# Patient Record
Sex: MALE | Race: BLACK OR AFRICAN AMERICAN | Employment: OTHER | URBAN - METROPOLITAN AREA
[De-identification: names, ages, dates, MRNs, and addresses within clinical notes are randomized per-mention and may not be internally consistent; named-entity substitution may affect disease eponyms.]

---

## 2017-01-31 ENCOUNTER — TELEPHONE (OUTPATIENT)
Dept: FAMILY MEDICINE CLINIC | Age: 77
End: 2017-01-31

## 2017-01-31 RX ORDER — ROSUVASTATIN CALCIUM 20 MG/1
TABLET, COATED ORAL
Qty: 90 TAB | Refills: 0 | Status: SHIPPED | OUTPATIENT
Start: 2017-01-31 | End: 2017-07-12 | Stop reason: SDUPTHER

## 2017-01-31 NOTE — TELEPHONE ENCOUNTER
----- Message from Emery Palomino sent at 1/31/2017 12:30 PM EST -----  Regarding: Dr. Frank Kelly is requesting a call back with the status of the medical records request that was sent to the office on 1/20/17.  (u)[phone]

## 2017-04-04 RX ORDER — VALSARTAN 160 MG/1
TABLET ORAL
Qty: 30 TAB | Refills: 0 | Status: SHIPPED | OUTPATIENT
Start: 2017-04-04 | End: 2017-05-09 | Stop reason: SDUPTHER

## 2017-04-04 RX ORDER — GLIMEPIRIDE 4 MG/1
TABLET ORAL
Qty: 30 TAB | Refills: 0 | Status: SHIPPED | OUTPATIENT
Start: 2017-04-04 | End: 2017-05-09 | Stop reason: SDUPTHER

## 2017-04-04 RX ORDER — CLOPIDOGREL BISULFATE 75 MG/1
TABLET ORAL
Qty: 30 TAB | Refills: 0 | Status: SHIPPED | OUTPATIENT
Start: 2017-04-04 | End: 2017-05-09 | Stop reason: SDUPTHER

## 2017-05-09 RX ORDER — GLIMEPIRIDE 4 MG/1
TABLET ORAL
Qty: 30 TAB | Refills: 0 | Status: SHIPPED | OUTPATIENT
Start: 2017-05-09 | End: 2017-06-13 | Stop reason: SDUPTHER

## 2017-05-09 RX ORDER — VALSARTAN 160 MG/1
TABLET ORAL
Qty: 30 TAB | Refills: 0 | Status: SHIPPED | OUTPATIENT
Start: 2017-05-09 | End: 2017-06-13 | Stop reason: SDUPTHER

## 2017-05-09 RX ORDER — CLOPIDOGREL BISULFATE 75 MG/1
TABLET ORAL
Qty: 30 TAB | Refills: 0 | Status: SHIPPED | OUTPATIENT
Start: 2017-05-09 | End: 2017-06-13 | Stop reason: SDUPTHER

## 2017-06-13 RX ORDER — GLIMEPIRIDE 4 MG/1
TABLET ORAL
Qty: 90 TAB | Refills: 0 | Status: SHIPPED | OUTPATIENT
Start: 2017-06-13 | End: 2017-09-18 | Stop reason: SDUPTHER

## 2017-06-13 RX ORDER — METFORMIN HYDROCHLORIDE 1000 MG/1
TABLET ORAL
Qty: 180 TAB | Refills: 12 | Status: SHIPPED | OUTPATIENT
Start: 2017-06-13 | End: 2018-01-15 | Stop reason: SDUPTHER

## 2017-06-13 RX ORDER — VALSARTAN 160 MG/1
TABLET ORAL
Qty: 90 TAB | Refills: 0 | Status: SHIPPED | OUTPATIENT
Start: 2017-06-13 | End: 2017-09-18 | Stop reason: SDUPTHER

## 2017-06-13 RX ORDER — AMITRIPTYLINE HYDROCHLORIDE 10 MG/1
TABLET, FILM COATED ORAL
Qty: 90 TAB | Refills: 12 | Status: SHIPPED | OUTPATIENT
Start: 2017-06-13 | End: 2018-07-20 | Stop reason: SDUPTHER

## 2017-06-13 RX ORDER — CLOPIDOGREL BISULFATE 75 MG/1
TABLET ORAL
Qty: 90 TAB | Refills: 0 | Status: SHIPPED | OUTPATIENT
Start: 2017-06-13 | End: 2018-01-12 | Stop reason: ALTCHOICE

## 2017-07-12 ENCOUNTER — OFFICE VISIT (OUTPATIENT)
Dept: FAMILY MEDICINE CLINIC | Age: 77
End: 2017-07-12

## 2017-07-12 ENCOUNTER — HOSPITAL ENCOUNTER (OUTPATIENT)
Dept: LAB | Age: 77
Discharge: HOME | End: 2017-07-12
Payer: MEDICARE

## 2017-07-12 VITALS
WEIGHT: 215 LBS | HEIGHT: 73 IN | BODY MASS INDEX: 28.49 KG/M2 | TEMPERATURE: 97.6 F | RESPIRATION RATE: 16 BRPM | DIASTOLIC BLOOD PRESSURE: 68 MMHG | HEART RATE: 108 BPM | SYSTOLIC BLOOD PRESSURE: 124 MMHG | OXYGEN SATURATION: 96 %

## 2017-07-12 DIAGNOSIS — E11.9 DIABETES MELLITUS WITHOUT COMPLICATION (HCC): Primary | ICD-10-CM

## 2017-07-12 DIAGNOSIS — Z13.39 SCREENING FOR ALCOHOLISM: ICD-10-CM

## 2017-07-12 DIAGNOSIS — Z00.00 ROUTINE GENERAL MEDICAL EXAMINATION AT A HEALTH CARE FACILITY: ICD-10-CM

## 2017-07-12 DIAGNOSIS — R06.09 DYSPNEA ON EXERTION: ICD-10-CM

## 2017-07-12 DIAGNOSIS — Z23 ENCOUNTER FOR IMMUNIZATION: ICD-10-CM

## 2017-07-12 LAB — HBA1C MFR BLD HPLC: 7.4 %

## 2017-07-12 PROCEDURE — 82043 UR ALBUMIN QUANTITATIVE: CPT

## 2017-07-12 PROCEDURE — 80061 LIPID PANEL: CPT

## 2017-07-12 PROCEDURE — 80053 COMPREHEN METABOLIC PANEL: CPT

## 2017-07-12 PROCEDURE — 36415 COLL VENOUS BLD VENIPUNCTURE: CPT

## 2017-07-12 PROCEDURE — 85025 COMPLETE CBC W/AUTO DIFF WBC: CPT

## 2017-07-12 RX ORDER — ACETAMINOPHEN AND CODEINE PHOSPHATE 300; 30 MG/1; MG/1
2 TABLET ORAL
Qty: 100 TAB | Refills: 5 | Status: SHIPPED | OUTPATIENT
Start: 2017-07-12 | End: 2018-03-12 | Stop reason: SDUPTHER

## 2017-07-12 RX ORDER — IBUPROFEN 800 MG/1
800 TABLET ORAL
Qty: 90 TAB | Refills: 12 | Status: SHIPPED | OUTPATIENT
Start: 2017-07-12 | End: 2018-01-12 | Stop reason: ALTCHOICE

## 2017-07-12 NOTE — PROGRESS NOTES
Chief Complaint   Patient presents with    Hypertension    Diabetes    Cholesterol Problem     1. Have you been to the ER, urgent care clinic since your last visit? Hospitalized since your last visit? No    2. Have you seen or consulted any other health care providers outside of the [facility] since your last visit? Include any pap smears or colon screening.  No     Health Maintenance Due   Topic Date Due    MEDICARE YEARLY EXAM  12/09/2016    HEMOGLOBIN A1C Q6M  06/09/2017    MICROALBUMIN Q1  06/09/2017

## 2017-07-12 NOTE — MR AVS SNAPSHOT
Visit Information Date & Time Provider Department Dept. Phone Encounter #  
 7/12/2017  3:00 PM Tatyana Roberts MD [facility] [phone] [id number] Follow-up Instructions Return in about 6 months (around 1/12/2018). Your Appointments 1/12/2018 10:30 AM  
ROUTINE CARE with Tatyana Roberts MD  
[facility] [address] [facility]) Appt Note: 6 months f/u.eli 7.12.17  
 [address] [facility] [address] [id number] [phone] [address] [facility] [address] Upcoming Health Maintenance Date Due  
 MEDICARE YEARLY EXAM 12/9/2016 HEMOGLOBIN A1C Q6M 6/9/2017 MICROALBUMIN Q1 6/9/2017 INFLUENZA AGE 9 TO ADULT 8/1/2017 EYE EXAM RETINAL OR DILATED Q1 9/29/2017 FOOT EXAM Q1 12/9/2017 LIPID PANEL Q1 12/9/2017 GLAUCOMA SCREENING Q2Y 9/29/2018 COLONOSCOPY 12/9/2018 DTaP/Tdap/Td series (2 - Td) 6/8/2025 Allergies as of 7/12/2017  Review Complete On: 7/12/2017 By: Tatyana Roberts MD  
  
 Severity Noted Reaction Type Reactions Lipitor [Atorvastatin]  10/29/2013   Side Effect Diarrhea Current Immunizations  Reviewed on 12/9/2015 Name Date Influenza High Dose Vaccine PF 12/9/2016 10:30 AM, 12/9/2015, 10/1/2014 Influenza Vaccine Split 9/12/2012, 10/25/2011, 11/29/2010 Pneumococcal Vaccine (Unspecified Type) 11/29/2010, 10/24/2004 Tdap 6/8/2015 Zoster Vaccine, Live 6/5/2013 Not reviewed this visit You Were Diagnosed With   
  
 Codes Comments Diabetes mellitus without complication ([facility]    -  Primary ICD-10-CM: E11.9 ICD-9-CM: 250.00 Dyspnea on exertion     ICD-10-CM: R06.09 
ICD-9-CM: 786.09 Encounter for immunization     ICD-10-CM: G20 ICD-9-CM: V03.89 Routine general medical examination at a health care facility     ICD-10-CM: Z00.00 ICD-9-CM: V70.0 Screening for alcoholism     ICD-10-CM: Z13.89 ICD-9-CM: V79.1 Vitals BP Pulse Temp Resp Height(growth percentile) Weight(growth percentile) 124/68 (!) 108 97.6 °F (36.4 °C) (Oral) 16 6' 1\" (1.854 m) 215 lb (97.5 kg) SpO2 BMI Smoking Status 96% 28.37 kg/m2 Former Smoker BMI and BSA Data Body Mass Index Body Surface Area  
 28.37 kg/m 2 2.24 m 2 Preferred Pharmacy Pharmacy Name Phone [facility] [employer] - is now [facility] [address]  [place] [phone] Your Updated Medication List  
  
   
This list is accurate as of: 7/12/17  5:52 PM.  Always use your most recent med list.  
  
  
  
  
 acetaminophen-codeine 300-30 mg per tablet Commonly known as:  TYLENOL-CODEINE #3 Take 2 Tabs by mouth every four (4) hours as needed for Pain. amitriptyline 10 mg tablet Commonly known as:  ELAVIL TAKE 1 TABLET BY MOUTH NIGHTLY TO PREVENT HAVING TO PASS WATER AT NIGHT AS DIRECTED  
  
 aspirin delayed-release 81 mg tablet Take 1 Tab by mouth daily. To prevent heart attack and stroke  
  
 clopidogrel 75 mg Tab Commonly known as:  PLAVIX TAKE 1 TABLET BY MOUTH EVERY DAY  
  
 glimepiride 4 mg tablet Commonly known as:  AMARYL  
TAKE 1 TABLET BY MOUTH EVERY DAY FOR SUGAR  
  
 hydroCHLOROthiazide 12.5 mg tablet Commonly known as:  HYDRODIURIL Take 1 Tab by mouth daily. For blood pressure  
  
 ibuprofen 800 mg tablet Commonly known as:  MOTRIN Take 1 Tab by mouth every eight (8) hours as needed for Pain.  
  
 metFORMIN 1,000 mg tablet Commonly known as:  GLUCOPHAGE  
TAKE 1 TABLET BY MOUTH TWICE DAILY WITH MEALS  
  
 metoprolol tartrate 25 mg tablet Commonly known as:  LOPRESSOR Take 0.5 Tabs by mouth two (2) times a day. pneumococcal 13 richard conj dip 0.5 mL Syrg injection Commonly known as:  PREVNAR-13  
0.5 mL by IntraMUSCular route once for 1 dose. rosuvastatin 20 mg tablet Commonly known as:  CRESTOR  
 TAKE 1 TABLET BY MOUTH EVERY NIGHT AT BEDTIME  
  
 sAXagliptin 5 mg Tab tablet Commonly known as:  ONGLYZA Take 1 Tab by mouth daily. for diabetes  
  
 sildenafil citrate 100 mg tablet Commonly known as:  VIAGRA Take 1 Tab by mouth as needed. 1/2 - tab one hour before sex on an empty stomach  
  
 valsartan 160 mg tablet Commonly known as:  DIOVAN  
TAKE 1 TABLET BY MOUTH EVERY DAY Prescriptions Printed Refills  
 acetaminophen-codeine (TYLENOL-CODEINE #3) 300-30 mg per tablet 5 Sig: Take 2 Tabs by mouth every four (4) hours as needed for Pain. Class: Print Route: Oral  
 pneumococcal 13 richard conj dip (PREVNAR-13) 0.5 mL syrg injection 0 Si.5 mL by IntraMUSCular route once for 1 dose. Class: Print Route: IntraMUSCular Prescriptions Sent to Pharmacy Refills  
 ibuprofen (MOTRIN) 800 mg tablet 12 Sig: Take 1 Tab by mouth every eight (8) hours as needed for Pain. Class: Normal  
 Pharmacy: [facility] [address] #: [phone] Route: Oral  
  
We Performed the Following AMB POC EKG ROUTINE W/ 12 LEADS, INTER & REP [23548 CPT(R)] AMB POC HEMOGLOBIN A1C [89355 CPT(R)] AMB POC SPIROMETRY [53792 CPT(R)] CBC WITH AUTOMATED DIFF [69194 CPT(R)]  DIABETES FOOT EXAM [HM7 Custom] LIPID PANEL [32603 CPT(R)] METABOLIC PANEL, COMPREHENSIVE [17205 CPT(R)] MICROALBUMIN, UR, RAND W/ MICROALBUMIN/CREA RATIO A2560525 CPT(R)] REFERRAL TO CARDIOLOGY [ANL96 Custom] Comments:  
 Please evaluate patient for dyspnea, heart murmur Follow-up Instructions Return in about 6 months (around 2018). To-Do List   
 2017 ECHO:  2D ECHO COMPLETE ADULT (TTE) W OR WO CONTR   
  
 2017 Imaging:  XR CHEST PA LAT Referral Information Referral ID Referred By Referred To [id number] Berry Toro Not Available Visits Status Start Date End Date 1 New Request 7/12/17 7/12/18 If your referral has a status of pending review or denied, additional information will be sent to support the outcome of this decision. Patient Instructions Medicare Wellness Visit, Male The best way to live healthy is to have a healthy lifestyle by eating a well-balanced diet, exercising regularly, limiting alcohol and stopping smoking. Regular physical exams and screening tests are another way to keep healthy. Preventive exams provided by your health care provider can find health problems before they become diseases or illnesses. Preventive services including immunizations, screening tests, monitoring and exams can help you take care of your own health. All people over age 72 should have a pneumovax  and and a prevnar shot to prevent pneumonia. These are once in a lifetime unless you and your provider decide differently. You had the PPSV 23 vaccine but are due for prevnar 13 - reviewed this with you today. All people over 65 should have a yearly flu shot and a tetanus vaccine every 10 years. You had your flu shot this year and are due again in the Fall 2017 coming up soon. You had Tdap in 2015 and will be due for a tetanus booster in 2025. Screening for diabetes mellitus with a blood sugar test should be done every year. Your A1c in December 2016 was 6.9% which is controlled. This will be checked again today. Glaucoma is a disease of the eye due to increased ocular pressure that can lead to blindness and it should be done every year by an eye professional. Boom Acuna are due for an eye exam in September 2017 and plan to make an appt. Cardiovascular screening tests that check for elevated lipids (fatty part of blood) which can lead to heart disease and strokes should be done every 5 years.  Your cholesterol was checked in December 2016, and is being checked again today. You are on a cholesterol medication. Colorectal screening that evaluates for blood or polyps in your colon should be done yearly as a stool test or every five years as a flexible sigmoidoscope or every 10 years as a colonoscopy up to age 76. You plan to discuss with Dr. Crystal Baker if you need this. Men up to age 76 may need a screening blood test for prostate cancer at certain intervals, depending on their personal and family history. This decision is between the patient and his provider. You had a normal PSA check in 2013. A shingles vaccine is also recommended once in a lifetime after age 61. You had this vaccine on 6/5/13. Your Medicare Wellness Exam is recommended annually. Introducing [facility] & HE[facility] SERVICES! Haley Shane introduces D[employer] patient portal. Now you can access parts of your medical record, email your doctor's office, and request medication refills online. 1. In your internet browser, go to [email or web address] 2. Click on the First Time User? Click Here link in the Sign In box. You will see the New Member Sign Up page. 3. Enter your DXY Access Code exactly as it appears below. You will not need to use this code after youve completed the sign-up process. If you do not sign up before the expiration date, you must request a new code. · DXY Access Code: [email or web address] Expires: 10/10/2017  4:21 PM 
 
4. Enter the last four digits of your Social Security Number (xxxx) and Date of Birth (mm/dd/yyyy) as indicated and click Submit. You will be taken to the next sign-up page. 5. Create a DXY ID. This will be your DXY login ID and cannot be changed, so think of one that is secure and easy to remember. 6. Create a DXY password. You can change your password at any time. 7. Enter your Password Reset Question and Answer. This can be used at a later time if you forget your password. 8. Enter your e-mail address. You will receive e-mail notification when new information is available in [address] 9. Click Sign Up. You can now view and download portions of your medical record. 10. Click the Download Summary menu link to download a portable copy of your medical information. If you have questions, please visit the Frequently Asked Questions section of the [employer] website. Remember, [employer] is NOT to be used for urgent needs. For medical emergencies, dial 911. Now available from your iPhone and Android! Please provide this summary of care documentation to your next provider. Your primary care clinician is listed as Stephanie Graves. If you have any questions after today's visit, please call [phone].

## 2017-07-12 NOTE — PROGRESS NOTES
Dr. Claudell Canes Referred AW, 1940, a 68 y.o. male for a Medicare Annual Wellness Visit (AWV). This is a Subsequent Medicare Annual Wellness Visit providing Personalized Prevention Plan Services (PPPS) (Performed 12 months after initial AWV and PPPS )    I have reviewed the patient's medical history in detail and updated the computerized patient record. History     Past Medical History:   Diagnosis Date    Arthritis     CAD (coronary artery disease)     Diabetes ([facility]     Diabetic neuropathy ([facility]     Hypercholesterolemia     Hypertension 11/29/10    PVD (peripheral vascular disease) ([facility]     Shingles 2010    [facility]boembolus [facility])       Past Surgical History:   Procedure Laterality Date    HX CARPAL TUNNEL RELEASE  left    HX HEART CATHETERIZATION  12/2005    Dr Lan      left arm fracture    CA COLONOSCOPY FLX DX W/COLLJ SPEC WHEN PFRMD  11/12/2012          Current Outpatient Prescriptions   Medication Sig Dispense Refill    acetaminophen-codeine (TYLENOL-CODEINE #3) 300-30 mg per tablet Take 2 Tabs by mouth every four (4) hours as needed for Pain. 100 Tab 5    ibuprofen (MOTRIN) 800 mg tablet Take 1 Tab by mouth every eight (8) hours as needed for Pain. 90 Tab 12    pneumococcal 13 richard conj dip (PREVNAR-13) 0.5 mL syrg injection 0.5 mL by IntraMUSCular route once for 1 dose. 0.5 mL 0    sAXagliptin (ONGLYZA) 5 mg tab tablet Take 1 Tab by mouth daily.  for diabetes 30 Tab 12    metFORMIN (GLUCOPHAGE) 1,000 mg tablet TAKE 1 TABLET BY MOUTH TWICE DAILY WITH MEALS 180 Tab 12    amitriptyline (ELAVIL) 10 mg tablet TAKE 1 TABLET BY MOUTH NIGHTLY TO PREVENT HAVING TO PASS WATER AT NIGHT AS DIRECTED 90 Tab 12    clopidogrel (PLAVIX) 75 mg tab TAKE 1 TABLET BY MOUTH EVERY DAY 90 Tab 0    valsartan (DIOVAN) 160 mg tablet TAKE 1 TABLET BY MOUTH EVERY DAY 90 Tab 0    glimepiride (AMARYL) 4 mg tablet TAKE 1 TABLET BY MOUTH EVERY DAY FOR SUGAR 90 Tab 0    rosuvastatin (CRESTOR) 20 mg tablet TAKE 1 TABLET BY MOUTH EVERY NIGHT AT BEDTIME 90 Tab 3    metoprolol tartrate (LOPRESSOR) 25 mg tablet Take 0.5 Tabs by mouth two (2) times a day. 30 Tab 3    hydrochlorothiazide (HYDRODIURIL) 12.5 mg tablet Take 1 Tab by mouth daily. For blood pressure 90 Tab 3    aspirin delayed-release 81 mg tablet Take 1 Tab by mouth daily. To prevent heart attack and stroke 30 Tab 12    sildenafil citrate (VIAGRA) 100 mg tablet Take 1 Tab by mouth as needed. 1/2 - tab one hour before sex on an empty stomach 6 Tab 5     Allergies   Allergen Reactions    Lipitor [Atorvastatin] Diarrhea     Family History   Problem Relation Age of Onset    Stroke Father     Heart Disease Brother     Cancer Brother      Social History   Substance Use Topics    Smoking status: Former Smoker     Quit date: 1/1/2000    Smokeless tobacco: Never Used      Comment: 15 years ago    Alcohol use Yes      Comment: occ. Patient Active Problem List   Diagnosis Code    Hypercholesterolemia E78.00    Diabetes ([facility] E11.9    Diabetes mellitus without complication ([facility] T85.6    Essential hypertension I10       Depression Risk Factor Screening:     PHQ over the last two weeks 7/12/2017   Little interest or pleasure in doing things Not at all   Feeling down, depressed or hopeless Not at all   Total Score PHQ 2 0     Alcohol Risk Factor Screening: On any occasion during the past 3 months, have you had more than 4 drinks containing alcohol? No    Do you average more than 14 drinks per week? No        Functional Ability and Level of Safety:     Hearing Loss   mild-to-moderate    Activities of Daily Living   Self-care.    Requires assistance with: no ADLs  ADL Assessment 7/12/2017   Feeding yourself No Help Needed   Getting from bed to chair No Help Needed   Getting dressed No Help Needed   Bathing or showering No Help Needed   Walk across the room (includes cane/walker) No Help Needed   Using the telphone No Help Needed   Taking your medications No Help Needed   Preparing meals No Help Needed   Managing money (expenses/bills) No Help Needed   Moderately strenuous housework (laundry) No Help Needed   Shopping for personal items (toiletries/medicines) No Help Needed   Shopping for groceries No Help Needed   Driving No Help Needed   Climbing a flight of stairs No Help Needed   Getting to places beyond walking distances No Help Needed         Fall Risk   Fall Risk Assessment, last 12 mths 7/12/2017   Able to walk? Yes   Fall in past 12 months? No     Abuse Screen   Patient is not abused    Review of Systems   Not required    Physical Examination     Evaluation of Cognitive Function:  Mood/affect:  happy  Appearance: age appropriate and within normal Limits  Family member/caregiver input: none present    No exam performed today for AWV; Dr. Stephanie Corona saw pt today and performed. Patient Care Team:  Yael Bobby MD as PCP - General    Advice/Referrals/Counseling   Education and counseling provided:  End-of-Life planning (with patient's consent)  Pneumococcal Vaccine  Colorectal cancer screening tests  Screening for glaucoma    Due for prevnar 13 - given information on this and also the \"your right to decide brochure\" for advanced directives     Assessment/Plan   specific diabetic recommendations: home glucose monitoring emphasized   Recommend repeat A1c as planned today - last check it was at goal at 6.9% - pt checks daily and states ranges from 85 - 170. Reviewed medicare-recommended preventative screenings as noted in patient instructions. Patient verbalized understanding of information presented. Answered all of the patient's questions. AVS handed and reviewed with patient.     Nathen Mcfadden, PHARMD, CDE

## 2017-07-12 NOTE — PROGRESS NOTES
HISTORY OF PRESENT ILLNESS  Angelique Lucia is a 68 y.o. male. HPI Pt. Comes in for blood pressure, cholesterol, and diabetes check. Has had some mild dyspnea for the last 3 months, seems to get worse when gets a cold. Symptoms seem worse if climbs stairs or cuts grass. . NO real chest tightness, edema. Slight cough at times. Nonsmoker. Blood sugars usually run in the 100s. NO hypoglycemic episodes. ROS    Physical Exam   Constitutional: He appears well-developed and well-nourished. HENT:   Right Ear: External ear normal.   Left Ear: External ear normal.   Mouth/Throat: Oropharynx is clear and moist.   Neck: No thyromegaly present. Cardiovascular: Normal rate, regular rhythm, normal heart sounds and intact distal pulses. 2/6 systolic murmur   Pulmonary/Chest: Effort normal and breath sounds normal. No respiratory distress. He has no wheezes. Abdominal: Soft. Bowel sounds are normal. He exhibits no distension and no mass. There is no tenderness. There is no guarding. Musculoskeletal: Normal range of motion. He exhibits no edema. Lymphadenopathy:     He has no cervical adenopathy. Nursing note and vitals reviewed. ASSESSMENT and PLAN  Orders Placed This Encounter    AMB POC SPIROMETRY    XR CHEST PA LAT     Standing Status:   Future     Number of Occurrences:   1     Standing Expiration Date:   10/12/2017     Order Specific Question:   Reason for Exam     Answer:   dyspnea     Order Specific Question:   Is Patient Allergic to Contrast Dye?      Answer:   No    CBC WITH AUTOMATED DIFF    METABOLIC PANEL, COMPREHENSIVE    LIPID PANEL    MICROALBUMIN, UR, RAND W/ MICROALBUMIN/CREA RATIO    REFERRAL TO CARDIOLOGY     Referral Priority:   Routine     Referral Type:   Consultation     Referral Reason:   Specialty Services Required    AMB POC HEMOGLOBIN A1C    AMB POC EKG ROUTINE W/ 12 LEADS, INTER & REP     Order Specific Question:   Reason for Exam:     Answer:   dyspnea    2D ECHO COMPLETE ADULT (TTE) W OR WO CONTR     Standing Status:   Future     Standing Expiration Date:   2018     Order Specific Question:   Reason for Exam:     Answer:   dyspnea, heart murmur     Order Specific Question:   Contrast Enhancement (Bubble Study, Definity, Optison) may be used if criteria listed in established evidence-based protocol has been identified. Answer:   No    HM DIABETES FOOT EXAM    acetaminophen-codeine (TYLENOL-CODEINE #3) 300-30 mg per tablet     Sig: Take 2 Tabs by mouth every four (4) hours as needed for Pain. Dispense:  100 Tab     Refill:  5    ibuprofen (MOTRIN) 800 mg tablet     Sig: Take 1 Tab by mouth every eight (8) hours as needed for Pain. Dispense:  90 Tab     Refill:  12    pneumococcal 13 richard conj dip (PREVNAR-13) 0.5 mL syrg injection     Si.5 mL by IntraMUSCular route once for 1 dose. Dispense:  0.5 mL     Refill:  0     Orders Placed This Encounter    AMB POC SPIROMETRY    XR CHEST PA LAT    CBC WITH AUTOMATED DIFF    METABOLIC PANEL, COMPREHENSIVE    LIPID PANEL    MICROALBUMIN, UR, RAND W/ MICROALBUMIN/CREA RATIO    REFERRAL TO CARDIOLOGY    AMB POC HEMOGLOBIN A1C    AMB POC EKG ROUTINE W/ 12 LEADS, INTER & REP    2D ECHO COMPLETE ADULT (TTE) W OR WO CONTR    HM DIABETES FOOT EXAM    acetaminophen-codeine (TYLENOL-CODEINE #3) 300-30 mg per tablet    ibuprofen (MOTRIN) 800 mg tablet    pneumococcal 13 richard conj dip (PREVNAR-13) 0.5 mL syrg injection     Rory was seen today for hypertension, diabetes and cholesterol problem. Diagnoses and all orders for this visit:    Diabetes mellitus without complication ([facility]  -     CBC WITH AUTOMATED DIFF  -     METABOLIC PANEL, COMPREHENSIVE  -     LIPID PANEL  -     AMB POC HEMOGLOBIN A1C  -     MICROALBUMIN, UR, RAND W/ MICROALBUMIN/CREA RATIO  -     HM DIABETES FOOT EXAM    Dyspnea on exertion  -     AMB POC EKG ROUTINE W/ 12 LEADS, INTER & REP  -     XR CHEST PA LAT;  Future  -     REFERRAL TO CARDIOLOGY  -     2D ECHO COMPLETE ADULT (TTE) W OR WO CONTR; Future  -     AMB POC SPIROMETRY    Encounter for immunization  -     acetaminophen-codeine (TYLENOL-CODEINE #3) 300-30 mg per tablet; Take 2 Tabs by mouth every four (4) hours as needed for Pain. Routine general medical examination at a health care facility  -     acetaminophen-codeine (TYLENOL-CODEINE #3) 300-30 mg per tablet; Take 2 Tabs by mouth every four (4) hours as needed for Pain. Screening for alcoholism    Other orders  -     ibuprofen (MOTRIN) 800 mg tablet; Take 1 Tab by mouth every eight (8) hours as needed for Pain. -     pneumococcal 13 richard conj dip (PREVNAR-13) 0.5 mL syrg injection; 0.5 mL by IntraMUSCular route once for 1 dose. Follow-up Disposition:  Return in about 6 months (around 1/12/2018).

## 2017-07-12 NOTE — PATIENT INSTRUCTIONS
Medicare Wellness Visit, Male    The best way to live healthy is to have a healthy lifestyle by eating a well-balanced diet, exercising regularly, limiting alcohol and stopping smoking. Regular physical exams and screening tests are another way to keep healthy. Preventive exams provided by your health care provider can find health problems before they become diseases or illnesses. Preventive services including immunizations, screening tests, monitoring and exams can help you take care of your own health. All people over age 72 should have a pneumovax  and and a prevnar shot to prevent pneumonia. These are once in a lifetime unless you and your provider decide differently. You had the PPSV 23 vaccine but are due for prevnar 13 - reviewed this with you today. All people over 65 should have a yearly flu shot and a tetanus vaccine every 10 years. You had your flu shot this year and are due again in the Fall 2017 coming up soon. You had Tdap in 2015 and will be due for a tetanus booster in 2025. Screening for diabetes mellitus with a blood sugar test should be done every year. Your A1c in December 2016 was 6.9% which is controlled. This will be checked again today. Glaucoma is a disease of the eye due to increased ocular pressure that can lead to blindness and it should be done every year by an eye professional. Tc Bourgeois are due for an eye exam in September 2017 and plan to make an appt. Cardiovascular screening tests that check for elevated lipids (fatty part of blood) which can lead to heart disease and strokes should be done every 5 years. Your cholesterol was checked in December 2016, and is being checked again today. You are on a cholesterol medication. Colorectal screening that evaluates for blood or polyps in your colon should be done yearly as a stool test or every five years as a flexible sigmoidoscope or every 10 years as a colonoscopy up to age 76.  You plan to discuss with Dr. Tamara Boudreaux if you need this. Men up to age 76 may need a screening blood test for prostate cancer at certain intervals, depending on their personal and family history. This decision is between the patient and his provider. You had a normal PSA check in 2013. A shingles vaccine is also recommended once in a lifetime after age 61. You had this vaccine on 6/5/13. Your Medicare Wellness Exam is recommended annually.

## 2017-07-13 LAB
ALBUMIN SERPL-MCNC: 4.4 G/DL (ref 3.5–4.8)
ALBUMIN/CREAT UR: 16.8 MG/G CREAT (ref 0–30)
ALBUMIN/GLOB SERPL: 1.5 {RATIO} (ref 1.2–2.2)
ALP SERPL-CCNC: 78 IU/L (ref 39–117)
ALT SERPL-CCNC: 21 IU/L (ref 0–44)
AST SERPL-CCNC: 25 IU/L (ref 0–40)
BASOPHILS # BLD AUTO: 0 X10E3/UL (ref 0–0.2)
BASOPHILS NFR BLD AUTO: 0 %
BILIRUB SERPL-MCNC: 0.3 MG/DL (ref 0–1.2)
BUN SERPL-MCNC: 12 MG/DL (ref 8–27)
BUN/CREAT SERPL: 11 (ref 10–24)
CALCIUM SERPL-MCNC: 10.1 MG/DL (ref 8.6–10.2)
CHLORIDE SERPL-SCNC: 97 MMOL/L (ref 96–106)
CHOLEST SERPL-MCNC: 151 MG/DL (ref 100–199)
CO2 SERPL-SCNC: 24 MMOL/L (ref 18–29)
CREAT SERPL-MCNC: 1.05 MG/DL (ref 0.76–1.27)
CREAT UR-MCNC: 246.4 MG/DL
EOSINOPHIL # BLD AUTO: 0.2 X10E3/UL (ref 0–0.4)
EOSINOPHIL NFR BLD AUTO: 3 %
ERYTHROCYTE [DISTWIDTH] IN BLOOD BY AUTOMATED COUNT: 14 % (ref 12.3–15.4)
GLOBULIN SER CALC-MCNC: 3 G/DL (ref 1.5–4.5)
GLUCOSE SERPL-MCNC: 183 MG/DL (ref 65–99)
HCT VFR BLD AUTO: 40.3 % (ref 37.5–51)
HDLC SERPL-MCNC: 40 MG/DL
HGB BLD-MCNC: 12.9 G/DL (ref 12.6–17.7)
IMM GRANULOCYTES # BLD: 0 X10E3/UL (ref 0–0.1)
IMM GRANULOCYTES NFR BLD: 0 %
INTERPRETATION, 910389: NORMAL
LDLC SERPL CALC-MCNC: 82 MG/DL (ref 0–99)
LYMPHOCYTES # BLD AUTO: 3.1 X10E3/UL (ref 0.7–3.1)
LYMPHOCYTES NFR BLD AUTO: 34 %
Lab: NORMAL
Lab: NORMAL
MCH RBC QN AUTO: 27.7 PG (ref 26.6–33)
MCHC RBC AUTO-ENTMCNC: 32 G/DL (ref 31.5–35.7)
MCV RBC AUTO: 87 FL (ref 79–97)
MICROALBUMIN UR-MCNC: 41.5 UG/ML
MONOCYTES # BLD AUTO: 0.5 X10E3/UL (ref 0.1–0.9)
MONOCYTES NFR BLD AUTO: 6 %
NEUTROPHILS # BLD AUTO: 5 X10E3/UL (ref 1.4–7)
NEUTROPHILS NFR BLD AUTO: 57 %
PLATELET # BLD AUTO: 325 X10E3/UL (ref 150–379)
POTASSIUM SERPL-SCNC: 4.5 MMOL/L (ref 3.5–5.2)
PROT SERPL-MCNC: 7.4 G/DL (ref 6–8.5)
RBC # BLD AUTO: 4.65 X10E6/UL (ref 4.14–5.8)
SODIUM SERPL-SCNC: 139 MMOL/L (ref 134–144)
TRIGL SERPL-MCNC: 147 MG/DL (ref 0–149)
VLDLC SERPL CALC-MCNC: 29 MG/DL (ref 5–40)
WBC # BLD AUTO: 8.9 X10E3/UL (ref 3.4–10.8)

## 2017-08-02 ENCOUNTER — CLINICAL SUPPORT (OUTPATIENT)
Dept: CARDIOLOGY CLINIC | Age: 77
End: 2017-08-02

## 2017-08-02 DIAGNOSIS — I10 ESSENTIAL HYPERTENSION: Primary | ICD-10-CM

## 2017-08-02 DIAGNOSIS — E78.00 HYPERCHOLESTEROLEMIA: ICD-10-CM

## 2017-09-18 RX ORDER — GLIMEPIRIDE 4 MG/1
TABLET ORAL
Qty: 90 TAB | Refills: 0 | Status: SHIPPED | OUTPATIENT
Start: 2017-09-18 | End: 2017-12-10 | Stop reason: SDUPTHER

## 2017-09-18 RX ORDER — VALSARTAN 160 MG/1
TABLET ORAL
Qty: 90 TAB | Refills: 0 | Status: SHIPPED | OUTPATIENT
Start: 2017-09-18 | End: 2017-12-10 | Stop reason: SDUPTHER

## 2017-11-03 RX ORDER — NAPROXEN 500 MG/1
TABLET ORAL
Qty: 180 TAB | Refills: 12 | Status: SHIPPED | OUTPATIENT
Start: 2017-11-03 | End: 2018-03-12 | Stop reason: SDUPTHER

## 2017-12-11 RX ORDER — METOPROLOL TARTRATE 25 MG/1
TABLET, FILM COATED ORAL
Qty: 30 TAB | Refills: 0 | Status: SHIPPED | OUTPATIENT
Start: 2017-12-11 | End: 2018-01-12 | Stop reason: SDUPTHER

## 2017-12-11 RX ORDER — GLIMEPIRIDE 4 MG/1
TABLET ORAL
Qty: 90 TAB | Refills: 0 | Status: SHIPPED | OUTPATIENT
Start: 2017-12-11 | End: 2018-01-12 | Stop reason: SDUPTHER

## 2017-12-11 RX ORDER — VALSARTAN 160 MG/1
TABLET ORAL
Qty: 90 TAB | Refills: 0 | Status: SHIPPED | OUTPATIENT
Start: 2017-12-11 | End: 2018-01-12 | Stop reason: SDUPTHER

## 2018-01-12 ENCOUNTER — HOSPITAL ENCOUNTER (OUTPATIENT)
Dept: LAB | Age: 78
Discharge: HOME | End: 2018-01-12
Payer: MEDICARE

## 2018-01-12 ENCOUNTER — OFFICE VISIT (OUTPATIENT)
Dept: FAMILY MEDICINE CLINIC | Age: 78
End: 2018-01-12

## 2018-01-12 VITALS
TEMPERATURE: 97.2 F | HEIGHT: 73 IN | OXYGEN SATURATION: 96 % | BODY MASS INDEX: 29.29 KG/M2 | SYSTOLIC BLOOD PRESSURE: 138 MMHG | WEIGHT: 221 LBS | HEART RATE: 60 BPM | DIASTOLIC BLOOD PRESSURE: 55 MMHG | RESPIRATION RATE: 14 BRPM

## 2018-01-12 DIAGNOSIS — E11.9 TYPE 2 DIABETES MELLITUS WITHOUT COMPLICATION, WITH LONG-TERM CURRENT USE OF INSULIN (HCC): Primary | ICD-10-CM

## 2018-01-12 DIAGNOSIS — I10 ESSENTIAL HYPERTENSION: ICD-10-CM

## 2018-01-12 DIAGNOSIS — R49.0 HOARSENESS: ICD-10-CM

## 2018-01-12 DIAGNOSIS — E78.00 HYPERCHOLESTEROLEMIA: ICD-10-CM

## 2018-01-12 DIAGNOSIS — Z79.4 TYPE 2 DIABETES MELLITUS WITHOUT COMPLICATION, WITH LONG-TERM CURRENT USE OF INSULIN (HCC): Primary | ICD-10-CM

## 2018-01-12 DIAGNOSIS — Z23 ENCOUNTER FOR IMMUNIZATION: ICD-10-CM

## 2018-01-12 LAB — HBA1C MFR BLD HPLC: 8.1 %

## 2018-01-12 PROCEDURE — 85025 COMPLETE CBC W/AUTO DIFF WBC: CPT

## 2018-01-12 PROCEDURE — 80061 LIPID PANEL: CPT

## 2018-01-12 PROCEDURE — 82043 UR ALBUMIN QUANTITATIVE: CPT

## 2018-01-12 PROCEDURE — 36415 COLL VENOUS BLD VENIPUNCTURE: CPT

## 2018-01-12 PROCEDURE — 80053 COMPREHEN METABOLIC PANEL: CPT

## 2018-01-12 RX ORDER — GLIMEPIRIDE 4 MG/1
TABLET ORAL
Qty: 90 TAB | Refills: 3 | Status: SHIPPED | OUTPATIENT
Start: 2018-01-12 | End: 2018-03-12 | Stop reason: SDUPTHER

## 2018-01-12 RX ORDER — VALSARTAN 160 MG/1
TABLET ORAL
Qty: 90 TAB | Refills: 3 | Status: SHIPPED | OUTPATIENT
Start: 2018-01-12 | End: 2018-03-12 | Stop reason: SDUPTHER

## 2018-01-12 RX ORDER — METOPROLOL TARTRATE 25 MG/1
TABLET, FILM COATED ORAL
Qty: 90 TAB | Refills: 12 | Status: SHIPPED | OUTPATIENT
Start: 2018-01-12 | End: 2018-03-12 | Stop reason: SDUPTHER

## 2018-01-12 RX ORDER — METOPROLOL TARTRATE 25 MG/1
TABLET, FILM COATED ORAL
Qty: 30 TAB | Refills: 12 | Status: SHIPPED | OUTPATIENT
Start: 2018-01-12 | End: 2018-01-12 | Stop reason: SDUPTHER

## 2018-01-12 RX ORDER — ROSUVASTATIN CALCIUM 20 MG/1
TABLET, COATED ORAL
Qty: 90 TAB | Refills: 3 | Status: SHIPPED | OUTPATIENT
Start: 2018-01-12 | End: 2018-03-12 | Stop reason: SDUPTHER

## 2018-01-12 RX ORDER — HYDROCHLOROTHIAZIDE 12.5 MG/1
12.5 TABLET ORAL DAILY
Qty: 90 TAB | Refills: 3 | Status: SHIPPED | OUTPATIENT
Start: 2018-01-12 | End: 2018-12-03 | Stop reason: SDUPTHER

## 2018-01-12 NOTE — MR AVS SNAPSHOT
Visit Information Date & Time Provider Department Dept. Phone Encounter #  
 1/12/2018 10:30 AM Jeffry Quintanilla MD [facility] [phone] [id number] Upcoming Health Maintenance Date Due  
 MEDICARE YEARLY EXAM 12/9/2016 EYE EXAM RETINAL OR DILATED Q1 9/29/2017 HEMOGLOBIN A1C Q6M 1/12/2018 FOOT EXAM Q1 7/12/2018 MICROALBUMIN Q1 7/12/2018 LIPID PANEL Q1 7/12/2018 GLAUCOMA SCREENING Q2Y 9/29/2018 COLONOSCOPY 12/9/2018 DTaP/Tdap/Td series (2 - Td) 6/8/2025 Allergies as of 1/12/2018  Review Complete On: 1/12/2018 By: Jeffry Quintanilla MD  
  
 Severity Noted Reaction Type Reactions Lipitor [Atorvastatin]  10/29/2013   Side Effect Diarrhea Current Immunizations  Reviewed on 12/9/2015 Name Date Influenza High Dose Vaccine PF 12/9/2016 10:30 AM, 12/9/2015, 10/1/2014 Influenza Vaccine Split 9/12/2012, 10/25/2011, 11/29/2010 Tdap 6/8/2015 ZZZ-RETIRED (DO NOT USE) Pneumococcal Vaccine (Unspecified Type) 11/29/2010, 10/24/2004 Zoster Vaccine, Live 6/5/2013 Not reviewed this visit You Were Diagnosed With   
  
 Codes Comments Type 2 diabetes mellitus without complication, with long-term current use of insulin (HCC)    -  Primary ICD-10-CM: E11.9, Z79.4 ICD-9-CM: 250.00, V58.67 Essential hypertension     ICD-10-CM: I10 
ICD-9-CM: 401.9 Hypercholesterolemia     ICD-10-CM: E78.00 ICD-9-CM: 272.0 Hoarseness     ICD-10-CM: R49.0 ICD-9-CM: 784.42 Vitals BP Pulse Temp Resp Height(growth percentile) Weight(growth percentile) 138/55 60 97.2 °F (36.2 °C) (Oral) 14 6' 1\" (1.854 m) 221 lb (100.2 kg) SpO2 BMI Smoking Status 96% 29.16 kg/m2 Former Smoker BMI and BSA Data Body Mass Index Body Surface Area  
 29.16 kg/m 2 2.27 m 2 Preferred Pharmacy Pharmacy Name Phone  [facility] [place] [address] [phone] Your Updated Medication List  
  
   
This list is accurate as of: 1/12/18 11:28 AM.  Always use your most recent med list.  
  
  
  
  
 acetaminophen-codeine 300-30 mg per tablet Commonly known as:  TYLENOL-CODEINE #3 Take 2 Tabs by mouth every four (4) hours as needed for Pain. amitriptyline 10 mg tablet Commonly known as:  ELAVIL TAKE 1 TABLET BY MOUTH NIGHTLY TO PREVENT HAVING TO PASS WATER AT NIGHT AS DIRECTED  
  
 aspirin delayed-release 81 mg tablet Take 1 Tab by mouth daily. To prevent heart attack and stroke  
  
 glimepiride 4 mg tablet Commonly known as:  AMARYL  
TAKE 1 TABLET BY MOUTH EVERY DAY FOR SUGAR  
  
 hydroCHLOROthiazide 12.5 mg tablet Commonly known as:  HYDRODIURIL Take 1 Tab by mouth daily. For blood pressure  
  
 metFORMIN 1,000 mg tablet Commonly known as:  GLUCOPHAGE  
TAKE 1 TABLET BY MOUTH TWICE DAILY WITH MEALS  
  
 metoprolol tartrate 25 mg tablet Commonly known as:  LOPRESSOR  
TAKE 1/2 TABLET BY MOUTH TWICE DAILY  
  
 naproxen 500 mg tablet Commonly known as:  NAPROSYN  
TAKE 1 TABLET BY MOUTH TWICE DAILY WITH MEALS FOR KNEE AND HIP PAIN  
  
 rosuvastatin 20 mg tablet Commonly known as:  CRESTOR  
TAKE 1 TABLET BY MOUTH EVERY NIGHT AT BEDTIME for cholesterol sAXagliptin 5 mg Tab tablet Commonly known as:  ONGLYZA Take 1 Tab by mouth daily. for diabetes  
  
 sildenafil citrate 100 mg tablet Commonly known as:  VIAGRA Take 1 Tab by mouth as needed. 1/2 - tab one hour before sex on an empty stomach  
  
 valsartan 160 mg tablet Commonly known as:  DIOVAN  
TAKE 1 TABLET BY MOUTH EVERY DAY for blood pressure Prescriptions Sent to Pharmacy Refills  
 rosuvastatin (CRESTOR) 20 mg tablet 3 Sig: TAKE 1 TABLET BY MOUTH EVERY NIGHT AT BEDTIME for cholesterol  Class: Normal  
 [facility] [address] [place], [place] [address] AT [address] [place] [address] Ph #: [phone]  
 hydroCHLOROthiazide (HYDRODIURIL) 12.5 mg tablet 3 Sig: Take 1 Tab by mouth daily. For blood pressure Class: Normal  
 Pharmacy: [facility] [address] Ph #: [phone] Route: Oral  
 glimepiride (AMARYL) 4 mg tablet 3 Sig: TAKE 1 TABLET BY MOUTH EVERY DAY FOR SUGAR Class: Normal  
 Pharmacy: [place] [address] AT [address] [place] [address] Ph #: [phone]  
 valsartan (DIOVAN) 160 mg tablet 3 Sig: TAKE 1 TABLET BY MOUTH EVERY DAY for blood pressure Class: Normal  
 Pharmacy: [facility] [address] AT [address] [place] [address] Ph #: [phone]  
 metoprolol tartrate (LOPRESSOR) 25 mg tablet 12 Sig: TAKE 1/2 TABLET BY MOUTH TWICE DAILY Class: Normal  
 Pharmacy: [facility] [address] Ph #: [phone] We Performed the Following AMB POC HEMOGLOBIN A1C [85392 CPT(R)] CBC WITH AUTOMATED DIFF [13286 CPT(R)] FUNDUS PHOTOGRAPHY [id number] CPT(R)]  DIABETES FOOT EXAM [HM7 Custom] LIPID PANEL [69584 CPT(R)] METABOLIC PANEL, COMPREHENSIVE [09358 CPT(R)] MICROALBUMIN, UR, RAND W/ MICROALBUMIN/CREA RATIO [id number] CPT(R)] REFERRAL TO ENT-OTOLARYNGOLOGY [OOQ14 Custom] Referral Information Referral ID Referred By Referred To  
  
 [id number] Juan Loss Not Available Visits Status Start Date End Date 1 New Request 1/12/18 1/12/19 If your referral has a status of pending review or denied, additional information will be sent to support the outcome of this decision. Introducing [address]!    
 Faby Colvin introduces [employer] patient portal. Now you can access parts of your medical record, email your doctor's office, and request medication refills online. 1. In your internet browser, go to [email or web address] 2. Click on the First Time User? Click Here link in the Sign In box. You will see the New Member Sign Up page. 3. Enter your GBS Access Code exactly as it appears below. You will not need to use this code after youve completed the sign-up process. If you do not sign up before the expiration date, you must request a new code. · GBS Access Code: [email or web address] Expires: 4/12/2018 10:34 AM 
 
4. Enter the last four digits of your Social Security Number (xxxx) and Date of Birth (mm/dd/yyyy) as indicated and click Submit. You will be taken to the next sign-up page. 5. Create a GBS ID. This will be your GBS login ID and cannot be changed, so think of one that is secure and easy to remember. 6. Create a GBS password. You can change your password at any time. 7. Enter your Password Reset Question and Answer. This can be used at a later time if you forget your password. 8. Enter your e-mail address. You will receive e-mail notification when new information is available in [address] 9. Click Sign Up. You can now view and download portions of your medical record. 10. Click the Download Summary menu link to download a portable copy of your medical information. If you have questions, please visit the Frequently Asked Questions section of the GBS website. Remember, GBS is NOT to be used for urgent needs. For medical emergencies, dial 911. Now available from your iPhone and Android! Please provide this summary of care documentation to your next provider. Your primary care clinician is listed as Alysia Farfan. If you have any questions after today's visit, please call [phone].

## 2018-01-12 NOTE — PROGRESS NOTES
Chief Complaint   Patient presents with    Hypertension    Diabetes    Cholesterol Problem     1. Have you been to the ER, urgent care clinic since your last visit? Hospitalized since your last visit? No    2. Have you seen or consulted any other health care providers outside of the [address] [place] [address] since your last visit? Include any pap smears or colon screening. No       Health Maintenance Due   Topic Date Due    MEDICARE YEARLY EXAM  12/09/2016    EYE EXAM RETINAL OR DILATED Q1  09/29/2017    HEMOGLOBIN A1C Q6M  01/12/2018     Patient had Retinal Scan in office Today.

## 2018-01-12 NOTE — PATIENT INSTRUCTIONS
Vaccine Information Statement     Pneumococcal Conjugate Vaccine (PCV13): What You Need to Know    Many Vaccine Information Statements are available in Bulgarian and other languages. See www.immunize.org/vis. Hojas de información Sobre Vacunas están disponibles en español y en muchos otros idiomas. Visite www.immunize.org/vis. 1. Why get vaccinated? Vaccination can protect both children and adults from pneumococcal disease. Pneumococcal disease is caused by bacteria that can spread from person to person through close contact. It can cause ear infections, and it can also lead to more serious infections of the:   Lungs (pneumonia),   Blood (bacteremia), and   Covering of the brain and spinal cord (meningitis). Pneumococcal pneumonia is most common among adults. Pneumococcal meningitis can cause deafness and brain damage, and it kills about 1 child in 10 who get it. Anyone can get pneumococcal disease, but children under 3years of age and adults 72 years and older, people with certain medical conditions, and cigarette smokers are at the highest risk. Before there was a vaccine, the [facility] saw:   more than 700 cases of meningitis,   about 13,000 blood infections,   about 5 million ear infections, and   about 200 deaths  in children under 5 each year from pneumococcal disease. Since vaccine became available, severe pneumococcal disease in these children has fallen by 88%. About 18,000 older adults die of pneumococcal disease each year in the [place]. Treatment of pneumococcal infections with penicillin and other drugs is not as effective as it used to be, because some strains of the disease have become resistant to these drugs. This makes prevention of the disease, through vaccination, even more important. 2. PCV13 vaccine    Pneumococcal conjugate vaccine (called PCV13) protects against 13 types of pneumococcal bacteria.       PCV13 is routinely given to children at 2, 4, 6, and 1515 months of age. It is also recommended for children and adults 3to 59years of age with certain health conditions, and for all adults 72years of age and older. Your doctor can give you details. 3. Some people should not get this vaccine    Anyone who has ever had a life-threatening allergic reaction to a dose of this vaccine, to an earlier pneumococcal vaccine called PCV7, or to any vaccine containing diphtheria toxoid (for example, DTaP), should not get PCV13. Anyone with a severe allergy to any component of PCV13 should not get the vaccine. Tell your doctor if the person being vaccinated has any severe allergies. If the person scheduled for vaccination is not feeling well, your healthcare provider might decide to reschedule the shot on another day. 4. Risks of a vaccine reaction    With any medicine, including vaccines, there is a chance of reactions. These are usually mild and go away on their own, but serious reactions are also possible. Problems reported following PCV13 varied by age and dose in the series. The most common problems reported among children were:    About half became drowsy after the shot, had a temporary loss of appetite, or had redness or tenderness where the shot was given.  About 1 out of 3 had swelling where the shot was given.  About 1 out of 3 had a mild fever, and about 1 in 20 had a fever over 102.2°F.   Up to about 8 out of 10 became fussy or irritable. Adults have reported pain, redness, and swelling where the shot was given; also mild fever, fatigue, headache, chills, or muscle pain. Alize Mosley children who get PCV13 along with inactivated flu vaccine at the same time may be at increased risk for seizures caused by fever. Ask your doctor for more information. Problems that could happen after any vaccine:     People sometimes faint after a medical procedure, including vaccination.  Sitting or lying down for about 15 minutes can help prevent fainting, and injuries caused by a fall. Tell your doctor if you feel dizzy, or have vision changes or ringing in the ears.  Some older children and adults get severe pain in the shoulder and have difficulty moving the arm where a shot was given. This happens very rarely.  Any medication can cause a severe allergic reaction. Such reactions from a vaccine are very rare, estimated at about 1 in a million doses, and would happen within a few minutes to a few hours after the vaccination. As with any medicine, there is a very small chance of a vaccine causing a serious injury or death. The safety of vaccines is always being monitored. For more information, visit: www.cdc.gov/vaccinesafety/     5. What if there is a serious reaction? What should I look for?  Look for anything that concerns you, such as signs of a severe allergic reaction, very high fever, or unusual behavior. Signs of a severe allergic reaction can include hives, swelling of the face and throat, difficulty breathing, a fast heartbeat, dizziness, and weakness - usually within a few minutes to a few hours after the vaccination. What should I do?  If you think it is a severe allergic reaction or other emergency that cant wait, call 9-1-1 or get the person to the nearest hospital. Otherwise, call your doctor. Reactions should be reported to the Vaccine Adverse Event Reporting System (VAERS). Your doctor should file this report, or you can do it yourself through the VAERS web site at www.vaers. hhs.gov, or by calling 3-575.455.9075. VAERS does not give medical advice. 6. The National Vaccine Injury Compensation Program    The [facility] Vaccine Injury Compensation Program (VICP) is a federal program that was created to compensate people who may have been injured by certain vaccines.     Persons who believe they may have been injured by a vaccine can learn about the program and about filing a claim by calling 1-434.696.3487 or visiting the [facility] website at www.[facility].gov/vaccinecompensation. There is a time limit to file a claim for compensation. 7. How can I learn more?  Ask your healthcare provider. He or she can give you the vaccine package insert or suggest other sources of information.  Call your local or state health department.  Contact the Centers for Disease Control and Prevention (CDC):  - Call 2-897.530.7642 (0-689-NNW-INFO) or  - Visit CDCs website at www.cdc.gov/vaccines    Vaccine Information Statement   PCV13 Vaccine   11/5/2015   42 FAYE [address]re [id number]-20    Department of Health and Human Services  Centers for Disease Control and Prevention    Office Use Only

## 2018-01-12 NOTE — PROGRESS NOTES
HISTORY OF PRESENT ILLNESS  Carson Jane is a 68 y.o. male. HPI Pt. Comes in for blood pressure, cholesterol, and diabetes check. Some hoarseness at times, intermittently. Quit smoking 20 years ago. Mild cough at times. Mild dyspnea if climbs stairs. No chest tightness, edema, orthopnea. ROS    Physical Exam   Constitutional: He appears well-developed and well-nourished. HENT:   Right Ear: External ear normal.   Left Ear: External ear normal.   Mouth/Throat: Oropharynx is clear and moist.   Neck: No thyromegaly present. Cardiovascular: Normal rate, regular rhythm, normal heart sounds and intact distal pulses. Pulmonary/Chest: Effort normal and breath sounds normal. No respiratory distress. He has no wheezes. Abdominal: Soft. Bowel sounds are normal. He exhibits no distension and no mass. There is no tenderness. There is no guarding. Musculoskeletal: Normal range of motion. He exhibits no edema. Lymphadenopathy:     He has no cervical adenopathy. Nursing note and vitals reviewed. ASSESSMENT and PLAN  Orders Placed This Encounter    FUNDUS PHOTOGRAPHY    CBC WITH AUTOMATED DIFF    METABOLIC PANEL, COMPREHENSIVE    LIPID PANEL    MICROALBUMIN, UR, RAND W/ MICROALBUMIN/CREA RATIO    REFERRAL TO ENT-OTOLARYNGOLOGY    AMB POC HEMOGLOBIN A1C     DIABETES FOOT EXAM    rosuvastatin (CRESTOR) 20 mg tablet    hydroCHLOROthiazide (HYDRODIURIL) 12.5 mg tablet    glimepiride (AMARYL) 4 mg tablet    valsartan (DIOVAN) 160 mg tablet    metoprolol tartrate (LOPRESSOR) 25 mg tablet     Diagnoses and all orders for this visit:    1. Type 2 diabetes mellitus without complication, with long-term current use of insulin ([facility])  -     CBC WITH AUTOMATED DIFF  -     METABOLIC PANEL, COMPREHENSIVE  -     LIPID PANEL  -     AMB POC HEMOGLOBIN A1C  -     FUNDUS PHOTOGRAPHY  -     MICROALBUMIN, UR, RAND W/ MICROALBUMIN/CREA RATIO  -      DIABETES FOOT EXAM    2. Essential hypertension    3. Hypercholesterolemia    4. Hoarseness  -     REFERRAL TO ENT-OTOLARYNGOLOGY    Other orders  -     rosuvastatin (CRESTOR) 20 mg tablet; TAKE 1 TABLET BY MOUTH EVERY NIGHT AT BEDTIME for cholesterol  -     hydroCHLOROthiazide (HYDRODIURIL) 12.5 mg tablet; Take 1 Tab by mouth daily. For blood pressure  -     glimepiride (AMARYL) 4 mg tablet; TAKE 1 TABLET BY MOUTH EVERY DAY FOR SUGAR  -     valsartan (DIOVAN) 160 mg tablet; TAKE 1 TABLET BY MOUTH EVERY DAY for blood pressure  -     metoprolol tartrate (LOPRESSOR) 25 mg tablet; TAKE 1/2 TABLET BY MOUTH TWICE DAILY      Follow-up Disposition:  Return in about 6 months (around 7/12/2018).

## 2018-01-13 LAB
ALBUMIN SERPL-MCNC: 4.3 G/DL (ref 3.5–4.8)
ALBUMIN/CREAT UR: 26.7 MG/G CREAT (ref 0–30)
ALBUMIN/GLOB SERPL: 1.4 {RATIO} (ref 1.2–2.2)
ALP SERPL-CCNC: 66 IU/L (ref 39–117)
ALT SERPL-CCNC: 21 IU/L (ref 0–44)
AST SERPL-CCNC: 27 IU/L (ref 0–40)
BASOPHILS # BLD AUTO: 0 X10E3/UL (ref 0–0.2)
BASOPHILS NFR BLD AUTO: 0 %
BILIRUB SERPL-MCNC: 0.3 MG/DL (ref 0–1.2)
BUN SERPL-MCNC: 13 MG/DL (ref 8–27)
BUN/CREAT SERPL: 13 (ref 10–24)
CALCIUM SERPL-MCNC: 9.9 MG/DL (ref 8.6–10.2)
CHLORIDE SERPL-SCNC: 103 MMOL/L (ref 96–106)
CHOLEST SERPL-MCNC: 129 MG/DL (ref 100–199)
CO2 SERPL-SCNC: 25 MMOL/L (ref 18–29)
CREAT SERPL-MCNC: 1.01 MG/DL (ref 0.76–1.27)
CREAT UR-MCNC: 169.2 MG/DL
EOSINOPHIL # BLD AUTO: 0.2 X10E3/UL (ref 0–0.4)
EOSINOPHIL NFR BLD AUTO: 3 %
ERYTHROCYTE [DISTWIDTH] IN BLOOD BY AUTOMATED COUNT: 14.6 % (ref 12.3–15.4)
GLOBULIN SER CALC-MCNC: 3 G/DL (ref 1.5–4.5)
GLUCOSE SERPL-MCNC: 147 MG/DL (ref 65–99)
HCT VFR BLD AUTO: 36.2 % (ref 37.5–51)
HDLC SERPL-MCNC: 38 MG/DL
HGB BLD-MCNC: 12.3 G/DL (ref 13–17.7)
IMM GRANULOCYTES # BLD: 0 X10E3/UL (ref 0–0.1)
IMM GRANULOCYTES NFR BLD: 0 %
INTERPRETATION, 910389: NORMAL
LDLC SERPL CALC-MCNC: 71 MG/DL (ref 0–99)
LYMPHOCYTES # BLD AUTO: 3.2 X10E3/UL (ref 0.7–3.1)
LYMPHOCYTES NFR BLD AUTO: 39 %
Lab: NORMAL
MCH RBC QN AUTO: 29.1 PG (ref 26.6–33)
MCHC RBC AUTO-ENTMCNC: 34 G/DL (ref 31.5–35.7)
MCV RBC AUTO: 86 FL (ref 79–97)
MICROALBUMIN UR-MCNC: 45.1 UG/ML
MONOCYTES # BLD AUTO: 0.7 X10E3/UL (ref 0.1–0.9)
MONOCYTES NFR BLD AUTO: 9 %
NEUTROPHILS # BLD AUTO: 4 X10E3/UL (ref 1.4–7)
NEUTROPHILS NFR BLD AUTO: 49 %
PLATELET # BLD AUTO: 267 X10E3/UL (ref 150–379)
POTASSIUM SERPL-SCNC: 4.4 MMOL/L (ref 3.5–5.2)
PROT SERPL-MCNC: 7.3 G/DL (ref 6–8.5)
RBC # BLD AUTO: 4.22 X10E6/UL (ref 4.14–5.8)
SODIUM SERPL-SCNC: 145 MMOL/L (ref 134–144)
TRIGL SERPL-MCNC: 102 MG/DL (ref 0–149)
VLDLC SERPL CALC-MCNC: 20 MG/DL (ref 5–40)
WBC # BLD AUTO: 8.2 X10E3/UL (ref 3.4–10.8)

## 2018-01-15 RX ORDER — METFORMIN HYDROCHLORIDE 1000 MG/1
TABLET ORAL
Qty: 225 TAB | Refills: 12 | Status: SHIPPED | OUTPATIENT
Start: 2018-01-15 | End: 2019-01-18 | Stop reason: SDUPTHER

## 2018-01-19 LAB
LEFT EYE DIABETIC RETINOPATHY: ABNORMAL
LEFT EYE IMAGE QUALITY: ABNORMAL
LEFT EYE MACULAR EDEMA: ABNORMAL
LEFT EYE OTHER RETINOPATHY: ABNORMAL
RESULT: ABNORMAL
RIGHT EYE DIABETIC RETINOPATHY: ABNORMAL
RIGHT EYE IMAGE QUALITY: ABNORMAL
RIGHT EYE MACULAR EDEMA: ABNORMAL
RIGHT EYE OTHER RETINOPATHY: ABNORMAL
SEVERITY: ABNORMAL

## 2018-01-22 ENCOUNTER — TELEPHONE (OUTPATIENT)
Dept: FAMILY MEDICINE CLINIC | Age: 78
End: 2018-01-22

## 2018-01-22 DIAGNOSIS — H40.003 GLAUCOMA SUSPECT OF BOTH EYES: Primary | ICD-10-CM

## 2018-03-06 ENCOUNTER — APPOINTMENT (OUTPATIENT)
Dept: GENERAL RADIOLOGY | Age: 78
DRG: 066 | End: 2018-03-06
Attending: EMERGENCY MEDICINE
Payer: MEDICARE

## 2018-03-06 ENCOUNTER — APPOINTMENT (OUTPATIENT)
Dept: CT IMAGING | Age: 78
DRG: 066 | End: 2018-03-06
Attending: EMERGENCY MEDICINE
Payer: MEDICARE

## 2018-03-06 ENCOUNTER — HOSPITAL ENCOUNTER (INPATIENT)
Age: 78
LOS: 2 days | Discharge: HOME | DRG: 066 | End: 2018-03-08
Attending: EMERGENCY MEDICINE | Admitting: INTERNAL MEDICINE
Payer: MEDICARE

## 2018-03-06 DIAGNOSIS — I63.9 CEREBROVASCULAR ACCIDENT (CVA), UNSPECIFIED MECHANISM (HCC): Primary | ICD-10-CM

## 2018-03-06 DIAGNOSIS — E78.00 HYPERCHOLESTEROLEMIA: ICD-10-CM

## 2018-03-06 DIAGNOSIS — I10 ESSENTIAL HYPERTENSION: ICD-10-CM

## 2018-03-06 DIAGNOSIS — I63.311 THROMBOTIC STROKE INVOLVING RIGHT MIDDLE CEREBRAL ARTERY (HCC): ICD-10-CM

## 2018-03-06 DIAGNOSIS — I65.23 BILATERAL CAROTID ARTERY STENOSIS: ICD-10-CM

## 2018-03-06 DIAGNOSIS — E11.9 DIABETES MELLITUS WITHOUT COMPLICATION (HCC): ICD-10-CM

## 2018-03-06 PROBLEM — G45.9 TIA (TRANSIENT ISCHEMIC ATTACK): Status: ACTIVE | Noted: 2018-03-06

## 2018-03-06 LAB
AMORPH CRY URNS QL MICRO: ABNORMAL
ANION GAP SERPL CALC-SCNC: 8 MMOL/L (ref 5–15)
APPEARANCE UR: CLEAR
BACTERIA URNS QL MICRO: NEGATIVE /HPF
BASOPHILS # BLD: 0 K/UL (ref 0–0.1)
BASOPHILS NFR BLD: 1 % (ref 0–1)
BILIRUB UR QL: NEGATIVE
BUN SERPL-MCNC: 22 MG/DL (ref 6–20)
BUN/CREAT SERPL: 17 (ref 12–20)
CALCIUM SERPL-MCNC: 9.5 MG/DL (ref 8.5–10.1)
CHLORIDE SERPL-SCNC: 101 MMOL/L (ref 97–108)
CO2 SERPL-SCNC: 28 MMOL/L (ref 21–32)
COLOR UR: ABNORMAL
CREAT SERPL-MCNC: 1.32 MG/DL (ref 0.7–1.3)
DIFFERENTIAL METHOD BLD: NORMAL
EOSINOPHIL # BLD: 0.2 K/UL (ref 0–0.4)
EOSINOPHIL NFR BLD: 2 % (ref 0–7)
EPITH CASTS URNS QL MICRO: ABNORMAL /LPF
ERYTHROCYTE [DISTWIDTH] IN BLOOD BY AUTOMATED COUNT: 13.1 % (ref 11.5–14.5)
GLUCOSE BLD STRIP.AUTO-MCNC: 128 MG/DL (ref 65–100)
GLUCOSE BLD STRIP.AUTO-MCNC: 73 MG/DL (ref 65–100)
GLUCOSE SERPL-MCNC: 143 MG/DL (ref 65–100)
GLUCOSE UR STRIP.AUTO-MCNC: NEGATIVE MG/DL
HCT VFR BLD AUTO: 37.5 % (ref 36.6–50.3)
HGB BLD-MCNC: 12.4 G/DL (ref 12.1–17)
HGB UR QL STRIP: NEGATIVE
HYALINE CASTS URNS QL MICRO: ABNORMAL /LPF (ref 0–5)
IMM GRANULOCYTES # BLD: 0 K/UL (ref 0–0.04)
IMM GRANULOCYTES NFR BLD AUTO: 0 % (ref 0–0.5)
INR BLD: 1.1 (ref 0.9–1.2)
KETONES UR QL STRIP.AUTO: NEGATIVE MG/DL
LEUKOCYTE ESTERASE UR QL STRIP.AUTO: NEGATIVE
LYMPHOCYTES # BLD: 3 K/UL (ref 0.8–3.5)
LYMPHOCYTES NFR BLD: 34 % (ref 12–49)
MCH RBC QN AUTO: 28.8 PG (ref 26–34)
MCHC RBC AUTO-ENTMCNC: 33.1 G/DL (ref 30–36.5)
MCV RBC AUTO: 87.2 FL (ref 80–99)
MONOCYTES # BLD: 0.7 K/UL (ref 0–1)
MONOCYTES NFR BLD: 8 % (ref 5–13)
NEUTS SEG # BLD: 4.8 K/UL (ref 1.8–8)
NEUTS SEG NFR BLD: 55 % (ref 32–75)
NITRITE UR QL STRIP.AUTO: NEGATIVE
NRBC # BLD: 0 K/UL (ref 0–0.01)
NRBC BLD-RTO: 0 PER 100 WBC
PH UR STRIP: 6 [PH] (ref 5–8)
PLATELET # BLD AUTO: 275 K/UL (ref 150–400)
PMV BLD AUTO: 10 FL (ref 8.9–12.9)
POTASSIUM SERPL-SCNC: 3.9 MMOL/L (ref 3.5–5.1)
PROT UR STRIP-MCNC: NEGATIVE MG/DL
RBC # BLD AUTO: 4.3 M/UL (ref 4.1–5.7)
RBC #/AREA URNS HPF: ABNORMAL /HPF (ref 0–5)
SERVICE CMNT-IMP: ABNORMAL
SERVICE CMNT-IMP: NORMAL
SODIUM SERPL-SCNC: 137 MMOL/L (ref 136–145)
SP GR UR REFRACTOMETRY: 1.01 (ref 1–1.03)
UA: UC IF INDICATED,UAUC: ABNORMAL
UROBILINOGEN UR QL STRIP.AUTO: 0.2 EU/DL (ref 0.2–1)
WBC # BLD AUTO: 8.7 K/UL (ref 4.1–11.1)
WBC URNS QL MICRO: ABNORMAL /HPF (ref 0–4)

## 2018-03-06 PROCEDURE — 74011250636 HC RX REV CODE- 250/636: Performed by: INTERNAL MEDICINE

## 2018-03-06 PROCEDURE — 74011250637 HC RX REV CODE- 250/637: Performed by: EMERGENCY MEDICINE

## 2018-03-06 PROCEDURE — 74011250637 HC RX REV CODE- 250/637: Performed by: INTERNAL MEDICINE

## 2018-03-06 PROCEDURE — 99285 EMERGENCY DEPT VISIT HI MDM: CPT

## 2018-03-06 PROCEDURE — 70450 CT HEAD/BRAIN W/O DYE: CPT

## 2018-03-06 PROCEDURE — 80048 BASIC METABOLIC PNL TOTAL CA: CPT | Performed by: EMERGENCY MEDICINE

## 2018-03-06 PROCEDURE — 82962 GLUCOSE BLOOD TEST: CPT

## 2018-03-06 PROCEDURE — 96360 HYDRATION IV INFUSION INIT: CPT

## 2018-03-06 PROCEDURE — 70498 CT ANGIOGRAPHY NECK: CPT

## 2018-03-06 PROCEDURE — 85025 COMPLETE CBC W/AUTO DIFF WBC: CPT | Performed by: EMERGENCY MEDICINE

## 2018-03-06 PROCEDURE — 74011250636 HC RX REV CODE- 250/636: Performed by: EMERGENCY MEDICINE

## 2018-03-06 PROCEDURE — 94762 N-INVAS EAR/PLS OXIMTRY CONT: CPT

## 2018-03-06 PROCEDURE — 81001 URINALYSIS AUTO W/SCOPE: CPT

## 2018-03-06 PROCEDURE — 65660000000 HC RM CCU STEPDOWN

## 2018-03-06 PROCEDURE — 85610 PROTHROMBIN TIME: CPT

## 2018-03-06 PROCEDURE — 36415 COLL VENOUS BLD VENIPUNCTURE: CPT | Performed by: EMERGENCY MEDICINE

## 2018-03-06 PROCEDURE — 93005 ELECTROCARDIOGRAM TRACING: CPT

## 2018-03-06 PROCEDURE — 74011636320 HC RX REV CODE- 636/320: Performed by: EMERGENCY MEDICINE

## 2018-03-06 PROCEDURE — 71045 X-RAY EXAM CHEST 1 VIEW: CPT

## 2018-03-06 RX ORDER — SODIUM CHLORIDE 9 MG/ML
50 INJECTION, SOLUTION INTRAVENOUS
Status: COMPLETED | OUTPATIENT
Start: 2018-03-06 | End: 2018-03-06

## 2018-03-06 RX ORDER — ACETAMINOPHEN 650 MG/1
650 SUPPOSITORY RECTAL
Status: DISCONTINUED | OUTPATIENT
Start: 2018-03-06 | End: 2018-03-08 | Stop reason: HOSPADM

## 2018-03-06 RX ORDER — SODIUM CHLORIDE 0.9 % (FLUSH) 0.9 %
10 SYRINGE (ML) INJECTION
Status: COMPLETED | OUTPATIENT
Start: 2018-03-06 | End: 2018-03-06

## 2018-03-06 RX ORDER — GLIMEPIRIDE 1 MG/1
2 TABLET ORAL
Status: DISCONTINUED | OUTPATIENT
Start: 2018-03-07 | End: 2018-03-08 | Stop reason: HOSPADM

## 2018-03-06 RX ORDER — ACETAMINOPHEN 325 MG/1
650 TABLET ORAL
Status: DISCONTINUED | OUTPATIENT
Start: 2018-03-06 | End: 2018-03-08 | Stop reason: HOSPADM

## 2018-03-06 RX ORDER — ASPIRIN 81 MG/1
81 TABLET ORAL DAILY
Status: DISCONTINUED | OUTPATIENT
Start: 2018-03-07 | End: 2018-03-08 | Stop reason: HOSPADM

## 2018-03-06 RX ORDER — AMITRIPTYLINE HYDROCHLORIDE 10 MG/1
10 TABLET, FILM COATED ORAL
Status: DISCONTINUED | OUTPATIENT
Start: 2018-03-06 | End: 2018-03-08 | Stop reason: HOSPADM

## 2018-03-06 RX ORDER — SODIUM CHLORIDE 0.9 % (FLUSH) 0.9 %
5-10 SYRINGE (ML) INJECTION EVERY 8 HOURS
Status: DISCONTINUED | OUTPATIENT
Start: 2018-03-06 | End: 2018-03-08 | Stop reason: HOSPADM

## 2018-03-06 RX ORDER — SODIUM CHLORIDE 0.9 % (FLUSH) 0.9 %
5-10 SYRINGE (ML) INJECTION AS NEEDED
Status: DISCONTINUED | OUTPATIENT
Start: 2018-03-06 | End: 2018-03-08 | Stop reason: HOSPADM

## 2018-03-06 RX ORDER — ENOXAPARIN SODIUM 100 MG/ML
40 INJECTION SUBCUTANEOUS EVERY 24 HOURS
Status: DISCONTINUED | OUTPATIENT
Start: 2018-03-06 | End: 2018-03-08 | Stop reason: HOSPADM

## 2018-03-06 RX ORDER — DEXTROSE 50 % IN WATER (D50W) INTRAVENOUS SYRINGE
12.5-25 AS NEEDED
Status: DISCONTINUED | OUTPATIENT
Start: 2018-03-06 | End: 2018-03-08 | Stop reason: HOSPADM

## 2018-03-06 RX ORDER — METOPROLOL TARTRATE 25 MG/1
12.5 TABLET, FILM COATED ORAL 2 TIMES DAILY
Status: DISCONTINUED | OUTPATIENT
Start: 2018-03-06 | End: 2018-03-08 | Stop reason: HOSPADM

## 2018-03-06 RX ORDER — ATORVASTATIN CALCIUM 40 MG/1
40 TABLET, FILM COATED ORAL
Status: DISCONTINUED | OUTPATIENT
Start: 2018-03-06 | End: 2018-03-08 | Stop reason: HOSPADM

## 2018-03-06 RX ORDER — ASPIRIN 325 MG
325 TABLET ORAL
Status: COMPLETED | OUTPATIENT
Start: 2018-03-06 | End: 2018-03-06

## 2018-03-06 RX ORDER — INSULIN LISPRO 100 [IU]/ML
INJECTION, SOLUTION INTRAVENOUS; SUBCUTANEOUS
Status: DISCONTINUED | OUTPATIENT
Start: 2018-03-06 | End: 2018-03-08 | Stop reason: HOSPADM

## 2018-03-06 RX ORDER — LABETALOL HYDROCHLORIDE 5 MG/ML
5 INJECTION, SOLUTION INTRAVENOUS
Status: DISCONTINUED | OUTPATIENT
Start: 2018-03-06 | End: 2018-03-08 | Stop reason: HOSPADM

## 2018-03-06 RX ORDER — MAGNESIUM SULFATE 100 %
4 CRYSTALS MISCELLANEOUS AS NEEDED
Status: DISCONTINUED | OUTPATIENT
Start: 2018-03-06 | End: 2018-03-08 | Stop reason: HOSPADM

## 2018-03-06 RX ADMIN — AMITRIPTYLINE HYDROCHLORIDE 10 MG: 10 TABLET, FILM COATED ORAL at 23:55

## 2018-03-06 RX ADMIN — SODIUM CHLORIDE 500 ML: 900 INJECTION, SOLUTION INTRAVENOUS at 17:42

## 2018-03-06 RX ADMIN — Medication 10 ML: at 23:57

## 2018-03-06 RX ADMIN — ACETAMINOPHEN 650 MG: 325 TABLET ORAL at 23:57

## 2018-03-06 RX ADMIN — ENOXAPARIN SODIUM 40 MG: 40 INJECTION SUBCUTANEOUS at 20:01

## 2018-03-06 RX ADMIN — Medication 10 ML: at 17:41

## 2018-03-06 RX ADMIN — IOPAMIDOL 100 ML: 755 INJECTION, SOLUTION INTRAVENOUS at 17:42

## 2018-03-06 RX ADMIN — SODIUM CHLORIDE 50 ML/HR: 900 INJECTION, SOLUTION INTRAVENOUS at 17:41

## 2018-03-06 RX ADMIN — METOPROLOL TARTRATE 12.5 MG: 25 TABLET ORAL at 20:02

## 2018-03-06 RX ADMIN — ASPIRIN 325 MG: 325 TABLET ORAL at 20:02

## 2018-03-06 RX ADMIN — ATORVASTATIN CALCIUM 40 MG: 40 TABLET, FILM COATED ORAL at 23:56

## 2018-03-06 NOTE — ED NOTES
Patients wife has asked multiple times if the patient can get TPA. Dr. Zhang Zaragoza and Dr. Stuart Kathleen have explained multiple times patient is not a candidate due to patients CT Scan.

## 2018-03-06 NOTE — PROGRESS NOTES
Spiritual Care Assessment/Progress Note  [facility]      NAME: Fidencio Torres      MRN: [id number]  AGE: 66 y.o. SEX: male  Sabianism Affiliation: No Bahai   Language: English     3/6/2018     Total Time (in minutes): 11     Spiritual Assessment begun in [facility] EMERGENCY DEPT through conversation with:         []Patient        [] Family    [] Friend(s)        Reason for Consult: Other (comment), Emergency Department visit (Code Stroke)     Spiritual beliefs: (Please include comment if needed)     [x] Involved in a aryan tradition/spiritual practice: Family is      [] Supported by a aryan community: Family is     [] Claims no spiritual orientation:      [] Seeking spiritual identity:           [] Adheres to an individual form of spirituality:      [] Not able to assess:                     Identified resources for coping:      [x] Prayer                  [] Devotional reading               [] Music                  [] Guided Imagery     [x] Family/friends                 [] Pet visits     [] Other:         Interventions offered during this visit: (See comments for more details)    Patient Interventions: Affirmation of emotions/emotional suffering, Initial/Spiritual assessment, patient floor, Prayer (assurance of), Affirmation of aryan, Iconic (affirming the presence of God/Higher Power)     Family/Friend(s):  Affirmation of aryan, Catharsis/review of pertinent events in supportive environment, Iconic (affirming the presence of God/Higher Power), Prayer (assurance of)     Plan of Care:     [x] Discuss Spiritual/Cultural needs    [] Support AMD and/or advance care planning process      [] Support grieving process   [] Coordinate Rites/Rituals    [] Coordination with community clergy   [] No spiritual needs identified at this time   [] Detailed Plan of Care below (See Comments)  [] Make referral to Music Therapy  [] Make referral to Pet Therapy     [] Make referral to Addiction services  [] Make referral to [facility]  [] Make referral to Spiritual Care Partner  [] No future visits requested             Comments:   Responded to Code Stroke page to ER. Patient's wife, daughter and one son were present. Patient was alert and was aware of my presence. Provided brief support to family as patient evaluation began. Explained role of  to family, offered assurance of prayer and advised of  availability as needed/requested. Will follow up tomorrow as able, if admitted.   LEOLA Bolden, [facility], [address] [place] [address] Avenue    [address] [place] [address] Road Paging Service  [phone])
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
## 2018-03-06 NOTE — IP AVS SNAPSHOT
[address] [place] [address] 
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 [address] [place] [address] 7 [phone] Talk to Dr Leon Townsend about setting up Outpatient PT/OT in the future at [facility]. Your Scheduled Appointments Monday March 12, 2018 11:15 AM EDT TRANSITIONAL CARE MANAGEMENT with Oksana Montano MD  
[facility] [address] [facility] 7 [id number] [phone] Discharge Orders None A check dora indicates which time of day the medication should be taken. My Medications CHANGE how you take these medications Instructions Each Dose to Equal  
 Morning Noon Evening Bedtime * aspirin delayed-release 81 mg tablet What changed:  Another medication with the same name was added. Make sure you understand how and when to take each. Your last dose was: Your next dose is: Take 1 Tab by mouth daily. To prevent heart attack and stroke 81 mg  
    
   
   
   
  
 * aspirin delayed-release 81 mg tablet What changed:   You were already taking a medication with the same name, and this prescription was added. Make sure you understand how and when to take each. Your last dose was: Your next dose is: Take 1 Tab by mouth daily. 81 mg  
    
   
   
   
  
 * Notice: This list has 2 medication(s) that are the same as other medications prescribed for you. Read the directions carefully, and ask your doctor or other care provider to review them with you. CONTINUE taking these medications Instructions Each Dose to Equal  
 Morning Noon Evening Bedtime  
 acetaminophen-codeine 300-30 mg per tablet Commonly known as:  TYLENOL-CODEINE #3 Your last dose was: Your next dose is: Take 2 Tabs by mouth every four (4) hours as needed for Pain. 2 Tab  
    
   
   
   
  
 amitriptyline 10 mg tablet Commonly known as:  ELAVIL Your last dose was: Your next dose is: TAKE 1 TABLET BY MOUTH NIGHTLY TO PREVENT HAVING TO PASS WATER AT NIGHT AS DIRECTED  
     
   
   
   
  
 glimepiride 4 mg tablet Commonly known as:  AMARYL Your last dose was: Your next dose is: TAKE 1 TABLET BY MOUTH EVERY DAY FOR SUGAR  
     
   
   
   
  
 hydroCHLOROthiazide 12.5 mg tablet Commonly known as:  HYDRODIURIL Your last dose was: Your next dose is: Take 1 Tab by mouth daily. For blood pressure 12.5 mg  
    
   
   
   
  
 metFORMIN 1,000 mg tablet Commonly known as:  GLUCOPHAGE Your last dose was: Your next dose is:    
   
   
 1 1/2 tabs po in am and 1 tab po in pm. For diabetes  
     
   
   
   
  
 metoprolol tartrate 25 mg tablet Commonly known as:  LOPRESSOR Your last dose was: Your next dose is: TAKE 1/2 TABLET BY MOUTH TWICE DAILY  
     
   
   
   
  
 naproxen 500 mg tablet Commonly known as:  NAPROSYN Your last dose was: Your next dose is: TAKE 1 TABLET BY MOUTH TWICE DAILY WITH MEALS FOR KNEE AND HIP PAIN  
     
   
   
   
  
 rosuvastatin 20 mg tablet Commonly known as:  CRESTOR Your last dose was: Your next dose is: TAKE 1 TABLET BY MOUTH EVERY NIGHT AT BEDTIME for cholesterol sAXagliptin 5 mg Tab tablet Commonly known as:  ONGLYZA Your last dose was: Your next dose is: Take 1 Tab by mouth daily. for diabetes 5 mg  
    
   
   
   
  
 valsartan 160 mg tablet Commonly known as:  DIOVAN Your last dose was: Your next dose is: TAKE 1 TABLET BY MOUTH EVERY DAY for blood pressure Where to Get Your Medications Information on where to get these meds will be given to you by the nurse or doctor. ! Ask your nurse or doctor about these medications  
  aspirin delayed-release 81 mg tablet Discharge Instructions General Daily Progress Note Admit Date: 3/6/2018 Hospital day 3 Subjective:  
 
Patient has no complaint of slurred speech. Juju Palomino Medication side effects: none Current Facility-Administered Medications Medication Dose Route Frequency  amitriptyline (ELAVIL) tablet 10 mg  10 mg Oral QHS  aspirin delayed-release tablet 81 mg  81 mg Oral DAILY  glimepiride (AMARYL) tablet 2 mg  2 mg Oral ACB  metoprolol tartrate (LOPRESSOR) tablet 12.5 mg  12.5 mg Oral BID  atorvastatin (LIPITOR) tablet 40 mg  40 mg Oral QHS  sodium chloride (NS) flush 5-10 mL  5-10 mL IntraVENous Q8H  
 sodium chloride (NS) flush 5-10 mL  5-10 mL IntraVENous PRN  
 acetaminophen (TYLENOL) tablet 650 mg  650 mg Oral Q4H PRN Or  
 acetaminophen (TYLENOL) solution 650 mg  650 mg Per NG tube Q4H PRN  Or  
 acetaminophen (TYLENOL) suppository 650 mg  650 mg Rectal Q4H PRN  
 labetalol (NORMODYNE;TRANDATE) injection 5 mg  5 mg IntraVENous Q10MIN PRN  
  enoxaparin (LOVENOX) injection 40 mg  40 mg SubCUTAneous Q24H  
 insulin lispro (HUMALOG) injection   SubCUTAneous AC&HS  
 glucose chewable tablet 16 g  4 Tab Oral PRN  
 dextrose (D50W) injection syrg 12.5-25 g  12.5-25 g IntraVENous PRN  
 glucagon (GLUCAGEN) injection 1 mg  1 mg IntraMUSCular PRN Review of Systems Pertinent items are noted in HPI. Objective:  
 
Patient Vitals for the past 8 hrs: 
 BP Temp Pulse Resp SpO2  
03/08/18 0727 128/57 97.4 °F (36.3 °C) 60 18 99 % 03/08/18 0320 126/65 97.7 °F (36.5 °C) (!) 59 18 97 % 03/06 1901 - 03/08 0700 In: 740 [P.O.:740] Out: 275 [Urine:275] Physical Exam:  
Visit Vitals  /57  Pulse 60  Temp 97.4 °F (36.3 °C)  Resp 18  Ht 6' 1\" (1.854 m)  Wt 198 lb 10.2 oz (90.1 kg)  SpO2 99%  BMI 26.21 kg/m2 Lungs: clear to auscultation bilaterally Heart: regular rate and rhythm, S1, S2 normal, no murmur, click, rub or gallop Abdomen: soft, non-tender. Bowel sounds normal. No masses,  no organomegaly Extremities: extremities normal, atraumatic, no cyanosis or edema ECG: normal sinus rhythm Data Review Recent Results (from the past 24 hour(s)) GLUCOSE, POC Collection Time: 03/07/18 11:48 AM  
Result Value Ref Range Glucose (POC) 241 (H) 65 - 100 mg/dL Performed by Dory Mondragon (PCT) GLUCOSE, POC Collection Time: 03/07/18  4:21 PM  
Result Value Ref Range Glucose (POC) 115 (H) 65 - 100 mg/dL Performed by Dory Mondragon (PCT) GLUCOSE, POC Collection Time: 03/07/18  9:07 PM  
Result Value Ref Range Glucose (POC) 198 (H) 65 - 100 mg/dL Performed by Erik Caraballo (PCT) GLUCOSE, POC Collection Time: 03/08/18  6:16 AM  
Result Value Ref Range Glucose (POC) 103 (H) 65 - 100 mg/dL Performed by Erik Caraballo (PCT) Assessment:  
 
Active Problems: 
  CVA (cerebral vascular accident) ([facility] [address] (3/6/2018) TIA (transient ischemic attack) (3/6/2018) Bilateral carotid artery stenosis (3/8/2018) Thrombotic stroke involving right middle cerebral artery ([address] (3/8/2018) Plan:  
 
Doing well. Will dc home today and recheck in office next week ACO Transitions of Care Introducing [facility] [address] offers a voluntary care coordination program to provide high quality service and care to [facility] fee-for-service beneficiaries. Mason Segura was designed to help you enhance your health and well-being through the following services: ? Transitions of Care  support for individuals who are transitioning from one care setting to another (example: Hospital to home). ? Chronic and Complex Care Coordination  support for individuals and caregivers of those with serious or chronic illnesses or with more than one chronic (ongoing) condition and those who take a number of different medications. If you meet specific medical criteria, a [address] [place] [address] Rd may call you directly to coordinate your care with your primary care physician and your other care providers. For questions about the [facility] programs, please, contact your physicians office. For general questions or additional information about Accountable Care Organizations: 
Please visit www.medicare.gov/acos. html or call 1-800-MEDICARE (8-248.728.8461) TTY users should call 9-180.570.7870. Introducing [facility] & HEALTH SERVICES! [facility] introduces [employer] patient portal. Now you can access parts of your medical record, email your doctor's office, and request medication refills online. 1. In your internet browser, go to [email or web address] 2. Click on the First Time User? Click Here link in the Sign In box. You will see the New Member Sign Up page. 3. Enter your [employer] Access Code exactly as it appears below. You will not need to use this code after youve completed the sign-up process. If you do not sign up before the expiration date, you must request a new code. · [employer] Access Code: [email or web address] Expires: 4/12/2018 10:34 AM 
 
4. Enter the last four digits of your Social Security Number (xxxx) and Date of Birth (mm/dd/yyyy) as indicated and click Submit. You will be taken to the next sign-up page. 5. Create a Satorist ID. This will be your [employer] login ID and cannot be changed, so think of one that is secure and easy to remember. 6. Create a [employer] password. You can change your password at any time. 7. Enter your Password Reset Question and Answer. This can be used at a later time if you forget your password. 8. Enter your e-mail address. You will receive e-mail notification when new information is available in [address] 9. Click Sign Up. You can now view and download portions of your medical record. 10. Click the Download Summary menu link to download a portable copy of your medical information. If you have questions, please visit the Frequently Asked Questions section of the [employer] website. Remember, [employer] is NOT to be used for urgent needs. For medical emergencies, dial 911. Now available from your iPhone and Android! Providers Seen During Your Hospitalization Provider Specialty Primary office phone Leslie Segura DO Emergency Medicine [phone] Tiffanie Whyte MD Internal Medicine [phone] Estefanía Aguila MD [facility] [phone] Your Primary Care Physician (PCP) Primary Care Physician Office Phone Office Fax Oleg Davila [phone] You are allergic to the following Allergen Reactions Lipitor (Atorvastatin) Diarrhea Recent Documentation Height Weight BMI Smoking Status 1.854 m 90.1 kg 26.21 kg/m2 Former Smoker Emergency Contacts Name Discharge Info Relation Home Work Mobile Abimbola Ayon DISCHARGE CAREGIVER [3] Spouse [3] [phone] Patient Belongings The following personal items are in your possession at time of discharge: 
  Dental Appliances: None  Visual Aid: None      Home Medications: None   Jewelry: None  Clothing: At bedside    Other Valuables: At bedside Please provide this summary of care documentation to your next provider. Signatures-by signing, you are acknowledging that this After Visit Summary has been reviewed with you and you have received a copy. Patient Signature:  ____________________________________________________________ Date:  ____________________________________________________________  
  
[facility] Provider Signature:  ____________________________________________________________ Date:  ____________________________________________________________

## 2018-03-06 NOTE — ED NOTES
Assumed care from EMS. Per EMS patient was last seen well at 2 pm per patients family. Patient was aphasic, but after being is route patients symptoms started clearing up. Once arriving to the ED patient had delayed responses, stuttering but with time could answer all questions correctly. Patient has no complaints, but did say he had a headache last night.

## 2018-03-07 ENCOUNTER — APPOINTMENT (OUTPATIENT)
Dept: MRI IMAGING | Age: 78
DRG: 066 | End: 2018-03-07
Attending: INTERNAL MEDICINE
Payer: MEDICARE

## 2018-03-07 LAB
ATRIAL RATE: 86 BPM
CALCULATED P AXIS, ECG09: 48 DEGREES
CALCULATED R AXIS, ECG10: 16 DEGREES
CALCULATED T AXIS, ECG11: 47 DEGREES
CHOLEST SERPL-MCNC: 113 MG/DL
DIAGNOSIS, 93000: NORMAL
EST. AVERAGE GLUCOSE BLD GHB EST-MCNC: 192 MG/DL
GLUCOSE BLD STRIP.AUTO-MCNC: 105 MG/DL (ref 65–100)
GLUCOSE BLD STRIP.AUTO-MCNC: 115 MG/DL (ref 65–100)
GLUCOSE BLD STRIP.AUTO-MCNC: 198 MG/DL (ref 65–100)
GLUCOSE BLD STRIP.AUTO-MCNC: 241 MG/DL (ref 65–100)
GLUCOSE BLD STRIP.AUTO-MCNC: 72 MG/DL (ref 65–100)
GLUCOSE BLD STRIP.AUTO-MCNC: 75 MG/DL (ref 65–100)
GLUCOSE BLD STRIP.AUTO-MCNC: 78 MG/DL (ref 65–100)
GLUCOSE BLD STRIP.AUTO-MCNC: 82 MG/DL (ref 65–100)
HBA1C MFR BLD: 8.3 % (ref 4.2–6.3)
HDLC SERPL-MCNC: 33 MG/DL
HDLC SERPL: 3.4 {RATIO} (ref 0–5)
LDLC SERPL CALC-MCNC: 57.2 MG/DL (ref 0–100)
LIPID PROFILE,FLP: NORMAL
P-R INTERVAL, ECG05: 192 MS
Q-T INTERVAL, ECG07: 372 MS
QRS DURATION, ECG06: 112 MS
QTC CALCULATION (BEZET), ECG08: 445 MS
SERVICE CMNT-IMP: ABNORMAL
SERVICE CMNT-IMP: NORMAL
TRIGL SERPL-MCNC: 114 MG/DL (ref ?–150)
TSH SERPL DL<=0.05 MIU/L-ACNC: 2.71 UIU/ML (ref 0.36–3.74)
VENTRICULAR RATE, ECG03: 86 BPM
VLDLC SERPL CALC-MCNC: 22.8 MG/DL

## 2018-03-07 PROCEDURE — 36415 COLL VENOUS BLD VENIPUNCTURE: CPT | Performed by: INTERNAL MEDICINE

## 2018-03-07 PROCEDURE — G8979 MOBILITY GOAL STATUS: HCPCS

## 2018-03-07 PROCEDURE — 74011250637 HC RX REV CODE- 250/637: Performed by: INTERNAL MEDICINE

## 2018-03-07 PROCEDURE — 97162 PT EVAL MOD COMPLEX 30 MIN: CPT

## 2018-03-07 PROCEDURE — 93880 EXTRACRANIAL BILAT STUDY: CPT

## 2018-03-07 PROCEDURE — 97116 GAIT TRAINING THERAPY: CPT

## 2018-03-07 PROCEDURE — G8989 SELF CARE D/C STATUS: HCPCS | Performed by: OCCUPATIONAL THERAPIST

## 2018-03-07 PROCEDURE — G8978 MOBILITY CURRENT STATUS: HCPCS

## 2018-03-07 PROCEDURE — 84443 ASSAY THYROID STIM HORMONE: CPT | Performed by: INTERNAL MEDICINE

## 2018-03-07 PROCEDURE — G8988 SELF CARE GOAL STATUS: HCPCS | Performed by: OCCUPATIONAL THERAPIST

## 2018-03-07 PROCEDURE — 97535 SELF CARE MNGMENT TRAINING: CPT | Performed by: OCCUPATIONAL THERAPIST

## 2018-03-07 PROCEDURE — G8987 SELF CARE CURRENT STATUS: HCPCS | Performed by: OCCUPATIONAL THERAPIST

## 2018-03-07 PROCEDURE — 83036 HEMOGLOBIN GLYCOSYLATED A1C: CPT | Performed by: INTERNAL MEDICINE

## 2018-03-07 PROCEDURE — 80061 LIPID PANEL: CPT | Performed by: INTERNAL MEDICINE

## 2018-03-07 PROCEDURE — 93306 TTE W/DOPPLER COMPLETE: CPT

## 2018-03-07 PROCEDURE — 65660000000 HC RM CCU STEPDOWN

## 2018-03-07 PROCEDURE — 82962 GLUCOSE BLOOD TEST: CPT

## 2018-03-07 PROCEDURE — 70551 MRI BRAIN STEM W/O DYE: CPT

## 2018-03-07 PROCEDURE — 74011636637 HC RX REV CODE- 636/637: Performed by: INTERNAL MEDICINE

## 2018-03-07 PROCEDURE — 74011250636 HC RX REV CODE- 250/636: Performed by: INTERNAL MEDICINE

## 2018-03-07 PROCEDURE — 92523 SPEECH SOUND LANG COMPREHEN: CPT

## 2018-03-07 PROCEDURE — 97165 OT EVAL LOW COMPLEX 30 MIN: CPT | Performed by: OCCUPATIONAL THERAPIST

## 2018-03-07 RX ADMIN — Medication 10 ML: at 06:06

## 2018-03-07 RX ADMIN — METOPROLOL TARTRATE 12.5 MG: 25 TABLET ORAL at 18:15

## 2018-03-07 RX ADMIN — ACETAMINOPHEN 650 MG: 325 TABLET ORAL at 13:49

## 2018-03-07 RX ADMIN — AMITRIPTYLINE HYDROCHLORIDE 10 MG: 10 TABLET, FILM COATED ORAL at 21:20

## 2018-03-07 RX ADMIN — Medication 10 ML: at 21:20

## 2018-03-07 RX ADMIN — ATORVASTATIN CALCIUM 40 MG: 40 TABLET, FILM COATED ORAL at 20:13

## 2018-03-07 RX ADMIN — ENOXAPARIN SODIUM 40 MG: 40 INJECTION SUBCUTANEOUS at 20:13

## 2018-03-07 RX ADMIN — INSULIN LISPRO 3 UNITS: 100 INJECTION, SOLUTION INTRAVENOUS; SUBCUTANEOUS at 13:06

## 2018-03-07 RX ADMIN — ASPIRIN 81 MG: 81 TABLET, COATED ORAL at 09:17

## 2018-03-07 RX ADMIN — METOPROLOL TARTRATE 12.5 MG: 25 TABLET ORAL at 08:54

## 2018-03-07 RX ADMIN — GLIMEPIRIDE 2 MG: 1 TABLET ORAL at 08:54

## 2018-03-07 NOTE — PROGRESS NOTES
Bedside shift change report given to Mine Ventura RN (oncoming nurse) by Doc Buchanan RN (offgoing nurse). Report included the following information SBAR, Kardex, Procedure Summary, Intake/Output, Recent Results and Cardiac Rhythm NSR.

## 2018-03-07 NOTE — PROGRESS NOTES
General Daily Progress Note    Admit Date: 3/6/2018  Hospital day 2    Subjective:     Patient has no complaint of headache. Thinks that speech is almost back to normal. Also had some clumsiness in L hand. No prior hx TIA or stroke. Medication side effects: none    Current Facility-Administered Medications   Medication Dose Route Frequency    amitriptyline (ELAVIL) tablet 10 mg  10 mg Oral QHS    aspirin delayed-release tablet 81 mg  81 mg Oral DAILY    glimepiride (AMARYL) tablet 2 mg  2 mg Oral ACB    metoprolol tartrate (LOPRESSOR) tablet 12.5 mg  12.5 mg Oral BID    atorvastatin (LIPITOR) tablet 40 mg  40 mg Oral QHS    sodium chloride (NS) flush 5-10 mL  5-10 mL IntraVENous Q8H    sodium chloride (NS) flush 5-10 mL  5-10 mL IntraVENous PRN    acetaminophen (TYLENOL) tablet 650 mg  650 mg Oral Q4H PRN    Or    acetaminophen (TYLENOL) solution 650 mg  650 mg Per NG tube Q4H PRN    Or    acetaminophen (TYLENOL) suppository 650 mg  650 mg Rectal Q4H PRN    labetalol (NORMODYNE;TRANDATE) injection 5 mg  5 mg IntraVENous Q10MIN PRN    enoxaparin (LOVENOX) injection 40 mg  40 mg SubCUTAneous Q24H    insulin lispro (HUMALOG) injection   SubCUTAneous AC&HS    glucose chewable tablet 16 g  4 Tab Oral PRN    dextrose (D50W) injection syrg 12.5-25 g  12.5-25 g IntraVENous PRN    glucagon (GLUCAGEN) injection 1 mg  1 mg IntraMUSCular PRN        Review of Systems  Pertinent items are noted in HPI.     Objective:     Patient Vitals for the past 8 hrs:   BP Temp Pulse Resp SpO2   03/07/18 0745 123/55 97.4 °F (36.3 °C) 66 18 98 %   03/07/18 0244 124/61 97.4 °F (36.3 °C) (!) 59 20 99 %             Physical Exam:   Visit Vitals    /55    Pulse 66    Temp 97.4 °F (36.3 °C)    Resp 18    Ht 6' 1\" (1.854 m)    Wt 198 lb 10.2 oz (90.1 kg)    SpO2 98%    BMI 26.21 kg/m2     Lungs: clear to auscultation bilaterally  Heart: regular rate and rhythm, S1, S2 normal, no murmur, click, rub or gallop  Abdomen: soft, non-tender. Bowel sounds normal. No masses,  no organomegaly  Neuro- speech intact. CNS2-12 intact. Gross motor intact. Gait- able to stand and walk ok      ECG: normal sinus rhythm     Data Review   Recent Results (from the past 24 hour(s))   GLUCOSE, POC    Collection Time: 03/06/18  4:10 PM   Result Value Ref Range    Glucose (POC) 128 (H) 65 - 100 mg/dL    Performed by Ashia Nunes    CBC WITH AUTOMATED DIFF    Collection Time: 03/06/18  4:13 PM   Result Value Ref Range    WBC 8.7 4.1 - 11.1 K/uL    RBC 4.30 4. 10 - 5.70 M/uL    HGB 12.4 12.1 - 17.0 g/dL    HCT 37.5 36.6 - 50.3 %    MCV 87.2 80.0 - 99.0 FL    MCH 28.8 26.0 - 34.0 PG    MCHC 33.1 30.0 - 36.5 g/dL    RDW 13.1 11.5 - 14.5 %    PLATELET 267 300 - 234 K/uL    MPV 10.0 8.9 - 12.9 FL    NRBC 0.0 0  WBC    ABSOLUTE NRBC 0.00 0.00 - 0.01 K/uL    NEUTROPHILS 55 32 - 75 %    LYMPHOCYTES 34 12 - 49 %    MONOCYTES 8 5 - 13 %    EOSINOPHILS 2 0 - 7 %    BASOPHILS 1 0 - 1 %    IMMATURE GRANULOCYTES 0 0.0 - 0.5 %    ABS. NEUTROPHILS 4.8 1.8 - 8.0 K/UL    ABS. LYMPHOCYTES 3.0 0.8 - 3.5 K/UL    ABS. MONOCYTES 0.7 0.0 - 1.0 K/UL    ABS. EOSINOPHILS 0.2 0.0 - 0.4 K/UL    ABS. BASOPHILS 0.0 0.0 - 0.1 K/UL    ABS. IMM.  GRANS. 0.0 0.00 - 0.04 K/UL    DF AUTOMATED     METABOLIC PANEL, BASIC    Collection Time: 03/06/18  4:13 PM   Result Value Ref Range    Sodium 137 136 - 145 mmol/L    Potassium 3.9 3.5 - 5.1 mmol/L    Chloride 101 97 - 108 mmol/L    CO2 28 21 - 32 mmol/L    Anion gap 8 5 - 15 mmol/L    Glucose 143 (H) 65 - 100 mg/dL    BUN 22 (H) 6 - 20 MG/DL    Creatinine 1.32 (H) 0.70 - 1.30 MG/DL    BUN/Creatinine ratio 17 12 - 20      GFR est AA >60 >60 ml/min/1.73m2    GFR est non-AA 52 (L) >60 ml/min/1.73m2    Calcium 9.5 8.5 - 10.1 MG/DL   POC INR    Collection Time: 03/06/18  4:37 PM   Result Value Ref Range    INR (POC) 1.1 <1.2     URINALYSIS W/ REFLEX CULTURE    Collection Time: 03/06/18  5:46 PM   Result Value Ref Range    Color YELLOW/STRAW      Appearance CLEAR CLEAR      Specific gravity 1.011 1.003 - 1.030      pH (UA) 6.0 5.0 - 8.0      Protein NEGATIVE  NEG mg/dL    Glucose NEGATIVE  NEG mg/dL    Ketone NEGATIVE  NEG mg/dL    Bilirubin NEGATIVE  NEG      Blood NEGATIVE  NEG      Urobilinogen 0.2 0.2 - 1.0 EU/dL    Nitrites NEGATIVE  NEG      Leukocyte Esterase NEGATIVE  NEG      WBC 0-4 0 - 4 /hpf    RBC 0-5 0 - 5 /hpf    Epithelial cells FEW FEW /lpf    Bacteria NEGATIVE  NEG /hpf    UA:UC IF INDICATED CULTURE NOT INDICATED BY UA RESULT CNI      Amorphous Crystals FEW (A) NEG      Hyaline cast 0-2 0 - 5 /lpf   EKG, 12 LEAD, INITIAL    Collection Time: 03/06/18  6:00 PM   Result Value Ref Range    Ventricular Rate 86 BPM    Atrial Rate 86 BPM    P-R Interval 192 ms    QRS Duration 112 ms    Q-T Interval 372 ms    QTC Calculation (Bezet) 445 ms    Calculated P Axis 48 degrees    Calculated R Axis 16 degrees    Calculated T Axis 47 degrees    Diagnosis       Normal sinus rhythm  Normal ECG  No previous ECGs available     GLUCOSE, POC    Collection Time: 03/06/18 11:55 PM   Result Value Ref Range    Glucose (POC) 73 65 - 100 mg/dL    Performed by Lott Lauren (PCT)    GLUCOSE, POC    Collection Time: 03/07/18 12:15 AM   Result Value Ref Range    Glucose (POC) 82 65 - 100 mg/dL    Performed by Ronna Sanchez (S0N)    LIPID PANEL    Collection Time: 03/07/18  2:45 AM   Result Value Ref Range    LIPID PROFILE          Cholesterol, total 113 <200 MG/DL    Triglyceride 114 <150 MG/DL    HDL Cholesterol 33 MG/DL    LDL, calculated 57.2 0 - 100 MG/DL    VLDL, calculated 22.8 MG/DL    CHOL/HDL Ratio 3.4 0 - 5.0     HEMOGLOBIN A1C WITH EAG    Collection Time: 03/07/18  2:45 AM   Result Value Ref Range    Hemoglobin A1c 8.3 (H) 4.2 - 6.3 %    Est. average glucose 192 mg/dL   TSH 3RD GENERATION    Collection Time: 03/07/18  2:45 AM   Result Value Ref Range    TSH 2.71 0.36 - 3.74 uIU/mL   GLUCOSE, POC    Collection Time: 03/07/18  6:29 AM Result Value Ref Range    Glucose (POC) 72 65 - 100 mg/dL    Performed by Elian Villegas (PCT)    GLUCOSE, POC    Collection Time: 03/07/18  6:44 AM   Result Value Ref Range    Glucose (POC) 75 65 - 100 mg/dL    Performed by Elian Villegas (PCT)    GLUCOSE, POC    Collection Time: 03/07/18  7:02 AM   Result Value Ref Range    Glucose (POC) 78 65 - 100 mg/dL    Performed by Elian Villegas (PCT)    GLUCOSE, POC    Collection Time: 03/07/18  7:25 AM   Result Value Ref Range    Glucose (POC) 105 (H) 65 - 100 mg/dL    Performed by Richie Andi [facility]            Assessment:     Active Problems:    CVA (cerebral vascular accident) ([address] (3/6/2018)      TIA (transient ischemic attack) (3/6/2018)        Plan:     1. TIA vs. Mild stroke with expressive aphasia, L hand clumsiness. For MRI, carotid doppler, echo, and neuro consult today. Continue aspirin. 2. Diabetes- stable. 3. Hx Hypertension.

## 2018-03-07 NOTE — PROGRESS NOTES
* No surgery found *  * No surgery found *  Bedside shift change report given Precious  RN (oncoming nurse) by Rivka RN (offgoing nurse). Report included the following information SBAR, Kardex and MAR. [facility] Phone:   [phone]      Significant changes during shift:  admitted        Patient Paige Ayon  66 y.o.  3/6/2018  4:08 PM by Jorge Aponte MD. Mirna Dodge was admitted from Home    Problem List    Patient Active Problem List    Diagnosis Date Noted    CVA (cerebral vascular accident) ([address] 03/06/2018    TIA (transient ischemic attack) 03/06/2018    Diabetes mellitus without complication ([facility] [address] 76/34/5500    Essential hypertension 12/09/2015    Hypercholesterolemia 11/29/2010    Diabetes ([address] 11/29/2010     Past Medical History:   Diagnosis Date    Arthritis     CAD (coronary artery disease)     Diabetes ([address]     Diabetic neuropathy ([facility] [address]     Hypercholesterolemia     Hypertension 11/29/10    PVD (peripheral vascular disease) ([address]     Shingles 2010    Thromboembolus ([facility] [address]          Core Measures:    CVA: Yes Yes  CHF:No No  PNA:noNo    Post Op Surgical (If Applicable):     Number times ambulated in hallway past shift:  0  Number of times OOB to chair past shift:   0    Activity Status:    OOB to Chair No  Ambulated this shift Yes   Bed Rest No    Supplemental O2: (If Applicable)    NC No  NRB No        LINES AND DRAINS:    Only peripheral IVs    DVT prophylaxis:    DVT prophylaxis Med- Yes  DVT prophylaxis SCD or GRACE- No     Wounds: (If Applicable)    Wounds- No    Location      Patient Safety:    Falls Score Total Score: 1  Safety Level_______  Bed Alarm On? Yes  Sitter?  No    Plan for upcoming shift: MRI, neuro consult, echo, ot/pt/slp        Discharge Plan: No just admitted    Active Consults:  IP CONSULT TO NEUROLOGY

## 2018-03-07 NOTE — PROGRESS NOTES
Problem: Communication Impaired (Adult)  Goal: *Acute Goals and Plan of Care (Insert Text)  3/7/2018  Speech path goals:  1. Pt will produce sentences with 80% intelligibility with mod cues. 2. Pt will state 2 motor speech strategies with 100% acc. 3. Pt will describe pix with 80% acc. Speech LAnguage Pathology evaluation  Patient: Belem Rosen (74 y.o. male)  Date: 3/7/2018  Primary Diagnosis: CVA (cerebral vascular accident) [facility])        Precautions:        ASSESSMENT :  Based on the objective data described below, the patient presents with mod dysarthria characterized by imprecise artic, reduced intelligibility, reduced volume and fast rate. He is fluent and grammatical but with mild word finding deficits on a naming task. Auditory comprehension is functional but mildly slow processing deficits. He reported no swallowing problems. Wife feels his speech is not at baseline but the pt does. Insight is reduced. Patient will benefit from skilled intervention to address the above impairments. Patients rehabilitation potential is considered to be Good  Factors which may influence rehabilitation potential include:   []              None noted  [x]              Mental ability/status  [x]              Medical condition  [x]              Home/family situation and support systems  [x]              Safety awareness  []              Pain tolerance/management  []              Other:      PLAN :  Recommendations and Planned Interventions:  Recommend intensive speech therapy to improve dysarthria and word finding. Frequency/Duration: Patient will be followed by speech-language pathology 4 times a week to address goals. Discharge Recommendations: Inpatient Rehab and None     SUBJECTIVE:   Patient stated he thought his speech was fine.      OBJECTIVE:     Past Medical History:   Diagnosis Date    Arthritis     CAD (coronary artery disease)     Diabetes ([facility] [address]     Diabetic neuropathy ([facility] [address]     Hypercholesterolemia  Hypertension 11/29/10    [facility]     Shingles 2010    Thromboembolus [facility])      Past Surgical History:   Procedure Laterality Date    HX CARPAL TUNNEL RELEASE  left    HX HEART CATHETERIZATION  12/2005    Dr Jasmina Franks      left arm fracture    [place] COLONOSCOPY FLX DX W/COLLJ SPEC WHEN PFRMD  11/12/2012          Prior Level of Function/Home Situation:   Home Situation  Home Environment:  (Condo)  # Steps to Enter: 3  Rails to Enter: Yes  Hand Rails : Right  One/Two Story Residence:  (13 steps; walk into 1sr level; bedroom on 3rd level)  Living Alone: No  Support Systems: Family member(s)  Patient Expects to be Discharged to[de-identified] Private residence  Current DME Used/Available at Home: None  Tub or Shower Type: Tub/Shower combination  Mental Status:  Neurologic State: Alert  Orientation Level: Oriented X4  Cognition: Appropriate for age attention/concentration, Appropriate safety awareness, Appropriate decision making  Perception: Appears intact  Perseveration: No perseveration noted     Motor Speech:  Oral-Motor Structure/Motor Speech  Face: No impairment  Labial: No impairment  Lingual: No impairment  Velum:  (weak on the L)  Apraxic Characteristics: None  Dysarthric Characteristics: Blended word boundaries; Imprecise; Increased rate  Intelligibility: Impaired  Word Intelligibility (%): 80 %  Conversation Intelligibility (%): 50 %  Overall Impairment Severity: Moderate  Language Comprehension and Expression:  Auditory Comprehension  Auditory Impairment: No  (slower processing)  Verbal Expression  Primary Mode of Expression: Verbal  Initiation: No impairment  Naming: Impaired  Confrontation (%): 62 %  Conversation: Fluent; Dysarthric  Speech Characteristics: Word retrieval        Neuro-Linguistics:                                                 G Codes:   In compliance with CMSs Claims Based Outcome Reporting, the following G-code set was chosen for this patient based the use of the NOMS functional outcome to quantify this patient's level of 4 impairment. Using the NOMS, the patient was determined to be at level 4 for motor speech which correlates with the CK= 40-59% level of severity. Based on the objective assessment provided within this note, the current, goal, and discharge g-codes are as follows: Motor   Current  CK= 40-59%   Goal  CJ= 20-39%      Pain:  Pain Scale 1: Numeric (0 - 10)  Pain Intensity 1: 0     After treatment:   []              Patient left in no apparent distress sitting up in chair  [x]              Patient left in no apparent distress in bed  [x]              Call bell left within reach  [x]              Nursing notified  [x]              Caregiver present  []              Bed alarm activated    COMMUNICATION/EDUCATION:   The patients plan of care including recommendations and planned interventions was discussed with: Registered Nurse. [x]  Patient/family have participated as able in goal setting and plan of care. []  Patient/family agree to work toward stated goals and plan of care. []  Patient understands intent and goals of therapy, but is neutral about his/her participation. []  Patient is unable to participate in goal setting and plan of care.     Thank you for this referral.  Cristóbal Blanca, SLP  Time Calculation: 13 mins

## 2018-03-07 NOTE — PROGRESS NOTES
Attempted to see pt for therapy services and pt had SLP at bedside. Will defer and continue to follow.

## 2018-03-07 NOTE — ED NOTES
Attempt to call report x2, on hold for extended period of time, RN not available, \"will call back\"

## 2018-03-07 NOTE — H&P
Hospitalist Admission Note    NAME: Nikki Otto   :  1940   MRN:  [id number]     Date/Time:  3/6/2018 7:25 PM    Patient PCP: Abeba Ennis MD  ______________________________________________________________________  Given the patient's current clinical presentation, I have a high level of concern for decompensation if discharged from the emergency department. Complex decision making was performed, which includes reviewing the patient's available past medical records, laboratory results, and x-ray films. My assessment of this patient's clinical condition and my plan of care is as follows. 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- pt unaware of any CVA or injury in past    Admit to neuro tele bed  S/p  mg x 1 in ER, cont ASA 81 mg daily in AM  Check MRI brain  Cont statin , check lipid panel  Check A1c, TSH  Check 2DEcho  IP neurology consult  PT/OT/SLP eval as per protocol  IV labetalol prn as per protocol    HTN  CAD s/p cath  Hyperlipidemia    Cont ASA  Cont statin  Cont Metoprolol, holding HCTZ, Diovan for first 24 hrs    DM type 2 - controlled    FS Q AC & HS  SSI lispro here  Holding home metformin, saxagliptin        Code Status: Full  Surrogate Decision Maker: wife Adonay Cabello # [phone]    DVT Prophylaxis: Sq lovenox  GI Prophylaxis: not indicated    Baseline: Pt lives at home with wife & is independent with ADLs      Subjective:   CHIEF COMPLAINT: Aphasia with L arm numbness x 15 min PTA    HISTORY OF PRESENT ILLNESS:     Nikki Otto is a 66 y.o.  male who presents with above complains from home via EMS. Pt presented as CODE S with CC of sudden onset difficulty speaking with associated L arm numbness this afternoon. Pt denies any previous h/o  CVA. H/o taking ASA for the heart as per pt  No h/o chest pain, SOB, palpitation, Syncope    Pt was found to have a ? Subacute R parietal lobe infarction in ER on CT & CTA head & neck with no significant prox blockage -- not a tPA candidate as per Teleneurology, also pt's symptoms resolved significantly. We were asked to admit for work up and evaluation of the above problems. Past Medical History:   Diagnosis Date    Arthritis     CAD (coronary artery disease)     [facility]     Diabetic neuropathy ([facility]     Hypercholesterolemia     Hypertension 11/29/10    PVD (peripheral vascular disease) ([facility]     Shingles 2010    [facility]boembolus [facility])         Past Surgical History:   Procedure Laterality Date    HX CARPAL TUNNEL RELEASE  left    HX HEART CATHETERIZATION  12/2005    Dr Xiao Tian      left arm fracture    [place] COLONOSCOPY FLX DX W/COLLJ Darrall Aas WHEN PFRMD  11/12/2012            Social History   Substance Use Topics    Smoking status: Former Smoker     Quit date: 1/1/2000    Smokeless tobacco: Never Used      Comment: 15 years ago    Alcohol use Yes      Comment: occ. Family History   Problem Relation Age of Onset    Stroke Father     Heart Disease Brother     Cancer Brother      Allergies   Allergen Reactions    Lipitor [Atorvastatin] Diarrhea        Prior to Admission medications    Medication Sig Start Date End Date Taking? Authorizing Provider   metFORMIN (GLUCOPHAGE) 1,000 mg tablet 1 1/2 tabs po in am and 1 tab po in pm. For diabetes 1/15/18  Yes Placido Grimm MD   rosuvastatin (CRESTOR) 20 mg tablet TAKE 1 TABLET BY MOUTH EVERY NIGHT AT BEDTIME for cholesterol 1/12/18  Yes Placido Grimm MD   hydroCHLOROthiazide (HYDRODIURIL) 12.5 mg tablet Take 1 Tab by mouth daily.  For blood pressure 1/12/18  Yes Placido Grimm MD   glimepiride (AMARYL) 4 mg tablet TAKE 1 TABLET BY MOUTH EVERY DAY FOR SUGAR 1/12/18  Yes Placido Grimm MD   valsartan (DIOVAN) 160 mg tablet TAKE 1 TABLET BY MOUTH EVERY DAY for blood pressure 1/12/18  Yes Placido Grimm MD   metoprolol tartrate (LOPRESSOR) 25 mg tablet TAKE 1/2 TABLET BY MOUTH TWICE DAILY 1/12/18  Yes Wiliam Currie MD   naproxen (NAPROSYN) 500 mg tablet TAKE 1 TABLET BY MOUTH TWICE DAILY WITH MEALS FOR KNEE AND HIP PAIN 11/3/17  Yes Wiliam Currie MD   acetaminophen-codeine (TYLENOL-CODEINE #3) 300-30 mg per tablet Take 2 Tabs by mouth every four (4) hours as needed for Pain. 7/12/17  Yes Wiliam Currie MD   sAXagliptin (ONGLYZA) 5 mg tab tablet Take 1 Tab by mouth daily. for diabetes 6/29/17  Yes Wiliam Currie MD   amitriptyline (ELAVIL) 10 mg tablet TAKE 1 TABLET BY MOUTH NIGHTLY TO PREVENT HAVING TO PASS WATER AT NIGHT AS DIRECTED 6/13/17  Yes Wiliam Currie MD   aspirin delayed-release 81 mg tablet Take 1 Tab by mouth daily.  To prevent heart attack and stroke 6/9/16  Yes Wiliam Currie MD       REVIEW OF SYSTEMS:           Total of 12 systems reviewed as follows:       POSITIVE= underlined text  Negative = text not underlined  General:  fever, chills, sweats, generalized weakness, weight loss/gain,      loss of appetite   Eyes:    blurred vision, eye pain, loss of vision, double vision  ENT:    rhinorrhea, pharyngitis   Respiratory:   cough, sputum production, SOB, FRIAS, wheezing, pleuritic pain   Cardiology:   chest pain, palpitations, orthopnea, PND, edema, syncope   Gastrointestinal:  abdominal pain , N/V, diarrhea, dysphagia, constipation, bleeding   Genitourinary:  frequency, urgency, dysuria, hematuria, incontinence   Muskuloskeletal :  arthralgia, myalgia, back pain  Hematology:  easy bruising, nose or gum bleeding, lymphadenopathy   Dermatological: rash, ulceration, pruritis, color change / jaundice  Endocrine:   hot flashes or polydipsia   Neurological:  headache, dizziness, confusion, focal weakness, paresthesia ( L hand)     Speech difficulties, memory loss, gait difficulty  Psychological: Feelings of anxiety, depression, agitation    Objective:   VITALS:    Visit Vitals    /79    Pulse 88    Temp 98.1 °F (36.7 °C)    Resp 18    Ht 6' 1\" (1.854 m)    Wt 90.1 kg (198 lb 10.2 oz)    SpO2 98%    BMI 26.21 kg/m2       PHYSICAL EXAM:    General:    Alert, cooperative, no distress, appears stated age. HEENT: Atraumatic, anicteric sclerae, pink conjunctivae     No oral ulcers, mucosa moist, throat clear, dentition fair  Neck:  Supple, symmetrical,  thyroid: non tender  Lungs:   Clear to auscultation bilaterally. No Wheezing or Rhonchi. No rales. Chest wall:  No tenderness  No Accessory muscle use. Heart:   Regular  rhythm,  No  murmur   No edema  Abdomen:   Soft, non-tender. Not distended. Bowel sounds normal  Extremities: No cyanosis. No clubbing,      Skin turgor normal, Capillary refill normal, Radial dial pulse 2+  Skin:     Not pale. Not Jaundiced  No rashes   Psych:  Good insight. Not depressed. Not anxious or agitated. Neurologic: EOMs intact. No facial asymmetry. ?dysarthria noted +, Symmetrical strength, Sensation grossly   intact now. Alert and oriented X 4.     _______________________________________________________________________  Care Plan discussed with:    Comments   Patient x    Family      RN x    Care Manager                    Consultant:  aracely ED Resident   _______________________________________________________________________  Expected  Disposition:   Home with Family x   HH/PT/OT/RN ?   SNF/LTC    ISAIAS    ________________________________________________________________________  TOTAL TIME:  64 Minutes    Critical Care Provided     Minutes non procedure based      Comments    x Reviewed previous records   >50% of visit spent in counseling and coordination of care x Discussion with patient and family and questions answered       ________________________________________________________________________  Signed: Saint Brake, MD    Procedures: see electronic medical records for all procedures/Xrays and details which were not copied into this note but were reviewed prior to creation of Plan.     LAB DATA REVIEWED:    Recent Results (from the past 24 hour(s))   GLUCOSE, POC    Collection Time: 03/06/18  4:10 PM   Result Value Ref Range    Glucose (POC) 128 (H) 65 - 100 mg/dL    Performed by Gabby Chaney    CBC WITH AUTOMATED DIFF    Collection Time: 03/06/18  4:13 PM   Result Value Ref Range    WBC 8.7 4.1 - 11.1 K/uL    RBC 4.30 4. 10 - 5.70 M/uL    HGB 12.4 12.1 - 17.0 g/dL    HCT 37.5 36.6 - 50.3 %    MCV 87.2 80.0 - 99.0 FL    MCH 28.8 26.0 - 34.0 PG    MCHC 33.1 30.0 - 36.5 g/dL    RDW 13.1 11.5 - 14.5 %    PLATELET 947 279 - 969 K/uL    MPV 10.0 8.9 - 12.9 FL    NRBC 0.0 0  WBC    ABSOLUTE NRBC 0.00 0.00 - 0.01 K/uL    NEUTROPHILS 55 32 - 75 %    LYMPHOCYTES 34 12 - 49 %    MONOCYTES 8 5 - 13 %    EOSINOPHILS 2 0 - 7 %    BASOPHILS 1 0 - 1 %    IMMATURE GRANULOCYTES 0 0.0 - 0.5 %    ABS. NEUTROPHILS 4.8 1.8 - 8.0 K/UL    ABS. LYMPHOCYTES 3.0 0.8 - 3.5 K/UL    ABS. MONOCYTES 0.7 0.0 - 1.0 K/UL    ABS. EOSINOPHILS 0.2 0.0 - 0.4 K/UL    ABS. BASOPHILS 0.0 0.0 - 0.1 K/UL    ABS. IMM.  GRANS. 0.0 0.00 - 0.04 K/UL    DF AUTOMATED     METABOLIC PANEL, BASIC    Collection Time: 03/06/18  4:13 PM   Result Value Ref Range    Sodium 137 136 - 145 mmol/L    Potassium 3.9 3.5 - 5.1 mmol/L    Chloride 101 97 - 108 mmol/L    CO2 28 21 - 32 mmol/L    Anion gap 8 5 - 15 mmol/L    Glucose 143 (H) 65 - 100 mg/dL    BUN 22 (H) 6 - 20 MG/DL    Creatinine 1.32 (H) 0.70 - 1.30 MG/DL    BUN/Creatinine ratio 17 12 - 20      GFR est AA >60 >60 ml/min/1.73m2    GFR est non-AA 52 (L) >60 ml/min/1.73m2    Calcium 9.5 8.5 - 10.1 MG/DL   POC INR    Collection Time: 03/06/18  4:37 PM   Result Value Ref Range    INR (POC) 1.1 <1.2     URINALYSIS W/ REFLEX CULTURE    Collection Time: 03/06/18  5:46 PM   Result Value Ref Range    Color YELLOW/STRAW      Appearance CLEAR CLEAR      Specific gravity 1.011 1.003 - 1.030      pH (UA) 6.0 5.0 - 8.0      Protein NEGATIVE  NEG mg/dL    Glucose NEGATIVE  NEG mg/dL    Ketone NEGATIVE  NEG mg/dL    Bilirubin NEGATIVE  NEG      Blood NEGATIVE  NEG      Urobilinogen 0.2 0.2 - 1.0 EU/dL    Nitrites NEGATIVE  NEG      Leukocyte Esterase NEGATIVE  NEG      WBC PENDING /hpf    RBC PENDING /hpf    Epithelial cells PENDING /lpf    Bacteria PENDING /hpf    UA:UC IF INDICATED PENDING    EKG, 12 LEAD, INITIAL    Collection Time: 03/06/18  6:00 PM   Result Value Ref Range    Ventricular Rate 86 BPM    Atrial Rate 86 BPM    P-R Interval 192 ms    QRS Duration 112 ms    Q-T Interval 372 ms    QTC Calculation (Bezet) 445 ms    Calculated P Axis 48 degrees    Calculated R Axis 16 degrees    Calculated T Axis 47 degrees    Diagnosis       Normal sinus rhythm  Normal ECG  No previous ECGs available

## 2018-03-07 NOTE — PROGRESS NOTES
[facility] Vascular  Preliminary Report:  Carotid Duplex Scan    Right:  Moderate plaque noted in the right carotid system. Right ICA velocities suggest less than 50% diameter reduction. Right vertebral artery flow is antegrade. Left:  Moderate plaque noted in the left carotid system. Left ICA velocities suggest less than 50% diameter reduction. Left vertebral artery flow is antegrade (resistive). Final report to follow.

## 2018-03-07 NOTE — ED NOTES
Bedside and Verbal shift change report given to [address] [place] [address] [address] (oncoming nurse) by Nathanael Clemons RN (offgoing nurse). Report included the following information SBAR, Kardex, ED Summary, Intake/Output, MAR, Recent Results and Med Rec Status.

## 2018-03-07 NOTE — PROGRESS NOTES
Occupational Therapy neurological EVALUATION with discharge  Patient: Will Pereira (74 y.o. male)  Date: 3/7/2018  Primary Diagnosis: CVA (cerebral vascular accident) [facility])        Precautions:   Fall    ASSESSMENT:  Based on the objective data described below, the patient presents with decreased auditory processing at times but pt appears to be hard of hearing. Slight left gross motor and left hand dis coordination that does not impede pts functional use of his left UE during ADLs. Decreased balance with higher level standing balance and turns. 5/5 strength in left UE. Pt reports slight numbness in left finger tips at times but this has improved. Pt was able to don socks seated, perform mock toileting. Pt may benefit from OP OT if symptoms persist.  Pt prefers to have PT to address balance deficits. Once these deficits improve so will his independence with ADLS. Further skilled acute occupational therapy is not indicated at this time. Educated pt on gross motor exercises with good understanding. Discharge Recommendations: Outpatient PT and OT   Further Equipment Recommendations for Discharge: none     SUBJECTIVE:   Patient stated I can work on this with the physical therapy.     OBJECTIVE DATA SUMMARY:   HISTORY:   Past Medical History:   Diagnosis Date    Arthritis     CAD (coronary artery disease)     [facility]     Diabetic neuropathy [facility]     Hypercholesterolemia     Hypertension 11/29/10    PVD [facility]peripheral vascular disease) [facility] [facility]     Shingles 2010    Thromboembolus [facility])      Past Surgical History:   Procedure Laterality Date    HX CARPAL TUNNEL RELEASE  left    HX HEART CATHETERIZATION  12/2005    Dr Steven Phan      left arm fracture    MD COLONOSCOPY FLX DX W/COLLJ SPEC WHEN PFRMD  11/12/2012            Prior Level of Function/Environment/Context: independent without assist devices; drives and is retired  Expanded or extensive additional review of patient history: Home Situation  Home Environment:  (Condo)  # Steps to Enter: 3  Rails to Enter: Yes  Hand Rails : Right  One/Two Story Residence:  (13 steps; walk into 2rd level; bedroom on 3rd level)  Living Alone: No  Support Systems: Family member(s)  Patient Expects to be Discharged to[de-identified] Private residence  Current DME Used/Available at Home: None  Tub or Shower Type: Tub/Shower combination  [x]  Right hand dominant   []  Left hand dominant    EXAMINATION OF PERFORMANCE DEFICITS:  Cognitive/Behavioral Status:  Neurologic State: Alert  Orientation Level: Oriented X4  Cognition: Appropriate for age attention/concentration; Appropriate safety awareness; Appropriate decision making  Perception: Appears intact  Perseveration: No perseveration noted  Safety/Judgement: Awareness of environment; Fall prevention        Hearing: Auditory  Auditory Impairment: None    Vision/Perceptual:    Tracking: Able to track stimulus in all quadrants w/o difficulty                      Acuity: Within Defined Limits         Range of Motion:    AROM: Within functional limits  PROM: Within functional limits                      Strength:    Strength: Generally decreased, functional                Coordination:  Coordination: Generally decreased, functional  Fine Motor Skills-Upper: Left Intact; Right Intact    Gross Motor Skills-Upper: Left Impaired;Right Intact (but functional)    Tone & Sensation:    Tone: Normal  Sensation: Impaired (L finger tips)                      Balance:  Sitting: Intact  Standing: Impaired; Without support  Standing - Static: Good  Standing - Dynamic : Fair    Functional Mobility and Transfers for ADLs:  Bed Mobility:  Rolling: Supervision  Supine to Sit: Supervision  Scooting: Supervision    Transfers:  Functional Transfers  Sit to Stand: Contact guard assistance  Stand to Sit: Stand-by assistance  Bed to Chair: Stand-by assistance  Toilet Transfer : Stand-by assistance    ADL Assessment:  Feeding: Supervision    Oral Facial Hygiene/Grooming: Supervision    Bathing: Stand-by assistance    Upper Body Dressing: Supervision    Lower Body Dressing: Stand-by assistance    Toileting: Stand by assistance                ADL Intervention and task modifications:    Cognitive Retraining  Safety/Judgement: Awareness of environment; Fall prevention      Functional Measure:   Fugl-Shields Assessment of Motor Recovery after Stroke:     Reflex Activity  Flexors/Biceps/Fingers: Can be elicited  Extensors/Triceps: Can be elicited  Reflex Subtotal: 4    Volitional Movement Within Synergies  Shoulder Retraction: Full  Shoulder Elevation: Full  Shoulder Abduction (90 degrees): Full  Shoulder External Rotation: Full  Elbow Flexion: Full  Forearm Supination: Full  Shoulder Adduction/Internal Rotation: Full  Elbow Extension: Full  Forearm Pronation: Full  Subtotal: 18    Volitional Movement Mixing Synergies  Hand to Lumbar Spine: Full  Shoulder Flexion (0-90 degrees): Full  Pronation-Supination: Full  Subtotal: 6    Volitional Movement With Little or No Synergy  Shoulder Abduction (0-90 degrees): Full  Shoulder Flexion ( degrees): Full  Pronation/Supination: Full  Subtotal : 6    Normal Reflex Activity  Biceps, Triceps, Finger Flexors:  Full  Subtotal : 2    Upper Extremity Total   Upper Extremity Total: 36    Wrist  Stability at 15 Degree Dorsiflexion: Full  Repeated Dorsiflexion/ Volar Flexion: Full  Stability at 15 Degree Dorsiflexion: Full  Repeated Dorsiflexion/ Volar Flexion: Partial  Circumduction: Full  Wrist Total: 9    Hand  Mass Flexion: Full  Mass Extension: Full  Grasp A: Full  Grasp B: Full  Grasp C: Full  Grasp D: Full  Grasp E: Full  Hand Total: 14    Coordination/Speed  Tremor: None  Dysmetria: Slight  Time: 2-5s  Coordination/Speed Total : 4    Total A-D  Total A-D (Motor Function): 63/66     Percentage of impairment CH  0% CI  1-19% CJ  20-39% CK  40-59% CL  60-79% CM  80-99% CN  100%   Fugl-Shields score: 0-66 66 53-65 39-52 26-38 13-25 1-12   0      This is a reliable/valid measure of arm function after a neurological event. It has established value to characterize functional status and for measuring spontaneous and therapy-induced recovery; tests proximal and distal motor functions. Fugl-Wright Assessment  UE scores recorded between five and 30 days post neurologic event can be used to predict UE recovery at six months post neurologic event. Severe = 0-21 points   Moderately Severe = 22-33 points   Moderate = 34-47 points   Mild = 48-66 points  RAVI Denton, CORA Monsalve, & PAULINA Jacobs (1992). Measurement of motor recovery after stroke: Outcome assessment and sample size requirements. Stroke, 23, pp. 7183-3168.   ------------------------------------------------------------------------------------------------------------------------------------------------------------------  MCID:  Stroke:   Jamaal Cedeño et al, 2001; n = 171; mean age 79 (5) years; assessed within 16 (12) days of stroke, Acute Stroke)  FMA Motor Scores from Admission to Discharge   10 point increase in FMA Upper Extremity = 1.5 change in discharge FIM   10 point increase in FMA Lower Extremity = 1.9 change in discharge FIM  MDC:   Stroke:   Fidencio Roberts et al, 2008, n = 14, mean age = 59.9 (14.6) years, assessed on average 14 (6.5) months post stroke, Chronic Stroke)   FMA = 5.2 points for the Upper Extremity portion of the assessment     G codes: In compliance with CMSs Claims Based Outcome Reporting, the following G-code set was chosen for this patient based on their primary functional limitation being treated: The outcome measure chosen to determine the severity of the functional limitation was the fugl wright with a score of 63/66 which was correlated with the impairment scale. ?  Self Care:     - CURRENT STATUS: CI - 1%-19% impaired, limited or restricted    - GOAL STATUS: CI - 1%-19% impaired, limited or restricted    - D/C STATUS:  CI - 1%-19% impaired, limited or restricted      Occupational Therapy Evaluation Charge Determination   History Examination Decision-Making   LOW Complexity : Brief history review  LOW Complexity : 1-3 performance deficits relating to physical, cognitive , or psychosocial skils that result in activity limitations and / or participation restrictions  LOW Complexity : No comorbidities that affect functional and no verbal or physical assistance needed to complete eval tasks       Based on the above components, the patient evaluation is determined to be of the following complexity level: LOW     Pain:  Pain Scale 1: Numeric (0 - 10)  Pain Intensity 1: 0              Activity Tolerance:     Please refer to the flowsheet for vital signs taken during this treatment. After treatment:   [x]  Patient left in no apparent distress sitting up in chair  []  Patient left in no apparent distress in bed  [x]  Call bell left within reach  [x]  Nursing notified  []  Caregiver present  []  Bed alarm activated    COMMUNICATION/EDUCATION:   Findings and recommendations were discussed with: Physical Therapist, Registered Nurse and patient    Patient was educated regarding His deficit(s) of left hand numbness/discoordination as this relates to His diagnosis of CVA. He demonstrated Good understanding as evidenced by verbalization. Patient and/or family was verbally educated on the BE FAST acronym for signs/symptoms of CVA and TIA. BE FAST was written on patient's communication board  for visual education and reinforcement. All questions answered with patient indicating good understanding. [x]      Home safety education was provided and the patient/caregiver indicated understanding. [x]      Patient/family have participated as able and agree with findings and recommendations. []      Patient is unable to participate in plan of care at this time.     Thank you for this referral.  Radha Bernard OTR/L  Time Calculation: 29 mins

## 2018-03-07 NOTE — PROGRESS NOTES
Stroke Education provided to patient and the following topics were discussed    1. Patients personal risk factors for stroke are hypertension    2. Warning signs of Stroke:        * Sudden numbness or weakness of the face, arm or leg, especially on one side of          The body            * Sudden confusion, trouble speaking or understanding        * Sudden trouble seeing in one or both eyes        * Sudden trouble walking, dizziness, loss of balance or coordination        * Sudden severe headache with no known cause      3. Importance of activation Emergency Medical Services ( 9-1-1 ) immediately if experience any warning signs of stroke. 4. Be sure and schedule a follow-up appointment with your primary care doctor or any specialists as instructed. 5. You must take medicine every day to treat your risk factors for stroke. Be sure to take your medicines exactly as your doctor tells you: no more, no less. Know what your medicines are for , what they do. Anti-thrombotics /anticoagulants can help prevent strokes. You are taking the following medicine(s)  asa     6. Smoking and second-hand smoke greatly increase your risk of stroke, cardiovascular disease and death. Smoking history quit 20 years ago    7. Information provided was BS Stroke Education Binder, Stroke Handouts or Verbal Education    8. Documentation of teaching completed in Patient Education Activity and on Care Plan with teaching response noted?   yes

## 2018-03-07 NOTE — ROUTINE PROCESS

## 2018-03-07 NOTE — PROCEDURES
[facility]  *** FINAL REPORT ***    Name: Jose Cunningham  MRN: [id number]    Inpatient  : 06 Mar 1940  HIS Order #: [id number]  [address] [facility] Visit #: [id number]  Date: 07 Mar 2018    TYPE OF TEST: Cerebrovascular Duplex    REASON FOR TEST  Focal Neuro Deficit    Right Carotid:-             Proximal               Mid                 Distal  cm/s  Systolic  Diastolic  Systolic  Diastolic  Systolic  Diastolic  CCA:    483.7      16.0                           121.0      19.0  Bulb:  ECA:     89.0       3.0  ICA:    107.0      21.0      114.0      14.0       94.0      21.0  ICA/CCA:  0.9       1.1    ICA Stenosis: <50%    Right Vertebral:-  Finding: Antegrade  Sys:       86.0  Lorena:       19.0    Right Subclavian: Normal    Left Carotid:-            Proximal                Mid                 Distal  cm/s  Systolic  Diastolic  Systolic  Diastolic  Systolic  Diastolic  CCA:    729.0      12.0                           117.0      17.0  Bulb:  ECA:     80.0       0.0  ICA:     77.0      11.0       44.0      13.0       62.0      15.0  ICA/CCA:  0.7       0.6    ICA Stenosis: <50%    Left Vertebral:-  Finding: Resistant  Sys:       26.0  Lorena:        6.0    Left Subclavian: Normal    INTERPRETATION/FINDINGS  PROCEDURE:  Carotid Duplex Examination using B-mode, color and  spectral Doppler of the extracranial cerebrovascular arteries. 1. Bilateral <50% stenosis of the internal carotid arteries. 2. No significant stenosis in the external carotid arteries  bilaterally. 3. Antegrade flow in the right vertebral artery. 4. Resistant flow in the left vertebral artery. 5. Normal flow in both subclavian arteries. Plaque Morphology:  1. Calcific plaque in the bulb and right ICA. 2. Calcific plaque in the bulb and left ICA. ADDITIONAL COMMENTS    I have personally reviewed the data relevant to the interpretation of  this  study. TECHNOLOGIST: Gabriel Bryson.  JEREMIAH Jameson, PHILIP  Signed: 2018 11:57 AM    PHYSICIAN: Brynn Rodas MD  Signed: 03/07/2018 10:46 PM

## 2018-03-07 NOTE — CONSULTS
NEUROLOGY NOTE       DATE OF CONSULTATION: 3/7/2018    CONSULTED BY: Jo Ann Jaquez MD    Chief Complaint   Patient presents with    Aphasia     per ems they were called an hour and 15 minutes ago for slurreed speech and patient unable to say his words correctly, ems had a positive cicannitti for aphasia, but answers all questions correctly, symptoms have all cleared accept for some delayed responses and stuttering       Reason for Consult  I have been asked to see the patient in neurological consultation to render advice and opinion regarding possible stroke    HISTORY OF PRESENT ILLNESS  Ga Aquino is a 66 y.o. male who presents to the hospital because of slurred speech and left arm weakness. He states that around 2 PM on 3/6/2018, he did have sudden onset slurred speech, mild expressive aphasia and left arm numbness. He came to the hospital where CT scan was done and that was suggestive of a right parietal infarct which was confirmed on MRI of the brain. The patient does not take any aspirin at home. He states that his symptoms are much better today.     ROS  A ten system review of constitutional, cardiovascular, respiratory, musculoskeletal, endocrine, skin, SHEENT, genitourinary, psychiatric and neurologic systems was obtained and is unremarkable except as stated in HPI     PMH  Past Medical History:   Diagnosis Date    Arthritis     CAD (coronary artery disease)     [facility] [address]     Diabetic neuropathy ([facility] [address]     Hypercholesterolemia     Hypertension 11/29/10    PVD (peripheral vascular disease) ([address]     Shingles 2010    Thromboembolus ([facility] [address]        FH  Family History   Problem Relation Age of Onset    Stroke Father     Heart Disease Brother     Cancer Brother        SH  Social History     Social History    Marital status:      Spouse name: N/A    Number of children: 4    Years of education: N/A     Social History Main Topics    Smoking status: Former Smoker     Quit date: 1/1/2000    Smokeless tobacco: Never Used      Comment: 15 years ago    Alcohol use Yes      Comment: occ.  Drug use: None    Sexual activity: No     Other Topics Concern    None     Social History Narrative    , 4 children. Retired from [employer] in 2001 after 25 years       [place] Reactions    Lipitor [Atorvastatin] Diarrhea       PHYSICAL EXAM  EXAMINATION:   Patient Vitals for the past 24 hrs:   Temp Pulse Resp BP SpO2   03/07/18 0745 97.4 °F (36.3 °C) 66 18 123/55 98 %   03/07/18 0244 97.4 °F (36.3 °C) (!) 59 20 124/61 99 %   03/06/18 2312 97.4 °F (36.3 °C) 74 20 158/82 98 %   03/06/18 2245 - 67 21 124/63 -   03/06/18 2230 - 64 14 120/65 -   03/06/18 2215 - 66 23 121/68 -   03/06/18 2200 - 69 17 122/72 -   03/06/18 2145 - 90 24 153/66 -   03/06/18 2130 - 77 20 125/64 -   03/06/18 2115 - 78 22 119/66 -   03/06/18 2100 - 83 23 120/61 -   03/06/18 2045 - 94 19 131/73 -   03/06/18 2030 - 96 25 138/65 -   03/06/18 2015 - (!) 102 27 121/55 -   03/06/18 2000 - 92 19 115/71 98 %   03/06/18 1945 - (!) 115 16 160/72 99 %   03/06/18 1930 - 95 18 139/69 98 %   03/06/18 1915 - 91 22 138/74 98 %   03/06/18 1900 - 93 21 135/81 98 %   03/06/18 1845 - 88 18 132/79 98 %   03/06/18 1830 - 93 21 140/69 97 %   03/06/18 1815 - 91 19 151/78 99 %   03/06/18 1800 - 93 19 148/69 98 %   03/06/18 1746 - (!) 114 24 158/67 96 %   03/06/18 1700 - 98 17 166/80 98 %   03/06/18 1645 - 94 22 148/66 98 %   03/06/18 1626 98.1 °F (36.7 °C) 100 18 163/68 100 %        General:   General appearance: Pt is in no acute distress   Distal pulses are preserved  Fundoscopic exam: attempted    Neurological Examination:   Mental Status:  AAO x3. Speech is dysarthric. Follows commands, has normal fund of knowledge, attention, short term recall, comprehension and insight. Cranial Nerves: Visual fields are full. PERRL, Extraocular movements are full. Facial sensation intact. Facial movement intact.  Hearing intact to conversation. Palate elevates symmetrically. Shoulder shrug symmetric. Tongue midline. Motor: Strength is 5/5 in all 4 ext. Normal tone. No atrophy. Sensation: Normal to light touch    Reflexes: DTRs absent in the upper and lower extremities    Coordination/Cerebellar: Intact to finger-nose-finger     Gait: Deferred    Skin: No significant bruising or lacerations. LAB DATA REVIEWED:    Recent Results (from the past 24 hour(s))   GLUCOSE, POC    Collection Time: 03/06/18  4:10 PM   Result Value Ref Range    Glucose (POC) 128 (H) 65 - 100 mg/dL    Performed by Daphne Mandujano    CBC WITH AUTOMATED DIFF    Collection Time: 03/06/18  4:13 PM   Result Value Ref Range    WBC 8.7 4.1 - 11.1 K/uL    RBC 4.30 4. 10 - 5.70 M/uL    HGB 12.4 12.1 - 17.0 g/dL    HCT 37.5 36.6 - 50.3 %    MCV 87.2 80.0 - 99.0 FL    MCH 28.8 26.0 - 34.0 PG    MCHC 33.1 30.0 - 36.5 g/dL    RDW 13.1 11.5 - 14.5 %    PLATELET 247 422 - 596 K/uL    MPV 10.0 8.9 - 12.9 FL    NRBC 0.0 0  WBC    ABSOLUTE NRBC 0.00 0.00 - 0.01 K/uL    NEUTROPHILS 55 32 - 75 %    LYMPHOCYTES 34 12 - 49 %    MONOCYTES 8 5 - 13 %    EOSINOPHILS 2 0 - 7 %    BASOPHILS 1 0 - 1 %    IMMATURE GRANULOCYTES 0 0.0 - 0.5 %    ABS. NEUTROPHILS 4.8 1.8 - 8.0 K/UL    ABS. LYMPHOCYTES 3.0 0.8 - 3.5 K/UL    ABS. MONOCYTES 0.7 0.0 - 1.0 K/UL    ABS. EOSINOPHILS 0.2 0.0 - 0.4 K/UL    ABS. BASOPHILS 0.0 0.0 - 0.1 K/UL    ABS. IMM.  GRANS. 0.0 0.00 - 0.04 K/UL    DF AUTOMATED     METABOLIC PANEL, BASIC    Collection Time: 03/06/18  4:13 PM   Result Value Ref Range    Sodium 137 136 - 145 mmol/L    Potassium 3.9 3.5 - 5.1 mmol/L    Chloride 101 97 - 108 mmol/L    CO2 28 21 - 32 mmol/L    Anion gap 8 5 - 15 mmol/L    Glucose 143 (H) 65 - 100 mg/dL    BUN 22 (H) 6 - 20 MG/DL    Creatinine 1.32 (H) 0.70 - 1.30 MG/DL    BUN/Creatinine ratio 17 12 - 20      GFR est AA >60 >60 ml/min/1.73m2    GFR est non-AA 52 (L) >60 ml/min/1.73m2    Calcium 9.5 8.5 - 10.1 MG/DL POC INR    Collection Time: 03/06/18  4:37 PM   Result Value Ref Range    INR (POC) 1.1 <1.2     URINALYSIS W/ REFLEX CULTURE    Collection Time: 03/06/18  5:46 PM   Result Value Ref Range    Color YELLOW/STRAW      Appearance CLEAR CLEAR      Specific gravity 1.011 1.003 - 1.030      pH (UA) 6.0 5.0 - 8.0      Protein NEGATIVE  NEG mg/dL    Glucose NEGATIVE  NEG mg/dL    Ketone NEGATIVE  NEG mg/dL    Bilirubin NEGATIVE  NEG      Blood NEGATIVE  NEG      Urobilinogen 0.2 0.2 - 1.0 EU/dL    Nitrites NEGATIVE  NEG      Leukocyte Esterase NEGATIVE  NEG      WBC 0-4 0 - 4 /hpf    RBC 0-5 0 - 5 /hpf    Epithelial cells FEW FEW /lpf    Bacteria NEGATIVE  NEG /hpf    UA:UC IF INDICATED CULTURE NOT INDICATED BY UA RESULT CNI      Amorphous Crystals FEW (A) NEG      Hyaline cast 0-2 0 - 5 /lpf   EKG, 12 LEAD, INITIAL    Collection Time: 03/06/18  6:00 PM   Result Value Ref Range    Ventricular Rate 86 BPM    Atrial Rate 86 BPM    P-R Interval 192 ms    QRS Duration 112 ms    Q-T Interval 372 ms    QTC Calculation (Bezet) 445 ms    Calculated P Axis 48 degrees    Calculated R Axis 16 degrees    Calculated T Axis 47 degrees    Diagnosis       Normal sinus rhythm  Normal ECG  No previous ECGs available     GLUCOSE, POC    Collection Time: 03/06/18 11:55 PM   Result Value Ref Range    Glucose (POC) 73 65 - 100 mg/dL    Performed by Portia Litten (PCT)    GLUCOSE, POC    Collection Time: 03/07/18 12:15 AM   Result Value Ref Range    Glucose (POC) 82 65 - 100 mg/dL    Performed by Eleuterio Henderson (S0N)    LIPID PANEL    Collection Time: 03/07/18  2:45 AM   Result Value Ref Range    LIPID PROFILE          Cholesterol, total 113 <200 MG/DL    Triglyceride 114 <150 MG/DL    HDL Cholesterol 33 MG/DL    LDL, calculated 57.2 0 - 100 MG/DL    VLDL, calculated 22.8 MG/DL    CHOL/HDL Ratio 3.4 0 - 5.0     HEMOGLOBIN A1C WITH EAG    Collection Time: 03/07/18  2:45 AM   Result Value Ref Range    Hemoglobin A1c 8.3 (H) 4.2 - 6.3 %    Est. average glucose 192 mg/dL   TSH 3RD GENERATION    Collection Time: 03/07/18  2:45 AM   Result Value Ref Range    TSH 2.71 0.36 - 3.74 uIU/mL   GLUCOSE, POC    Collection Time: 03/07/18  6:29 AM   Result Value Ref Range    Glucose (POC) 72 65 - 100 mg/dL    Performed by Shanell Heath (PCT)    GLUCOSE, POC    Collection Time: 03/07/18  6:44 AM   Result Value Ref Range    Glucose (POC) 75 65 - 100 mg/dL    Performed by Shanell Heath (PCT)    GLUCOSE, POC    Collection Time: 03/07/18  7:02 AM   Result Value Ref Range    Glucose (POC) 78 65 - 100 mg/dL    Performed by Shanell Heath (PCT)    GLUCOSE, POC    Collection Time: 03/07/18  7:25 AM   Result Value Ref Range    Glucose (POC) 105 (H) 65 - 100 mg/dL    Performed by [facility] RUTH[facility])         Imaging review:  See below. Stroke workup    MRI Brain  1. Acute right cerebral infarct, primarily parietal lobe. 2. No acute bleed or shift. 3. Chronic right parietal infarct with punctate foci of hemosiderin. 4. Chronic deep white matter microangiopathy. Carotids:   Right:  Moderate plaque noted in the right carotid system. Right ICA velocities suggest less than 50% diameter reduction. Right vertebral artery flow is antegrade. Left:  Moderate plaque noted in the left carotid system. Left ICA velocities suggest less than 50% diameter reduction. Left vertebral artery flow is antegrade (resistive). TTE:   Pending  Stroke labs:  HgBA1c    Lab Results   Component Value Date/Time    Hemoglobin A1c 8.3 (H) 03/07/2018 02:45 AM     LDL   Lab Results   Component Value Date/Time    LDL, calculated 57.2 03/07/2018 02:45 AM       HOME MEDS  Prior to Admission Medications   Prescriptions Last Dose Informant Patient Reported? Taking?   acetaminophen-codeine (TYLENOL-CODEINE #3) 300-30 mg per tablet 2/6/2018 at Unknown time  No Yes   Sig: Take 2 Tabs by mouth every four (4) hours as needed for Pain.    amitriptyline (ELAVIL) 10 mg tablet 2/6/2018 at Unknown time  No Yes   Sig: TAKE 1 TABLET BY MOUTH NIGHTLY TO PREVENT HAVING TO PASS WATER AT NIGHT AS DIRECTED   aspirin delayed-release 81 mg tablet 3/6/2018 at Unknown time  No Yes   Sig: Take 1 Tab by mouth daily. To prevent heart attack and stroke   glimepiride (AMARYL) 4 mg tablet 3/5/2018 at Unknown time  No Yes   Sig: TAKE 1 TABLET BY MOUTH EVERY DAY FOR SUGAR   hydroCHLOROthiazide (HYDRODIURIL) 12.5 mg tablet 3/5/2018 at Unknown time  No Yes   Sig: Take 1 Tab by mouth daily. For blood pressure   metFORMIN (GLUCOPHAGE) 1,000 mg tablet 3/5/2018 at Unknown time  No Yes   Si 1/2 tabs po in am and 1 tab po in pm. For diabetes   metoprolol tartrate (LOPRESSOR) 25 mg tablet 3/5/2018 at Unknown time  No Yes   Sig: TAKE 1/2 TABLET BY MOUTH TWICE DAILY   naproxen (NAPROSYN) 500 mg tablet 2018 at Unknown time  No Yes   Sig: TAKE 1 TABLET BY MOUTH TWICE DAILY WITH MEALS FOR KNEE AND HIP PAIN   rosuvastatin (CRESTOR) 20 mg tablet 3/6/2018 at Unknown time  No Yes   Sig: TAKE 1 TABLET BY MOUTH EVERY NIGHT AT BEDTIME for cholesterol   sAXagliptin (ONGLYZA) 5 mg tab tablet 3/5/2018 at Unknown time  No Yes   Sig: Take 1 Tab by mouth daily. for diabetes   valsartan (DIOVAN) 160 mg tablet 3/6/2018 at Unknown time  No Yes   Sig: TAKE 1 TABLET BY MOUTH EVERY DAY for blood pressure      Facility-Administered Medications: None       CURRENT MEDS  Current Facility-Administered Medications   Medication Dose Route Frequency    amitriptyline (ELAVIL) tablet 10 mg  10 mg Oral QHS    aspirin delayed-release tablet 81 mg  81 mg Oral DAILY    glimepiride (AMARYL) tablet 2 mg  2 mg Oral ACB    metoprolol tartrate (LOPRESSOR) tablet 12.5 mg  12.5 mg Oral BID    atorvastatin (LIPITOR) tablet 40 mg  40 mg Oral QHS    sodium chloride (NS) flush 5-10 mL  5-10 mL IntraVENous Q8H    enoxaparin (LOVENOX) injection 40 mg  40 mg SubCUTAneous Q24H    insulin lispro (HUMALOG) injection   SubCUTAneous AC&HS       IMPRESSION:  Wood Meyer is a 66 y.o. male who presents with slurred speech and left arm numbness that started on 3/6/2018 at around 2 PM.  Both of them are better. Imaging study did show a right parietal infarct. The patient does not take any aspirin at home and stopped taking it somewhere in 2017. Recommend that he restarts that. His HbA1c is elevated as well and that can be worked on as an outpatient. Transthoracic echo is pending. 1.  Right parietal infarct  2. Hypertension  3. Hyperlipidemia  4. Diabetes    RECOMMENDATIONS:  - MRI brain w/o C -right parietal infarct  - Carotid US  -normal  - TTE -completed and result pending  - Telemetry  - Permissive HTN (SBP<220/<120) for 24 hrs from symptom onset and then BP goal is less than 140/90  - Stroke labs (HgbA1c, lipid panel). HbA1c is 8.3. Long-term goal is less than 7  -Continue aspirin 81 mg a day. Was not taking it as an outpatient.  -Continue atorvastatin 40 mg daily  - ST/OT/PT chris    Thank you very much for this consultation. Transthoracic echo is pending otherwise stroke workup has been completed. Will follow up with patient as an outpatient.       Ingris Sandoval MD  Neurologist

## 2018-03-07 NOTE — PROGRESS NOTES
Problem: Mobility Impaired (Adult and Pediatric)  Goal: *Acute Goals and Plan of Care (Insert Text)  Physical Therapy Goals  Initiated 3/7/2018  1. Patient will move from supine to sit and sit to supine , scoot up and down and roll side to side in bed with independence within 7 day(s). 2.  Patient will transfer from bed to chair and chair to bed with independence using the least restrictive device within 7 day(s). 3.  Patient will perform sit to stand with independence within 7 day(s). 4.  Patient will ambulate with independence for 200 feet with the least restrictive device within 7 day(s). 5.  Patient will ascend/descend 13 stairs with 1 handrail(s) with modified independence within 7 day(s). 6.  Patient will improve Golden Balance score by 7 points within 7 days. physical Therapy EVALUATION- neuro population    Patient: Reina Parnell (74 y.o. male)  Date: 3/7/2018  Primary Diagnosis: CVA (cerebral vascular accident) [facility])        Precautions:   Fall    ASSESSMENT :  Based on the objective data described below, the patient presents with impaired functional mobility secondary to impaired balance, mild gait impairments, impaired coordination in LUE, and impaired command following following admission for CVA. Pt received supine in bed with wife present and agreeable to PT evaluation. Pt cleared by nursing for mobility. Speech appeared slurred, with occasionally difficulty understanding patient. He had difficulty following multi-step commands, requiring repetition of commands and one step commands. Bed mobility performed with supervision and transfers performed with CGA/SBA. He scored 35/56 on GOLDEN balance test indicating moderate fall risk. Pt with tendency to rely on bed by leaning his legs against bed for support during balance testing, needing frequent cueing to correct.  He ambulated 110 ft with CGA x 1, demonstrating decreased step clearance (L worse than R), increased trunk flexion (pt reports baseline), and mild deviations throughout. He ascended/descended 10 steps with CGA and use of R railing. No overt LOB noted during functional mobility, however he was generally unsteady. He was assisted into bedside chair and left with all needs met, wife present, and RN aware following therapy session. Recommend patient discharge home with initial 24/7 supervision and OP neuro PT. PT will continue to follow to address mobility impairments as noted above. Patient will benefit from skilled intervention to address the above impairments. Patients rehabilitation potential is considered to be Good  Factors which may influence rehabilitation potential include:   [x]           None noted  []           Mental ability/status  []           Medical condition  []           Home/family situation and support systems  []           Safety awareness  []           Pain tolerance/management  []           Other:      PLAN :  Recommendations and Planned Interventions:  [x]             Bed Mobility Training             []      Neuromuscular Re-Education  [x]             Transfer Training                   []      Orthotic/Prosthetic Training  [x]             Gait Training                         []      Modalities  [x]             Therapeutic Exercises           []      Edema Management/Control  [x]             Therapeutic Activities            [x]      Patient and Family Training/Education  []             Other (comment):  Frequency/Duration: Patient will be followed by physical therapy 4 times a week to address goals. Discharge Recommendations: Outpatient Neuro PT  Further Equipment Recommendations for Discharge: None     SUBJECTIVE:   Patient stated About the same.     OBJECTIVE DATA SUMMARY:   HISTORY:    Past Medical History:   Diagnosis Date    Arthritis     CAD (coronary artery disease)     [facility] [address]     Diabetic neuropathy ([facility] [address]     Hypercholesterolemia     Hypertension 11/29/10    PVD (peripheral vascular disease) ([facility] [address]  Shingles 2010    [facility]rom[facility]embolus [facility])      Past Surgical History:   Procedure Laterality Date    HX CARPAL TUNNEL RELEASE  left    HX HEART CATHETERIZATION  12/2005    Dr Jenn Elder      left arm fracture    WA COLONOSCOPY FLX DX W/COLLJ SPEC WHEN PFRMD  11/12/2012          Prior Level of Function/Home Situation: Pt is independent for mobility and ADLs at baseline. Lives with wife. Retired. Drives. Denies fall history. Personal factors and/or comorbidities impacting plan of care: HTN    Home Situation  Home Environment:  (Condo)  # Steps to Enter: 3  Rails to Enter: Yes  Hand Rails : Right  One/Two Story Residence:  (13 steps; walk into 2rd level; bedroom on 3rd level)  Living Alone: No  Support Systems: Family member(s)  Patient Expects to be Discharged to[de-identified] Private residence  Current DME Used/Available at Home: None  Tub or Shower Type: Tub/Shower combination    EXAMINATION/PRESENTATION/DECISION MAKING:   Critical Behavior:  Neurologic State: Alert  Orientation Level: Oriented X4  Cognition: Appropriate for age attention/concentration, Appropriate safety awareness, Appropriate decision making     Hearing: Auditory  Auditory Impairment: None  Skin:  Intact  Edema: None  Range Of Motion:  AROM: Within functional limits           PROM: Within functional limits           Strength:    Strength: Generally decreased, functional                    Tone & Sensation:   Tone: Normal              Sensation: Impaired (L fingers)               Coordination:  Coordination: Generally decreased, functional  Vision:      Functional Mobility:  Bed Mobility:  Rolling: Supervision  Supine to Sit: Supervision     Scooting: Supervision  Transfers:  Sit to Stand: Contact guard assistance  Stand to Sit: Stand-by assistance                       Balance:   Sitting: Intact  Standing: Impaired; Without support  Standing - Static: Good  Standing - Dynamic : Fair  Ambulation/Gait Training:  Distance (ft): 110 Feet (ft)  Assistive Device: Gait belt  Ambulation - Level of Assistance: Contact guard assistance;Assist x1;Additional time     Gait Description (WDL): Exceptions to WDL  Gait Abnormalities: Decreased step clearance (increased trunk flexion)        Base of Support: Narrowed     Speed/Gina: Pace decreased (<100 feet/min)  Step Length: Left shortened;Right shortened       Stair Training:  Number of Stairs Trained: 10  Stairs - Level of Assistance: Contact guard assistance;Assist X1;Additional time  Rail Use: Right        Functional Measure  Golden Balance Test:    Sitting to Standing: 3  Standing Unsupported: 3  Sitting with Back Unsupported: 4  Standing to Sitting: 3  Transfers: 3  Standing Unsupported with Eyes Closed: 4  Standing Unsupported with Feet Together: 3  Reach Forward with Outstretched Arm: 2   Object: 3  Turn to Look Over Shoulders: 4  Turn 360 Degrees: 1  Alternate Foot on Step/Stool: 0  Standing Unsupported One Foot in Front: 0  Stand on One Le  Total: 35         56=Maximum possible score;   0-20=High fall risk  21-40=Moderate fall risk   41-56=Low fall risk     Golden Balance Test and G-code impairment scale:  Percentage of Impairment CH    0%   CI    1-19% CJ    20-39% CK    40-59% CL    60-79% CM    80-99% CN     100%   Golden   Score 0-56 56 45-55 34-44 23-33 12-22 1-11 0       G codes: In compliance with CMSs Claims Based Outcome Reporting, the following G-code set was chosen for this patient based on their primary functional limitation being treated: The outcome measure chosen to determine the severity of the functional limitation was the GOLDEN with a score of 35/56 which was correlated with the impairment scale.     ? Mobility - Walking and Moving Around:     - CURRENT STATUS: CJ - 20%-39% impaired, limited or restricted    - GOAL STATUS: CI - 1%-19% impaired, limited or restricted    - D/C STATUS:  ---------------To be determined---------------     Physical Therapy Evaluation Charge Determination   History Examination Presentation Decision-Making   MEDIUM  Complexity : 1-2 comorbidities / personal factors will impact the outcome/ POC  HIGH Complexity : 4+ Standardized tests and measures addressing body structure, function, activity limitation and / or participation in recreation  MEDIUM Complexity : Evolving with changing characteristics  Other outcome measures GOLDEN  MEDIUM      Based on the above components, the patient evaluation is determined to be of the following complexity level: MEDIUM    Pain:  Pain Scale 1: Numeric (0 - 10)  Pain Intensity 1: 0              Activity Tolerance:   Good - pt without complaints  Please refer to the flowsheet for vital signs taken during this treatment. After treatment:   [x]     Patient left in no apparent distress sitting up in chair  []     Patient left in no apparent distress in bed  [x]     Call bell left within reach  [x]     Nursing notified  [x]     Caregiver present  []     Bed alarm activated    COMMUNICATION/EDUCATION:   The patients plan of care was discussed with: Physical Therapist, Occupational Therapist and Registered Nurse. Patient was educated regarding His deficit(s) of impaired balance as this relates to His diagnosis of CVA. He demonstrated Excellent understanding as evidenced by verbalizing understanding. Patient and/or family was verbally educated on the BE FAST acronym for signs/symptoms of CVA and TIA. BE FAST was written on patient's communication board  for visual education and reinforcement. All questions answered with patient indicating Good understanding. [x]  Fall prevention education was provided and the patient/caregiver indicated understanding. [x]  Patient/family have participated as able in goal setting and plan of care. [x]  Patient/family agree to work toward stated goals and plan of care.   []  Patient understands intent and goals of therapy, but is neutral about his/her participation. []  Patient is unable to participate in goal setting and plan of care.     Thank you for this referral.  Trisha Park, PT, DPT   Time Calculation: 23 mins

## 2018-03-08 ENCOUNTER — PATIENT OUTREACH (OUTPATIENT)
Dept: FAMILY MEDICINE CLINIC | Age: 78
End: 2018-03-08

## 2018-03-08 VITALS
SYSTOLIC BLOOD PRESSURE: 128 MMHG | HEART RATE: 60 BPM | BODY MASS INDEX: 26.33 KG/M2 | DIASTOLIC BLOOD PRESSURE: 57 MMHG | WEIGHT: 198.63 LBS | RESPIRATION RATE: 18 BRPM | HEIGHT: 73 IN | OXYGEN SATURATION: 99 % | TEMPERATURE: 97.4 F

## 2018-03-08 PROBLEM — I63.311 THROMBOTIC STROKE INVOLVING RIGHT MIDDLE CEREBRAL ARTERY (HCC): Status: ACTIVE | Noted: 2018-03-08

## 2018-03-08 PROBLEM — I65.23 BILATERAL CAROTID ARTERY STENOSIS: Status: ACTIVE | Noted: 2018-03-08

## 2018-03-08 LAB
GLUCOSE BLD STRIP.AUTO-MCNC: 103 MG/DL (ref 65–100)
SERVICE CMNT-IMP: ABNORMAL

## 2018-03-08 PROCEDURE — 82962 GLUCOSE BLOOD TEST: CPT

## 2018-03-08 PROCEDURE — 74011250637 HC RX REV CODE- 250/637: Performed by: INTERNAL MEDICINE

## 2018-03-08 RX ORDER — ASPIRIN 81 MG/1
81 TABLET ORAL DAILY
Qty: 30 TAB | Refills: 12 | Status: SHIPPED
Start: 2018-03-08 | End: 2018-03-12 | Stop reason: SDUPTHER

## 2018-03-08 RX ADMIN — ASPIRIN 81 MG: 81 TABLET, COATED ORAL at 08:48

## 2018-03-08 RX ADMIN — GLIMEPIRIDE 2 MG: 1 TABLET ORAL at 08:48

## 2018-03-08 RX ADMIN — METOPROLOL TARTRATE 12.5 MG: 25 TABLET ORAL at 08:48

## 2018-03-08 NOTE — LETTER
3/8/2018 2:18 PM 
 
Mr. Nicanor Drake [address] [address] Dear Mr. Ayon: 
 
Hello! At any age, we need to think about our wishes if something catastrophic were to happen medically. If you were not able to speak for yourself, is there a way that your wishes were followed if you cannot meaningfully recover? There is a way you can let your medical team know if you do not want (or want) certain medical procedures in the event that you cannot recover from something back to good quality of life. Please find enclosed the documents and papers regarding making an Advance Care Plan. You just need to pick your 2 \"spokespeople\" and let them know what you want. Then you decide what you want by checking the boxes. Lastly, you sign the last page with two people watching you and they sign the witness blanks. These papers do NOT need to be notarized, are effective from the date signed and can be changed or revoked at any time. When you do complete the paperwork, please bring them to your Primary Care office. A copy will be made for their records and the original returned to you for safekeeping. The papers will then be available for all Irina Daniel. Please call me if you have any questions at [phone]. Sincerely, Pillo Parker RN

## 2018-03-08 NOTE — PROGRESS NOTES
CM noted that Pt is ready for discharge. CM is going to meet with Pt now to complete evaluation. CM made follow up appointment with PCP. CM will continue to monitor discharge plan. 9:55 AM   CM met with Pt at 8:50 AM this morning and completed eval and discharge planning. Pt is a 67 y/o male that was admitted on 3/6/18 d/t a Dx of CVA. Pt is alert and oriented. Pt is Full code. Pt lives with spouse in three story home with 2 ANITRA to enter. Bedroom is on seconf floor. Pt has no DME. Pt has no experience with HH or SNF. Pt I W ADLs and Drives. Pt pharmacy is [facility]. CM issued Second IM letter. Signed copy on bedside chart. Spouse will come to transport home this morning. Therapy rec was outpatient PTOT and MD did not write order. Pt will see MD on Monday and will discuss. No further CM needs. Care Management Interventions  PCP Verified by CM: Yes  Palliative Care Criteria Met (RRAT>21 & CHF Dx)?: No  Mode of Transport at Discharge:  Other (see comment)  Transition of Care Consult (CM Consult): Discharge Planning  MyChart Signup: No  Discharge Durable Medical Equipment: No  Physical Therapy Consult: Yes  Occupational Therapy Consult: Yes  Speech Therapy Consult: No  Current Support Network: Lives with Spouse, Own Home  Confirm Follow Up Transport: Family  Plan discussed with Pt/Family/Caregiver: Yes  Freedom of Choice Offered: Yes  Discharge Location  Discharge Placement: Home with family assistance    Nicolas, [address] [facility]

## 2018-03-08 NOTE — PROGRESS NOTES
Spoke with patient's wife about taking asa every day and that his follow up is next Monday with Dr. Giorgio Myles. She is on her way to pick him up.

## 2018-03-08 NOTE — PROGRESS NOTES
DC instructions reviewed with patient. CM met with patient and he is following up with his PCP next week. He verbalizes understanding of all information provided.

## 2018-03-08 NOTE — DISCHARGE INSTRUCTIONS
General Daily Progress Note    Admit Date: 3/6/2018  Hospital day 3    Subjective:     Patient has no complaint of slurred speech. .   Medication side effects: none    Current Facility-Administered Medications   Medication Dose Route Frequency    amitriptyline (ELAVIL) tablet 10 mg  10 mg Oral QHS    aspirin delayed-release tablet 81 mg  81 mg Oral DAILY    glimepiride (AMARYL) tablet 2 mg  2 mg Oral ACB    metoprolol tartrate (LOPRESSOR) tablet 12.5 mg  12.5 mg Oral BID    atorvastatin (LIPITOR) tablet 40 mg  40 mg Oral QHS    sodium chloride (NS) flush 5-10 mL  5-10 mL IntraVENous Q8H    sodium chloride (NS) flush 5-10 mL  5-10 mL IntraVENous PRN    acetaminophen (TYLENOL) tablet 650 mg  650 mg Oral Q4H PRN    Or    acetaminophen (TYLENOL) solution 650 mg  650 mg Per NG tube Q4H PRN    Or    acetaminophen (TYLENOL) suppository 650 mg  650 mg Rectal Q4H PRN    labetalol (NORMODYNE;TRANDATE) injection 5 mg  5 mg IntraVENous Q10MIN PRN    enoxaparin (LOVENOX) injection 40 mg  40 mg SubCUTAneous Q24H    insulin lispro (HUMALOG) injection   SubCUTAneous AC&HS    glucose chewable tablet 16 g  4 Tab Oral PRN    dextrose (D50W) injection syrg 12.5-25 g  12.5-25 g IntraVENous PRN    glucagon (GLUCAGEN) injection 1 mg  1 mg IntraMUSCular PRN        Review of Systems  Pertinent items are noted in HPI. Objective:     Patient Vitals for the past 8 hrs:   BP Temp Pulse Resp SpO2   03/08/18 0727 128/57 97.4 °F (36.3 °C) 60 18 99 %   03/08/18 0320 126/65 97.7 °F (36.5 °C) (!) 59 18 97 %        03/06 1901 - 03/08 0700  In: 740 [P.O.:740]  Out: 275 [Urine:275]    Physical Exam:   Visit Vitals    /57    Pulse 60    Temp 97.4 °F (36.3 °C)    Resp 18    Ht 6' 1\" (1.854 m)    Wt 198 lb 10.2 oz (90.1 kg)    SpO2 99%    BMI 26.21 kg/m2     Lungs: clear to auscultation bilaterally  Heart: regular rate and rhythm, S1, S2 normal, no murmur, click, rub or gallop  Abdomen: soft, non-tender.  Bowel sounds normal. No masses,  no organomegaly  Extremities: extremities normal, atraumatic, no cyanosis or edema      ECG: normal sinus rhythm     Data Review   Recent Results (from the past 24 hour(s))   GLUCOSE, POC    Collection Time: 03/07/18 11:48 AM   Result Value Ref Range    Glucose (POC) 241 (H) 65 - 100 mg/dL    Performed by Ritu Cortez (PCT)    GLUCOSE, POC    Collection Time: 03/07/18  4:21 PM   Result Value Ref Range    Glucose (POC) 115 (H) 65 - 100 mg/dL    Performed by Ritu Cortez (PCT)    GLUCOSE, POC    Collection Time: 03/07/18  9:07 PM   Result Value Ref Range    Glucose (POC) 198 (H) 65 - 100 mg/dL    Performed by Anny Mello (PCT)    GLUCOSE, POC    Collection Time: 03/08/18  6:16 AM   Result Value Ref Range    Glucose (POC) 103 (H) 65 - 100 mg/dL    Performed by Anny Mello (PCT)            Assessment:     Active Problems:    CVA (cerebral vascular accident) ([address] (3/6/2018)      TIA (transient ischemic attack) (3/6/2018)      Bilateral carotid artery stenosis (3/8/2018)      Thrombotic stroke involving right middle cerebral artery ([address] (3/8/2018)        Plan:     Doing well.  Will dc home today and recheck in office next week

## 2018-03-08 NOTE — PROGRESS NOTES
Hospital Discharge Follow-Up      Date/Time:  3/8/2018 2:25 PM    Patient was admitted to [facility] on 3/6/18 and discharged on 3/8/18 for CVA. The physician discharge summary was not available at the time of outreach. Patient contacted within 1 business days of discharge. Nurse Navigator(NN) contacted the family by telephone to perform post hospital discharge assessment. Verified name and  with wife as identifiers. Provided introduction to self, and explanation of the Nurse Navigator role. Inpatient RRAT score: 16    Top challenges reviewed with the provider:  Aspirin compliance and checking blood sugars    Method of communication with provider :face to face      Reviewed discharge instructions and red flags with  wife who verbalizes understanding. WIfe given an opportunity to ask questions and does not have any further questions or concerns at this time. The patient agrees to contact the PCP office for questions related to their healthcare. NN provided contact information for future reference. Summary of patients top problems:  1. Compliance with aspirin therapy  2. Checking blood sugars at least daily  3. Appointment attendance    Home Health orders at discharge: [address]: NA  Date of initial visit: NA    Durable Medical Equipment ordered/company: None  Durable Medical Equipment received: NA    Barriers to care? lack of knowledge about disease, medication management    Medication:   Medication reconciliation was preformed with wife, who verbalizes understanding of administration of home medications. There were no barriers to obtaining medications identified at this time.     New medications at discharge include none  Does the patient have new prescription(s) to last until follow up with prescribing provider: NA  Prescription Medication total: 12 (pharmacy consult for polypharm needed?) no   Medication changes (dose adjustments or discontinued meds): addition of aspirin which the patient was supposed to be taking PTA    Advance Care Planning:   Does patient have an Advance Directive:  not on file, materials mailed to patient     PCP/Specialist follow up: Future Appointments  Date Time Provider Negar Mckeon   3/12/2018 11:15 AM Kvng Shepherd MD [address]   7/12/2018 10:30 AM Kvng Shepherd  [place] [address] No hospital readmission for CVA            3/8/18 - Spoke with wife about patient now being home after CVA - goal is not to go back to the hospital.  S/sx of CVA reviewed with wife. Importance of taking daily aspirin reviewed. SPoke about importance of taking twice daily blood sugars and recording (homework). Call back in 1 week to discuss medication compliance and taking blood sugars.

## 2018-03-08 NOTE — PROGRESS NOTES
* No surgery found *  * No surgery found *  Bedside shift change report given to Shira[facility] [place] (oncoming nurse) by Andrea Pina (offgoing nurse). Report included the following information SBAR, Kardex and MAR.     Zone Phone:   [phone]        Significant changes during shift:  none        Patient Information     Aliyah Buchanan  66 y.o.  3/6/2018  4:08 PM by Tesfaye Mars MD. Aliyah Buchanan was admitted from Home     Problem List          Patient Active Problem List     Diagnosis Date Noted    CVA (cerebral vascular accident) ([address] 03/06/2018    TIA (transient ischemic attack) 03/06/2018    Diabetes mellitus without complication ([address] 01/05/7678    Essential hypertension 12/09/2015    Hypercholesterolemia 11/29/2010    Diabetes ([address] 11/29/2010           Past Medical History:   Diagnosis Date    Arthritis      CAD (coronary artery disease)      Diabetes ([address]      Diabetic neuropathy ([address]      Hypercholesterolemia      Hypertension 11/29/10    PVD (peripheral vascular disease) ([address]      Shingles 2010    Thromboembolus ([address]              Core Measures:     CVA: Yes Yes  CHF:No No  PNA:noNo     Post Op Surgical (If Applicable):      Number times ambulated in hallway past shift:  0  Number of times OOB to chair past shift:   0     Activity Status:     OOB to Chair No  Ambulated this shift Yes   Bed Rest No     Supplemental O2: (If Applicable)     NC No  NRB No           LINES AND DRAINS:     Only peripheral IVs     DVT prophylaxis:     DVT prophylaxis Med- Yes  DVT prophylaxis SCD or GRACE- No      Wounds: (If Applicable)     Wounds- No     Location       Patient Safety:     Falls Score Total Score: 1  Safety Level_______  Bed Alarm On? Yes  Sitter?  No     Plan for upcoming shift: ot/pt/slp           Discharge Plan: No just admitted     Active Consults:  IP CONSULT TO NEUROLOGY

## 2018-03-08 NOTE — PROGRESS NOTES
Spoke with Saintclair Rickers to schedule f/u appointment. She stated that she would give his information to their  people and that someone would call him at home with f/u appointment.  This information was also relayed to patient and added to his AVS.

## 2018-03-12 ENCOUNTER — OFFICE VISIT (OUTPATIENT)
Dept: FAMILY MEDICINE CLINIC | Age: 78
End: 2018-03-12

## 2018-03-12 VITALS
SYSTOLIC BLOOD PRESSURE: 119 MMHG | TEMPERATURE: 96.8 F | DIASTOLIC BLOOD PRESSURE: 56 MMHG | BODY MASS INDEX: 28.63 KG/M2 | RESPIRATION RATE: 16 BRPM | HEIGHT: 73 IN | WEIGHT: 216 LBS | HEART RATE: 82 BPM | OXYGEN SATURATION: 97 %

## 2018-03-12 DIAGNOSIS — M54.50 CHRONIC MIDLINE LOW BACK PAIN WITHOUT SCIATICA: Primary | ICD-10-CM

## 2018-03-12 DIAGNOSIS — Z00.00 ROUTINE GENERAL MEDICAL EXAMINATION AT A HEALTH CARE FACILITY: ICD-10-CM

## 2018-03-12 DIAGNOSIS — Z23 ENCOUNTER FOR IMMUNIZATION: ICD-10-CM

## 2018-03-12 DIAGNOSIS — G89.29 CHRONIC MIDLINE LOW BACK PAIN WITHOUT SCIATICA: Primary | ICD-10-CM

## 2018-03-12 PROBLEM — E11.21 TYPE 2 DIABETES WITH NEPHROPATHY (HCC): Status: ACTIVE | Noted: 2018-03-12

## 2018-03-12 RX ORDER — VALSARTAN 160 MG/1
TABLET ORAL
Qty: 90 TAB | Refills: 3 | Status: SHIPPED | OUTPATIENT
Start: 2018-03-12 | End: 2019-01-18 | Stop reason: SDUPTHER

## 2018-03-12 RX ORDER — GLIMEPIRIDE 4 MG/1
TABLET ORAL
Qty: 90 TAB | Refills: 3 | Status: SHIPPED | OUTPATIENT
Start: 2018-03-12 | End: 2019-01-05 | Stop reason: SDUPTHER

## 2018-03-12 RX ORDER — ROSUVASTATIN CALCIUM 20 MG/1
TABLET, COATED ORAL
Qty: 90 TAB | Refills: 3 | Status: SHIPPED | OUTPATIENT
Start: 2018-03-12 | End: 2018-10-26 | Stop reason: SDUPTHER

## 2018-03-12 RX ORDER — ACETAMINOPHEN AND CODEINE PHOSPHATE 300; 30 MG/1; MG/1
2 TABLET ORAL
Qty: 100 TAB | Refills: 5 | Status: SHIPPED | OUTPATIENT
Start: 2018-03-12 | End: 2018-10-23 | Stop reason: SDUPTHER

## 2018-03-12 RX ORDER — NAPROXEN 500 MG/1
TABLET ORAL
Qty: 180 TAB | Refills: 12 | Status: SHIPPED | OUTPATIENT
Start: 2018-03-12 | End: 2018-03-14 | Stop reason: SDUPTHER

## 2018-03-12 RX ORDER — METOPROLOL TARTRATE 25 MG/1
TABLET, FILM COATED ORAL
Qty: 90 TAB | Refills: 12 | Status: SHIPPED | OUTPATIENT
Start: 2018-03-12 | End: 2018-07-20 | Stop reason: ALTCHOICE

## 2018-03-12 NOTE — PROGRESS NOTES
HISTORY OF PRESENT ILLNESS  Zhang Duarte is a 66 y.o. male. HPI In for hospital followup. Some aching in L hip. Had a CVA with expressive aphasia, L hand clumsiness. Still has some clumsiness in L hand. Has been having pain in L hip for a year and a half. Hip pain helped by Naproxen and Tylenol 3. ROS    Physical Exam   Constitutional: He appears well-developed and well-nourished. HENT:   Right Ear: External ear normal.   Left Ear: External ear normal.   Mouth/Throat: Oropharynx is clear and moist.   Neck: No thyromegaly present. Cardiovascular: Normal rate, regular rhythm, normal heart sounds and intact distal pulses. Pulmonary/Chest: Effort normal and breath sounds normal. No respiratory distress. He has no wheezes. Abdominal: Soft. Bowel sounds are normal. He exhibits no distension and no mass. There is no tenderness. There is no guarding. Musculoskeletal: Normal range of motion. He exhibits no edema.   l HIP-FULL ROM  Back- mild tenderness in L gluteal area. Lymphadenopathy:     He has no cervical adenopathy. Nursing note and vitals reviewed. ASSESSMENT and PLAN  Orders Placed This Encounter    glimepiride (AMARYL) 4 mg tablet     Sig: TAKE 1 TABLET BY MOUTH EVERY DAY FOR SUGAR     Dispense:  90 Tab     Refill:  3    metoprolol tartrate (LOPRESSOR) 25 mg tablet     Sig: TAKE 1/2 TABLET BY MOUTH TWICE DAILY FOR BLOOD PRESSURE     Dispense:  90 Tab     Refill:  12     **Patient requests 90 days supply**    valsartan (DIOVAN) 160 mg tablet     Sig: TAKE 1 TABLET BY MOUTH EVERY DAY for blood pressure     Dispense:  90 Tab     Refill:  3    rosuvastatin (CRESTOR) 20 mg tablet     Sig: TAKE 1 TABLET BY MOUTH EVERY NIGHT AT BEDTIME for cholesterol     Dispense:  90 Tab     Refill:  3    acetaminophen-codeine (TYLENOL-CODEINE #3) 300-30 mg per tablet     Sig: Take 2 Tabs by mouth every four (4) hours as needed for Pain.      Dispense:  100 Tab     Refill:  5    naproxen (NAPROSYN) 500 mg tablet     Sig: TAKE 1 TABLET BY MOUTH TWICE DAILY WITH MEALS FOR KNEE AND HIP PAIN     Dispense:  180 Tab     Refill:  12     **Patient requests 90 days supply**     Orders Placed This Encounter    glimepiride (AMARYL) 4 mg tablet    metoprolol tartrate (LOPRESSOR) 25 mg tablet    valsartan (DIOVAN) 160 mg tablet    rosuvastatin (CRESTOR) 20 mg tablet    acetaminophen-codeine (TYLENOL-CODEINE #3) 300-30 mg per tablet    naproxen (NAPROSYN) 500 mg tablet     Diagnoses and all orders for this visit:    1. Chronic midline low back pain without sciatica    2. Encounter for immunization  -     acetaminophen-codeine (TYLENOL-CODEINE #3) 300-30 mg per tablet; Take 2 Tabs by mouth every four (4) hours as needed for Pain. 3. Routine general medical examination at a health care facility  -     acetaminophen-codeine (TYLENOL-CODEINE #3) 300-30 mg per tablet; Take 2 Tabs by mouth every four (4) hours as needed for Pain.     Other orders  -     glimepiride (AMARYL) 4 mg tablet; TAKE 1 TABLET BY MOUTH EVERY DAY FOR SUGAR  -     metoprolol tartrate (LOPRESSOR) 25 mg tablet; TAKE 1/2 TABLET BY MOUTH TWICE DAILY FOR BLOOD PRESSURE  -     valsartan (DIOVAN) 160 mg tablet; TAKE 1 TABLET BY MOUTH EVERY DAY for blood pressure  -     rosuvastatin (CRESTOR) 20 mg tablet; TAKE 1 TABLET BY MOUTH EVERY NIGHT AT BEDTIME for cholesterol  -     naproxen (NAPROSYN) 500 mg tablet; TAKE 1 TABLET BY MOUTH TWICE DAILY WITH MEALS FOR KNEE AND HIP PAIN

## 2018-03-12 NOTE — MR AVS SNAPSHOT
[facility] 
 
 
 [address] [facility] [address] 
[phone] Patient: Anna Rogel MRN: [id number] QXM:[id number][id number] Visit Information Date & Time Provider Department Dept. Phone Encounter #  
 3/12/2018 11:15 AM Wiliam Currie MD [facility] [phone] [id number] Follow-up Instructions Return in about 3 months (around 6/12/2018). Your Appointments 7/12/2018 10:30 AM  
ROUTINE CARE with Wiliam Currie MD  
[facility]) Appt Note: 6 month follow up  
 [address] [facility] [address] [address]  
[phone] [address] Upcoming Health Maintenance Date Due Bone Densitometry (Dexa) Screening 3/6/2005 MEDICARE YEARLY EXAM 12/9/2016 HEMOGLOBIN A1C Q6M 9/7/2018 GLAUCOMA SCREENING Q2Y 9/29/2018 COLONOSCOPY 12/9/2018 FOOT EXAM Q1 1/12/2019 MICROALBUMIN Q1 1/12/2019 EYE EXAM RETINAL OR DILATED Q1 1/12/2019 LIPID PANEL Q1 3/7/2019 DTaP/Tdap/Td series (2 - Td) 6/8/2025 Allergies as of 3/12/2018  Review Complete On: 3/12/2018 By: Wiliam Currie MD  
  
 Severity Noted Reaction Type Reactions Lipitor [Atorvastatin]  10/29/2013   Side Effect Diarrhea Current Immunizations  Reviewed on 12/9/2015 Name Date Influenza High Dose Vaccine PF 12/9/2016 10:30 AM, 12/9/2015, 10/1/2014 Influenza Vaccine 10/1/2017 Influenza Vaccine Split 9/12/2012, 10/25/2011, 11/29/2010 Pneumococcal Conjugate (PCV-13) 1/12/2018 Tdap 6/8/2015 ZZZ-RETIRED (DO NOT USE) Pneumococcal Vaccine (Unspecified Type) 11/29/2010, 10/24/2004 Zoster Vaccine, Live 6/5/2013 Not reviewed this visit You Were Diagnosed With   
  
 Codes Comments Chronic midline low back pain without sciatica    -  Primary ICD-10-CM: M54.5, G89.29 ICD-9-CM: 724.2, 338.29   
 Encounter for immunization     ICD-10-CM: A11 ICD-9-CM: V03.89 Routine general medical examination at a health care facility     ICD-10-CM: Z00.00 ICD-9-CM: V70.0 Vitals BP Pulse Temp Resp Height(growth percentile) Weight(growth percentile) 119/56 82 96.8 °F (36 °C) (Oral) 16 6' 1\" (1.854 m) 216 lb (98 kg) SpO2 BMI Smoking Status 97% 28.5 kg/m2 Former Smoker BMI and BSA Data Body Mass Index Body Surface Area 28.5 kg/m 2 2.25 m 2 Preferred Pharmacy Pharmacy Name Phone [facility] [address]  [facility] [phone] Your Updated Medication List  
  
   
This list is accurate as of 3/12/18 12:39 PM.  Always use your most recent med list.  
  
  
  
  
 acetaminophen-codeine 300-30 mg per tablet Commonly known as:  TYLENOL-CODEINE #3 Take 2 Tabs by mouth every four (4) hours as needed for Pain. amitriptyline 10 mg tablet Commonly known as:  ELAVIL TAKE 1 TABLET BY MOUTH NIGHTLY TO PREVENT HAVING TO PASS WATER AT NIGHT AS DIRECTED  
  
 aspirin delayed-release 81 mg tablet Take 1 Tab by mouth daily. To prevent heart attack and stroke  
  
 glimepiride 4 mg tablet Commonly known as:  AMARYL  
TAKE 1 TABLET BY MOUTH EVERY DAY FOR SUGAR  
  
 hydroCHLOROthiazide 12.5 mg tablet Commonly known as:  HYDRODIURIL Take 1 Tab by mouth daily. For blood pressure  
  
 metFORMIN 1,000 mg tablet Commonly known as:  GLUCOPHAGE  
1 1/2 tabs po in am and 1 tab po in pm. For diabetes  
  
 metoprolol tartrate 25 mg tablet Commonly known as:  LOPRESSOR  
TAKE 1/2 TABLET BY MOUTH TWICE DAILY FOR BLOOD PRESSURE  
  
 naproxen 500 mg tablet Commonly known as:  NAPROSYN  
TAKE 1 TABLET BY MOUTH TWICE DAILY WITH MEALS FOR KNEE AND HIP PAIN  
  
 rosuvastatin 20 mg tablet Commonly known as:  CRESTOR  
TAKE 1 TABLET BY MOUTH EVERY NIGHT AT BEDTIME for cholesterol sAXagliptin 5 mg Tab tablet Commonly known as:  ONGLYZA Take 1 Tab by mouth daily. for diabetes  
  
 valsartan 160 mg tablet Commonly known as:  DIOVAN  
TAKE 1 TABLET BY MOUTH EVERY DAY for blood pressure Prescriptions Printed Refills  
 acetaminophen-codeine (TYLENOL-CODEINE #3) 300-30 mg per tablet 5 Sig: Take 2 Tabs by mouth every four (4) hours as needed for Pain. Class: Print Route: Oral  
  
Prescriptions Sent to Pharmacy Refills  
 glimepiride (AMARYL) 4 mg tablet 3 Sig: TAKE 1 TABLET BY MOUTH EVERY DAY FOR SUGAR Class: Normal  
 Pharmacy: [email or web address] [address] [place] [address] TPKE AT [address] [place] [address] Ph #: [phone]  
 metoprolol tartrate (LOPRESSOR) 25 mg tablet 12 Sig: TAKE 1/2 TABLET BY MOUTH TWICE DAILY FOR BLOOD PRESSURE Class: Normal  
 Pharmacy: [facility] [address] [place] [address] [facility] AT [address] [place] [address] Road Ph #: [phone]  
 valsartan (DIOVAN) 160 mg tablet 3 Sig: TAKE 1 TABLET BY MOUTH EVERY DAY for blood pressure Class: Normal  
 Pharmacy: [facility] [address] [place] [address] TP[facility] AT [address] [place] [address] Road Ph #: [phone]  
 rosuvastatin (CRESTOR) 20 mg tablet 3 Sig: TAKE 1 TABLET BY MOUTH EVERY NIGHT AT BEDTIME for cholesterol Class: Normal  
 Pharmacy: [facility] [address] [place] [address] TPKE AT [address] [place] [address] Road Ph #: [phone]  
 naproxen (NAPROSYN) 500 mg tablet 12 Sig: TAKE 1 TABLET BY MOUTH TWICE DAILY WITH MEALS FOR KNEE AND HIP PAIN Class: Normal  
 Pharmacy: [email or web address] [address] Ph #: [phone] Follow-up Instructions Return in about 3 months (around 6/12/2018). [facility]! Bonnie Myles introduces [employer] patient portal. Now you can access parts of your medical record, email your doctor's office, and request medication refills online. 1. In your internet browser, go to [email or web address] 2. Click on the First Time User? Click Here link in the Sign In box. You will see the New Member Sign Up page. 3. Enter your [employer] Access Code exactly as it appears below. You will not need to use this code after youve completed the sign-up process. If you do not sign up before the expiration date, you must request a new code. · [employer] Access Code: [email or web address] Expires: 4/12/2018 11:34 AM 
 
4. Enter the last four digits of your Social Security Number (xxxx) and Date of Birth (mm/dd/yyyy) as indicated and click Submit. You will be taken to the next sign-up page. 5. Create a [employer]IS ID. This will be your [employer] login ID and cannot be changed, so think of one that is secure and easy to remember. 6. Create a [employer] password. You can change your password at any time. 7. Enter your Password Reset Question and Answer. This can be used at a later time if you forget your password. 8. Enter your e-mail address. You will receive e-mail notification when new information is available in [address] 9. Click Sign Up. You can now view and download portions of your medical record. 10. Click the Download Summary menu link to download a portable copy of your medical information. If you have questions, please visit the Frequently Asked Questions section of the [employer] website. Remember, [employer] is NOT to be used for urgent needs. For medical emergencies, dial 911. Now available from your iPhone and Android! Please provide this summary of care documentation to your next provider. Your primary care clinician is listed as Alysia Farfan. If you have any questions after today's visit, please call [phone].

## 2018-03-12 NOTE — PROGRESS NOTES
Chief Complaint   Patient presents with   [facility] ED Follow-up     ED [facility]   Stroke  3/06/18     1. Have you been to the ER, urgent care clinic since your last visit? Hospitalized since your last visit?see chief complaint    2. Have you seen or consulted any other health care providers outside of the [address] [place] [address] since your last visit? Include any pap smears or colon screening.  No    Health Maintenance Due   Topic Date Due    Bone Densitometry (Dexa) Screening  03/06/2005    MEDICARE YEARLY EXAM  12/09/2016

## 2018-03-13 NOTE — DISCHARGE SUMMARY
[facility] [address]    Aravind Villegas  MR#: [id number]  : 1940  ACCOUNT #: [de-identified]   ADMIT DATE: 2018  DISCHARGE DATE: 2018    FINAL DIAGNOSES  1.  CVA. 2.  Hypertension. 3.  Hyperlipidemia. 4.  Diabetes. DISCHARGE MEDICATIONS:  Aspirin 81 mg daily, metformin 1000 mg one-half in the morning and one in the evening, hydrochlorothiazide  mg daily, Onglyza 5 mg daily, amitriptyline 10 mg daily, Amaryl 4 mg daily, metoprolol 25 mg half tab b.i.d., Diovan 160 mg daily, Crestor 20 mg daily, Naprosyn 500 mg b.i.d. p.r.n., and Tylenol #3 p.r.n. CONSULTATIONS:  Neurology, Dr. Zee Thompson. Patient to follow up in one week. HISTORY OF PRESENT ILLNESS:  The patient is a very pleasant 77-year-old -American male came to the hospital with both expressive aphasia and left arm clumsiness and numbness, came on 15 minutes prior to admission. The patient had sudden onset of difficulty speaking associated with left arm numbness. Denied any prior history of CVA. He is supposed to be taking a baby aspirin, but was not compliant with this all the time. There were no complaints of chest pain, shortness of breath, palpitations, or syncope. Patient was found to have questionable subacute right parietal lobe infarction on CT. Seen in consultation by tele neurology, was not felt to be a TPA candidate. Symptoms had resolved on presentation to the ER. PAST MEDICAL HISTORY:  Significant for arthritis, coronary artery disease, diabetes, diabetic neuropathy, hypercholesterolemia, hypertension, peripheral vascular disease, and shingles. PAST SURGICAL HISTORY:  Include carpal tunnel, cardiac cath , left arm fracture, colonoscopy in . SOCIAL HISTORY:  Patient quit smoking. The patient quit in . Alcohol use: An occasional drink. ALLERGIES:  LIPITOR.     MEDICATIONS ON ADMISSION:  Metformin 1000 mg 1/2 in the morning and 1 in the evening, Crestor 20 mg daily, hydrochlorothiazide 12.5 mg daily, Amaryl 4 mg daily, Diovan 160 mg daily, metoprolol 25 mg half tab b.i.d., Naprosyn p.r.n. pain, Tylenol #3 p.r.n. pain, Onglyza 5 mg daily, amitriptyline 10 mg at bedtime. Supposedly, aspirin 81 mg, but the patient does not take this regular. REVIEW OF SYSTEMS:  Please see HPI. PHYSICAL EXAMINATION:  VITAL SIGNS:  Blood pressure 132/79, pulse 88, temperature 98.1, respiratory rate 18, height 6 feet 1 inch. Weight . BMI 26. GENERAL:  Alert, cooperative. NECK:  Supple, symmetrical.  Thyroid nontender. EYES:  Pupils equal, round, reactive to light and accommodation. LUNGS:  Clear to auscultation. HEART:  Regular rhythm and rate. No murmurs, rubs or gallops. ABDOMEN:  Soft, nontender. PSYCHIATRIC:  Good insight at present. NEUROLOGIC:  Question mild dysarthria. Sensation and strength intact bilaterally. The patient is alert and oriented x4. HOSPITAL COURSE:  The patient was admitted, placed on telemetry, did not develop any AFib. Wound up getting an MRI done the following morning, this showed acute right cerebral infarct, primarily in the parietal lobe. No acute bleed or shift. Chronic right parietal infarct with punctate foci of hemosiderin, chronic deep white matter microangiopathy. A carotid Doppler, left internal carotid artery has less than 50% stenosis. Right side also was less than 50% stenosis. Patient had an echo done while in the hospital.  EF was 55%, no obvious wall motion abnormalities. Normal mitral valve. No stenosis of the aortic valve, no regurgitation. Patient's symptoms have resolved pretty much. He is able to get up and walk. His speech was very well back to normal.  He was stable and discharged home on 03/08. Follow up in the office next week. He is strongly advised to take his aspirin 81 mg daily.       Irving Eisenmenger, MD MS/[facility]CKINLEY  D: 03/13/2018 14:17     T: 03/13/2018 16:45  JOB #: [id number]

## 2018-03-14 RX ORDER — NAPROXEN 500 MG/1
TABLET ORAL
Qty: 270 TAB | Refills: 12 | Status: SHIPPED | OUTPATIENT
Start: 2018-03-14 | End: 2019-01-18

## 2018-03-21 ENCOUNTER — PATIENT OUTREACH (OUTPATIENT)
Dept: FAMILY MEDICINE CLINIC | Age: 78
End: 2018-03-21

## 2018-03-28 ENCOUNTER — PATIENT OUTREACH (OUTPATIENT)
Dept: FAMILY MEDICINE CLINIC | Age: 78
End: 2018-03-28

## 2018-07-20 ENCOUNTER — OFFICE VISIT (OUTPATIENT)
Dept: FAMILY MEDICINE CLINIC | Age: 78
End: 2018-07-20

## 2018-07-20 ENCOUNTER — HOSPITAL ENCOUNTER (OUTPATIENT)
Dept: LAB | Age: 78
Discharge: HOME | End: 2018-07-20
Payer: MEDICARE

## 2018-07-20 VITALS
BODY MASS INDEX: 28.28 KG/M2 | WEIGHT: 213.4 LBS | SYSTOLIC BLOOD PRESSURE: 108 MMHG | OXYGEN SATURATION: 96 % | DIASTOLIC BLOOD PRESSURE: 62 MMHG | TEMPERATURE: 96.5 F | RESPIRATION RATE: 14 BRPM | HEART RATE: 93 BPM | HEIGHT: 73 IN

## 2018-07-20 DIAGNOSIS — E11.9 DIABETES MELLITUS WITHOUT COMPLICATION (HCC): Primary | ICD-10-CM

## 2018-07-20 DIAGNOSIS — I95.1 ORTHOSTATIC HYPOTENSION: ICD-10-CM

## 2018-07-20 LAB — HBA1C MFR BLD HPLC: 7.3 %

## 2018-07-20 PROCEDURE — 85025 COMPLETE CBC W/AUTO DIFF WBC: CPT

## 2018-07-20 PROCEDURE — 80053 COMPREHEN METABOLIC PANEL: CPT

## 2018-07-20 PROCEDURE — 36415 COLL VENOUS BLD VENIPUNCTURE: CPT

## 2018-07-20 PROCEDURE — 82043 UR ALBUMIN QUANTITATIVE: CPT

## 2018-07-20 PROCEDURE — 80061 LIPID PANEL: CPT

## 2018-07-20 RX ORDER — BISMUTH SUBSALICYLATE 262 MG
1 TABLET,CHEWABLE ORAL DAILY
COMMUNITY
End: 2020-01-22 | Stop reason: ALTCHOICE

## 2018-07-20 RX ORDER — AMITRIPTYLINE HYDROCHLORIDE 10 MG/1
TABLET, FILM COATED ORAL
Qty: 90 TAB | Refills: 0 | Status: SHIPPED | OUTPATIENT
Start: 2018-07-20 | End: 2018-10-26 | Stop reason: SDUPTHER

## 2018-07-20 NOTE — MR AVS SNAPSHOT
[facility] 
 
 
 [address] [facility] [place] [address] [address] 
[phone] Patient: Buck Bedolla MRN: [id number] DQN:[id number] Visit Information Date & Time Provider Department Dept. Phone Encounter #  
 7/20/2018 11:00 AM Bernardo Obrien MD [facility] [phone] [id number] Follow-up Instructions Return in about 6 months (around 1/20/2019). Upcoming Health Maintenance Date Due  
 MEDICARE YEARLY EXAM 3/14/2018 COLONOSCOPY 12/9/2018 Influenza Age 5 to Adult 8/1/2018 HEMOGLOBIN A1C Q6M 9/7/2018 GLAUCOMA SCREENING Q2Y 9/29/2018 FOOT EXAM Q1 1/12/2019 MICROALBUMIN Q1 1/12/2019 EYE EXAM RETINAL OR DILATED Q1 1/12/2019 LIPID PANEL Q1 3/7/2019 DTaP/Tdap/Td series (2 - Td) 6/8/2025 Allergies as of 7/20/2018  Review Complete On: 7/20/2018 By: Bernardo Obrien MD  
  
 Severity Noted Reaction Type Reactions Lipitor [Atorvastatin]  10/29/2013   Side Effect Diarrhea Current Immunizations  Reviewed on 12/9/2015 Name Date Influenza High Dose Vaccine PF 12/9/2016 10:30 AM, 12/9/2015, 10/1/2014 Influenza Vaccine 10/1/2017 Influenza Vaccine Split 9/12/2012, 10/25/2011, 11/29/2010 Pneumococcal Conjugate (PCV-13) 1/12/2018 Tdap 6/8/2015 ZZZ-RETIRED (DO NOT USE) Pneumococcal Vaccine (Unspecified Type) 11/29/2010, 10/24/2004 Zoster Vaccine, Live 6/5/2013 Not reviewed this visit You Were Diagnosed With   
  
 Codes Comments Diabetes mellitus without complication [facility]    -  Primary ICD-10-CM: E11.9 ICD-9-CM: 250.00 Orthostatic hypotension     ICD-10-CM: I95.1 ICD-9-CM: 458.0 Vitals BP Pulse Temp Resp Height(growth percentile) Weight(growth percentile) 108/62 93 96.5 °F (35.8 °C) (Oral) 14 6' 1\" (1.854 m) 213 lb 6.4 oz (96.8 kg) SpO2 BMI Smoking Status 96% 28.15 kg/m2 Former Smoker Vitals History BMI and BSA Data Body Mass Index Body Surface Area  
 28.15 kg/m 2 2.23 m 2 Preferred Pharmacy Pharmacy Name Phone [facility]  [place] [facility] [phone] Your Updated Medication List  
  
   
This list is accurate as of 7/20/18 12:52 PM.  Always use your most recent med list.  
  
  
  
  
 acetaminophen-codeine 300-30 mg per tablet Commonly known as:  TYLENOL-CODEINE #3 Take 2 Tabs by mouth every four (4) hours as needed for Pain. amitriptyline 10 mg tablet Commonly known as:  ELAVIL TAKE 1 TABLET BY MOUTH NIGHTLY TO PREVENT HAVING TO PASS WATER AT NIGHT AS DIRECTED  
  
 aspirin delayed-release 81 mg tablet Take 1 Tab by mouth daily. To prevent heart attack and stroke  
  
 glimepiride 4 mg tablet Commonly known as:  AMARYL  
TAKE 1 TABLET BY MOUTH EVERY DAY FOR SUGAR  
  
 hydroCHLOROthiazide 12.5 mg tablet Commonly known as:  HYDRODIURIL Take 1 Tab by mouth daily. For blood pressure  
  
 metFORMIN 1,000 mg tablet Commonly known as:  GLUCOPHAGE  
1 1/2 tabs po in am and 1 tab po in pm. For diabetes  
  
 multivitamin tablet Commonly known as:  ONE A DAY Take 1 Tab by mouth daily. naproxen 500 mg tablet Commonly known as:  NAPROSYN  
TAKE 1 TABLET BY MOUTH TWICE DAILY WITH MEALS FOR KNEE AND HIP PAIN. rosuvastatin 20 mg tablet Commonly known as:  CRESTOR  
TAKE 1 TABLET BY MOUTH EVERY NIGHT AT BEDTIME for cholesterol sAXagliptin 5 mg Tab tablet Commonly known as:  ONGLYZA Take 1 Tab by mouth daily. for diabetes  
  
 valsartan 160 mg tablet Commonly known as:  DIOVAN  
TAKE 1 TABLET BY MOUTH EVERY DAY for blood pressure We Performed the Following AMB POC HEMOGLOBIN A1C [14052 CPT(R)] CBC WITH AUTOMATED DIFF [57840 CPT(R)] LIPID PANEL [70358 CPT(R)] METABOLIC PANEL, COMPREHENSIVE [78572 CPT(R)] MICROALBUMIN, UR, RAND W/ MICROALB/CREAT RATIO [id number] CPT(R)] Follow-up Instructions Return in about 6 months (around 1/20/2019). Introducing [facility] SERVICES! Mani Saldana introduces [employer] patient portal. Now you can access parts of your medical record, email your doctor's office, and request medication refills online. 1. In your internet browser, go to [email or web address] 2. Click on the First Time User? Click Here link in the Sign In box. You will see the New Member Sign Up page. 3. Enter your [employer] Access Code exactly as it appears below. You will not need to use this code after youve completed the sign-up process. If you do not sign up before the expiration date, you must request a new code. · [employer] Access Code: [email or web address] Expires: 10/18/2018 11:12 AM 
 
4. Enter the last four digits of your Social Security Number (xxxx) and Date of Birth (mm/dd/yyyy) as indicated and click Submit. You will be taken to the next sign-up page. 5. Create a [employer] ID. This will be your [employer] login ID and cannot be changed, so think of one that is secure and easy to remember. 6. Create a [employer] password. You can change your password at any time. 7. Enter your Password Reset Question and Answer. This can be used at a later time if you forget your password. 8. Enter your e-mail address. You will receive e-mail notification when new information is available in [address] 9. Click Sign Up. You can now view and download portions of your medical record. 10. Click the Download Summary menu link to download a portable copy of your medical information. If you have questions, please visit the Frequently Asked Questions section of the [employer] website. Remember, [employer] is NOT to be used for urgent needs. For medical emergencies, dial 911. Now available from your iPhone and Android! Please provide this summary of care documentation to your next provider. Your primary care clinician is listed as Reuben Sommer. If you have any questions after today's visit, please call [phone].

## 2018-07-20 NOTE — PROGRESS NOTES
Chief Complaint   Patient presents with    Hypertension    Diabetes    Cholesterol Problem    Neurologic Problem     f/u old tia    Ringing in Ear     both mostly on right     Hoarse    Shortness of Breath     upon exertion,  also when getting up too fast      1. Have you been to the ER, urgent care clinic since your last visit? Hospitalized since your last visit? No    2. Have you seen or consulted any other health care providers outside of the [address] [place] [address] since your last visit? Include any pap smears or colon screening.  No     Health Maintenance Due   Topic Date Due    MEDICARE YEARLY EXAM  03/14/2018    COLONOSCOPY  12/09/2018

## 2018-07-20 NOTE — ACP (ADVANCE CARE PLANNING)
Still wants to be full code. Would want wife, Arvind Ramirez to make medical decisions for him if needed.

## 2018-07-20 NOTE — PROGRESS NOTES
HISTORY OF PRESENT ILLNESS  Karin Mast is a 66 y.o. male. HPI In for medicare physical. Mild dyspnea at times, if does some work around house. Has had a head cold. Takes anacin- some relief. Review of Systems   HENT: Positive for tinnitus (R ear). Respiratory: Positive for shortness of breath. Negative for cough. Quit smoking 20 years ago. Cardiovascular: Positive for leg swelling (occ swelling in L ankle. ). Negative for chest pain and orthopnea. Gastrointestinal: Negative for abdominal pain and blood in stool. Genitourinary: Negative for frequency and hematuria. Neurological: Negative for focal weakness and loss of consciousness. Psychiatric/Behavioral: Negative for depression. The patient does not have insomnia. Oc drink on weekends. Physical Exam   Constitutional: He appears well-developed and well-nourished. HENT:   Right Ear: External ear normal.   Left Ear: External ear normal.   Mouth/Throat: Oropharynx is clear and moist.   Neck: No thyromegaly present. Cardiovascular: Normal rate, regular rhythm, normal heart sounds and intact distal pulses. Pulmonary/Chest: Effort normal and breath sounds normal. No respiratory distress. He has no wheezes. Abdominal: Soft. Bowel sounds are normal. He exhibits no distension and no mass. There is no tenderness. There is no guarding. Musculoskeletal: Normal range of motion. He exhibits no edema. Lymphadenopathy:     He has no cervical adenopathy. Neurological:   Absent sensation lateral aspect of feet bilaterally   Nursing note and vitals reviewed.       ASSESSMENT and PLAN  Orders Placed This Encounter    CBC WITH AUTOMATED DIFF    METABOLIC PANEL, COMPREHENSIVE    LIPID PANEL    MICROALBUMIN, UR, RAND W/ MICROALB/CREAT RATIO    AMB POC HEMOGLOBIN A1C     Orders Placed This Encounter    CBC WITH AUTOMATED DIFF    METABOLIC PANEL, COMPREHENSIVE    LIPID PANEL    MICROALBUMIN, UR, RAND W/ MICROALB/CREAT RATIO  AMB POC HEMOGLOBIN A1C     Diagnoses and all orders for this visit:    1. Diabetes mellitus without complication (HCC)  -     LIPID PANEL  -     AMB POC HEMOGLOBIN A1C  -     MICROALBUMIN, UR, RAND W/ MICROALB/CREAT RATIO    2.  Orthostatic hypotension  -     CBC WITH AUTOMATED DIFF  -     METABOLIC PANEL, COMPREHENSIVE      Dc metoprolol

## 2018-07-20 NOTE — TELEPHONE ENCOUNTER
Last Visit: 7/20/18-Lindy  Next Appt: 1/18/19-Lindy  Previous Refill Encounter: 6/29/17-30+12    Requested Prescriptions     Pending Prescriptions Disp Refills    sAXagliptin (ONGLYZA) 5 mg tab tablet 30 Tab 12     Sig: Take 1 Tab by mouth daily.  for diabetes

## 2018-07-21 LAB
ALBUMIN SERPL-MCNC: 4.3 G/DL (ref 3.5–4.8)
ALBUMIN/GLOB SERPL: 1.4 {RATIO} (ref 1.2–2.2)
ALP SERPL-CCNC: 67 IU/L (ref 39–117)
ALT SERPL-CCNC: 33 IU/L (ref 0–44)
AST SERPL-CCNC: 32 IU/L (ref 0–40)
BASOPHILS # BLD AUTO: 0 X10E3/UL (ref 0–0.2)
BASOPHILS NFR BLD AUTO: 0 %
BILIRUB SERPL-MCNC: 0.3 MG/DL (ref 0–1.2)
BUN SERPL-MCNC: 20 MG/DL (ref 8–27)
BUN/CREAT SERPL: 18 (ref 10–24)
CALCIUM SERPL-MCNC: 9.9 MG/DL (ref 8.6–10.2)
CHLORIDE SERPL-SCNC: 99 MMOL/L (ref 96–106)
CHOLEST SERPL-MCNC: 130 MG/DL (ref 100–199)
CO2 SERPL-SCNC: 25 MMOL/L (ref 20–29)
CREAT SERPL-MCNC: 1.11 MG/DL (ref 0.76–1.27)
EOSINOPHIL # BLD AUTO: 0.3 X10E3/UL (ref 0–0.4)
EOSINOPHIL NFR BLD AUTO: 3 %
ERYTHROCYTE [DISTWIDTH] IN BLOOD BY AUTOMATED COUNT: 14 % (ref 12.3–15.4)
GLOBULIN SER CALC-MCNC: 3 G/DL (ref 1.5–4.5)
GLUCOSE SERPL-MCNC: 168 MG/DL (ref 65–99)
HCT VFR BLD AUTO: 36.9 % (ref 37.5–51)
HDLC SERPL-MCNC: 39 MG/DL
HGB BLD-MCNC: 12.2 G/DL (ref 13–17.7)
IMM GRANULOCYTES # BLD: 0 X10E3/UL (ref 0–0.1)
IMM GRANULOCYTES NFR BLD: 0 %
INTERPRETATION, 910389: NORMAL
LDLC SERPL CALC-MCNC: 66 MG/DL (ref 0–99)
LYMPHOCYTES # BLD AUTO: 3.1 X10E3/UL (ref 0.7–3.1)
LYMPHOCYTES NFR BLD AUTO: 36 %
Lab: NORMAL
MCH RBC QN AUTO: 28.3 PG (ref 26.6–33)
MCHC RBC AUTO-ENTMCNC: 33.1 G/DL (ref 31.5–35.7)
MCV RBC AUTO: 86 FL (ref 79–97)
MONOCYTES # BLD AUTO: 0.7 X10E3/UL (ref 0.1–0.9)
MONOCYTES NFR BLD AUTO: 8 %
NEUTROPHILS # BLD AUTO: 4.5 X10E3/UL (ref 1.4–7)
NEUTROPHILS NFR BLD AUTO: 53 %
PLATELET # BLD AUTO: 260 X10E3/UL (ref 150–379)
POTASSIUM SERPL-SCNC: 4.6 MMOL/L (ref 3.5–5.2)
PROT SERPL-MCNC: 7.3 G/DL (ref 6–8.5)
RBC # BLD AUTO: 4.31 X10E6/UL (ref 4.14–5.8)
SODIUM SERPL-SCNC: 141 MMOL/L (ref 134–144)
TRIGL SERPL-MCNC: 124 MG/DL (ref 0–149)
VLDLC SERPL CALC-MCNC: 25 MG/DL (ref 5–40)
WBC # BLD AUTO: 8.7 X10E3/UL (ref 3.4–10.8)

## 2018-07-22 LAB
ALBUMIN/CREAT UR: 16.5 MG/G CREAT (ref 0–30)
CREAT UR-MCNC: 238.2 MG/DL
MICROALBUMIN UR-MCNC: 39.3 UG/ML

## 2018-08-24 RX ORDER — METFORMIN HYDROCHLORIDE 1000 MG/1
TABLET ORAL
Qty: 180 TAB | Refills: 0 | Status: SHIPPED | OUTPATIENT
Start: 2018-08-24 | End: 2018-12-03 | Stop reason: SDUPTHER

## 2018-10-23 DIAGNOSIS — Z00.00 ROUTINE GENERAL MEDICAL EXAMINATION AT A HEALTH CARE FACILITY: ICD-10-CM

## 2018-10-23 DIAGNOSIS — Z23 ENCOUNTER FOR IMMUNIZATION: ICD-10-CM

## 2018-10-23 RX ORDER — ACETAMINOPHEN AND CODEINE PHOSPHATE 300; 30 MG/1; MG/1
2 TABLET ORAL
Qty: 100 TAB | Refills: 5 | Status: SHIPPED | OUTPATIENT
Start: 2018-10-23 | End: 2019-05-20 | Stop reason: SDUPTHER

## 2018-10-23 NOTE — TELEPHONE ENCOUNTER
Last Visit: 7/20  Next Appt: 1/18/19  Previous Refill Encounter: 3/+5    Requested Prescriptions     Pending Prescriptions Disp Refills    acetaminophen-codeine (TYLENOL-CODEINE #3) 300-30 mg per tablet 100 Tab 5     Sig: Take 2 Tabs by mouth every four (4) hours as needed for Pain.

## 2018-10-24 ENCOUNTER — TELEPHONE (OUTPATIENT)
Dept: FAMILY MEDICINE CLINIC | Age: 78
End: 2018-10-24

## 2018-10-26 RX ORDER — ROSUVASTATIN CALCIUM 20 MG/1
TABLET, COATED ORAL
Qty: 90 TAB | Refills: 0 | Status: SHIPPED | OUTPATIENT
Start: 2018-10-26 | End: 2019-01-18 | Stop reason: SDUPTHER

## 2018-10-26 RX ORDER — AMITRIPTYLINE HYDROCHLORIDE 10 MG/1
TABLET, FILM COATED ORAL
Qty: 90 TAB | Refills: 0 | Status: SHIPPED | OUTPATIENT
Start: 2018-10-26 | End: 2019-01-18 | Stop reason: SDUPTHER

## 2018-12-04 RX ORDER — NAPROXEN 500 MG/1
TABLET ORAL
Qty: 180 TAB | Refills: 0 | Status: SHIPPED | OUTPATIENT
Start: 2018-12-04 | End: 2019-01-18 | Stop reason: SDUPTHER

## 2018-12-04 RX ORDER — METFORMIN HYDROCHLORIDE 1000 MG/1
TABLET ORAL
Qty: 180 TAB | Refills: 0 | Status: SHIPPED | OUTPATIENT
Start: 2018-12-04 | End: 2019-01-18 | Stop reason: DRUGHIGH

## 2018-12-04 RX ORDER — HYDROCHLOROTHIAZIDE 12.5 MG/1
TABLET ORAL
Qty: 90 TAB | Refills: 0 | Status: SHIPPED | OUTPATIENT
Start: 2018-12-04 | End: 2019-01-18 | Stop reason: SDUPTHER

## 2019-01-06 RX ORDER — GLIMEPIRIDE 4 MG/1
TABLET ORAL
Qty: 90 TAB | Refills: 0 | Status: SHIPPED | OUTPATIENT
Start: 2019-01-06 | End: 2019-01-18 | Stop reason: SDUPTHER

## 2019-01-06 RX ORDER — HYDROCHLOROTHIAZIDE 12.5 MG/1
TABLET ORAL
Qty: 90 TAB | Refills: 0 | Status: SHIPPED | OUTPATIENT
Start: 2019-01-06 | End: 2019-01-18 | Stop reason: ALTCHOICE

## 2019-01-18 ENCOUNTER — HOSPITAL ENCOUNTER (OUTPATIENT)
Dept: LAB | Age: 79
Discharge: HOME | End: 2019-01-18
Payer: MEDICARE

## 2019-01-18 ENCOUNTER — OFFICE VISIT (OUTPATIENT)
Dept: FAMILY MEDICINE CLINIC | Age: 79
End: 2019-01-18

## 2019-01-18 VITALS
SYSTOLIC BLOOD PRESSURE: 134 MMHG | BODY MASS INDEX: 27.57 KG/M2 | DIASTOLIC BLOOD PRESSURE: 55 MMHG | HEART RATE: 82 BPM | OXYGEN SATURATION: 97 % | WEIGHT: 208 LBS | RESPIRATION RATE: 14 BRPM | TEMPERATURE: 97.1 F | HEIGHT: 73 IN

## 2019-01-18 DIAGNOSIS — I10 ESSENTIAL HYPERTENSION: ICD-10-CM

## 2019-01-18 DIAGNOSIS — Z23 ENCOUNTER FOR IMMUNIZATION: Primary | ICD-10-CM

## 2019-01-18 DIAGNOSIS — E11.9 CONTROLLED TYPE 2 DIABETES MELLITUS WITHOUT COMPLICATION, WITHOUT LONG-TERM CURRENT USE OF INSULIN (HCC): ICD-10-CM

## 2019-01-18 DIAGNOSIS — E78.00 HYPERCHOLESTEROLEMIA: ICD-10-CM

## 2019-01-18 PROCEDURE — 36415 COLL VENOUS BLD VENIPUNCTURE: CPT

## 2019-01-18 PROCEDURE — 80053 COMPREHEN METABOLIC PANEL: CPT

## 2019-01-18 PROCEDURE — 80061 LIPID PANEL: CPT

## 2019-01-18 RX ORDER — VALSARTAN 160 MG/1
TABLET ORAL
Qty: 90 TAB | Refills: 3 | Status: SHIPPED | OUTPATIENT
Start: 2019-01-18 | End: 2020-10-26 | Stop reason: SDUPTHER

## 2019-01-18 RX ORDER — GLIMEPIRIDE 4 MG/1
TABLET ORAL
Qty: 90 TAB | Refills: 3 | Status: SHIPPED | OUTPATIENT
Start: 2019-01-18 | End: 2020-02-27 | Stop reason: SDUPTHER

## 2019-01-18 RX ORDER — HYDROCHLOROTHIAZIDE 12.5 MG/1
TABLET ORAL
Qty: 90 TAB | Refills: 3 | Status: SHIPPED | OUTPATIENT
Start: 2019-01-18 | End: 2019-06-03

## 2019-01-18 RX ORDER — AMITRIPTYLINE HYDROCHLORIDE 10 MG/1
TABLET, FILM COATED ORAL
Qty: 90 TAB | Refills: 3 | Status: SHIPPED | OUTPATIENT
Start: 2019-01-18 | End: 2020-10-26 | Stop reason: ALTCHOICE

## 2019-01-18 RX ORDER — ROSUVASTATIN CALCIUM 20 MG/1
TABLET, COATED ORAL
Qty: 90 TAB | Refills: 3 | Status: SHIPPED | OUTPATIENT
Start: 2019-01-18 | End: 2020-02-24 | Stop reason: SDUPTHER

## 2019-01-18 RX ORDER — NAPROXEN 500 MG/1
TABLET ORAL
Qty: 180 TAB | Refills: 12 | Status: SHIPPED | OUTPATIENT
Start: 2019-01-18 | End: 2019-06-12 | Stop reason: ALTCHOICE

## 2019-01-18 RX ORDER — SILDENAFIL CITRATE 20 MG/1
TABLET ORAL
Qty: 40 TAB | Refills: 2 | Status: SHIPPED | OUTPATIENT
Start: 2019-01-18 | End: 2020-10-26 | Stop reason: SDUPTHER

## 2019-01-18 RX ORDER — METFORMIN HYDROCHLORIDE 1000 MG/1
TABLET ORAL
Qty: 225 TAB | Refills: 12 | Status: SHIPPED | OUTPATIENT
Start: 2019-01-18 | End: 2019-10-22 | Stop reason: DRUGHIGH

## 2019-01-18 NOTE — PROGRESS NOTES
HISTORY OF PRESENT ILLNESS  Barak Parker is a 66 y.o. male. HPI Pt. Comes in for blood pressure, cholesterol, and diabetes check. No complaints of chest pain, shortness of breath, TIAs, claudication or edema. Keeping reasonably active. Gets some pain in L hip, worse whenever picks something up or cuts grass. ROS    Physical Exam   Constitutional: He appears well-developed and well-nourished. HENT:   Right Ear: External ear normal.   Left Ear: External ear normal.   Mouth/Throat: Oropharynx is clear and moist.   Neck: No thyromegaly present. Cardiovascular: Normal rate, regular rhythm and normal heart sounds. Absent distal pulses   Pulmonary/Chest: Effort normal and breath sounds normal. No respiratory distress. He has no wheezes. Abdominal: Soft. Bowel sounds are normal. He exhibits no distension and no mass. There is no tenderness. There is no guarding. Musculoskeletal: Normal range of motion. He exhibits no edema. Lymphadenopathy:     He has no cervical adenopathy. Nursing note and vitals reviewed. ASSESSMENT and PLAN  Orders Placed This Encounter    Influenza Vaccine Inactivated (IIV) (FLUAD), Subunit, Adjuvanted, IM (98616)    METABOLIC PANEL, COMPREHENSIVE    LIPID PANEL    TX IMMUNIZ ADMIN,1 SINGLE/COMB VAC/TOXOID    metFORMIN (GLUCOPHAGE) 1,000 mg tablet    valsartan (DIOVAN) 160 mg tablet    glimepiride (AMARYL) 4 mg tablet    hydroCHLOROthiazide (HYDRODIURIL) 12.5 mg tablet    rosuvastatin (CRESTOR) 20 mg tablet    amitriptyline (ELAVIL) 10 mg tablet    naproxen (NAPROSYN) 500 mg tablet     Diagnoses and all orders for this visit:    1. Encounter for immunization  -     INFLUENZA VACCINE INACTIVATED (IIV), SUBUNIT, ADJUVANTED, IM  -     TX IMMUNIZ ADMIN,1 SINGLE/COMB VAC/TOXOID    2. Essential hypertension  -     METABOLIC PANEL, COMPREHENSIVE    3. Controlled type 2 diabetes mellitus without complication, without long-term current use of insulin ([address]    4. Hypercholesterolemia  -     LIPID PANEL    Other orders  -     metFORMIN (GLUCOPHAGE) 1,000 mg tablet; 1 1/2 tabs po in am and 1 tab po in pm. For diabetes  -     valsartan (DIOVAN) 160 mg tablet; TAKE 1 TABLET BY MOUTH EVERY DAY for blood pressure  -     glimepiride (AMARYL) 4 mg tablet; One daily for diabetes  -     hydroCHLOROthiazide (HYDRODIURIL) 12.5 mg tablet; TAKE 1 TABLET BY MOUTH EVERY DAY FOR BLOOD PRESSURE  -     rosuvastatin (CRESTOR) 20 mg tablet; One daily for cholesterol  -     amitriptyline (ELAVIL) 10 mg tablet; TAKE 1 TABLET BY MOUTH NIGHTLY TO PREVENT HAVING TO PASS WATER AT NIGHT AS DIRECTED  -     naproxen (NAPROSYN) 500 mg tablet; TAKE 1 TABLET BY MOUTH TWICE DAILY WITH MEALS FOR KNEE AND HIP PAIN.       Follow-up Disposition: Not on File

## 2019-01-18 NOTE — PROGRESS NOTES
Chief Complaint   Patient presents with    Hip Pain    Hypertension    Diabetes    Cholesterol Problem     1. Have you been to the ER, urgent care clinic since your last visit? Hospitalized since your last visit? No    2. Have you seen or consulted any other health care providers outside of the [address] [place] [address] since your last visit? Include any pap smears or colon screening.  No     Health Maintenance Due   Topic Date Due    Shingrix Vaccine Age 49> (1 of 2) 03/06/1990    Influenza Age 5 to Adult  08/01/2018    GLAUCOMA SCREENING Q2Y  09/29/2018    COLONOSCOPY  12/09/2018    FOOT EXAM Q1  01/12/2019    HEMOGLOBIN A1C Q6M  01/20/2019

## 2019-01-18 NOTE — PATIENT INSTRUCTIONS
Vaccine Information Statement    Influenza (Flu) Vaccine (Inactivated or Recombinant): What you need to know    Many Vaccine Information Statements are available in [place]i and other languages. See www.immunize.org/vis  Hojas de Información Sobre Vacunas están disponibles en Español y en muchos otros idiomas. Visite www.immunize.org/vis    1. Why get vaccinated? Influenza (flu) is a contagious disease that spreads around the [place] every year, usually between October and May. Flu is caused by influenza viruses, and is spread mainly by coughing, sneezing, and close contact. Anyone can get flu. Flu strikes suddenly and can last several days. Symptoms vary by age, but can include:   fever/chills   sore throat   muscle aches   fatigue   cough   headache    runny or stuffy nose    Flu can also lead to pneumonia and blood infections, and cause diarrhea and seizures in children. If you have a medical condition, such as heart or lung disease, flu can make it worse. Flu is more dangerous for some people. Infants and young children, people 72years of age and older, pregnant women, and people with certain health conditions or a weakened immune system are at greatest risk. Each year thousands of people in the [facility] die from flu, and many more are hospitalized. Flu vaccine can:   keep you from getting flu,   make flu less severe if you do get it, and   keep you from spreading flu to your family and other people. 2. Inactivated and recombinant flu vaccines    A dose of flu vaccine is recommended every flu season. Children 6 months through 6years of age may need two doses during the same flu season. Everyone else needs only one dose each flu season.        Some inactivated flu vaccines contain a very small amount of a mercury-based preservative called thimerosal. Studies have not shown thimerosal in vaccines to be harmful, but flu vaccines that do not contain thimerosal are available. There is no live flu virus in flu shots. They cannot cause the flu. There are many flu viruses, and they are always changing. Each year a new flu vaccine is made to protect against three or four viruses that are likely to cause disease in the upcoming flu season. But even when the vaccine doesnt exactly match these viruses, it may still provide some protection    Flu vaccine cannot prevent:   flu that is caused by a virus not covered by the vaccine, or   illnesses that look like flu but are not. It takes about 2 weeks for protection to develop after vaccination, and protection lasts through the flu season. 3. Some people should not get this vaccine    Tell the person who is giving you the vaccine:     If you have any severe, life-threatening allergies. If you ever had a life-threatening allergic reaction after a dose of flu vaccine, or have a severe allergy to any part of this vaccine, you may be advised not to get vaccinated. Most, but not all, types of flu vaccine contain a small amount of egg protein.  If you ever had Guillain-Barré Syndrome (also called GBS). Some people with a history of GBS should not get this vaccine. This should be discussed with your doctor.  If you are not feeling well. It is usually okay to get flu vaccine when you have a mild illness, but you might be asked to come back when you feel better. 4. Risks of a vaccine reaction    With any medicine, including vaccines, there is a chance of reactions. These are usually mild and go away on their own, but serious reactions are also possible. Most people who get a flu shot do not have any problems with it.      Minor problems following a flu shot include:    soreness, redness, or swelling where the shot was given     hoarseness   sore, red or itchy eyes   cough   fever   aches   headache   itching   fatigue  If these problems occur, they usually begin soon after the shot and last 1 or 2 days. More serious problems following a flu shot can include the following:     There may be a small increased risk of Guillain-Barré Syndrome (GBS) after inactivated flu vaccine. This risk has been estimated at 1 or 2 additional cases per million people vaccinated. This is much lower than the risk of severe complications from flu, which can be prevented by flu vaccine.  Young children who get the flu shot along with pneumococcal vaccine (PCV13) and/or DTaP vaccine at the same time might be slightly more likely to have a seizure caused by fever. Ask your doctor for more information. Tell your doctor if a child who is getting flu vaccine has ever had a seizure. Problems that could happen after any injected vaccine:      People sometimes faint after a medical procedure, including vaccination. Sitting or lying down for about 15 minutes can help prevent fainting, and injuries caused by a fall. Tell your doctor if you feel dizzy, or have vision changes or ringing in the ears.  Some people get severe pain in the shoulder and have difficulty moving the arm where a shot was given. This happens very rarely.  Any medication can cause a severe allergic reaction. Such reactions from a vaccine are very rare, estimated at about 1 in a million doses, and would happen within a few minutes to a few hours after the vaccination. As with any medicine, there is a very remote chance of a vaccine causing a serious injury or death. The safety of vaccines is always being monitored. For more information, visit: www.cdc.gov/vaccinesafety/    5. What if there is a serious reaction? What should I look for?  Look for anything that concerns you, such as signs of a severe allergic reaction, very high fever, or unusual behavior.     Signs of a severe allergic reaction can include hives, swelling of the face and throat, difficulty breathing, a fast heartbeat, dizziness, and weakness - usually within a few minutes to a few hours after the vaccination. What should I do?  If you think it is a severe allergic reaction or other emergency that cant wait, call 9-1-1 and get the person to the nearest hospital. Otherwise, call your doctor.  Reactions should be reported to the Vaccine Adverse Event Reporting System (VAERS). Your doctor should file this report, or you can do it yourself through  the VAERS web site at www.vaers. [facility].gov, or by calling 1-198.650.2189. VAERS does not give medical advice. 6. The National Vaccine Injury Compensation Program    The [facility] Vaccine Injury Compensation Program (VICP) is a federal program that was created to compensate people who may have been injured by certain vaccines. Persons who believe they may have been injured by a vaccine can learn about the program and about filing a claim by calling 7-277.965.5156 or visiting the [employer] website at www.[facility].gov/vaccinecompensation. There is a time limit to file a claim for compensation. 7. How can I learn more?  Ask your healthcare provider. He or she can give you the vaccine package insert or suggest other sources of information.  Call your local or state health department.  Contact the Centers for Disease Control and Prevention (CDC):  - Call 9-196.148.1878 (1-800-CDC-INFO) or  - Visit CDCs website at www.cdc.gov/flu    Vaccine Information Statement   Inactivated Influenza Vaccine   8/7/2015  42 FAYE Arnold [id number]    Department of Health and Human Services  Centers for Disease Control and Prevention    Office Use Only

## 2019-01-19 LAB
ALBUMIN SERPL-MCNC: 4.4 G/DL (ref 3.5–4.8)
ALBUMIN/GLOB SERPL: 1.3 {RATIO} (ref 1.2–2.2)
ALP SERPL-CCNC: 62 IU/L (ref 39–117)
ALT SERPL-CCNC: 24 IU/L (ref 0–44)
AST SERPL-CCNC: 37 IU/L (ref 0–40)
BILIRUB SERPL-MCNC: 0.4 MG/DL (ref 0–1.2)
BUN SERPL-MCNC: 13 MG/DL (ref 8–27)
BUN/CREAT SERPL: 12 (ref 10–24)
CALCIUM SERPL-MCNC: 10.3 MG/DL (ref 8.6–10.2)
CHLORIDE SERPL-SCNC: 96 MMOL/L (ref 96–106)
CHOLEST SERPL-MCNC: 143 MG/DL (ref 100–199)
CO2 SERPL-SCNC: 26 MMOL/L (ref 20–29)
CREAT SERPL-MCNC: 1.1 MG/DL (ref 0.76–1.27)
GLOBULIN SER CALC-MCNC: 3.5 G/DL (ref 1.5–4.5)
GLUCOSE SERPL-MCNC: 126 MG/DL (ref 65–99)
HDLC SERPL-MCNC: 42 MG/DL
INTERPRETATION, 910389: NORMAL
LDLC SERPL CALC-MCNC: 75 MG/DL (ref 0–99)
POTASSIUM SERPL-SCNC: 3.6 MMOL/L (ref 3.5–5.2)
PROT SERPL-MCNC: 7.9 G/DL (ref 6–8.5)
SODIUM SERPL-SCNC: 142 MMOL/L (ref 134–144)
TRIGL SERPL-MCNC: 128 MG/DL (ref 0–149)
VLDLC SERPL CALC-MCNC: 26 MG/DL (ref 5–40)

## 2019-01-30 NOTE — ED NOTES
BMI above normal limits, recommended weight loss, improve diet and follow up with internist. Dr. Israel Andrade at bedside patient is worse after coming from [place]. Patient had a left side facial droop and dysarthria.

## 2019-05-20 DIAGNOSIS — Z23 ENCOUNTER FOR IMMUNIZATION: ICD-10-CM

## 2019-05-20 DIAGNOSIS — Z00.00 ROUTINE GENERAL MEDICAL EXAMINATION AT A HEALTH CARE FACILITY: ICD-10-CM

## 2019-05-20 RX ORDER — ACETAMINOPHEN AND CODEINE PHOSPHATE 300; 30 MG/1; MG/1
2 TABLET ORAL
Qty: 100 TAB | Refills: 5 | OUTPATIENT
Start: 2019-05-20 | End: 2019-06-03

## 2019-06-03 ENCOUNTER — APPOINTMENT (OUTPATIENT)
Dept: GENERAL RADIOLOGY | Age: 79
End: 2019-06-03
Attending: PHYSICIAN ASSISTANT
Payer: MEDICARE

## 2019-06-03 ENCOUNTER — APPOINTMENT (OUTPATIENT)
Dept: ULTRASOUND IMAGING | Age: 79
End: 2019-06-03
Attending: PHYSICIAN ASSISTANT
Payer: MEDICARE

## 2019-06-03 ENCOUNTER — HOSPITAL ENCOUNTER (EMERGENCY)
Age: 79
Discharge: HOME | End: 2019-06-03
Attending: EMERGENCY MEDICINE
Payer: MEDICARE

## 2019-06-03 VITALS
HEART RATE: 85 BPM | HEIGHT: 73 IN | OXYGEN SATURATION: 100 % | DIASTOLIC BLOOD PRESSURE: 73 MMHG | WEIGHT: 210.98 LBS | BODY MASS INDEX: 27.96 KG/M2 | SYSTOLIC BLOOD PRESSURE: 156 MMHG | TEMPERATURE: 98 F | RESPIRATION RATE: 18 BRPM

## 2019-06-03 DIAGNOSIS — M25.552 LEFT HIP PAIN: ICD-10-CM

## 2019-06-03 DIAGNOSIS — R60.9 PERIPHERAL EDEMA: ICD-10-CM

## 2019-06-03 DIAGNOSIS — M76.892 ENTHESOPATHY OF LEFT HIP REGION: Primary | ICD-10-CM

## 2019-06-03 DIAGNOSIS — E87.6 HYPOKALEMIA: ICD-10-CM

## 2019-06-03 DIAGNOSIS — M16.12 ARTHRITIS OF LEFT HIP: ICD-10-CM

## 2019-06-03 DIAGNOSIS — M47.816 ARTHRITIS OF LUMBAR SPINE: ICD-10-CM

## 2019-06-03 LAB
ALBUMIN SERPL-MCNC: 3.3 G/DL (ref 3.5–5)
ALBUMIN/GLOB SERPL: 0.8 {RATIO} (ref 1.1–2.2)
ALP SERPL-CCNC: 70 U/L (ref 45–117)
ALT SERPL-CCNC: 24 U/L (ref 12–78)
ANION GAP SERPL CALC-SCNC: 5 MMOL/L (ref 5–15)
AST SERPL-CCNC: 26 U/L (ref 15–37)
BASOPHILS # BLD: 0.1 K/UL (ref 0–0.1)
BASOPHILS NFR BLD: 1 % (ref 0–1)
BILIRUB SERPL-MCNC: 0.3 MG/DL (ref 0.2–1)
BNP SERPL-MCNC: 33 PG/ML
BUN SERPL-MCNC: 13 MG/DL (ref 6–20)
BUN/CREAT SERPL: 12 (ref 12–20)
CALCIUM SERPL-MCNC: 9 MG/DL (ref 8.5–10.1)
CHLORIDE SERPL-SCNC: 103 MMOL/L (ref 97–108)
CO2 SERPL-SCNC: 30 MMOL/L (ref 21–32)
CREAT SERPL-MCNC: 1.07 MG/DL (ref 0.7–1.3)
DIFFERENTIAL METHOD BLD: ABNORMAL
EOSINOPHIL # BLD: 0.2 K/UL (ref 0–0.4)
EOSINOPHIL NFR BLD: 3 % (ref 0–7)
ERYTHROCYTE [DISTWIDTH] IN BLOOD BY AUTOMATED COUNT: 13.4 % (ref 11.5–14.5)
GLOBULIN SER CALC-MCNC: 4.3 G/DL (ref 2–4)
GLUCOSE SERPL-MCNC: 99 MG/DL (ref 65–100)
HCT VFR BLD AUTO: 34.5 % (ref 36.6–50.3)
HGB BLD-MCNC: 11.3 G/DL (ref 12.1–17)
IMM GRANULOCYTES # BLD AUTO: 0 K/UL (ref 0–0.04)
IMM GRANULOCYTES NFR BLD AUTO: 0 % (ref 0–0.5)
LYMPHOCYTES # BLD: 2.3 K/UL (ref 0.8–3.5)
LYMPHOCYTES NFR BLD: 31 % (ref 12–49)
MCH RBC QN AUTO: 27.8 PG (ref 26–34)
MCHC RBC AUTO-ENTMCNC: 32.8 G/DL (ref 30–36.5)
MCV RBC AUTO: 85 FL (ref 80–99)
MONOCYTES # BLD: 0.7 K/UL (ref 0–1)
MONOCYTES NFR BLD: 9 % (ref 5–13)
NEUTS SEG # BLD: 4.3 K/UL (ref 1.8–8)
NEUTS SEG NFR BLD: 56 % (ref 32–75)
NRBC # BLD: 0 K/UL (ref 0–0.01)
NRBC BLD-RTO: 0 PER 100 WBC
PLATELET # BLD AUTO: 233 K/UL (ref 150–400)
PMV BLD AUTO: 9.7 FL (ref 8.9–12.9)
POTASSIUM SERPL-SCNC: 3.3 MMOL/L (ref 3.5–5.1)
PROT SERPL-MCNC: 7.6 G/DL (ref 6.4–8.2)
RBC # BLD AUTO: 4.06 M/UL (ref 4.1–5.7)
SODIUM SERPL-SCNC: 138 MMOL/L (ref 136–145)
WBC # BLD AUTO: 7.6 K/UL (ref 4.1–11.1)

## 2019-06-03 PROCEDURE — 72100 X-RAY EXAM L-S SPINE 2/3 VWS: CPT

## 2019-06-03 PROCEDURE — 83880 ASSAY OF NATRIURETIC PEPTIDE: CPT

## 2019-06-03 PROCEDURE — 36415 COLL VENOUS BLD VENIPUNCTURE: CPT

## 2019-06-03 PROCEDURE — 93971 EXTREMITY STUDY: CPT

## 2019-06-03 PROCEDURE — 73502 X-RAY EXAM HIP UNI 2-3 VIEWS: CPT

## 2019-06-03 PROCEDURE — 80053 COMPREHEN METABOLIC PANEL: CPT

## 2019-06-03 PROCEDURE — 74011250637 HC RX REV CODE- 250/637: Performed by: PHYSICIAN ASSISTANT

## 2019-06-03 PROCEDURE — 99283 EMERGENCY DEPT VISIT LOW MDM: CPT

## 2019-06-03 PROCEDURE — 85025 COMPLETE CBC W/AUTO DIFF WBC: CPT

## 2019-06-03 RX ORDER — METOPROLOL TARTRATE 25 MG/1
25 TABLET, FILM COATED ORAL 2 TIMES DAILY
COMMUNITY
End: 2020-01-22 | Stop reason: ALTCHOICE

## 2019-06-03 RX ORDER — POTASSIUM CHLORIDE 20 MEQ/1
20 TABLET, EXTENDED RELEASE ORAL
Status: COMPLETED | OUTPATIENT
Start: 2019-06-03 | End: 2019-06-03

## 2019-06-03 RX ORDER — HYDROCODONE BITARTRATE AND ACETAMINOPHEN 5; 325 MG/1; MG/1
1 TABLET ORAL
Status: COMPLETED | OUTPATIENT
Start: 2019-06-03 | End: 2019-06-03

## 2019-06-03 RX ADMIN — POTASSIUM CHLORIDE 20 MEQ: 20 TABLET, EXTENDED RELEASE ORAL at 19:35

## 2019-06-03 RX ADMIN — HYDROCODONE BITARTRATE AND ACETAMINOPHEN 1 TABLET: 5; 325 TABLET ORAL at 17:42

## 2019-06-03 NOTE — ED NOTES
Pt presents to ED with left hip pain x 2 months as well as some bilateral lower leg swelling. pitting noted.  Pt denies any fall

## 2019-06-03 NOTE — DISCHARGE INSTRUCTIONS
Patient Education        Arthritis: Care Instructions  Your Care Instructions  Arthritis, also called osteoarthritis, is a breakdown of the cartilage that cushions your joints. When the cartilage wears down, your bones rub against each other. This causes pain and stiffness. Many people have some arthritis as they age. Arthritis most often affects the joints of the spine, hands, hips, knees, or feet. You can take simple measures to protect your joints, ease your pain, and help you stay active. Follow-up care is a key part of your treatment and safety. Be sure to make and go to all appointments, and call your doctor if you are having problems. It's also a good idea to know your test results and keep a list of the medicines you take. How can you care for yourself at home? · Stay at a healthy weight. Being overweight puts extra strain on your joints. · Talk to your doctor or physical therapist about exercises that will help ease joint pain. ? Stretch. You may enjoy gentle forms of yoga to help keep your joints and muscles flexible. ? Walk instead of jog. Other types of exercise that are less stressful on the joints include riding a bicycle, swimming, suzanne chi, or water exercise. ? Lift weights. Strong muscles help reduce stress on your joints. Stronger thigh muscles, for example, take some of the stress off of the knees and hips. Learn the right way to lift weights so you do not make joint pain worse. · Take your medicines exactly as prescribed. Call your doctor if you think you are having a problem with your medicine. · Take pain medicines exactly as directed. ? If the doctor gave you a prescription medicine for pain, take it as prescribed. ? If you are not taking a prescription pain medicine, ask your doctor if you can take an over-the-counter medicine. · Use a cane, crutch, walker, or another device if you need help to get around. These can help rest your joints.  You also can use other things to make life easier, such as a higher toilet seat and padded handles on kitchen utensils. · Do not sit in low chairs, which can make it hard to get up. · Put heat or cold on your sore joints as needed. Use whichever helps you most. You also can take turns with hot and cold packs. ? Apply heat 2 or 3 times a day for 20 to 30 minutes--using a heating pad, hot shower, or hot pack--to relieve pain and stiffness. ? Put ice or a cold pack on your sore joint for 10 to 20 minutes at a time. Put a thin cloth between the ice and your skin. When should you call for help? Call your doctor now or seek immediate medical care if:    · You have sudden swelling, warmth, or pain in any joint.     · You have joint pain and a fever or rash.     · You have such bad pain that you cannot use a joint.    Watch closely for changes in your health, and be sure to contact your doctor if:    · You have mild joint symptoms that continue even with more than 6 weeks of care at home.     · You have stomach pain or other problems with your medicine. Where can you learn more? Go to http://anthony-esteban.info/. Enter Y246 in the search box to learn more about \"Arthritis: Care Instructions. \"  Current as of: Juju 10, 2018  Content Version: 11.9  © 7474-5487 [employer]. Care instructions adapted under license by [employer] (which disclaims liability or warranty for this information). If you have questions about a medical condition or this instruction, always ask your healthcare professional. [place] [address] any warranty or liability for your use of this information. Patient Education        Hip Arthritis: Care Instructions  Your Care Instructions    Arthritis, also called osteoarthritis, is a breakdown of the tissue (cartilage) that cushions your joints. Many people have some arthritis as they age.  When the cartilage in your hip joints wears down, your hip bone rubs against the hip socket. This causes pain and stiffness. Work with your doctor to find the right mix of treatments for your arthritis. There are things you can do at home to protect your hip joints, ease your pain, and help you stay active. But if your arthritis becomes so bad that you cannot walk, you may need surgery to replace the hip joint. Follow-up care is a key part of your treatment and safety. Be sure to make and go to all appointments, and call your doctor if you are having problems. It's also a good idea to know your test results and keep a list of the medicines you take. How can you care for yourself at home? · Stay at a healthy weight. Being overweight puts extra strain on your hip joints. · Talk to your doctor or physical therapist about exercises that will help ease hip pain. These tips may help. ? Stretch to help prevent stiffness and to prevent injury before you exercise. You may enjoy gentle forms of yoga to help keep your joints and muscles flexible. ? Walk instead of jog. Other types of exercise that are less stressful on the joints include riding a bike, swimming, and doing water exercise. ? Lift weights. Strong muscles help reduce stress on your joints. Stronger thigh muscles, for example, take some of the stress off of the knees and hips. Learn the right way to lift weights so you do not make joint pain worse. · Take pain medicines exactly as directed. ? If the doctor gave you a prescription medicine for pain, take it as prescribed. ? If you are not taking a prescription pain medicine, ask your doctor if you can take an over-the-counter medicine. · Use a cane, crutch, walker, or another device if you need help to get around. These can help rest your hips. You also can use other things to make life easier, such as a higher toilet seat. · Do not sit in low chairs, which can make it painful to get up. · Put heat or cold on your sore hips as needed.  Use whichever helps you most. You also can go back and forth between hot and cold packs. ? Apply heat 2 or 3 times a day for 20 to 30 minutes--using a heating pad, hot shower, or hot pack--to relieve pain and stiffness. ? Put ice or a cold pack on your sore hips for 10 to 20 minutes at a time to numb the area. Put a thin cloth between the ice and your skin. · Think about talking to your doctor about using capsaicin, a cream you apply to the skin for pain relief. When should you call for help? Call your doctor now or seek immediate medical care if:    · You have sudden swelling, warmth, or pain in any joint.     · You have joint pain and a fever or rash.     · You have such bad pain that you cannot use the joint.    Watch closely for changes in your health, and be sure to contact your doctor if:    · You have mild joint symptoms that continue even with more than 6 weeks of care at home.     · You do not get better as expected.     · You have stomach pain or other problems with your medicine. Where can you learn more? Go to http://anthony-esteban.info/. Enter A233 in the search box to learn more about \"Hip Arthritis: Care Instructions. \"  Current as of: Juju 10, 2018  Content Version: 11.9  © 5441-5239 [employer]. Care instructions adapted under license by [employer] (which disclaims liability or warranty for this information). If you have questions about a medical condition or this instruction, always ask your healthcare professional. [place] [address] any warranty or liability for your use of this information. Patient Education        Leg and Ankle Edema: Care Instructions  Your Care Instructions  Swelling in the legs, ankles, and feet is called edema. It is common after you sit or stand for a while. Long plane flights or car rides often cause swelling in the legs and feet. You may also have swelling if you have to stand for long periods of time at your job.  Problems with the veins in the legs (varicose veins) and changes in hormones can also cause swelling. Sometimes the swelling in the ankles and feet is caused by a more serious problem, such as heart failure, infection, blood clots, or liver or kidney disease. Follow-up care is a key part of your treatment and safety. Be sure to make and go to all appointments, and call your doctor if you are having problems. It's also a good idea to know your test results and keep a list of the medicines you take. How can you care for yourself at home? · If your doctor gave you medicine, take it as prescribed. Call your doctor if you think you are having a problem with your medicine. · Whenever you are resting, raise your legs up. Try to keep the swollen area higher than the level of your heart. · Take breaks from standing or sitting in one position. ? Walk around to increase the blood flow in your lower legs. ? Move your feet and ankles often while you stand, or tighten and relax your leg muscles. · Wear support stockings. Put them on in the morning, before swelling gets worse. · Eat a balanced diet. Lose weight if you need to. · Limit the amount of salt (sodium) in your diet. Salt holds fluid in the body and may increase swelling. When should you call for help? Call 911 anytime you think you may need emergency care. For example, call if:    · You have symptoms of a blood clot in your lung (called a pulmonary embolism). These may include:  ? Sudden chest pain. ? Trouble breathing. ? Coughing up blood.    Call your doctor now or seek immediate medical care if:    · You have signs of a blood clot, such as:  ? Pain in your calf, back of the knee, thigh, or groin. ? Redness and swelling in your leg or groin.     · You have symptoms of infection, such as:  ? Increased pain, swelling, warmth, or redness. ? Red streaks or pus.   ? A fever.    Watch closely for changes in your health, and be sure to contact your doctor if:    · Your swelling is getting worse.     · You have new or worsening pain in your legs.     · You do not get better as expected. Where can you learn more? Go to http://anthony-esteban.info/. Enter A647 in the search box to learn more about \"Leg and Ankle Edema: Care Instructions. \"  Current as of: September 23, 2018  Content Version: 11.9  © 9968-2415 [employer]. Care instructions adapted under license by [employer] (which disclaims liability or warranty for this information). If you have questions about a medical condition or this instruction, always ask your healthcare professional. [place] [address] any warranty or liability for your use of this information. Patient Education        Hypokalemia: Care Instructions  Your Care Instructions    Hypokalemia (say \"vb-ki-yxr-DELANEY-mikala-uh\") is a low level of potassium. The heart, muscles, kidneys, and nervous system all need potassium to work well. This problem has many different causes. Kidney problems, diet, and medicines like diuretics and laxatives can cause it. So can vomiting or diarrhea. In some cases, cancer is the cause. Your doctor may do tests to find the cause of your low potassium levels. You may need medicines to bring your potassium levels back to normal. You may also need regular blood tests to check your potassium. If you have very low potassium, you may need intravenous (IV) medicines. You also may need tests to check the electrical activity of your heart. Heart problems caused by low potassium levels can be very serious. Follow-up care is a key part of your treatment and safety. Be sure to make and go to all appointments, and call your doctor if you are having problems. It's also a good idea to know your test results and keep a list of the medicines you take. How can you care for yourself at home? · If your doctor recommends it, eat foods that have a lot of potassium.  These include fresh fruits, juices, and vegetables. They also include nuts, beans, and milk. · Be safe with medicines. If your doctor prescribes medicines or potassium supplements, take them exactly as directed. Call your doctor if you have any problems with your medicines. · Get your potassium levels tested as often as your doctor tells you. When should you call for help? Call 911 anytime you think you may need emergency care. For example, call if:    · You feel like your heart is missing beats. Heart problems caused by low potassium can cause death.     · You passed out (lost consciousness).     · You have a seizure.    Call your doctor now or seek immediate medical care if:    · You feel weak or unusually tired.     · You have severe arm or leg cramps.     · You have tingling or numbness.     · You feel sick to your stomach, or you vomit.     · You have belly cramps.     · You feel bloated or constipated.     · You have to urinate a lot.     · You feel very thirsty most of the time.     · You are dizzy or lightheaded, or you feel like you may faint.     · You feel depressed, or you lose touch with reality.    Watch closely for changes in your health, and be sure to contact your doctor if:    · You do not get better as expected. Where can you learn more? Go to http://anthony-esteban.info/. Enter G358 in the search box to learn more about \"Hypokalemia: Care Instructions. \"  Current as of: March 14, 2018  Content Version: 11.9  © 8553-5576 [employer]. Care instructions adapted under license by [employer] (which disclaims liability or warranty for this information). If you have questions about a medical condition or this instruction, always ask your healthcare professional. [place] [address] any warranty or liability for your use of this information.          Patient Education        Joint Pain: Care Instructions  Your Care Instructions    Many people have small aches and pains from overuse or injury to muscles and joints. Joint injuries often happen during sports or recreation, work tasks, or projects around the home. An overuse injury can happen when you put too much stress on a joint or when you do an activity that stresses the joint over and over, such as using the computer or rowing a boat. You can take action at home to help your muscles and joints get better. You should feel better in 1 to 2 weeks, but it can take 3 months or more to heal completely. Follow-up care is a key part of your treatment and safety. Be sure to make and go to all appointments, and call your doctor if you are having problems. It's also a good idea to know your test results and keep a list of the medicines you take. How can you care for yourself at home? · Do not put weight on the injured joint for at least a day or two. · For the first day or two after an injury, do not take hot showers or baths, and do not use hot packs. The heat could make swelling worse. · Put ice or a cold pack on the sore joint for 10 to 20 minutes at a time. Try to do this every 1 to 2 hours for the next 3 days (when you are awake) or until the swelling goes down. Put a thin cloth between the ice and your skin. · Wrap the injury in an elastic bandage. Do not wrap it too tightly because this can cause more swelling. · Prop up the sore joint on a pillow when you ice it or anytime you sit or lie down during the next 3 days. Try to keep it above the level of your heart. This will help reduce swelling. · Take an over-the-counter pain medicine, such as acetaminophen (Tylenol), ibuprofen (Advil, Motrin), or naproxen (Aleve). Read and follow all instructions on the label. · After 1 or 2 days of rest, begin moving the joint gently. While the joint is still healing, you can begin to exercise using activities that do not strain or hurt the painful joint. When should you call for help?   Call your doctor now or seek immediate medical care if:    · You have signs of infection, such as:  ? Increased pain, swelling, warmth, and redness. ? Red streaks leading from the joint. ? A fever.    Watch closely for changes in your health, and be sure to contact your doctor if:    · Your movement or symptoms are not getting better after 1 to 2 weeks of home treatment. Where can you learn more? Go to http://anthony-esteban.info/. Enter P205 in the search box to learn more about \"Joint Pain: Care Instructions. \"  Current as of: September 20, 2018  Content Version: 11.9  © 7721-2697 [employer]. Care instructions adapted under license by [employer] (which disclaims liability or warranty for this information). If you have questions about a medical condition or this instruction, always ask your healthcare professional. [address] any warranty or liability for your use of this information.

## 2019-06-03 NOTE — ED PROVIDER NOTES
EMERGENCY DEPARTMENT HISTORY AND PHYSICAL EXAM      Date: 6/3/2019  Patient Name: Keven Dean    History of Presenting Illness     HPI: Keven Dean is a 78 y.o. male with PMHx of diabetes, hyperlipidemia, arthritis, peripheral vascular disease, diabetic neuropathy, hypertension, CAD, thromboembolus, presents the emergency room for left hip pain and bilateral peripheral edema for the past 3 days. He says he has had hip pain for months but noticed the leg swelling a couple days ago that is progressively worsened. He says that his pain is primarily in his left hip and is a 7 out of 10, positional, achy, nonradiating pain. Patient denies trauma, history of CHF or kidney or liver problems, fever/chills, inability to bear weight, among other associated symptoms and history. PCP: Flex Nguyen MD    Current Outpatient Medications   Medication Sig Dispense Refill    metoprolol tartrate (LOPRESSOR) 25 mg tablet Take 25 mg by mouth two (2) times a day. Take 1/2 a tablet twice a day      metFORMIN (GLUCOPHAGE) 1,000 mg tablet 1 1/2 tabs po in am and 1 tab po in pm. For diabetes 225 Tab 12    glimepiride (AMARYL) 4 mg tablet One daily for diabetes 90 Tab 3    rosuvastatin (CRESTOR) 20 mg tablet One daily for cholesterol 90 Tab 3    amitriptyline (ELAVIL) 10 mg tablet TAKE 1 TABLET BY MOUTH NIGHTLY TO PREVENT HAVING TO PASS WATER AT NIGHT AS DIRECTED 90 Tab 3    naproxen (NAPROSYN) 500 mg tablet TAKE 1 TABLET BY MOUTH TWICE DAILY WITH MEALS FOR KNEE AND HIP PAIN. 180 Tab 12    multivitamin (ONE A DAY) tablet Take 1 Tab by mouth daily.  sAXagliptin (ONGLYZA) 5 mg tab tablet Take 1 Tab by mouth daily. for diabetes 30 Tab 12    aspirin delayed-release 81 mg tablet Take 1 Tab by mouth daily.  To prevent heart attack and stroke 30 Tab 12    valsartan (DIOVAN) 160 mg tablet TAKE 1 TABLET BY MOUTH EVERY DAY for blood pressure 90 Tab 3    sildenafil, antihypertensive, (REVATIO) 20 mg tablet 2 tabs po prn, on an empty stomach, for erectile dysfucntion 36 Tab 2       Past History     Past Medical History:  Past Medical History:   Diagnosis Date    Arthritis     CAD (coronary artery disease)     [facility] [address]     Diabetic neuropathy ([facility] [address]     Hypercholesterolemia     Hypertension 11/29/10    PVD (peripheral vascular disease) ([facility] [address]     Shingles 2010    Thromboembolus [facility])        Past Surgical History:  Past Surgical History:   Procedure Laterality Date    HX CARPAL TUNNEL RELEASE  left    HX HEART CATHETERIZATION  2005    Dr Glenis Martin      left arm fracture    ID COLONOSCOPY FLX DX W/COLLJ SPEC WHEN PFRMD  2012            Family History:  Family History   Problem Relation Age of Onset    Stroke Father     Heart Disease Brother     Cancer Brother        Social History:  Social History     Tobacco Use    Smoking status: Former Smoker     Last attempt to quit: 2000     Years since quittin.4    Smokeless tobacco: Never Used    Tobacco comment: 15 years ago   Substance Use Topics    Alcohol use: Yes     Comment: occ.  Drug use: Not on file       Allergies: Allergies   Allergen Reactions    Lipitor [Atorvastatin] Diarrhea         Review of Systems   Review of Systems   Constitutional: Negative for chills and fever. HENT: Negative for congestion and sore throat. Eyes: Negative for discharge and redness. Respiratory: Negative for shortness of breath and wheezing. Cardiovascular: Positive for leg swelling. Negative for chest pain. Gastrointestinal: Negative for abdominal pain, nausea and vomiting. Endocrine: Negative for polydipsia, polyphagia and polyuria. Genitourinary: Negative for frequency, genital sores and urgency. Musculoskeletal: Positive for arthralgias. Negative for back pain, myalgias and neck pain. Skin: Negative for rash and wound. Neurological: Negative for light-headedness and headaches.        Physical Exam     Vitals:    19 1644   BP: 156/73   Pulse: 85   Resp: 18   Temp: 98 °F (36.7 °C)   SpO2: 100%   Weight: 95.7 kg (210 lb 15.7 oz)   Height: 6' 1\" (1.854 m)     Physical Exam   Constitutional: He is oriented to person, place, and time. He appears well-developed and well-nourished. HENT:   Head: Normocephalic and atraumatic. Cardiovascular: Normal rate, regular rhythm and normal heart sounds. Pulmonary/Chest: Effort normal and breath sounds normal.   Abdominal: Soft. Bowel sounds are normal. He exhibits no distension and no mass. There is no tenderness. There is no rebound and no guarding. Musculoskeletal:   Lower extremeties: Bilateral pitting peripheral edema,     Left hip: no TTP. Left leg appears larger than the right full ROM and no pain w/ passing ROM. No erythema, signs of skin infection/rash or warmth to the touch. No obvious trauma or deformity. 2+ distal pulses, NVI, sensation grossly intact to light touch. No pitting edema or crepitus. Pt walks w/o difficulty. Gait normal and stable. Neurological: He is alert and oriented to person, place, and time. Skin: Skin is warm and dry. Psychiatric: He has a normal mood and affect. His behavior is normal. Judgment and thought content normal.         Diagnostic Study Results     Labs -     Recent Results (from the past 12 hour(s))   METABOLIC PANEL, COMPREHENSIVE    Collection Time: 06/03/19  5:52 PM   Result Value Ref Range    Sodium 138 136 - 145 mmol/L    Potassium 3.3 (L) 3.5 - 5.1 mmol/L    Chloride 103 97 - 108 mmol/L    CO2 30 21 - 32 mmol/L    Anion gap 5 5 - 15 mmol/L    Glucose 99 65 - 100 mg/dL    BUN 13 6 - 20 MG/DL    Creatinine 1.07 0.70 - 1.30 MG/DL    BUN/Creatinine ratio 12 12 - 20      GFR est AA >60 >60 ml/min/1.73m2    GFR est non-AA >60 >60 ml/min/1.73m2    Calcium 9.0 8.5 - 10.1 MG/DL    Bilirubin, total 0.3 0.2 - 1.0 MG/DL    ALT (SGPT) 24 12 - 78 U/L    AST (SGOT) 26 15 - 37 U/L    Alk.  phosphatase 70 45 - 117 U/L    Protein, total 7.6 6.4 - 8.2 g/dL    Albumin 3.3 (L) 3.5 - 5.0 g/dL    Globulin 4.3 (H) 2.0 - 4.0 g/dL    A-G Ratio 0.8 (L) 1.1 - 2.2     CBC WITH AUTOMATED DIFF    Collection Time: 06/03/19  5:52 PM   Result Value Ref Range    WBC 7.6 4.1 - 11.1 K/uL    RBC 4.06 (L) 4.10 - 5.70 M/uL    HGB 11.3 (L) 12.1 - 17.0 g/dL    HCT 34.5 (L) 36.6 - 50.3 %    MCV 85.0 80.0 - 99.0 FL    MCH 27.8 26.0 - 34.0 PG    MCHC 32.8 30.0 - 36.5 g/dL    RDW 13.4 11.5 - 14.5 %    PLATELET 531 764 - 591 K/uL    MPV 9.7 8.9 - 12.9 FL    NRBC 0.0 0  WBC    ABSOLUTE NRBC 0.00 0.00 - 0.01 K/uL    NEUTROPHILS 56 32 - 75 %    LYMPHOCYTES 31 12 - 49 %    MONOCYTES 9 5 - 13 %    EOSINOPHILS 3 0 - 7 %    BASOPHILS 1 0 - 1 %    IMMATURE GRANULOCYTES 0 0.0 - 0.5 %    ABS. NEUTROPHILS 4.3 1.8 - 8.0 K/UL    ABS. LYMPHOCYTES 2.3 0.8 - 3.5 K/UL    ABS. MONOCYTES 0.7 0.0 - 1.0 K/UL    ABS. EOSINOPHILS 0.2 0.0 - 0.4 K/UL    ABS. BASOPHILS 0.1 0.0 - 0.1 K/UL    ABS. IMM. GRANS. 0.0 0.00 - 0.04 K/UL    DF AUTOMATED     NT-PRO BNP    Collection Time: 06/03/19  5:52 PM   Result Value Ref Range    NT pro-BNP 33 <450 PG/ML       Radiologic Studies -   XR HIP LT W OR WO PELV 2-3 VWS   Final Result   IMPRESSION: No acute abnormality. XR SPINE LUMB 2 OR 3 V   Final Result   IMPRESSION: No change. Significant degenerative disc disease. CT Results  (Last 48 hours)    None        CXR Results  (Last 48 hours)    None            Medical Decision Making   I am the first provider for this patient. I reviewed the vital signs, available nursing notes, past medical history, past surgical history, social history    Pulse Oximetry Analysis - 100% on RA    ED Course:   Initial assessment performed. The patients presenting problems have been discussed, and they are in agreement with the care plan formulated and outlined with them. I have encouraged them to ask questions as they arise throughout their visit. Vital Signs-Reviewed the patient's vital signs.   Vitals:    06/03/19 1644   BP: 156/73   BP 1 Location: Left arm   BP Patient Position: Sitting   Pulse: 85   Resp: 18   Temp: 98 °F (36.7 °C)   SpO2: 100%   Weight: 95.7 kg (210 lb 15.7 oz)   Height: 6' 1\" (1.854 m)       Medications Administered During ED Course  Medications   HYDROcodone-acetaminophen (NORCO) 5-325 mg per tablet 1 Tab (1 Tab Oral Given 6/3/19 1742)   potassium chloride (K-DUR, KLOR-CON) SR tablet 20 mEq (20 mEq Oral Given 6/3/19 1935)       Progress Note:  On re evaluation pt is resting comfortably, is requesting discharge, and has no new complaints, changes, or physical findings. Disposition:  D/c home    DISCHARGE NOTE:   I Counseled the patient on diagnosis and care plan. All available lab and imaging results have been reviewed by me and were discussed with the patient, including all incidental findings. The likelihood of other entities in the differential is insufficient to justify any further testing for them. This was explained to the patient. Patient agrees with plan and agrees to follow up with Ortho as recommended, or return to the ED if their symptoms worsen. All medications were reviewed with the patient. All of pt's questions and concerns were addressed. The patient was advised that new or worsening symptoms would require further evaluation and should prompt immediate return to the Emergency Department. Discharge instructions have been provided and explained to the patient, along with reasons to return to the ED. Patient voices understanding and is agreeable with the plan for discharge. Patient is ready to go home.     Follow-up Information     Follow up With Specialties Details Why Contact Info    Goldy Pacheco MD [facility] Schedule an appointment as soon as possible for a visit For hip pain and peripheral edema [place] [address]  [phone]      [facility] EMERGENCY DEPT Emergency Medicine Go to If symptoms worsen [address] [place]  [phone]          Discharge Medication List as of 6/3/2019  7:53 PM          Provider Notes (Medical Decision Making):   l differential diagnosis: CHF, valve disease, venous insufficiency, venous obstruction, cirrhosis, low albumin, cellulitis, drugs (vasodilators), high sodium diet, CKD    Procedures:  Procedures    Critical Care Time: none      Diagnosis     Clinical Impression:   1. Enthesopathy of left hip region    2. Arthritis of left hip    3. Left hip pain    4. Peripheral edema    5. Hypokalemia    6. Arthritis of lumbar spine        Please note that this dictation was completed with [employer], the computer voice recognition software. Quite often unanticipated grammatical, syntax, homophones, and other interpretive errors are inadvertently transcribed by the computer software. Please disregard these errors. Please excuse any errors that have escaped final proofreading.

## 2019-06-04 NOTE — ED NOTES
Patient discharge by Diana SMITH pt sent to the front lobby, with strong and steady gait -  Discharge information / home RX / and reasons to return to the ED were reviewed by the doctor.

## 2019-06-12 ENCOUNTER — HOSPITAL ENCOUNTER (OUTPATIENT)
Dept: LAB | Age: 79
Discharge: HOME | End: 2019-06-12
Payer: MEDICARE

## 2019-06-12 ENCOUNTER — OFFICE VISIT (OUTPATIENT)
Dept: FAMILY MEDICINE CLINIC | Age: 79
End: 2019-06-12

## 2019-06-12 VITALS
OXYGEN SATURATION: 96 % | BODY MASS INDEX: 28.1 KG/M2 | HEIGHT: 73 IN | RESPIRATION RATE: 14 BRPM | HEART RATE: 79 BPM | WEIGHT: 212 LBS | SYSTOLIC BLOOD PRESSURE: 132 MMHG | TEMPERATURE: 98.4 F | DIASTOLIC BLOOD PRESSURE: 52 MMHG

## 2019-06-12 DIAGNOSIS — M79.605 PAIN IN BOTH LOWER EXTREMITIES: ICD-10-CM

## 2019-06-12 DIAGNOSIS — R60.9 EDEMA, UNSPECIFIED TYPE: ICD-10-CM

## 2019-06-12 DIAGNOSIS — R05.9 COUGH: Primary | ICD-10-CM

## 2019-06-12 DIAGNOSIS — M79.604 PAIN IN BOTH LOWER EXTREMITIES: ICD-10-CM

## 2019-06-12 DIAGNOSIS — R53.1 WEAKNESS: ICD-10-CM

## 2019-06-12 LAB
BILIRUB UR QL STRIP: NEGATIVE
GLUCOSE UR-MCNC: NEGATIVE MG/DL
KETONES P FAST UR STRIP-MCNC: NEGATIVE MG/DL
PH UR STRIP: 6 [PH] (ref 4.6–8)
PROT UR QL STRIP: NEGATIVE
SP GR UR STRIP: 1.01 (ref 1–1.03)
UA UROBILINOGEN AMB POC: NORMAL (ref 0.2–1)
URINALYSIS CLARITY POC: CLEAR
URINALYSIS COLOR POC: YELLOW
URINE BLOOD POC: NEGATIVE
URINE LEUKOCYTES POC: NEGATIVE
URINE NITRITES POC: NEGATIVE

## 2019-06-12 PROCEDURE — 84443 ASSAY THYROID STIM HORMONE: CPT

## 2019-06-12 PROCEDURE — 36415 COLL VENOUS BLD VENIPUNCTURE: CPT

## 2019-06-12 RX ORDER — FUROSEMIDE 80 MG/1
TABLET ORAL
Qty: 60 TAB | Refills: 12 | Status: SHIPPED | OUTPATIENT
Start: 2019-06-12 | End: 2020-10-26 | Stop reason: SDUPTHER

## 2019-06-12 RX ORDER — ACETAMINOPHEN AND CODEINE PHOSPHATE 300; 30 MG/1; MG/1
TABLET ORAL
Refills: 4 | COMMUNITY
Start: 2019-04-15 | End: 2020-01-22 | Stop reason: SDUPTHER

## 2019-06-12 RX ORDER — HYDROCHLOROTHIAZIDE 12.5 MG/1
TABLET ORAL
Refills: 0 | COMMUNITY
Start: 2019-05-09 | End: 2019-10-22 | Stop reason: SDUPTHER

## 2019-06-12 RX ORDER — ACETAMINOPHEN AND CODEINE PHOSPHATE 300; 30 MG/1; MG/1
2 TABLET ORAL
Qty: 100 TAB | Refills: 3 | Status: SHIPPED | OUTPATIENT
Start: 2019-06-12 | End: 2019-06-15

## 2019-06-12 NOTE — PROGRESS NOTES
Chief Complaint   Patient presents with    Hand Pain     bilateral    Foot Pain     bilateral    Knee Pain     bilateral    Hip Pain     bilateral    Ankle Pain     bilateral  EDEMA +5 Pitting Edema      1. Have you been to the ER, urgent care clinic since your last visit? Hospitalized since your last visit? No    2. Have you seen or consulted any other health care providers outside of the [address] [place] [address] since your last visit? Include any pap smears or colon screening.  No     Health Maintenance Due   Topic Date Due    Shingrix Vaccine Age 49> (1 of 2) 03/06/1990    GLAUCOMA SCREENING Q2Y  09/29/2018    COLONOSCOPY  12/09/2018    FOOT EXAM Q1  01/12/2019    Pneumococcal 65+ years (2 of 2 - PPSV23) 01/12/2019    HEMOGLOBIN A1C Q6M  01/20/2019

## 2019-06-12 NOTE — PROGRESS NOTES
HISTORY OF PRESENT ILLNESS  Julissa Cuevas is a 78 y.o. male. HPI In for recheck. Has been having lots of aches and pains. Having pain in L hip, and having swelling in both legs, right worse than left. Has also had tightness in both feet. Legs have been swelling for over one month. No co dyspnea. Some knee pain. tyl #3 works well for pain. Swelling in lower legs has developed one month ago. No dyspnea . Seen in er a week ago, had nol cmp, venous dopplers   Needs cholesterol and diabetes and blood pressure checked also. ROS    Physical Exam   Constitutional: He appears well-developed and well-nourished. HENT:   Right Ear: External ear normal.   Left Ear: External ear normal.   Mouth/Throat: Oropharynx is clear and moist.   Neck: No thyromegaly present. Cardiovascular: Normal rate, regular rhythm, normal heart sounds and intact distal pulses. Pulmonary/Chest: Effort normal and breath sounds normal. No respiratory distress. He has no wheezes. Abdominal: Soft. Bowel sounds are normal. He exhibits no distension and no mass. There is no tenderness. There is no guarding. Musculoskeletal: Normal range of motion. He exhibits edema (2-3+ edema bilaterally. ). Lymphadenopathy:     He has no cervical adenopathy. Nursing note and vitals reviewed. ASSESSMENT and PLAN  Orders Placed This Encounter    XR CHEST PA LAT    TSH 3RD GENERATION    AMB POC URINALYSIS DIP STICK AUTO W/O MICRO    acetaminophen-codeine (TYLENOL-CODEINE #3) 300-30 mg per tablet    furosemide (LASIX) 80 mg tablet     Diagnoses and all orders for this visit:    1. Cough  -     XR CHEST PA LAT; Future    2. Weakness  -     TSH 3RD GENERATION    3. Edema, unspecified type  -     AMB POC URINALYSIS DIP STICK AUTO W/O MICRO    4. Pain in both lower extremities  -     acetaminophen-codeine (TYLENOL-CODEINE #3) 300-30 mg per tablet; Take 2 Tabs by mouth every six to eight (6-8) hours as needed for Pain for up to 3 days.  Max Daily Amount: 8 Tabs.    Other orders  -     furosemide (LASIX) 80 mg tablet; 1-2 tabs po every day as needed for swelling in legs. Follow-up and Dispositions    · Return in about 1 month (around 7/10/2019).

## 2019-06-13 LAB — TSH SERPL DL<=0.005 MIU/L-ACNC: 3.93 UIU/ML (ref 0.45–4.5)

## 2019-07-18 ENCOUNTER — HOSPITAL ENCOUNTER (OUTPATIENT)
Dept: LAB | Age: 79
Discharge: HOME | End: 2019-07-18
Payer: MEDICARE

## 2019-07-18 ENCOUNTER — OFFICE VISIT (OUTPATIENT)
Dept: FAMILY MEDICINE CLINIC | Age: 79
End: 2019-07-18

## 2019-07-18 VITALS
RESPIRATION RATE: 14 BRPM | HEIGHT: 73 IN | SYSTOLIC BLOOD PRESSURE: 124 MMHG | BODY MASS INDEX: 26.51 KG/M2 | HEART RATE: 88 BPM | DIASTOLIC BLOOD PRESSURE: 68 MMHG | WEIGHT: 200 LBS | TEMPERATURE: 98.9 F | OXYGEN SATURATION: 98 %

## 2019-07-18 DIAGNOSIS — R60.9 EDEMA, UNSPECIFIED TYPE: ICD-10-CM

## 2019-07-18 DIAGNOSIS — E11.9 CONTROLLED TYPE 2 DIABETES MELLITUS WITHOUT COMPLICATION, WITHOUT LONG-TERM CURRENT USE OF INSULIN (HCC): Primary | ICD-10-CM

## 2019-07-18 LAB — HBA1C MFR BLD HPLC: 7.2 %

## 2019-07-18 PROCEDURE — 80053 COMPREHEN METABOLIC PANEL: CPT

## 2019-07-18 NOTE — PROGRESS NOTES
Chief Complaint   Patient presents with    Leg Pain     bilateral    Peripheral Neuropathy    Depression    Dizziness     \"comes and goes\"    Diabetes    Annual Wellness Visit     1. Have you been to the ER, urgent care clinic since your last visit? Hospitalized since your last visit? no    2. Have you seen or consulted any other health care providers outside of the [address] [place] [address] since your last visit? Include any pap smears or colon screening.  No     Health Maintenance Due   Topic Date Due    Shingrix Vaccine Age 49> (1 of 2) 03/06/1990    GLAUCOMA SCREENING Q2Y  09/29/2018    COLONOSCOPY  12/09/2018    FOOT EXAM Q1  01/12/2019    Pneumococcal 65+ years (2 of 2 - PPSV23) 01/12/2019    HEMOGLOBIN A1C Q6M  01/20/2019    MICROALBUMIN Q1  07/20/2019    MEDICARE YEARLY EXAM  07/21/2019

## 2019-07-18 NOTE — PROGRESS NOTES
HISTORY OF PRESENT ILLNESS  Nadira Armenta is a 78 y.o. male. HPI Has been having swelling in legs for  2 months. Has been taking lasix 80 mg- working fairly well. Has some mild dyspnea at times. Occasional mild cough. Has lost 12 lbs since last seen. Backed off fluid pills some when glucose went way up. ROS    Physical Exam   Constitutional: He appears well-developed and well-nourished. HENT:   Right Ear: External ear normal.   Left Ear: External ear normal.   Mouth/Throat: Oropharynx is clear and moist.   Neck: No thyromegaly present. Cardiovascular: Normal rate, regular rhythm, normal heart sounds and intact distal pulses. Pulmonary/Chest: Effort normal and breath sounds normal. No respiratory distress. He has no wheezes. Abdominal: Soft. Bowel sounds are normal. He exhibits no distension and no mass. There is no tenderness. There is no guarding. Musculoskeletal: Normal range of motion. He exhibits edema (1+ edema bilaterally). Lymphadenopathy:     He has no cervical adenopathy. Nursing note and vitals reviewed. ASSESSMENT and PLAN  Orders Placed This Encounter    METABOLIC PANEL, COMPREHENSIVE    AMB POC HEMOGLOBIN A1C     Diagnoses and all orders for this visit:    1. Controlled type 2 diabetes mellitus without complication, without long-term current use of insulin (HCC)  -     AMB POC HEMOGLOBIN A1C    2. Edema, unspecified type  -     METABOLIC PANEL, COMPREHENSIVE      Follow-up and Dispositions    · Return in about 3 months (around 10/18/2019).

## 2019-07-19 LAB
ALBUMIN SERPL-MCNC: 4.4 G/DL (ref 3.5–4.8)
ALBUMIN/GLOB SERPL: 1.4 {RATIO} (ref 1.2–2.2)
ALP SERPL-CCNC: 69 IU/L (ref 39–117)
ALT SERPL-CCNC: 15 IU/L (ref 0–44)
AST SERPL-CCNC: 25 IU/L (ref 0–40)
BILIRUB SERPL-MCNC: 0.3 MG/DL (ref 0–1.2)
BUN SERPL-MCNC: 15 MG/DL (ref 8–27)
BUN/CREAT SERPL: 12 (ref 10–24)
CALCIUM SERPL-MCNC: 10.3 MG/DL (ref 8.6–10.2)
CHLORIDE SERPL-SCNC: 96 MMOL/L (ref 96–106)
CO2 SERPL-SCNC: 28 MMOL/L (ref 20–29)
CREAT SERPL-MCNC: 1.21 MG/DL (ref 0.76–1.27)
GLOBULIN SER CALC-MCNC: 3.1 G/DL (ref 1.5–4.5)
GLUCOSE SERPL-MCNC: 96 MG/DL (ref 65–99)
INTERPRETATION: NORMAL
POTASSIUM SERPL-SCNC: 3.6 MMOL/L (ref 3.5–5.2)
PROT SERPL-MCNC: 7.5 G/DL (ref 6–8.5)
SODIUM SERPL-SCNC: 139 MMOL/L (ref 134–144)

## 2019-07-29 RX ORDER — SAXAGLIPTIN 5 MG/1
TABLET, FILM COATED ORAL
Qty: 30 TAB | Refills: 0 | Status: SHIPPED | OUTPATIENT
Start: 2019-07-29 | End: 2019-08-29 | Stop reason: SDUPTHER

## 2019-07-29 RX ORDER — HYDROCHLOROTHIAZIDE 12.5 MG/1
TABLET ORAL
Qty: 90 TAB | Refills: 0 | Status: SHIPPED | OUTPATIENT
Start: 2019-07-29 | End: 2020-02-24 | Stop reason: SDUPTHER

## 2019-07-29 RX ORDER — METFORMIN HYDROCHLORIDE 1000 MG/1
TABLET ORAL
Qty: 180 TAB | Refills: 0 | Status: SHIPPED | OUTPATIENT
Start: 2019-07-29 | End: 2020-02-24 | Stop reason: SDUPTHER

## 2019-07-29 RX ORDER — ROSUVASTATIN CALCIUM 20 MG/1
TABLET, COATED ORAL
Qty: 90 TAB | Refills: 0 | Status: SHIPPED | OUTPATIENT
Start: 2019-07-29 | End: 2019-10-22 | Stop reason: SDUPTHER

## 2019-08-29 RX ORDER — SAXAGLIPTIN 5 MG/1
TABLET, FILM COATED ORAL
Qty: 30 TAB | Refills: 0 | Status: SHIPPED | OUTPATIENT
Start: 2019-08-29 | End: 2019-10-02 | Stop reason: SDUPTHER

## 2019-10-02 RX ORDER — SAXAGLIPTIN 5 MG/1
TABLET, FILM COATED ORAL
Qty: 30 TAB | Refills: 0 | Status: SHIPPED | OUTPATIENT
Start: 2019-10-02 | End: 2019-10-31 | Stop reason: SDUPTHER

## 2019-10-22 ENCOUNTER — OFFICE VISIT (OUTPATIENT)
Dept: FAMILY MEDICINE CLINIC | Age: 79
End: 2019-10-22

## 2019-10-22 ENCOUNTER — HOSPITAL ENCOUNTER (OUTPATIENT)
Dept: LAB | Age: 79
Discharge: HOME | End: 2019-10-22
Payer: MEDICARE

## 2019-10-22 VITALS
HEART RATE: 93 BPM | WEIGHT: 198.4 LBS | RESPIRATION RATE: 14 BRPM | TEMPERATURE: 98.7 F | DIASTOLIC BLOOD PRESSURE: 53 MMHG | OXYGEN SATURATION: 98 % | BODY MASS INDEX: 26.29 KG/M2 | SYSTOLIC BLOOD PRESSURE: 117 MMHG | HEIGHT: 73 IN

## 2019-10-22 DIAGNOSIS — E78.00 HYPERCHOLESTEROLEMIA: ICD-10-CM

## 2019-10-22 DIAGNOSIS — I73.9 CLAUDICATION (HCC): ICD-10-CM

## 2019-10-22 DIAGNOSIS — Z00.00 ENCOUNTER FOR MEDICARE ANNUAL WELLNESS EXAM: ICD-10-CM

## 2019-10-22 DIAGNOSIS — I10 ESSENTIAL HYPERTENSION: ICD-10-CM

## 2019-10-22 DIAGNOSIS — Z23 ENCOUNTER FOR IMMUNIZATION: Primary | ICD-10-CM

## 2019-10-22 DIAGNOSIS — E11.9 DIABETES MELLITUS WITHOUT COMPLICATION (HCC): ICD-10-CM

## 2019-10-22 DIAGNOSIS — I73.9 PERIPHERAL VASCULAR DISEASE (HCC): ICD-10-CM

## 2019-10-22 LAB — HBA1C MFR BLD HPLC: 6.5 %

## 2019-10-22 PROCEDURE — 36415 COLL VENOUS BLD VENIPUNCTURE: CPT

## 2019-10-22 PROCEDURE — 80061 LIPID PANEL: CPT

## 2019-10-22 PROCEDURE — 85025 COMPLETE CBC W/AUTO DIFF WBC: CPT

## 2019-10-22 PROCEDURE — 80053 COMPREHEN METABOLIC PANEL: CPT

## 2019-10-22 NOTE — PROGRESS NOTES
HISTORY OF PRESENT ILLNESS  Franca Resendez is a 78 y.o. male. HPI Pt. Comes in for blood pressure, cholesterol, and diabetes check. Has been having some pain in R foot. Also feels like something is crawling on arms and face. Has also been having pain and swelling in R knee. Gets some pain in calves when is out walking, doesn't bother him that much. Gets some pain in toes, helped by hanging legs over the side of his bed. Needs medicare wellness exam.   Not seeing any other doctors. Review of Systems   HENT: Positive for tinnitus. Negative for hearing loss. Neurological:        No falls, canes, walker. Independent in all adls. Psychiatric/Behavioral: Positive for depression (occ mild depression). No alcohol. Physical Exam   Constitutional: He appears well-developed and well-nourished. HENT:   Right Ear: External ear normal.   Left Ear: External ear normal.   Mouth/Throat: Oropharynx is clear and moist.   Neck: No thyromegaly present. Cardiovascular: Normal rate, regular rhythm and normal heart sounds. Absent distal pulses, bilateral femoral bruits. Pulmonary/Chest: Effort normal and breath sounds normal. No respiratory distress. He has no wheezes. Abdominal: Soft. Bowel sounds are normal. He exhibits no distension and no mass. There is no tenderness. There is no guarding. Musculoskeletal: Normal range of motion. He exhibits no edema. Lymphadenopathy:     He has no cervical adenopathy. Neurological:   Says that forgets things in the short term easily. Still drives. AMT 4 4/4   Nursing note and vitals reviewed.       ASSESSMENT and PLAN  Orders Placed This Encounter    Influenza Vaccine Inactivated (IIV) (FLUAD), Subunit, Adjuvanted, IM (77675)    CBC WITH AUTOMATED DIFF    METABOLIC PANEL, COMPREHENSIVE    LIPID PANEL    [facility]    AMB POC HEMOGLOBIN A1C    MN IMMUNIZ ADMIN,1 SINGLE/COMB VAC/TOXOID     Diagnoses and all orders for this visit: 1. Encounter for immunization  -     INFLUENZA VACCINE INACTIVATED (IIV), SUBUNIT, ADJUVANTED, IM  -     ME IMMUNIZ ADMIN,1 SINGLE/COMB VAC/TOXOID    2. Essential hypertension  -     CBC WITH AUTOMATED DIFF  -     METABOLIC PANEL, COMPREHENSIVE    3. Diabetes mellitus without complication (HCC)  -     AMB POC HEMOGLOBIN A1C    4. Hypercholesterolemia  -     LIPID PANEL    5. Peripheral vascular disease (HCC)  -     REFERRAL TO VASCULAR SURGERY    6. Claudication ([facility] [address]  -     REFERRAL TO VASCULAR SURGERY    7. Encounter for Medicare annual wellness exam      Follow-up and Dispositions    · Return in about 3 months (around 1/22/2020).

## 2019-10-22 NOTE — PROGRESS NOTES
Chief Complaint   Patient presents with    Foot Pain     RIGHT FOOT IS WORSE    Diabetes     1. Have you been to the ER, urgent care clinic since your last visit? Hospitalized since your last visit? No    2. Have you seen or consulted any other health care providers outside of the [address] [place] [address] since your last visit? Include any pap smears or colon screening. No     Health Maintenance Due   Topic Date Due    Shingrix Vaccine Age 49> (1 of 2) 03/06/1990    GLAUCOMA SCREENING Q2Y  09/29/2018    COLONOSCOPY  12/09/2018    FOOT EXAM Q1  01/12/2019    Pneumococcal 65+ years (2 of 2 - PPSV23) 01/12/2019    MICROALBUMIN Q1  07/20/2019    MEDICARE YEARLY EXAM  07/21/2019    Influenza Age 5 to Adult  08/01/2019     After obtaining consent, and per orders of Dr. Shelby Caldwell, injection of flu shot high dose (right deltoid)  given by [place] LPN. Patient instructed to remain in clinic for 20 minutes afterwards, and to report any adverse reaction to me immediately. Pt did not report any negative side effects.

## 2019-10-22 NOTE — PATIENT INSTRUCTIONS
Vaccine Information Statement    Influenza (Flu) Vaccine (Inactivated or Recombinant): What You Need to Know    Many Vaccine Information Statements are available in [place] and other languages. See www.immunize.org/vis  Hojas de información sobre vacunas están disponibles en español y en muchos otros idiomas. Visite www.immunize.org/vis    1. Why get vaccinated? Influenza vaccine can prevent influenza (flu). Flu is a contagious disease that spreads around the [facility] every year, usually between October and May. Anyone can get the flu, but it is more dangerous for some people. Infants and young children, people 72years of age and older, pregnant women, and people with certain health conditions or a weakened immune system are at greatest risk of flu complications. Pneumonia, bronchitis, sinus infections and ear infections are examples of flu-related complications. If you have a medical condition, such as heart disease, cancer or diabetes, flu can make it worse. Flu can cause fever and chills, sore throat, muscle aches, fatigue, cough, headache, and runny or stuffy nose. Some people may have vomiting and diarrhea, though this is more common in children than adults. Each year thousands of people in the [facility] die from flu, and many more are hospitalized. Flu vaccine prevents millions of illnesses and flu-related visits to the doctor each year. 2. Influenza vaccines     CDC recommends everyone 10months of age and older get vaccinated every flu season. Children 6 months through 6years of age may need 2 doses during a single flu season. Everyone else needs only 1 dose each flu season. It takes about 2 weeks for protection to develop after vaccination. There are many flu viruses, and they are always changing. Each year a new flu vaccine is made to protect against three or four viruses that are likely to cause disease in the upcoming flu season.  Even when the vaccine doesnt exactly match these viruses, it may still provide some protection. Influenza vaccine does not cause flu. Influenza vaccine may be given at the same time as other vaccines. 3. Talk with your health care provider    Tell your vaccine provider if the person getting the vaccine:   Has had an allergic reaction after a previous dose of influenza vaccine, or has any severe, life-threatening allergies.  Has ever had Guillain-Barré Syndrome (also called GBS). In some cases, your health care provider may decide to postpone influenza vaccination to a future visit. People with minor illnesses, such as a cold, may be vaccinated. People who are moderately or severely ill should usually wait until they recover before getting influenza vaccine. Your health care provider can give you more information. 4. Risks of a reaction     Soreness, redness, and swelling where shot is given, fever, muscle aches, and headache can happen after influenza vaccine.  There may be a very small increased risk of Guillain-Barré Syndrome (GBS) after inactivated influenza vaccine (the flu shot). [facility] children who get the flu shot along with pneumococcal vaccine (PCV13), and/or DTaP vaccine at the same time might be slightly more likely to have a seizure caused by fever. Tell your health care provider if a child who is getting flu vaccine has ever had a seizure. People sometimes faint after medical procedures, including vaccination. Tell your provider if you feel dizzy or have vision changes or ringing in the ears. As with any medicine, there is a very remote chance of a vaccine causing a severe allergic reaction, other serious injury, or death. 5. What if there is a serious problem? An allergic reaction could occur after the vaccinated person leaves the clinic.  If you see signs of a severe allergic reaction (hives, swelling of the face and throat, difficulty breathing, a fast heartbeat, dizziness, or weakness), call 9-1-1 and get the person to the nearest hospital.    For other signs that concern you, call your health care provider. Adverse reactions should be reported to the Vaccine Adverse Event Reporting System (VAERS). Your health care provider will usually file this report, or you can do it yourself. Visit the VAERS website at www.vaers. [facility].gov or call 0-869.817.9717. VAERS is only for reporting reactions, and VAERS staff do not give medical advice. 6. The National Vaccine Injury Compensation Program    The [facility] Vaccine Injury Compensation Program (VICP) is a federal program that was created to compensate people who may have been injured by certain vaccines. Visit the VICP website at www.[facility].gov/vaccinecompensation or call 0-676.600.6989 to learn about the program and about filing a claim. There is a time limit to file a claim for compensation. 7. How can I learn more?  Ask your health care provider.  Call your local or state health department.  Contact the Centers for Disease Control and Prevention (CDC):  - Call 3-139.173.4144 (1-800-CDC-INFO) or  - Visit CDCs influenza website at www.cdc.gov/flu    Vaccine Information Statement (Interim)  Inactivated Influenza Vaccine   8/15/2019  42 FAYE Figueroa [id number]   Department of Health and Human Services  Centers for Disease Control and Prevention    Office Use Only

## 2019-10-23 LAB
ALBUMIN SERPL-MCNC: 4.2 G/DL (ref 3.5–4.8)
ALBUMIN/GLOB SERPL: 1.4 {RATIO} (ref 1.2–2.2)
ALP SERPL-CCNC: 65 IU/L (ref 39–117)
ALT SERPL-CCNC: 15 IU/L (ref 0–44)
AST SERPL-CCNC: 25 IU/L (ref 0–40)
BASOPHILS # BLD AUTO: 0.1 X10E3/UL (ref 0–0.2)
BASOPHILS NFR BLD AUTO: 1 %
BILIRUB SERPL-MCNC: 0.3 MG/DL (ref 0–1.2)
BUN SERPL-MCNC: 18 MG/DL (ref 8–27)
BUN/CREAT SERPL: 17 (ref 10–24)
CALCIUM SERPL-MCNC: 10.1 MG/DL (ref 8.6–10.2)
CHLORIDE SERPL-SCNC: 94 MMOL/L (ref 96–106)
CHOLEST SERPL-MCNC: 132 MG/DL (ref 100–199)
CO2 SERPL-SCNC: 26 MMOL/L (ref 20–29)
CREAT SERPL-MCNC: 1.04 MG/DL (ref 0.76–1.27)
EOSINOPHIL # BLD AUTO: 0.2 X10E3/UL (ref 0–0.4)
EOSINOPHIL NFR BLD AUTO: 2 %
ERYTHROCYTE [DISTWIDTH] IN BLOOD BY AUTOMATED COUNT: 13.4 % (ref 12.3–15.4)
GLOBULIN SER CALC-MCNC: 3.1 G/DL (ref 1.5–4.5)
GLUCOSE SERPL-MCNC: 160 MG/DL (ref 65–99)
HCT VFR BLD AUTO: 37.6 % (ref 37.5–51)
HDLC SERPL-MCNC: 38 MG/DL
HGB BLD-MCNC: 12.2 G/DL (ref 13–17.7)
IMM GRANULOCYTES # BLD AUTO: 0 X10E3/UL (ref 0–0.1)
IMM GRANULOCYTES NFR BLD AUTO: 0 %
INTERPRETATION, 910389: NORMAL
LDLC SERPL CALC-MCNC: 74 MG/DL (ref 0–99)
LYMPHOCYTES # BLD AUTO: 2.7 X10E3/UL (ref 0.7–3.1)
LYMPHOCYTES NFR BLD AUTO: 30 %
MCH RBC QN AUTO: 26.8 PG (ref 26.6–33)
MCHC RBC AUTO-ENTMCNC: 32.4 G/DL (ref 31.5–35.7)
MCV RBC AUTO: 83 FL (ref 79–97)
MONOCYTES # BLD AUTO: 0.6 X10E3/UL (ref 0.1–0.9)
MONOCYTES NFR BLD AUTO: 7 %
NEUTROPHILS # BLD AUTO: 5.4 X10E3/UL (ref 1.4–7)
NEUTROPHILS NFR BLD AUTO: 60 %
PLATELET # BLD AUTO: 331 X10E3/UL (ref 150–450)
POTASSIUM SERPL-SCNC: 3.9 MMOL/L (ref 3.5–5.2)
PROT SERPL-MCNC: 7.3 G/DL (ref 6–8.5)
RBC # BLD AUTO: 4.56 X10E6/UL (ref 4.14–5.8)
SODIUM SERPL-SCNC: 138 MMOL/L (ref 134–144)
TRIGL SERPL-MCNC: 101 MG/DL (ref 0–149)
VLDLC SERPL CALC-MCNC: 20 MG/DL (ref 5–40)
WBC # BLD AUTO: 8.9 X10E3/UL (ref 3.4–10.8)

## 2019-10-31 RX ORDER — SAXAGLIPTIN 5 MG/1
TABLET, FILM COATED ORAL
Qty: 30 TAB | Refills: 0 | Status: SHIPPED | OUTPATIENT
Start: 2019-10-31 | End: 2019-12-03 | Stop reason: SDUPTHER

## 2019-12-03 RX ORDER — SAXAGLIPTIN 5 MG/1
TABLET, FILM COATED ORAL
Qty: 30 TAB | Refills: 0 | Status: SHIPPED | OUTPATIENT
Start: 2019-12-03 | End: 2020-01-03

## 2020-01-03 RX ORDER — SAXAGLIPTIN 5 MG/1
TABLET, FILM COATED ORAL
Qty: 30 TAB | Refills: 0 | Status: SHIPPED | OUTPATIENT
Start: 2020-01-03 | End: 2020-03-03 | Stop reason: SDUPTHER

## 2020-01-22 ENCOUNTER — OFFICE VISIT (OUTPATIENT)
Dept: FAMILY MEDICINE CLINIC | Age: 80
End: 2020-01-22

## 2020-01-22 VITALS
OXYGEN SATURATION: 99 % | SYSTOLIC BLOOD PRESSURE: 88 MMHG | BODY MASS INDEX: 27.3 KG/M2 | HEART RATE: 81 BPM | TEMPERATURE: 96.7 F | RESPIRATION RATE: 16 BRPM | HEIGHT: 73 IN | DIASTOLIC BLOOD PRESSURE: 62 MMHG | WEIGHT: 206 LBS

## 2020-01-22 DIAGNOSIS — M25.551 PAIN OF BOTH HIP JOINTS: Primary | ICD-10-CM

## 2020-01-22 DIAGNOSIS — M25.552 PAIN OF BOTH HIP JOINTS: Primary | ICD-10-CM

## 2020-01-22 RX ORDER — ACETAMINOPHEN AND CODEINE PHOSPHATE 300; 30 MG/1; MG/1
2 TABLET ORAL
Qty: 100 TAB | Refills: 4 | Status: SHIPPED | OUTPATIENT
Start: 2020-01-22 | End: 2020-02-21

## 2020-01-22 NOTE — PROGRESS NOTES
HISTORY OF PRESENT ILLNESS  Jesús Linton is a 78 y.o. male. HPI Pt. Comes in for blood pressure and diabetes and cholesterol check. No complaints of chest pain, shortness of breath, TIAs, claudication or edema. Has also been having bilateral lateral hip pains and finger pains. Tyl #3  Works fairly well. Some tingling in toes. No real claudication in les when walking. ROS    Physical Exam  Vitals signs and nursing note reviewed. Constitutional:       Appearance: He is well-developed. HENT:      Right Ear: External ear normal.      Left Ear: External ear normal.   Neck:      Thyroid: No thyromegaly. Cardiovascular:      Rate and Rhythm: Normal rate and regular rhythm. Heart sounds: Normal heart sounds. Comments: Bilateral femoral bruits. Absent distal pulses  Pulmonary:      Effort: Pulmonary effort is normal. No respiratory distress. Breath sounds: Normal breath sounds. No wheezing. Abdominal:      General: Bowel sounds are normal. There is no distension. Palpations: Abdomen is soft. There is no mass. Tenderness: There is no tenderness. There is no guarding. Musculoskeletal: Normal range of motion. Comments: Hips- full rom, minimal pain. Tenderness greater trochanters bilaterally   Lymphadenopathy:      Cervical: No cervical adenopathy. ASSESSMENT and PLAN  Orders Placed This Encounter    acetaminophen-codeine (TYLENOL #3) 300-30 mg per tablet     Diagnoses and all orders for this visit:    1. Pain of both hip joints  -     acetaminophen-codeine (TYLENOL #3) 300-30 mg per tablet; Take 2 Tabs by mouth every four (4) hours as needed for Pain for up to 30 days. Max Daily Amount: 12 Tabs. Follow-up and Dispositions    · Return in about 3 months (around 4/22/2020).

## 2020-02-24 RX ORDER — HYDROCHLOROTHIAZIDE 12.5 MG/1
TABLET ORAL
Qty: 90 TAB | Refills: 0 | Status: SHIPPED | OUTPATIENT
Start: 2020-02-24 | End: 2020-06-10 | Stop reason: SDUPTHER

## 2020-02-24 RX ORDER — ROSUVASTATIN CALCIUM 20 MG/1
TABLET, COATED ORAL
Qty: 90 TAB | Refills: 3 | Status: SHIPPED | OUTPATIENT
Start: 2020-02-24 | End: 2020-12-07 | Stop reason: SDUPTHER

## 2020-02-24 RX ORDER — METFORMIN HYDROCHLORIDE 1000 MG/1
TABLET ORAL
Qty: 180 TAB | Refills: 0 | Status: SHIPPED | OUTPATIENT
Start: 2020-02-24 | End: 2020-06-10 | Stop reason: SDUPTHER

## 2020-02-24 NOTE — TELEPHONE ENCOUNTER
Last visit: 1/22/20  Next visit: 4/23/20  Previous refill 7/29/19- Metformin & HCTZ(90 day    Supply)              1/18/19( Crestor 20mg)           Requested Prescriptions     Pending Prescriptions Disp Refills    metFORMIN (GLUCOPHAGE) 1,000 mg tablet 180 Tab 0     Sig: TAKE 1 TABLET BY MOUTH TWICE DAILY WITH MEALS    rosuvastatin (CRESTOR) 20 mg tablet 90 Tab 3     Sig: One daily for cholesterol    hydroCHLOROthiazide (HYDRODIURIL) 12.5 mg tablet 90 Tab 0     Sig: TAKE 1 TABLET BY MOUTH EVERY DAY FOR BLOOD PRESSURE

## 2020-02-27 RX ORDER — GLIMEPIRIDE 4 MG/1
TABLET ORAL
Qty: 90 TAB | Refills: 3 | Status: SHIPPED | OUTPATIENT
Start: 2020-02-27 | End: 2020-12-07 | Stop reason: SDUPTHER

## 2020-02-27 NOTE — TELEPHONE ENCOUNTER
Last visit:1/22/20  Next visit:4/23/20  Previous refill 1/18/19(90+3R)    Requested Prescriptions     Pending Prescriptions Disp Refills    glimepiride (AMARYL) 4 mg tablet 90 Tab 3     Sig: One daily for diabetes

## 2020-03-03 NOTE — TELEPHONE ENCOUNTER
Last visit:1/22/20  Next visit:4/23/20  Previous refill 1/3/20(30+0R)    Requested Prescriptions     Pending Prescriptions Disp Refills    sAXagliptin (ONGLYZA) 5 mg tab tablet 30 Tab 2     Sig: TAKE 1 TABLET BY MOUTH DAILY FOR DIABETES

## 2020-04-23 ENCOUNTER — VIRTUAL VISIT (OUTPATIENT)
Dept: FAMILY MEDICINE CLINIC | Age: 80
End: 2020-04-23

## 2020-04-23 DIAGNOSIS — M51.36 DEGENERATIVE DISC DISEASE, LUMBAR: Primary | ICD-10-CM

## 2020-04-23 RX ORDER — PIROXICAM 10 MG/1
10 CAPSULE ORAL DAILY
Qty: 30 CAP | Refills: 5 | Status: SHIPPED | OUTPATIENT
Start: 2020-04-23 | End: 2020-10-26 | Stop reason: SDUPTHER

## 2020-04-23 RX ORDER — ACETAMINOPHEN AND CODEINE PHOSPHATE 300; 30 MG/1; MG/1
TABLET ORAL
COMMUNITY
Start: 2020-04-21 | End: 2020-08-12 | Stop reason: SDUPTHER

## 2020-04-23 NOTE — PROGRESS NOTES
HISTORY OF PRESENT ILLNESS  Kayla Ventura is a [de-identified] y.o. male. HPI Pt has been having pain in both hips, Lworse than R. Pain is worse when stands and walks. Tyl #3 works fairly well, but not always. No hx stomach ulcers and renal function is normal. Pain radiates down both legs to ankles. No bowel or bladder problems. We took some xrays of his hips and low back last year, DJD was much worse in LS spines than hips. Patient was at home while conducting this encounter. CONSENT:  Heand/or @ [facility] healthcare decision maker is aware that this patient-initiated Telehealth encounter is a billable service,with coverage as determined by @ HIS @insurance carrier. @CAP @ is aware that [facility] and has provided verbal consent to precede. Yes    THIS VIRTUAL VISIT WAS CONDUCTED TELEPHONE ENCOUNTER. I AFFIRM THIS A PATIENT INITIATED EPISODE WITH AN ESTABLISHED PATIENT WHO HAS NOT HAD A RELATED APPOINTMENT WITHIN MY DEPARTMENT IN THE PAST SEVEN (7) DAYS OR SCHEDULED WITHIN THE NEXT 24 HOURS. NOTE: THIS ENCOUNTER IS NOT BILLABLE IF THIS CALL SERVES TO TRIAGE THE PATIENT INTO AN APPOINTMENT FOR THE RELEVANT CONCERN. TOTAL TIME: minutes: 5-10 minutes. ROS    Physical Exam    ASSESSMENT and PLAN  Orders Placed This Encounter    piroxicam (FELDENE) 10 mg capsule     Diagnoses and all orders for this visit:    1. Degenerative disc disease, lumbar    Other orders  -     piroxicam (FELDENE) 10 mg capsule; Take 1 Cap by mouth daily. For back and hip pain          Pt to take piroxicam 10 mg daily and continue tylenol #3 prn.

## 2020-04-23 NOTE — PROGRESS NOTES
Chief Complaint   Patient presents with    Follow Up Chronic Condition     Patient here for follow up.

## 2020-06-10 RX ORDER — METFORMIN HYDROCHLORIDE 1000 MG/1
TABLET ORAL
Qty: 180 TAB | Refills: 3 | Status: ON HOLD | OUTPATIENT
Start: 2020-06-10 | End: 2021-01-22 | Stop reason: SDUPTHER

## 2020-06-10 RX ORDER — HYDROCHLOROTHIAZIDE 12.5 MG/1
TABLET ORAL
Qty: 90 TAB | Refills: 3 | Status: SHIPPED | OUTPATIENT
Start: 2020-06-10 | End: 2020-12-07

## 2020-06-10 NOTE — TELEPHONE ENCOUNTER
Last OV:4/23/2020  Next Appt:none  Last Refill: 2/24/2020 (90+R0)    Requested Prescriptions     Pending Prescriptions Disp Refills    hydroCHLOROthiazide (HYDRODIURIL) 12.5 mg tablet 90 Tab 3     Sig: TAKE 1 TABLET BY MOUTH EVERY DAY FOR BLOOD PRESSURE    metFORMIN (GLUCOPHAGE) 1,000 mg tablet 180 Tab 3     Sig: TAKE 1 TABLET BY MOUTH TWICE DAILY WITH MEALS

## 2020-08-12 DIAGNOSIS — M25.552 PAIN OF BOTH HIP JOINTS: Primary | ICD-10-CM

## 2020-08-12 DIAGNOSIS — M25.551 PAIN OF BOTH HIP JOINTS: Primary | ICD-10-CM

## 2020-08-12 RX ORDER — ACETAMINOPHEN AND CODEINE PHOSPHATE 300; 30 MG/1; MG/1
2 TABLET ORAL
Qty: 100 TAB | Refills: 0 | OUTPATIENT
Start: 2020-08-12 | End: 2020-09-02 | Stop reason: SDUPTHER

## 2020-08-12 NOTE — TELEPHONE ENCOUNTER
Last visit:4/23/20  Next visit:not scheduled  Previous refill 4/21/20(no refills or quantity listed)    Please review  before prescribing new Rx. Requested Prescriptions     Pending Prescriptions Disp Refills    acetaminophen-codeine (TYLENOL #3) 300-30 mg per tablet 100 Tab 0     Sig: Take 2 Tabs by mouth every four (4) hours as needed for Pain for up to 9 days. Max Daily Amount: 12 Tabs.

## 2020-09-02 DIAGNOSIS — M25.551 PAIN OF BOTH HIP JOINTS: ICD-10-CM

## 2020-09-02 DIAGNOSIS — M25.552 PAIN OF BOTH HIP JOINTS: ICD-10-CM

## 2020-09-02 RX ORDER — ACETAMINOPHEN AND CODEINE PHOSPHATE 300; 30 MG/1; MG/1
2 TABLET ORAL
Qty: 100 TAB | Refills: 0 | Status: SHIPPED | OUTPATIENT
Start: 2020-09-02 | End: 2020-09-11

## 2020-09-02 NOTE — TELEPHONE ENCOUNTER
Please review  before prescribing new Rx    Last visit: 4/23/30(virtual)  Next visit:not scheduled  Previous refill 8/12/20(100+0R)    Requested Prescriptions     Pending Prescriptions Disp Refills    acetaminophen-codeine (TYLENOL #3) 300-30 mg per tablet 100 Tab 0     Sig: Take 2 Tabs by mouth every four (4) hours as needed for Pain for up to 9 days. Max Daily Amount: 12 Tabs.

## 2020-10-26 ENCOUNTER — OFFICE VISIT (OUTPATIENT)
Dept: FAMILY MEDICINE CLINIC | Age: 80
End: 2020-10-26
Payer: MEDICARE

## 2020-10-26 VITALS
HEIGHT: 73 IN | OXYGEN SATURATION: 98 % | WEIGHT: 218.6 LBS | HEART RATE: 89 BPM | BODY MASS INDEX: 28.97 KG/M2 | RESPIRATION RATE: 14 BRPM | SYSTOLIC BLOOD PRESSURE: 162 MMHG | DIASTOLIC BLOOD PRESSURE: 70 MMHG | TEMPERATURE: 96.6 F

## 2020-10-26 DIAGNOSIS — E78.00 HYPERCHOLESTEROLEMIA: ICD-10-CM

## 2020-10-26 DIAGNOSIS — R60.0 LOCALIZED EDEMA: ICD-10-CM

## 2020-10-26 DIAGNOSIS — I10 ESSENTIAL HYPERTENSION: ICD-10-CM

## 2020-10-26 DIAGNOSIS — G89.29 CHRONIC LEFT HIP PAIN: ICD-10-CM

## 2020-10-26 DIAGNOSIS — E11.9 CONTROLLED TYPE 2 DIABETES MELLITUS WITHOUT COMPLICATION, WITHOUT LONG-TERM CURRENT USE OF INSULIN (HCC): ICD-10-CM

## 2020-10-26 DIAGNOSIS — Z23 NEEDS FLU SHOT: Primary | ICD-10-CM

## 2020-10-26 DIAGNOSIS — M25.552 CHRONIC LEFT HIP PAIN: ICD-10-CM

## 2020-10-26 DIAGNOSIS — R06.09 DYSPNEA ON EXERTION: ICD-10-CM

## 2020-10-26 PROCEDURE — G8754 DIAS BP LESS 90: HCPCS | Performed by: FAMILY MEDICINE

## 2020-10-26 PROCEDURE — 1101F PT FALLS ASSESS-DOCD LE1/YR: CPT | Performed by: FAMILY MEDICINE

## 2020-10-26 PROCEDURE — 90694 VACC AIIV4 NO PRSRV 0.5ML IM: CPT

## 2020-10-26 PROCEDURE — G8427 DOCREV CUR MEDS BY ELIG CLIN: HCPCS | Performed by: FAMILY MEDICINE

## 2020-10-26 PROCEDURE — G8419 CALC BMI OUT NRM PARAM NOF/U: HCPCS | Performed by: FAMILY MEDICINE

## 2020-10-26 PROCEDURE — G8536 NO DOC ELDER MAL SCRN: HCPCS | Performed by: FAMILY MEDICINE

## 2020-10-26 PROCEDURE — G8510 SCR DEP NEG, NO PLAN REQD: HCPCS | Performed by: FAMILY MEDICINE

## 2020-10-26 PROCEDURE — G8753 SYS BP > OR = 140: HCPCS | Performed by: FAMILY MEDICINE

## 2020-10-26 PROCEDURE — G0463 HOSPITAL OUTPT CLINIC VISIT: HCPCS | Performed by: FAMILY MEDICINE

## 2020-10-26 PROCEDURE — 99214 OFFICE O/P EST MOD 30 MIN: CPT | Performed by: FAMILY MEDICINE

## 2020-10-26 RX ORDER — ACETAMINOPHEN AND CODEINE PHOSPHATE 300; 30 MG/1; MG/1
TABLET ORAL
COMMUNITY
Start: 2020-10-12 | End: 2020-10-26 | Stop reason: SDUPTHER

## 2020-10-26 RX ORDER — PIROXICAM 10 MG/1
10 CAPSULE ORAL DAILY
Qty: 30 CAP | Refills: 5 | Status: SHIPPED | OUTPATIENT
Start: 2020-10-26 | End: 2021-02-08

## 2020-10-26 RX ORDER — FUROSEMIDE 80 MG/1
TABLET ORAL
Qty: 60 TAB | Refills: 12 | Status: SHIPPED | OUTPATIENT
Start: 2020-10-26 | End: 2021-02-08

## 2020-10-26 RX ORDER — VALSARTAN 160 MG/1
TABLET ORAL
Qty: 90 TAB | Refills: 3 | Status: SHIPPED | OUTPATIENT
Start: 2020-10-26 | End: 2021-02-08

## 2020-10-26 RX ORDER — SILDENAFIL CITRATE 20 MG/1
TABLET ORAL
Qty: 40 TAB | Refills: 2 | Status: SHIPPED | OUTPATIENT
Start: 2020-10-26 | End: 2021-02-08

## 2020-10-26 RX ORDER — ACETAMINOPHEN AND CODEINE PHOSPHATE 300; 30 MG/1; MG/1
2 TABLET ORAL EVERY 12 HOURS
Qty: 120 TAB | Refills: 5 | Status: SHIPPED | OUTPATIENT
Start: 2020-10-26 | End: 2021-06-17

## 2020-10-26 NOTE — PROGRESS NOTES
HISTORY OF PRESENT ILLNESS  Jennifer Gill is a [de-identified] y.o. male. HPI Has been having swelling in legs for several months. Mild dyspnea if walks a ways, or even climbing stairs. Also has pain in L hip. Quit smoking 20 years ago. No real chest pains. Had a nl echo 2 years ago. Needs diabetes and blood pressure checked also. Past Medical History:   Diagnosis Date    Arthritis     CAD (coronary artery disease)     [facility]     Diabetic neuropathy ([facility]     Hypercholesterolemia     Hypertension 11/29/10    PVD (peripheral vascular disease) ([facility]     Shingles 2010    Thromboembolus ([facility]        Social History     Socioeconomic History    Marital status:      Spouse name: Not on file    Number of children: 3    Years of education: Not on file    Highest education level: Not on file   Tobacco Use    Smoking status: Former Smoker     Packs/day: 0.50     Years: 20.00     Pack years: 10.00     Types: Cigarettes     Last attempt to quit: 2000     Years since quittin.8    Smokeless tobacco: Never Used    Tobacco comment: 15 years ago   Substance and Sexual Activity    Alcohol use: Yes     Comment: occ.  Drug use: Not Currently    Sexual activity: Never   Social History Narrative    , 4 children. Retired from [employer] in  after 25 years       Current Outpatient Medications   Medication Sig Dispense Refill    acetaminophen-codeine (TYLENOL #3) 300-30 mg per tablet Take 2 Tabs by mouth every twelve (12) hours every twelve (12) hours for 30 days. Max Daily Amount: 4 Tabs. 120 Tab 5    hydroCHLOROthiazide (HYDRODIURIL) 12.5 mg tablet TAKE 1 TABLET BY MOUTH EVERY DAY FOR BLOOD PRESSURE 90 Tab 3    metFORMIN (GLUCOPHAGE) 1,000 mg tablet TAKE 1 TABLET BY MOUTH TWICE DAILY WITH MEALS 180 Tab 3    piroxicam (FELDENE) 10 mg capsule Take 1 Cap by mouth daily.  For back and hip pain 30 Cap 5    sAXagliptin (Onglyza) 5 mg tab tablet TAKE 1 TABLET BY MOUTH DAILY FOR DIABETES 30 Tab 2    glimepiride (AMARYL) 4 mg tablet One daily for diabetes 90 Tab 3    rosuvastatin (CRESTOR) 20 mg tablet One daily for cholesterol 90 Tab 3    furosemide (LASIX) 80 mg tablet 1-2 tabs po every day as needed for swelling in legs. 60 Tab 12    valsartan (DIOVAN) 160 mg tablet TAKE 1 TABLET BY MOUTH EVERY DAY for blood pressure 90 Tab 3    amitriptyline (ELAVIL) 10 mg tablet TAKE 1 TABLET BY MOUTH NIGHTLY TO PREVENT HAVING TO PASS WATER AT NIGHT AS DIRECTED 90 Tab 3    sildenafil, antihypertensive, (REVATIO) 20 mg tablet 2 tabs po prn, on an empty stomach, for erectile dysfucntion 40 Tab 2    aspirin delayed-release 81 mg tablet Take 1 Tab by mouth daily. To prevent heart attack and stroke 30 Tab 12         ROS    Physical Exam  Vitals signs and nursing note reviewed. Constitutional:       Appearance: He is well-developed. HENT:      Right Ear: External ear normal.      Left Ear: External ear normal.   Neck:      Thyroid: No thyromegaly. Cardiovascular:      Rate and Rhythm: Normal rate and regular rhythm. Heart sounds: Normal heart sounds. Pulmonary:      Effort: Pulmonary effort is normal. No respiratory distress. Breath sounds: Normal breath sounds. No wheezing. Abdominal:      General: Bowel sounds are normal. There is no distension. Palpations: Abdomen is soft. There is no mass. Tenderness: There is no abdominal tenderness. There is no guarding. Musculoskeletal: Normal range of motion. Comments: 2+ edemabilaterally   Lymphadenopathy:      Cervical: No cervical adenopathy.          ASSESSMENT and PLAN  Orders Placed This Encounter    XR CHEST PA LAT    Influenza Vaccine, QUAD, 65 Yrs +  IM  (Fluad 02234 )    METABOLIC PANEL, COMPREHENSIVE    LIPID PANEL    CBC WITH AUTOMATED DIFF    HEMOGLOBIN A1C WITH EAG    [facility]aldo [facility]    acetaminophen-codeine (TYLENOL #3) 300-30 mg per tablet    furosemide (LASIX) 80 mg tablet    valsartan (DIOVAN) 160 mg tablet    piroxicam (FELDENE) 10 mg capsule    sildenafiL, pulmonary hypertension, (REVATIO) 20 mg tablet     Diagnoses and all orders for this visit:    1. Needs flu shot  -     FLU (FLUAD QUAD INFLUENZA VACCINE,QUAD,ADJUVANTED)    2. Hypercholesterolemia  -     LIPID PANEL; Future    3. Controlled type 2 diabetes mellitus without complication, without long-term current use of insulin (HCC)  -     HEMOGLOBIN A1C WITH EAG; Future    4. Essential hypertension  -     METABOLIC PANEL, COMPREHENSIVE; Future  -     CBC WITH AUTOMATED DIFF; Future    5. Chronic left hip pain  -     acetaminophen-codeine (TYLENOL #3) 300-30 mg per tablet; Take 2 Tabs by mouth every twelve (12) hours every twelve (12) hours for 30 days. Max Daily Amount: 4 Tabs. 6. Localized edema  -     REFERRAL TO CARDIOLOGY    7. Dyspnea on exertion  -     REFERRAL TO CARDIOLOGY  -     XR CHEST PA LAT; Future    Other orders  -     furosemide (LASIX) 80 mg tablet; 1-2 tabs po every day as needed for swelling in legs. -     valsartan (DIOVAN) 160 mg tablet; TAKE 1 TABLET BY MOUTH EVERY DAY for blood pressure  -     piroxicam (FELDENE) 10 mg capsule; Take 1 Cap by mouth daily. For back and hip pain  -     sildenafiL, pulmonary hypertension, (REVATIO) 20 mg tablet; 2 tabs po prn, on an empty stomach, for erectile dysfucntion      Follow-up and Dispositions    · Return in about 6 weeks (around 12/7/2020).

## 2020-10-26 NOTE — PROGRESS NOTES
Chief Complaint   Patient presents with    Swelling    Hypertension       1. Have you been to the ER, urgent care clinic since your last visit? Hospitalized since your last visit? No    2. Have you seen or consulted any other health care providers outside of the [address] [place] [address] since your last visit? Include any pap smears or colon screening. No     Flu shot - Pt accepts   Verbal Order with Readback given by Esther Benitez MD for Influenza. Given in Right Deltoid without difficulty.       Health Maintenance Due   Topic Date Due    Shingrix Vaccine Age 49> (1 of 2) 03/06/1990    GLAUCOMA SCREENING Q2Y  09/29/2018    Foot Exam Q1  01/12/2019    MICROALBUMIN Q1  07/20/2019    Eye Exam Retinal or Dilated  01/12/2020    A1C test (Diabetic or Prediabetic)  04/22/2020    Flu Vaccine (1) 09/01/2020    Medicare Yearly Exam  10/22/2020    Lipid Screen  10/22/2020

## 2020-11-03 ENCOUNTER — HOSPITAL ENCOUNTER (OUTPATIENT)
Dept: LAB | Age: 80
Discharge: HOME | End: 2020-11-03
Payer: MEDICARE

## 2020-11-03 PROCEDURE — 36415 COLL VENOUS BLD VENIPUNCTURE: CPT

## 2020-11-03 PROCEDURE — 80061 LIPID PANEL: CPT

## 2020-11-03 PROCEDURE — 83036 HEMOGLOBIN GLYCOSYLATED A1C: CPT

## 2020-11-03 PROCEDURE — 85025 COMPLETE CBC W/AUTO DIFF WBC: CPT

## 2020-11-03 PROCEDURE — 80053 COMPREHEN METABOLIC PANEL: CPT

## 2020-11-04 LAB
ALBUMIN SERPL-MCNC: 4 G/DL (ref 3.7–4.7)
ALBUMIN/GLOB SERPL: 1.3 {RATIO} (ref 1.2–2.2)
ALP SERPL-CCNC: 78 IU/L (ref 39–117)
ALT SERPL-CCNC: 15 IU/L (ref 0–44)
AST SERPL-CCNC: 23 IU/L (ref 0–40)
BASOPHILS # BLD AUTO: 0.1 X10E3/UL (ref 0–0.2)
BASOPHILS NFR BLD AUTO: 1 %
BILIRUB SERPL-MCNC: 0.2 MG/DL (ref 0–1.2)
BUN SERPL-MCNC: 18 MG/DL (ref 8–27)
BUN/CREAT SERPL: 16 (ref 10–24)
CALCIUM SERPL-MCNC: 10 MG/DL (ref 8.6–10.2)
CHLORIDE SERPL-SCNC: 104 MMOL/L (ref 96–106)
CHOLEST SERPL-MCNC: 122 MG/DL (ref 100–199)
CO2 SERPL-SCNC: 27 MMOL/L (ref 20–29)
CREAT SERPL-MCNC: 1.11 MG/DL (ref 0.76–1.27)
EOSINOPHIL # BLD AUTO: 0.3 X10E3/UL (ref 0–0.4)
EOSINOPHIL NFR BLD AUTO: 3 %
ERYTHROCYTE [DISTWIDTH] IN BLOOD BY AUTOMATED COUNT: 12.7 % (ref 11.6–15.4)
EST. AVERAGE GLUCOSE BLD GHB EST-MCNC: 151 MG/DL
GLOBULIN SER CALC-MCNC: 3 G/DL (ref 1.5–4.5)
GLUCOSE SERPL-MCNC: 75 MG/DL (ref 65–99)
HBA1C MFR BLD: 6.9 % (ref 4.8–5.6)
HCT VFR BLD AUTO: 33.3 % (ref 37.5–51)
HDLC SERPL-MCNC: 35 MG/DL
HGB BLD-MCNC: 10.8 G/DL (ref 13–17.7)
IMM GRANULOCYTES # BLD AUTO: 0 X10E3/UL (ref 0–0.1)
IMM GRANULOCYTES NFR BLD AUTO: 0 %
INTERPRETATION, 910389: NORMAL
LDLC SERPL CALC-MCNC: 61 MG/DL (ref 0–99)
LYMPHOCYTES # BLD AUTO: 2.8 X10E3/UL (ref 0.7–3.1)
LYMPHOCYTES NFR BLD AUTO: 31 %
Lab: NORMAL
MCH RBC QN AUTO: 27.8 PG (ref 26.6–33)
MCHC RBC AUTO-ENTMCNC: 32.4 G/DL (ref 31.5–35.7)
MCV RBC AUTO: 86 FL (ref 79–97)
MONOCYTES # BLD AUTO: 0.8 X10E3/UL (ref 0.1–0.9)
MONOCYTES NFR BLD AUTO: 9 %
NEUTROPHILS # BLD AUTO: 5 X10E3/UL (ref 1.4–7)
NEUTROPHILS NFR BLD AUTO: 56 %
PLATELET # BLD AUTO: 230 X10E3/UL (ref 150–450)
POTASSIUM SERPL-SCNC: 4 MMOL/L (ref 3.5–5.2)
PROT SERPL-MCNC: 7 G/DL (ref 6–8.5)
RBC # BLD AUTO: 3.89 X10E6/UL (ref 4.14–5.8)
SODIUM SERPL-SCNC: 143 MMOL/L (ref 134–144)
TRIGL SERPL-MCNC: 148 MG/DL (ref 0–149)
VLDLC SERPL CALC-MCNC: 26 MG/DL (ref 5–40)
WBC # BLD AUTO: 8.9 X10E3/UL (ref 3.4–10.8)

## 2020-11-17 NOTE — PROGRESS NOTES
Prabhjot Hwang, [facility]    Subjective/HPI: Gabriella Tabares is a [de-identified] y.o. male is here to establish care. He has a PMHx of DM2, HTN, HLD, CVA and tobacco abuse. He was referred for evaluation of shortness of breath with exertion and leg swelling. He gets out of breath climbing stairs or walking a short distance. Previous history of tobacco abuse, but quit 20 years ago. He reports leg swelling which has been chronic. Normally improved with diuretics, but has been more persistent recently. Increased his lasix with some improvement in swelling. Has shortness of breath with some activity, but not always consistent. Does activity without assistance. He can mow his lawn, but does take breaks in between. PCP Provider  Stanley Jarrett MD    Past Medical History:   Diagnosis Date    Arthritis     Diabetes [facility]     Diabetic neuropathy ([facility]     Hypercholesterolemia     Hypertension 11/29/10    Shingles 2010    Thromboembolus [facility])         Past Surgical History:   Procedure Laterality Date    HX CARPAL TUNNEL RELEASE  left    HX HEART CATHETERIZATION  12/2005    Dr Castañeda Repress      left arm fracture    AK COLONOSCOPY FLX DX W/COLLJ SPEC WHEN PFRMD  11/12/2012             family history includes Cancer in his brother; Heart Disease in his brother; Stroke in his father.      Social History     Socioeconomic History    Marital status:      Spouse name: Not on file    Number of children: 3    Years of education: Not on file    Highest education level: Not on file   Occupational History    Not on file   Social Needs    Financial resource strain: Not on file    Food insecurity     Worry: Not on file     Inability: Not on file   [employer] needs     Medical: Not on file     Non-medical: Not on file   Tobacco Use    Smoking status: Former Smoker     Packs/day: 0.50     Years: 20.00     Pack years: 10.00     Types: Cigarettes     Last attempt to quit: 2000     Years since quittin.8    Smokeless tobacco: Never Used    Tobacco comment: 15 years ago   Substance and Sexual Activity    Alcohol use: Yes     Comment: occ.  Drug use: Not Currently    Sexual activity: Never   Lifestyle    Physical activity     Days per week: Not on file     Minutes per session: Not on file    Stress: Not on file   Relationships    Social connections     Talks on phone: Not on file     Gets together: Not on file     Attends Anabaptism service: Not on file     Active member of club or organization: Not on file     Attends meetings of clubs or organizations: Not on file     Relationship status: Not on file    Intimate partner violence     Fear of current or ex partner: Not on file     Emotionally abused: Not on file     Physically abused: Not on file     Forced sexual activity: Not on file   Other Topics Concern    Not on file   Social History Narrative    , 4 children. Retired from [employer] in  after 25 years       Allergies   Allergen Reactions    Lipitor [Atorvastatin] Diarrhea        Outpatient Encounter Medications as of 2020   Medication Sig Dispense Refill    acetaminophen-codeine (TYLENOL #3) 300-30 mg per tablet Take 2 Tabs by mouth every twelve (12) hours every twelve (12) hours for 30 days. Max Daily Amount: 4 Tabs. 120 Tab 5    furosemide (LASIX) 80 mg tablet 1-2 tabs po every day as needed for swelling in legs. 60 Tab 12    valsartan (DIOVAN) 160 mg tablet TAKE 1 TABLET BY MOUTH EVERY DAY for blood pressure 90 Tab 3    piroxicam (FELDENE) 10 mg capsule Take 1 Cap by mouth daily.  For back and hip pain 30 Cap 5    sildenafiL, pulmonary hypertension, (REVATIO) 20 mg tablet 2 tabs po prn, on an empty stomach, for erectile dysfucntion 40 Tab 2    hydroCHLOROthiazide (HYDRODIURIL) 12.5 mg tablet TAKE 1 TABLET BY MOUTH EVERY DAY FOR BLOOD PRESSURE 90 Tab 3    metFORMIN (GLUCOPHAGE) 1,000 mg tablet TAKE 1 TABLET BY MOUTH TWICE DAILY WITH MEALS 180 Tab 3    glimepiride (AMARYL) 4 mg tablet One daily for diabetes 90 Tab 3    rosuvastatin (CRESTOR) 20 mg tablet One daily for cholesterol 90 Tab 3    aspirin delayed-release 81 mg tablet Take 1 Tab by mouth daily. To prevent heart attack and stroke 30 Tab 12     No facility-administered encounter medications on file as of 11/18/2020. Review of Symptoms:    Review of Systems   Constitutional: Negative for chills, fever and weight loss. HENT: Negative for nosebleeds. Eyes: Negative for blurred vision and double vision. Respiratory: Negative for cough, shortness of breath and wheezing. Cardiovascular: Negative for chest pain, palpitations, orthopnea, leg swelling and PND. Gastrointestinal: Negative for abdominal pain, blood in stool, diarrhea, nausea and vomiting. Musculoskeletal: Negative for joint pain. Skin: Negative for rash. Neurological: Negative for dizziness, tingling and loss of consciousness. Endo/Heme/Allergies: Does not bruise/bleed easily. Physical Exam:      General: Well developed, in no acute distress, cooperative and alert  HEENT: No carotid bruits, no JVD, trach is midline. Neck Supple, PEERL, EOM intact. Heart:  reg rate and rhythm; normal S1/S2; no murmurs, no gallops or rubs. Respiratory: Clear bilaterally x 4, no wheezing or rales  Abdomen:   Soft, non-tender, no distention, no masses. + BS. Extremities:  Normal cap refill, no cyanosis, atraumatic. No edema. Neuro: A&Ox3, speech clear, gait stable. Skin: Skin color is normal. No rashes or lesions.  Non diaphoretic  Vascular: 2+ pulses symmetric in all extremities    Vitals:    11/18/20 1206   BP: 122/60   Pulse: 88   Resp: 18   SpO2: 98%   Weight: 210 lb 6.4 oz (95.4 kg)   Height: 6' 1\" (1.854 m)       ECG: sinus rhythm    Cardiology Labs:    Lab Results   Component Value Date/Time    Cholesterol, total 122 11/03/2020 02:27 PM    HDL Cholesterol 35 (L) 11/03/2020 02:27 PM VLDL, calculated 20 10/22/2019 04:45 PM    VLDL Cholesterol Toby 26 11/03/2020 02:27 PM    CHOL/HDL Ratio 3.4 03/07/2018 02:45 AM    LDL, calculated 74 10/22/2019 04:45 PM    LDL, calculated 75 01/18/2019 11:41 AM    LDL, calculated 66 07/20/2018 01:02 PM    LDL Chol Calc (NIH) 61 11/03/2020 02:27 PM       Lab Results   Component Value Date/Time    Hemoglobin A1c 6.9 (H) 11/03/2020 02:27 PM    Hemoglobin A1c (POC) 6.5 10/22/2019 04:39 PM       Lab Results   Component Value Date/Time    Sodium 143 11/03/2020 02:27 PM    Potassium 4.0 11/03/2020 02:27 PM    Chloride 104 11/03/2020 02:27 PM    CO2 27 11/03/2020 02:27 PM    Glucose 75 11/03/2020 02:27 PM    BUN 18 11/03/2020 02:27 PM    Creatinine 1.11 11/03/2020 02:27 PM    BUN/Creatinine ratio 16 11/03/2020 02:27 PM    GFR est AA 72 11/03/2020 02:27 PM    GFR est non-AA 62 11/03/2020 02:27 PM    Calcium 10.0 11/03/2020 02:27 PM    Anion gap 5 06/03/2019 05:52 PM    Bilirubin, total 0.2 11/03/2020 02:27 PM    ALT (SGPT) 15 11/03/2020 02:27 PM    Alk. phosphatase 78 11/03/2020 02:27 PM    Protein, total 7.0 11/03/2020 02:27 PM    Albumin 4.0 11/03/2020 02:27 PM    Globulin 4.3 (H) 06/03/2019 05:52 PM    A-G Ratio 1.3 11/03/2020 02:27 PM          Assessment:     Assessment:       ICD-10-CM ICD-9-CM    1. FRIAS (dyspnea on exertion)  R06.00 786.09 AMB POC EKG ROUTINE W/ 12 LEADS, INTER & REP      ECHO ADULT COMPLETE      NUCLEAR CARDIAC STRESS TEST   2. Leg swelling  M79.89 729.81 DUPLEX LOWER EXT VENOUS BILAT   3. Essential hypertension  I10 401.9 AMB POC EKG ROUTINE W/ 12 LEADS, INTER & REP      ECHO ADULT COMPLETE   4. Hypercholesterolemia  E78.00 272.0         Plan:     1.  FRIAS (dyspnea on exertion)  Shortness of breath symptoms with exertion with leg swelling  Will evaluate with echocardiogram  Echo done 3/2018 with preserved LVEF 55% with no significant valvular pathology  Multiple risk factors for CAD -- CVA, tobacco abuse, DM, HTN, HLD  Check lexiscan stress test  Previous lexiscans tress test in 9/2014 without evidence of ischemia    2. Leg swelling  Will evaluate with venous insufficiency    3. Essential hypertension  BP controlled. Continue anti-hypertensive therapy and low sodium diet    4. Hypercholesterolemia  LDL 61 in 11/2020  Continue statin therapy and low fat, low cholesterol diet  Lipids managed by PCP    F/u with Dr. Jim Menon after testing complete    Sherif Sofia, NP       [facility]    11/18/2020         Patient seen, examined by me personally. Plan discussed as detailed. Agree with note as outlined by  NP. I confirm findings in history and physical exam. No additional findings noted. Agree with plan as outlined above.      Paul Rodriguez MD

## 2020-11-18 ENCOUNTER — OFFICE VISIT (OUTPATIENT)
Dept: CARDIOLOGY CLINIC | Age: 80
End: 2020-11-18
Payer: MEDICARE

## 2020-11-18 VITALS
HEIGHT: 73 IN | HEART RATE: 88 BPM | BODY MASS INDEX: 27.89 KG/M2 | RESPIRATION RATE: 18 BRPM | OXYGEN SATURATION: 98 % | DIASTOLIC BLOOD PRESSURE: 60 MMHG | WEIGHT: 210.4 LBS | SYSTOLIC BLOOD PRESSURE: 122 MMHG

## 2020-11-18 DIAGNOSIS — I10 ESSENTIAL HYPERTENSION: ICD-10-CM

## 2020-11-18 DIAGNOSIS — M79.89 LEG SWELLING: ICD-10-CM

## 2020-11-18 DIAGNOSIS — R06.09 DOE (DYSPNEA ON EXERTION): Primary | ICD-10-CM

## 2020-11-18 DIAGNOSIS — E78.00 HYPERCHOLESTEROLEMIA: ICD-10-CM

## 2020-11-18 DIAGNOSIS — R06.09 DOE (DYSPNEA ON EXERTION): ICD-10-CM

## 2020-11-18 PROCEDURE — G8752 SYS BP LESS 140: HCPCS | Performed by: INTERNAL MEDICINE

## 2020-11-18 PROCEDURE — G8510 SCR DEP NEG, NO PLAN REQD: HCPCS | Performed by: INTERNAL MEDICINE

## 2020-11-18 PROCEDURE — 1101F PT FALLS ASSESS-DOCD LE1/YR: CPT | Performed by: INTERNAL MEDICINE

## 2020-11-18 PROCEDURE — 99204 OFFICE O/P NEW MOD 45 MIN: CPT | Performed by: INTERNAL MEDICINE

## 2020-11-18 PROCEDURE — G8536 NO DOC ELDER MAL SCRN: HCPCS | Performed by: INTERNAL MEDICINE

## 2020-11-18 PROCEDURE — 93005 ELECTROCARDIOGRAM TRACING: CPT | Performed by: INTERNAL MEDICINE

## 2020-11-18 PROCEDURE — G0463 HOSPITAL OUTPT CLINIC VISIT: HCPCS | Performed by: INTERNAL MEDICINE

## 2020-11-18 PROCEDURE — G8754 DIAS BP LESS 90: HCPCS | Performed by: INTERNAL MEDICINE

## 2020-11-18 PROCEDURE — G8419 CALC BMI OUT NRM PARAM NOF/U: HCPCS | Performed by: INTERNAL MEDICINE

## 2020-11-18 PROCEDURE — G8427 DOCREV CUR MEDS BY ELIG CLIN: HCPCS | Performed by: INTERNAL MEDICINE

## 2020-11-18 PROCEDURE — 93010 ELECTROCARDIOGRAM REPORT: CPT | Performed by: INTERNAL MEDICINE

## 2020-11-18 NOTE — PROGRESS NOTES
Chief Complaint   Patient presents with    New Patient     Referred by Dr Natty Jones for edema in lower legs and feet     Shortness of Breath     when going up stairs      1. Have you been to the ER, urgent care clinic since your last visit? Hospitalized since your last visit? No     2. Have you seen or consulted any other health care providers outside of the [address] [place] [address] since your last visit? Include any pap smears or colon screening.   No

## 2020-11-18 NOTE — LETTER
11/18/20 Patient: Isabel Thompson YOB: 1940 Date of Visit: 11/18/2020 Aileen Lawton MD 
[address] [place] [id number] VIA In Basket Dear Aileen Lawton MD, Thank you for referring Mr. Isabel Thompson to [address] [place] [address] for evaluation. My notes for this consultation are attached. If you have questions, please do not hesitate to call me. I look forward to following your patient along with you. Sincerely, Reta Nails MD

## 2020-12-02 ENCOUNTER — ANCILLARY PROCEDURE (OUTPATIENT)
Dept: CARDIOLOGY CLINIC | Age: 80
End: 2020-12-02
Payer: MEDICARE

## 2020-12-02 VITALS
SYSTOLIC BLOOD PRESSURE: 110 MMHG | BODY MASS INDEX: 27.83 KG/M2 | WEIGHT: 210 LBS | HEIGHT: 73 IN | DIASTOLIC BLOOD PRESSURE: 75 MMHG

## 2020-12-02 PROCEDURE — 93016 CV STRESS TEST SUPVJ ONLY: CPT | Performed by: INTERNAL MEDICINE

## 2020-12-02 PROCEDURE — 78452 HT MUSCLE IMAGE SPECT MULT: CPT | Performed by: INTERNAL MEDICINE

## 2020-12-02 PROCEDURE — 93018 CV STRESS TEST I&R ONLY: CPT | Performed by: INTERNAL MEDICINE

## 2020-12-02 PROCEDURE — A9502 TC99M TETROFOSMIN: HCPCS | Performed by: INTERNAL MEDICINE

## 2020-12-02 RX ADMIN — REGADENOSON 0.4 MG: 0.08 INJECTION, SOLUTION INTRAVENOUS at 10:28

## 2020-12-02 RX ADMIN — TETROFOSMIN 34 MILLICURIE: 1.38 INJECTION, POWDER, LYOPHILIZED, FOR SOLUTION INTRAVENOUS at 10:28

## 2020-12-03 LAB
STRESS BASELINE DIAS BP: 75 MMHG
STRESS BASELINE HR: 62 BPM
STRESS BASELINE SYS BP: 110 MMHG
STRESS PEAK DIAS BP: 75 MMHG
STRESS PEAK SYS BP: 110 MMHG
STRESS PERCENT HR ACHIEVED: 61 %
STRESS POST PEAK HR: 85 BPM
STRESS RATE PRESSURE PRODUCT: 9350 BPM*MMHG
STRESS ST DEPRESSION: 0 MM
STRESS ST ELEVATION: 0 MM
STRESS TARGET HR: 140 BPM

## 2020-12-04 NOTE — PROGRESS NOTES
Please contact pt and inform him there are changes on his stress test which may indicate blood flow issues to his heart. Dr William Zarco would like to see him in the office to discuss.

## 2020-12-05 ENCOUNTER — TELEPHONE (OUTPATIENT)
Dept: CARDIOLOGY CLINIC | Age: 80
End: 2020-12-05

## 2020-12-05 NOTE — TELEPHONE ENCOUNTER
----- Message from Tom Ramírez NP sent at 12/4/2020 10:39 AM EST -----  Please contact pt and inform him there are changes on his stress test which may indicate blood flow issues to his heart. Dr Nahomi Snell would like to see him in the office to discuss.

## 2020-12-07 ENCOUNTER — OFFICE VISIT (OUTPATIENT)
Dept: FAMILY MEDICINE CLINIC | Age: 80
End: 2020-12-07
Payer: MEDICARE

## 2020-12-07 VITALS
BODY MASS INDEX: 28.94 KG/M2 | RESPIRATION RATE: 18 BRPM | SYSTOLIC BLOOD PRESSURE: 187 MMHG | HEART RATE: 82 BPM | DIASTOLIC BLOOD PRESSURE: 85 MMHG | WEIGHT: 218.4 LBS | TEMPERATURE: 97.3 F | HEIGHT: 73 IN | OXYGEN SATURATION: 94 %

## 2020-12-07 DIAGNOSIS — R06.09 DOE (DYSPNEA ON EXERTION): ICD-10-CM

## 2020-12-07 DIAGNOSIS — R60.0 LOCALIZED EDEMA: ICD-10-CM

## 2020-12-07 DIAGNOSIS — I10 ESSENTIAL HYPERTENSION: Primary | ICD-10-CM

## 2020-12-07 PROCEDURE — G8753 SYS BP > OR = 140: HCPCS | Performed by: FAMILY MEDICINE

## 2020-12-07 PROCEDURE — G8427 DOCREV CUR MEDS BY ELIG CLIN: HCPCS | Performed by: FAMILY MEDICINE

## 2020-12-07 PROCEDURE — G8536 NO DOC ELDER MAL SCRN: HCPCS | Performed by: FAMILY MEDICINE

## 2020-12-07 PROCEDURE — G0463 HOSPITAL OUTPT CLINIC VISIT: HCPCS | Performed by: FAMILY MEDICINE

## 2020-12-07 PROCEDURE — G8419 CALC BMI OUT NRM PARAM NOF/U: HCPCS | Performed by: FAMILY MEDICINE

## 2020-12-07 PROCEDURE — 1101F PT FALLS ASSESS-DOCD LE1/YR: CPT | Performed by: FAMILY MEDICINE

## 2020-12-07 PROCEDURE — G8510 SCR DEP NEG, NO PLAN REQD: HCPCS | Performed by: FAMILY MEDICINE

## 2020-12-07 PROCEDURE — 99213 OFFICE O/P EST LOW 20 MIN: CPT | Performed by: FAMILY MEDICINE

## 2020-12-07 PROCEDURE — G8754 DIAS BP LESS 90: HCPCS | Performed by: FAMILY MEDICINE

## 2020-12-07 RX ORDER — HYDROCHLOROTHIAZIDE 12.5 MG/1
TABLET ORAL
Qty: 90 TAB | Refills: 3 | Status: SHIPPED | OUTPATIENT
Start: 2020-12-07 | End: 2021-02-08

## 2020-12-07 RX ORDER — ROSUVASTATIN CALCIUM 20 MG/1
TABLET, COATED ORAL
Qty: 90 TAB | Refills: 3 | Status: SHIPPED | OUTPATIENT
Start: 2020-12-07 | End: 2021-03-10 | Stop reason: SDUPTHER

## 2020-12-07 RX ORDER — SPIRONOLACTONE 25 MG/1
25 TABLET ORAL DAILY
Qty: 90 TAB | Refills: 3 | Status: SHIPPED | OUTPATIENT
Start: 2020-12-07 | End: 2021-02-08

## 2020-12-07 RX ORDER — GLIMEPIRIDE 4 MG/1
TABLET ORAL
Qty: 90 TAB | Refills: 3 | Status: SHIPPED | OUTPATIENT
Start: 2020-12-07 | End: 2021-03-10 | Stop reason: SDUPTHER

## 2020-12-07 NOTE — PROGRESS NOTES
HISTORY OF PRESENT ILLNESS  Anne Granados is a [de-identified] y.o. male. HPI In for followup of edema and dyspnea. Has been to cardiology. Says that breathing is doing better. Had a stress test last week, with an abnormal perfusion area. Says that hasnt heard back from cardiology yet. Has been voiding frequently on lasix. ROS    Physical Exam  Vitals signs and nursing note reviewed. Constitutional:       Appearance: He is well-developed. HENT:      Right Ear: External ear normal.      Left Ear: External ear normal.   Neck:      Thyroid: No thyromegaly. Cardiovascular:      Rate and Rhythm: Normal rate and regular rhythm. Heart sounds: Normal heart sounds. Pulmonary:      Effort: Pulmonary effort is normal. No respiratory distress. Breath sounds: Normal breath sounds. No wheezing. Abdominal:      General: Bowel sounds are normal. There is no distension. Palpations: Abdomen is soft. There is no mass. Tenderness: There is no abdominal tenderness. There is no guarding. Musculoskeletal: Normal range of motion. Comments: 2+ edema bilaterally   Lymphadenopathy:      Cervical: No cervical adenopathy. ASSESSMENT and PLAN  Orders Placed This Encounter    rosuvastatin (CRESTOR) 20 mg tablet    glimepiride (AMARYL) 4 mg tablet    hydroCHLOROthiazide (HYDRODIURIL) 12.5 mg tablet    spironolactone (ALDACTONE) 25 mg tablet     Diagnoses and all orders for this visit:    1. Essential hypertension    2. FRIAS (dyspnea on exertion)    3. Localized edema    Other orders  -     rosuvastatin (CRESTOR) 20 mg tablet; One daily for cholesterol  -     glimepiride (AMARYL) 4 mg tablet; One daily for diabetes  -     hydroCHLOROthiazide (HYDRODIURIL) 12.5 mg tablet; TAKE 1 TABLET BY MOUTH EVERY DAY FOR BLOOD PRESSURE  -     spironolactone (ALDACTONE) 25 mg tablet; Take 1 Tab by mouth daily. For blood pressure      Follow-up and Dispositions    · Return in about 3 months (around 3/7/2021).

## 2020-12-07 NOTE — PROGRESS NOTES
Chief Complaint   Patient presents with    Diabetes     F/U on diabetes.  Hypertension     F/U on BP.  Cholesterol Problem     F/U on cholesterol. 1. Have you been to the ER, urgent care clinic since your last visit? Hospitalized since your last visit? No    2. Have you seen or consulted any other health care providers outside of the [address] [place] [address] since your last visit? Include any pap smears or colon screening.  No

## 2020-12-19 NOTE — TELEPHONE ENCOUNTER
12-, left 2nd message to call us.      ----- Message from Mark Perez NP sent at 12/4/2020 10:39 AM EST -----  Please contact pt and inform him there are changes on his stress test which may indicate blood flow issues to his heart. Dr Mindi Hanks would like to see him in the office to discuss.

## 2020-12-23 ENCOUNTER — OFFICE VISIT (OUTPATIENT)
Dept: URGENT CARE | Age: 80
End: 2020-12-23
Payer: MEDICARE

## 2020-12-23 VITALS — HEART RATE: 91 BPM | RESPIRATION RATE: 18 BRPM | TEMPERATURE: 98.6 F | OXYGEN SATURATION: 100 %

## 2020-12-23 DIAGNOSIS — Z20.822 EXPOSURE TO COVID-19 VIRUS: Primary | ICD-10-CM

## 2020-12-23 PROCEDURE — G8756 NO BP MEASURE DOC: HCPCS | Performed by: FAMILY MEDICINE

## 2020-12-23 PROCEDURE — G8419 CALC BMI OUT NRM PARAM NOF/U: HCPCS | Performed by: FAMILY MEDICINE

## 2020-12-23 PROCEDURE — 99203 OFFICE O/P NEW LOW 30 MIN: CPT | Performed by: FAMILY MEDICINE

## 2020-12-23 PROCEDURE — G8432 DEP SCR NOT DOC, RNG: HCPCS | Performed by: FAMILY MEDICINE

## 2020-12-23 PROCEDURE — G8536 NO DOC ELDER MAL SCRN: HCPCS | Performed by: FAMILY MEDICINE

## 2020-12-23 PROCEDURE — 1101F PT FALLS ASSESS-DOCD LE1/YR: CPT | Performed by: FAMILY MEDICINE

## 2020-12-23 PROCEDURE — G8427 DOCREV CUR MEDS BY ELIG CLIN: HCPCS | Performed by: FAMILY MEDICINE

## 2020-12-23 NOTE — LETTER
December 23, 2020 Frances Kam [address] [address] [address]: Thank you for requesting access to [employer]. Please follow the instructions below to securely access and download your online medical record. [employer] allows you to send messages to your doctor, view your test results, renew your prescriptions, schedule appointments, and more. How Do I Sign Up? 1. In your internet browser, go to [email or web address]. 2. Click on the First Time User? Click Here link in the Sign In box. You will see the New Member Sign Up page. 3. Enter your [employer] Access Code exactly as it appears below. You will not need to use this code after youve completed the sign-up process. If you do not sign up before the expiration date, you must request a new code. [employer] Access Code: [email or web address] Expires: 2/6/2021  6:47 PM  
 
4. Enter the last four digits of your Social Security Number (xxxx) and Date of Birth (mm/dd/yyyy) as indicated and click Submit. You will be taken to the next sign-up page. 5. Create a [employer] ID. This will be your [employer] login ID and cannot be changed, so think of one that is secure and easy to remember. 6. Create a [employer] password. You can change your password at any time. 7. Enter your Password Reset Question and Answer. This can be used at a later time if you forget your password. 8. Enter your e-mail address. You will receive e-mail notification when new information is available in [address] 9. Click Sign Up. You can now view and download portions of your medical record. 10. Click the Download Summary menu link to download a portable copy of your medical information. Additional Information If you have questions, please visit the Frequently Asked Questions section of the [employer] website at [email or web address] Remember, [employer] is NOT to be used for urgent needs. For medical emergencies, dial 911. Now available from your iPhone and Android! Sincerely, [employer]

## 2020-12-24 NOTE — PROGRESS NOTES
This patient was seen at [address] [place] [address] Urgent Care while in their vehicle due to COVID-19 pandemic with PPE and focused examination in order to decrease community viral transmission. The patient/guardian gave verbal consent to treat. Ami Cunningham is a [de-identified] y.o. male who presents for COVID-19 testing. Was exposed to COVID-19 by daughter, last contact last week. Denies cough, fever, SOB. Admits to intermittent dizziness x 2 months. Reports PCP aware. No CP, HA. Eating/drinking well. The history is provided by the patient. Past Medical History:   Diagnosis Date    Arthritis     Diabetes ([facility]     Diabetic neuropathy ([facility]     Hypercholesterolemia     Hypertension 11/29/10    Shingles 2010    Thromboembolus [facility])         Past Surgical History:   Procedure Laterality Date    HX CARPAL TUNNEL RELEASE  left    HX HEART CATHETERIZATION  2005    Dr [facility]      left arm fracture    ME COLONOSCOPY FLX DX W/COLLJ SPEC WHEN PFRMD  2012              Family History   Problem Relation Age of Onset    Stroke Father     Heart Disease Brother     Cancer Brother         Social History     Socioeconomic History    Marital status:      Spouse name: Not on file    Number of children: 3    Years of education: Not on file    Highest education level: Not on file   Occupational History    Not on file   Social Needs    Financial resource strain: Not on file    Food insecurity     Worry: Not on file     Inability: Not on file   [employer] needs     Medical: Not on file     Non-medical: Not on file   Tobacco Use    Smoking status: Former Smoker     Packs/day: 0.50     Years: 20.00     Pack years: 10.00     Types: Cigarettes     Quit date: 2000     Years since quittin.9    Smokeless tobacco: Never Used    Tobacco comment: 15 years ago   Substance and Sexual Activity    Alcohol use: Yes     Comment: occ.     Drug use: Not Currently    Sexual activity: Never   Lifestyle    Physical activity     Days per week: Not on file     Minutes per session: Not on file    Stress: Not on file   Relationships    Social connections     Talks on phone: Not on file     Gets together: Not on file     Attends Confucianism service: Not on file     Active member of club or organization: Not on file     Attends meetings of clubs or organizations: Not on file     Relationship status: Not on file    Intimate partner violence     Fear of current or ex partner: Not on file     Emotionally abused: Not on file     Physically abused: Not on file     Forced sexual activity: Not on file   Other Topics Concern    Not on file   Social History Narrative    , 4 children. Retired from [employer] in 2001 after 25 years                ALLERGIES: Lipitor [atorvastatin]    Review of Systems   Constitutional: Negative for activity change, appetite change, chills and fever. HENT: Negative for congestion, rhinorrhea and sore throat. Respiratory: Negative for cough, shortness of breath and wheezing. Cardiovascular: Negative for chest pain. Gastrointestinal: Negative for abdominal pain, diarrhea, nausea and vomiting. Musculoskeletal: Negative for myalgias. Neurological: Positive for dizziness. Negative for headaches. Vitals:    12/23/20 1909   Pulse: 91   Resp: 18   Temp: 98.6 °F (37 °C)   SpO2: 100%       Physical Exam  Vitals signs and nursing note reviewed. Constitutional:       General: He is not in acute distress. Appearance: He is well-developed. He is not diaphoretic. Cardiovascular:      Rate and Rhythm: Normal rate and regular rhythm. Pulmonary:      Effort: Pulmonary effort is normal. No respiratory distress. Breath sounds: Normal breath sounds. No stridor. No wheezing, rhonchi or rales. Neurological:      Mental Status: He is alert. Psychiatric:         Behavior: Behavior normal.         Thought Content:  Thought content normal. Judgment: Judgment normal.         MDM    ICD-10-CM ICD-9-CM   1. Exposure to COVID-19 virus  Z20.828 V01.79       Orders Placed This Encounter    NOVEL CORONAVIRUS (COVID-19)     Scheduling Instructions:      1) Due to current limited availability of the COVID-19 PCR test, tests will be prioritized and may not be completed.              2) Order only if the test result will change clinical management or necessary for a return to mission-critical employment decision.              3) Print and instruct patient to adhere to CDC home isolation program. (Link Above)              4) Set up or refer patient for a monitoring program.              5) Have patient sign up for and leverage [employer] (if not previously done). Order Specific Question:   Is this test for diagnosis or screening? Answer:   Screening     Order Specific Question:   Symptomatic for COVID-19 as defined by CDC? Answer:   No     Order Specific Question:   Hospitalized for COVID-19? Answer:   No     Order Specific Question:   Admitted to ICU for COVID-19? Answer:   No     Order Specific Question:   Employed in healthcare setting? Answer:   No     Order Specific Question:   Resident in a congregate (group) care setting? Answer:   No     Order Specific Question:   Previously tested for COVID-19? Answer:   No        Quarantine  Deep breathing exercises, ambulation  Follow up with PCP regarding dizziness, will need further evaluation    If signs and symptoms become worse the pt is to go to the ER.          Procedures

## 2020-12-26 LAB — SARS-COV-2, NAA: NOT DETECTED

## 2021-01-08 ENCOUNTER — TELEPHONE (OUTPATIENT)
Dept: CARDIOLOGY CLINIC | Age: 81
End: 2021-01-08

## 2021-01-08 NOTE — TELEPHONE ENCOUNTER
Left 3rd message to call me, advised regarding abnormal test.  Pt has never responded. Mailed postcard to pt.

## 2021-01-13 ENCOUNTER — TELEPHONE (OUTPATIENT)
Dept: CARDIOLOGY CLINIC | Age: 81
End: 2021-01-13

## 2021-01-15 ENCOUNTER — HOSPITAL ENCOUNTER (OUTPATIENT)
Dept: LAB | Age: 81
Discharge: HOME | End: 2021-01-15
Payer: MEDICARE

## 2021-01-15 ENCOUNTER — OFFICE VISIT (OUTPATIENT)
Dept: CARDIOLOGY CLINIC | Age: 81
End: 2021-01-15
Payer: MEDICARE

## 2021-01-15 VITALS
WEIGHT: 214 LBS | HEIGHT: 73 IN | RESPIRATION RATE: 16 BRPM | BODY MASS INDEX: 28.36 KG/M2 | OXYGEN SATURATION: 97 % | DIASTOLIC BLOOD PRESSURE: 74 MMHG | HEART RATE: 82 BPM | SYSTOLIC BLOOD PRESSURE: 114 MMHG

## 2021-01-15 DIAGNOSIS — E78.00 HYPERCHOLESTEROLEMIA: ICD-10-CM

## 2021-01-15 DIAGNOSIS — I10 ESSENTIAL HYPERTENSION: ICD-10-CM

## 2021-01-15 DIAGNOSIS — I25.10 ASHD (ARTERIOSCLEROTIC HEART DISEASE): Primary | ICD-10-CM

## 2021-01-15 PROCEDURE — 85027 COMPLETE CBC AUTOMATED: CPT

## 2021-01-15 PROCEDURE — 36415 COLL VENOUS BLD VENIPUNCTURE: CPT

## 2021-01-15 PROCEDURE — 99214 OFFICE O/P EST MOD 30 MIN: CPT | Performed by: INTERNAL MEDICINE

## 2021-01-15 PROCEDURE — G0463 HOSPITAL OUTPT CLINIC VISIT: HCPCS | Performed by: INTERNAL MEDICINE

## 2021-01-15 PROCEDURE — G8754 DIAS BP LESS 90: HCPCS | Performed by: INTERNAL MEDICINE

## 2021-01-15 PROCEDURE — G8536 NO DOC ELDER MAL SCRN: HCPCS | Performed by: INTERNAL MEDICINE

## 2021-01-15 PROCEDURE — 80048 BASIC METABOLIC PNL TOTAL CA: CPT

## 2021-01-15 PROCEDURE — G8427 DOCREV CUR MEDS BY ELIG CLIN: HCPCS | Performed by: INTERNAL MEDICINE

## 2021-01-15 PROCEDURE — 1101F PT FALLS ASSESS-DOCD LE1/YR: CPT | Performed by: INTERNAL MEDICINE

## 2021-01-15 PROCEDURE — G8510 SCR DEP NEG, NO PLAN REQD: HCPCS | Performed by: INTERNAL MEDICINE

## 2021-01-15 PROCEDURE — G8752 SYS BP LESS 140: HCPCS | Performed by: INTERNAL MEDICINE

## 2021-01-15 PROCEDURE — G8419 CALC BMI OUT NRM PARAM NOF/U: HCPCS | Performed by: INTERNAL MEDICINE

## 2021-01-15 PROCEDURE — 85610 PROTHROMBIN TIME: CPT

## 2021-01-15 RX ORDER — METOPROLOL SUCCINATE 25 MG/1
25 TABLET, EXTENDED RELEASE ORAL DAILY
Qty: 30 TAB | Refills: 3 | Status: SHIPPED | OUTPATIENT
Start: 2021-01-15 | End: 2021-02-08

## 2021-01-15 NOTE — PROGRESS NOTES
Subjective/HPI: Nickolas August is a [de-identified] y.o. male is here for routine f/u. He has a PMHx of DM, HTN, hyperlipidemia. Continues to be SOB with minimal exertion. Stress test abnormal suggestive of ischemia. PCP Provider  Naty Finch MD    Past Medical History:   Diagnosis Date    Arthritis     [facility] [facility]     Diabetic neuropathy ([facility]     Hypercholesterolemia     Hypertension 11/29/10    Shingles 2010    Thromboembolus ([facility]         Allergies   Allergen Reactions    Lipitor [Atorvastatin] Diarrhea        Outpatient Encounter Medications as of 1/15/2021   Medication Sig Dispense Refill    rosuvastatin (CRESTOR) 20 mg tablet One daily for cholesterol 90 Tab 3    glimepiride (AMARYL) 4 mg tablet One daily for diabetes 90 Tab 3    hydroCHLOROthiazide (HYDRODIURIL) 12.5 mg tablet TAKE 1 TABLET BY MOUTH EVERY DAY FOR BLOOD PRESSURE 90 Tab 3    spironolactone (ALDACTONE) 25 mg tablet Take 1 Tab by mouth daily. For blood pressure 90 Tab 3    furosemide (LASIX) 80 mg tablet 1-2 tabs po every day as needed for swelling in legs. 60 Tab 12    valsartan (DIOVAN) 160 mg tablet TAKE 1 TABLET BY MOUTH EVERY DAY for blood pressure 90 Tab 3    piroxicam (FELDENE) 10 mg capsule Take 1 Cap by mouth daily. For back and hip pain 30 Cap 5    sildenafiL, pulmonary hypertension, (REVATIO) 20 mg tablet 2 tabs po prn, on an empty stomach, for erectile dysfucntion 40 Tab 2    metFORMIN (GLUCOPHAGE) 1,000 mg tablet TAKE 1 TABLET BY MOUTH TWICE DAILY WITH MEALS 180 Tab 3    aspirin delayed-release 81 mg tablet Take 1 Tab by mouth daily. To prevent heart attack and stroke 30 Tab 12     No facility-administered encounter medications on file as of 1/15/2021. Review of Symptoms:    Review of Systems   Constitutional: Negative. Negative for chills and fever. HENT: Negative. Negative for hearing loss. Respiratory: Positive for shortness of breath.  Negative for cough and hemoptysis. Cardiovascular: Negative. Negative for chest pain, palpitations, orthopnea, claudication, leg swelling and PND. Gastrointestinal: Negative. Negative for nausea and vomiting. Musculoskeletal: Negative for myalgias. Skin: Negative. Negative for rash. Neurological: Negative. Negative for dizziness, loss of consciousness and headaches. Physical Exam:      General: Well developed, in no acute distress, cooperative and alert  HEENT: No carotid bruits, no JVD, trach is midline. Neck Supple, PEERL, EOM intact. Heart:  reg rate and rhythm; normal S1/S2; no murmurs, no gallops or rubs. Respiratory: Clear bilaterally x 4, no wheezing or rales  Abdomen:   Soft, non-tender, no distention, no masses. + BS. Extremities:  Normal cap refill, no cyanosis, atraumatic. No edema. Neuro: A&Ox3, speech clear, gait stable. Skin: Skin color is normal. No rashes or lesions.  Non diaphoretic  Vascular: 2+ pulses symmetric in all extremities    Visit Vitals  /74 (BP 1 Location: Left arm, BP Patient Position: Sitting)   Pulse 82   Resp 16   Ht 6' 1\" (1.854 m)   Wt 214 lb (97.1 kg)   SpO2 97%   BMI 28.23 kg/m²       ECG: sinus     Cardiology Labs:    Lab Results   Component Value Date/Time    Cholesterol, total 122 11/03/2020 02:27 PM    HDL Cholesterol 35 (L) 11/03/2020 02:27 PM    LDL, calculated 61 11/03/2020 02:27 PM    LDL, calculated 74 10/22/2019 04:45 PM    LDL, calculated 75 01/18/2019 11:41 AM    LDL, calculated 66 07/20/2018 01:02 PM    VLDL, calculated 26 11/03/2020 02:27 PM    VLDL, calculated 20 10/22/2019 04:45 PM    CHOL/HDL Ratio 3.4 03/07/2018 02:45 AM       Lab Results   Component Value Date/Time    Hemoglobin A1c 6.9 (H) 11/03/2020 02:27 PM    Hemoglobin A1c (POC) 6.5 10/22/2019 04:39 PM       Lab Results   Component Value Date/Time    Sodium 143 11/03/2020 02:27 PM    Potassium 4.0 11/03/2020 02:27 PM    Chloride 104 11/03/2020 02:27 PM    CO2 27 11/03/2020 02:27 PM    Glucose 75 11/03/2020 02:27 PM    BUN 18 11/03/2020 02:27 PM    Creatinine 1.11 11/03/2020 02:27 PM    BUN/Creatinine ratio 16 11/03/2020 02:27 PM    GFR est AA 72 11/03/2020 02:27 PM    GFR est non-AA 62 11/03/2020 02:27 PM    Calcium 10.0 11/03/2020 02:27 PM    Anion gap 5 06/03/2019 05:52 PM    Bilirubin, total 0.2 11/03/2020 02:27 PM    ALT (SGPT) 15 11/03/2020 02:27 PM    Alk. phosphatase 78 11/03/2020 02:27 PM    Protein, total 7.0 11/03/2020 02:27 PM    Albumin 4.0 11/03/2020 02:27 PM    Globulin 4.3 (H) 06/03/2019 05:52 PM    A-G Ratio 1.3 11/03/2020 02:27 PM          Assessment:       ICD-10-CM ICD-9-CM    1. ASHD (arteriosclerotic heart disease)  I25.10 414.00 CBC W/O DIFF      METABOLIC PANEL, BASIC      PROTHROMBIN TIME + INR   2. Essential hypertension  I10 401.9    3. Hypercholesterolemia  E78.00 272.0         Plan:     1. ASHD (arteriosclerotic heart disease)  Significant risk factors with progressive symptoms. Stress test abnormal. Discussed cath/PCI. - CBC W/O DIFF  - METABOLIC PANEL, BASIC  - PROTHROMBIN TIME + INR    2. Essential hypertension  Controlled. 3. Hypercholesterolemia  Continue statin. Pre cath labs ordered.       Ashely Sandoval MD

## 2021-01-15 NOTE — H&P (VIEW-ONLY)
Subjective/HPI: Migdalia Oppenheim is a [de-identified] y.o. male is here for routine f/u. He has a PMHx of DM, HTN, hyperlipidemia. Continues to be SOB with minimal exertion. Stress test abnormal suggestive of ischemia. PCP Provider Siobhan Fajardo MD 
 
Past Medical History:  
Diagnosis Date  Arthritis  Diabetes ([facility] [address]  Diabetic neuropathy ([facility]  Hypercholesterolemia  Hypertension 11/29/10  Shingles 2010  [facility] Allergies Allergen Reactions  Lipitor [Atorvastatin] Diarrhea Outpatient Encounter Medications as of 1/15/2021 Medication Sig Dispense Refill  rosuvastatin (CRESTOR) 20 mg tablet One daily for cholesterol 90 Tab 3  
 glimepiride (AMARYL) 4 mg tablet One daily for diabetes 90 Tab 3  
 hydroCHLOROthiazide (HYDRODIURIL) 12.5 mg tablet TAKE 1 TABLET BY MOUTH EVERY DAY FOR BLOOD PRESSURE 90 Tab 3  
 spironolactone (ALDACTONE) 25 mg tablet Take 1 Tab by mouth daily. For blood pressure 90 Tab 3  furosemide (LASIX) 80 mg tablet 1-2 tabs po every day as needed for swelling in legs. 60 Tab 12  
 valsartan (DIOVAN) 160 mg tablet TAKE 1 TABLET BY MOUTH EVERY DAY for blood pressure 90 Tab 3  piroxicam (FELDENE) 10 mg capsule Take 1 Cap by mouth daily. For back and hip pain 30 Cap 5  
 sildenafiL, pulmonary hypertension, (REVATIO) 20 mg tablet 2 tabs po prn, on an empty stomach, for erectile dysfucntion 40 Tab 2  
 metFORMIN (GLUCOPHAGE) 1,000 mg tablet TAKE 1 TABLET BY MOUTH TWICE DAILY WITH MEALS 180 Tab 3  
 aspirin delayed-release 81 mg tablet Take 1 Tab by mouth daily. To prevent heart attack and stroke 30 Tab 12 No facility-administered encounter medications on file as of 1/15/2021. Review of Symptoms: 
 
Review of Systems Constitutional: Negative. Negative for chills and fever. HENT: Negative. Negative for hearing loss. Respiratory: Positive for shortness of breath. Negative for cough and hemoptysis. Cardiovascular: Negative. Negative for chest pain, palpitations, orthopnea, claudication, leg swelling and PND. Gastrointestinal: Negative. Negative for nausea and vomiting. Musculoskeletal: Negative for myalgias. Skin: Negative. Negative for rash. Neurological: Negative. Negative for dizziness, loss of consciousness and headaches. Physical Exam:   
 
General: Well developed, in no acute distress, cooperative and alert HEENT: No carotid bruits, no JVD, trach is midline. Neck Supple, PEERL, EOM intact. Heart:  reg rate and rhythm; normal S1/S2; no murmurs, no gallops or rubs. Respiratory: Clear bilaterally x 4, no wheezing or rales Abdomen:   Soft, non-tender, no distention, no masses. + BS. Extremities:  Normal cap refill, no cyanosis, atraumatic. No edema. Neuro: A&Ox3, speech clear, gait stable. Skin: Skin color is normal. No rashes or lesions. Non diaphoretic Vascular: 2+ pulses symmetric in all extremities Visit Vitals /74 (BP 1 Location: Left arm, BP Patient Position: Sitting) Pulse 82 Resp 16 Ht 6' 1\" (1.854 m) Wt 214 lb (97.1 kg) SpO2 97% BMI 28.23 kg/m² ECG: sinus Cardiology Labs: 
 
Lab Results Component Value Date/Time Cholesterol, total 122 11/03/2020 02:27 PM  
 HDL Cholesterol 35 (L) 11/03/2020 02:27 PM  
 LDL, calculated 61 11/03/2020 02:27 PM  
 LDL, calculated 74 10/22/2019 04:45 PM  
 LDL, calculated 75 01/18/2019 11:41 AM  
 LDL, calculated 66 07/20/2018 01:02 PM  
 VLDL, calculated 26 11/03/2020 02:27 PM  
 VLDL, calculated 20 10/22/2019 04:45 PM  
 CHOL/HDL Ratio 3.4 03/07/2018 02:45 AM  
 
 
Lab Results Component Value Date/Time Hemoglobin A1c 6.9 (H) 11/03/2020 02:27 PM  
 Hemoglobin A1c (POC) 6.5 10/22/2019 04:39 PM  
 
 
Lab Results Component Value Date/Time  Sodium 143 11/03/2020 02:27 PM  
 Potassium 4.0 11/03/2020 02:27 PM  
 Chloride 104 11/03/2020 02:27 PM  
 CO2 27 11/03/2020 02:27 PM  
 Glucose 75 11/03/2020 02:27 PM  
 BUN 18 11/03/2020 02:27 PM  
 Creatinine 1.11 11/03/2020 02:27 PM  
 BUN/Creatinine ratio 16 11/03/2020 02:27 PM  
 GFR est AA 72 11/03/2020 02:27 PM  
 GFR est non-AA 62 11/03/2020 02:27 PM  
 Calcium 10.0 11/03/2020 02:27 PM  
 Anion gap 5 06/03/2019 05:52 PM  
 Bilirubin, total 0.2 11/03/2020 02:27 PM  
 ALT (SGPT) 15 11/03/2020 02:27 PM  
 Alk. phosphatase 78 11/03/2020 02:27 PM  
 Protein, total 7.0 11/03/2020 02:27 PM  
 Albumin 4.0 11/03/2020 02:27 PM  
 Globulin 4.3 (H) 06/03/2019 05:52 PM  
 A-G Ratio 1.3 11/03/2020 02:27 PM  
 
 
 
 Assessment: ICD-10-CM ICD-9-CM 1. ASHD (arteriosclerotic heart disease)  I25.10 414.00 CBC W/O DIFF  
   METABOLIC PANEL, BASIC  
   PROTHROMBIN TIME + INR  
2. Essential hypertension  I10 401.9 3. Hypercholesterolemia  E78.00 272.0 Plan: 1. ASHD (arteriosclerotic heart disease) Significant risk factors with progressive symptoms. Stress test abnormal. Discussed cath/PCI. - CBC W/O DIFF 
- METABOLIC PANEL, BASIC 
- PROTHROMBIN TIME + INR 
 
2. Essential hypertension Controlled. 3. Hypercholesterolemia Continue statin. Pre cath labs ordered.  
 
 
Joni Sabillon MD

## 2021-01-15 NOTE — PROGRESS NOTES
Identified pt with two pt identifiers(name and ). Reviewed record in preparation for visit and have obtained necessary documentation. Chief Complaint   Patient presents with    Results      Vitals:    01/15/21 1033   BP: 114/74   Pulse: 82   Resp: 16   SpO2: 97%   Weight: 214 lb (97.1 kg)   Height: 6' 1\" (1.854 m)   PainSc:   0 - No pain       Health Maintenance Review: Patient reminded of \"due or due soon\" health maintenance. I have asked the patient to contact his/her primary care provider (PCP) for follow-up on his/her health maintenance. Coordination of Care Questionnaire:  :   1) Have you been to an emergency room, urgent care, or hospitalized since your last visit? If yes, where when, and reason for visit? no       2. Have seen or consulted any other health care provider since your last visit? If yes, where when, and reason for visit? NO      Patient is accompanied by self I have received verbal consent from Marquis Zaragoza to discuss any/all medical information while they are present in the room.

## 2021-01-16 LAB
BUN SERPL-MCNC: 46 MG/DL (ref 8–27)
BUN/CREAT SERPL: 23 (ref 10–24)
CALCIUM SERPL-MCNC: 10.5 MG/DL (ref 8.6–10.2)
CHLORIDE SERPL-SCNC: 97 MMOL/L (ref 96–106)
CO2 SERPL-SCNC: 30 MMOL/L (ref 20–29)
CREAT SERPL-MCNC: 1.99 MG/DL (ref 0.76–1.27)
ERYTHROCYTE [DISTWIDTH] IN BLOOD BY AUTOMATED COUNT: 13.5 % (ref 11.6–15.4)
GLUCOSE SERPL-MCNC: 96 MG/DL (ref 65–99)
HCT VFR BLD AUTO: 33 % (ref 37.5–51)
HGB BLD-MCNC: 10.9 G/DL (ref 13–17.7)
INR PPP: 1 (ref 0.9–1.2)
INTERPRETATION: NORMAL
MCH RBC QN AUTO: 28.4 PG (ref 26.6–33)
MCHC RBC AUTO-ENTMCNC: 33 G/DL (ref 31.5–35.7)
MCV RBC AUTO: 86 FL (ref 79–97)
PLATELET # BLD AUTO: 227 X10E3/UL (ref 150–450)
POTASSIUM SERPL-SCNC: 4.3 MMOL/L (ref 3.5–5.2)
PROTHROMBIN TIME: 11.2 SEC (ref 9.1–12)
RBC # BLD AUTO: 3.84 X10E6/UL (ref 4.14–5.8)
SODIUM SERPL-SCNC: 140 MMOL/L (ref 134–144)
WBC # BLD AUTO: 7.8 X10E3/UL (ref 3.4–10.8)

## 2021-01-18 ENCOUNTER — ANCILLARY PROCEDURE (OUTPATIENT)
Dept: CARDIOLOGY CLINIC | Age: 81
End: 2021-01-18
Payer: MEDICARE

## 2021-01-18 PROCEDURE — 93970 EXTREMITY STUDY: CPT | Performed by: INTERNAL MEDICINE

## 2021-01-18 NOTE — PERIOP NOTES
Patient denied any COVID-19 symptoms or possible exposure that would require a pre-procedural COVID-19 test for the Cath Lab procedure scheduled on 1/22/21.

## 2021-01-18 NOTE — TELEPHONE ENCOUNTER
----- Message from Johanna Molina NP sent at 1/18/2021  8:33 AM EST -----  Pls call patient--have him hold lasix/hctz 2 days prior to cath.   ----- Message -----  From: Liz Allen MD  Sent: 1/17/2021  11:14 PM EST  To: Keri Haile NP    Kidney function abnormal. Will need hydration before cath.  Have him hold lasix/HCTZ for 2 days prior to procedure. thx.

## 2021-01-18 NOTE — TELEPHONE ENCOUNTER
Spoke with patient and his wife, both on phone. Verified patient with two patient identifiers. Advised to hold both Lasix and HCTZ two days prior to cath, which is on Fri 1-. They both verbalized understanding.

## 2021-01-18 NOTE — PROGRESS NOTES
Kidney function abnormal. Will need hydration before cath.  Have him hold lasix/HCTZ for 2 days prior to procedure. thx.

## 2021-01-18 NOTE — TELEPHONE ENCOUNTER
----- Message from Alberto Castellanos NP sent at 1/18/2021  8:33 AM EST -----  Pls call patient--have him hold lasix/hctz 2 days prior to cath.   ----- Message -----  From: Stephanie Calzada MD  Sent: 1/17/2021  11:14 PM EST  To: Lizet Lund NP    Kidney function abnormal. Will need hydration before cath.  Have him hold lasix/HCTZ for 2 days prior to procedure. thx.

## 2021-01-19 ENCOUNTER — ANCILLARY PROCEDURE (OUTPATIENT)
Dept: CARDIOLOGY CLINIC | Age: 81
End: 2021-01-19
Payer: MEDICARE

## 2021-01-19 ENCOUNTER — DOCUMENTATION ONLY (OUTPATIENT)
Dept: CARDIOLOGY CLINIC | Age: 81
End: 2021-01-19

## 2021-01-19 VITALS
SYSTOLIC BLOOD PRESSURE: 114 MMHG | BODY MASS INDEX: 28.36 KG/M2 | DIASTOLIC BLOOD PRESSURE: 74 MMHG | HEIGHT: 73 IN | WEIGHT: 214 LBS

## 2021-01-19 LAB
ECHO AO ASC DIAM: 3.85 CM
ECHO AO ROOT DIAM: 3.79 CM
ECHO AV PEAK GRADIENT: 6.22 MMHG
ECHO AV PEAK VELOCITY: 124.66 CM/S
ECHO EST RA PRESSURE: 3 MMHG
ECHO LA MAJOR AXIS: 3.07 CM
ECHO LA MINOR AXIS: 1.39 CM
ECHO LV E' LATERAL VELOCITY: 9.82 CM/S
ECHO LV INTERNAL DIMENSION DIASTOLIC: 3.84 CM (ref 4.2–5.9)
ECHO LV INTERNAL DIMENSION SYSTOLIC: 2.05 CM
ECHO LV ISOVOLUMETRIC RELAXATION TIME (IVRT): 81.83 MS
ECHO LV IVSD: 1.19 CM (ref 0.6–1)
ECHO LV MASS 2D: 142.3 G (ref 88–224)
ECHO LV MASS INDEX 2D: 64.3 G/M2 (ref 49–115)
ECHO LV POSTERIOR WALL DIASTOLIC: 1.07 CM (ref 0.6–1)
ECHO LVOT PEAK GRADIENT: 4.27 MMHG
ECHO LVOT PEAK VELOCITY: 103.3 CM/S
ECHO MV "A" WAVE DURATION: 179.83 MS
ECHO MV A VELOCITY: 68.24 CM/S
ECHO MV E DECELERATION TIME (DT): 248.35 MS
ECHO MV E VELOCITY: 77.37 CM/S
ECHO MV E/A RATIO: 1.13
ECHO MV E/E' LATERAL: 7.88
ECHO PVEIN A DURATION: 154.14 MS
ECHO PVEIN A VELOCITY: 23.58 CM/S
ECHO RIGHT VENTRICULAR SYSTOLIC PRESSURE (RVSP): 31.96 MMHG
ECHO RV TAPSE: 1.82 CM (ref 1.5–2)
ECHO TV REGURGITANT MAX VELOCITY: 269.06 CM/S
ECHO TV REGURGITANT PEAK GRADIENT: 28.96 MMHG
LEFT GSV AT KNEE DIAM: 0.33 CM
LEFT GSV AT KNEE RFX: 0 S
LEFT GSV BK MID DIAM: 0.34 CM
LEFT GSV BK MID RFX: 0 S
LEFT GSV JUNC DIAM: 0.4 CM
LEFT GSV JUNC DIAM: 0.5 CM
LEFT GSV JUNC DIAM: 0.5 CM
LEFT GSV JUNC RFX: 0 S
LEFT GSV THIGH PROX DIAM: 0.38 CM
LEFT GSV THIGH PROX RFX: 0 S
LEFT SSV PROX DIAM: 0.21 CM
LEFT SSV PROX RFX: 0 S
RIGHT GSV AK RFX: 0 S
RIGHT GSV AT KNEE DIAM: 0.21 CM
RIGHT GSV BK MID DIAM: 0.21 CM
RIGHT GSV BK MID RFX: 0.7 S
RIGHT GSV JUNC DIAM: 0.46 CM
RIGHT GSV JUNC DIAM: 0.6 CM
RIGHT GSV JUNC DIAM: 0.6 CM
RIGHT GSV JUNC RFX: 0 S
RIGHT GSV THIGH PROX DIAM: 0.46 CM
RIGHT GSV THIGH PROX RFX: 0 S
RIGHT PERFORATOR DIAM: 0.34 CM
RIGHT PERFORATOR RFX: 0 S
RIGHT SSV PROX DIAM: 0.27 CM
RIGHT SSV PROX RFX: 627 S

## 2021-01-19 PROCEDURE — 93306 TTE W/DOPPLER COMPLETE: CPT | Performed by: INTERNAL MEDICINE

## 2021-01-19 NOTE — PROGRESS NOTES
Pt needs to reschedule cath to 1/26/21 arriving at 830 due to needing hydration (x4hrs). Hold hctz x2 days per Dr. Mateo Newby.

## 2021-01-21 ENCOUNTER — TELEPHONE (OUTPATIENT)
Dept: CARDIOLOGY CLINIC | Age: 81
End: 2021-01-21

## 2021-01-21 NOTE — TELEPHONE ENCOUNTER
----- Message from Lissette Obrien MD sent at 1/20/2021 10:01 PM EST -----  Echo shows normal heart function, may be a small growth in heart chamber. Will need CARLOS. Can schedule at same time as cath on Friday.  thx.

## 2021-01-21 NOTE — PROGRESS NOTES
Echo shows normal heart function, may be a small growth in heart chamber. Will need CARLOS. Can schedule at same time as cath on Friday.  thx.

## 2021-01-22 ENCOUNTER — HOSPITAL ENCOUNTER (OUTPATIENT)
Age: 81
Discharge: HOME | End: 2021-01-22
Attending: INTERNAL MEDICINE | Admitting: INTERNAL MEDICINE
Payer: MEDICARE

## 2021-01-22 ENCOUNTER — APPOINTMENT (OUTPATIENT)
Dept: GENERAL RADIOLOGY | Age: 81
End: 2021-01-22
Attending: NURSE PRACTITIONER
Payer: MEDICARE

## 2021-01-22 ENCOUNTER — APPOINTMENT (OUTPATIENT)
Dept: VASCULAR SURGERY | Age: 81
End: 2021-01-22
Attending: NURSE PRACTITIONER
Payer: MEDICARE

## 2021-01-22 VITALS
OXYGEN SATURATION: 99 % | WEIGHT: 218 LBS | TEMPERATURE: 97.4 F | HEART RATE: 78 BPM | RESPIRATION RATE: 21 BRPM | DIASTOLIC BLOOD PRESSURE: 56 MMHG | SYSTOLIC BLOOD PRESSURE: 120 MMHG | HEIGHT: 73 IN | BODY MASS INDEX: 28.89 KG/M2

## 2021-01-22 DIAGNOSIS — I65.23 BILATERAL CAROTID ARTERY STENOSIS: ICD-10-CM

## 2021-01-22 DIAGNOSIS — R07.9 CHEST PAIN, UNSPECIFIED TYPE: ICD-10-CM

## 2021-01-22 DIAGNOSIS — I63.311 THROMBOTIC STROKE INVOLVING RIGHT MIDDLE CEREBRAL ARTERY (HCC): ICD-10-CM

## 2021-01-22 DIAGNOSIS — G45.9 TIA (TRANSIENT ISCHEMIC ATTACK): ICD-10-CM

## 2021-01-22 PROBLEM — Z98.890 S/P CARDIAC CATH: Status: ACTIVE | Noted: 2021-01-22

## 2021-01-22 LAB
BNP SERPL-MCNC: 227 PG/ML
GLUCOSE BLD STRIP.AUTO-MCNC: 127 MG/DL (ref 65–100)
LEFT CCA DIST DIAS: 18.1 CM/S
LEFT CCA DIST SYS: 109.4 CM/S
LEFT CCA PROX DIAS: 14.4 CM/S
LEFT CCA PROX SYS: 85.7 CM/S
LEFT ECA DIAS: 0 CM/S
LEFT ECA SYS: 83.8 CM/S
LEFT ICA DIST DIAS: 14.4 CM/S
LEFT ICA DIST SYS: 80.2 CM/S
LEFT ICA MID DIAS: 16.3 CM/S
LEFT ICA MID SYS: 67.4 CM/S
LEFT ICA PROX DIAS: 12.6 CM/S
LEFT ICA PROX SYS: 43.7 CM/S
LEFT ICA/CCA SYS: 0.7
LEFT SUBCLAVIAN DIAS: 0 CM/S
LEFT SUBCLAVIAN SYS: 156.5 CM/S
LEFT VERTEBRAL DIAS: 23.6 CM/S
LEFT VERTEBRAL SYS: 51.3 CM/S
RIGHT CCA DIST DIAS: 9.7 CM/S
RIGHT CCA DIST SYS: 62.5 CM/S
RIGHT CCA PROX DIAS: 8.6 CM/S
RIGHT CCA PROX SYS: 85.4 CM/S
RIGHT ECA DIAS: 0 CM/S
RIGHT ECA SYS: 65 CM/S
RIGHT ICA DIST DIAS: 18.3 CM/S
RIGHT ICA DIST SYS: 78.5 CM/S
RIGHT ICA MID DIAS: 20.8 CM/S
RIGHT ICA MID SYS: 94.4 CM/S
RIGHT ICA PROX DIAS: 15.8 CM/S
RIGHT ICA PROX SYS: 72.3 CM/S
RIGHT ICA/CCA SYS: 1.5
RIGHT SUBCLAVIAN DIAS: 0 CM/S
RIGHT SUBCLAVIAN SYS: 142.4 CM/S
RIGHT VERTEBRAL DIAS: 12.2 CM/S
RIGHT VERTEBRAL SYS: 60 CM/S
SERVICE CMNT-IMP: ABNORMAL

## 2021-01-22 PROCEDURE — 77010033678 HC OXYGEN DAILY

## 2021-01-22 PROCEDURE — 82962 GLUCOSE BLOOD TEST: CPT

## 2021-01-22 PROCEDURE — C1769 GUIDE WIRE: HCPCS | Performed by: INTERNAL MEDICINE

## 2021-01-22 PROCEDURE — 77030004549 HC CATH ANGI DX PRF MRTM -A: Performed by: INTERNAL MEDICINE

## 2021-01-22 PROCEDURE — 77030015766: Performed by: INTERNAL MEDICINE

## 2021-01-22 PROCEDURE — 93458 L HRT ARTERY/VENTRICLE ANGIO: CPT | Performed by: INTERNAL MEDICINE

## 2021-01-22 PROCEDURE — C1894 INTRO/SHEATH, NON-LASER: HCPCS | Performed by: INTERNAL MEDICINE

## 2021-01-22 PROCEDURE — 71045 X-RAY EXAM CHEST 1 VIEW: CPT

## 2021-01-22 PROCEDURE — 74011250637 HC RX REV CODE- 250/637: Performed by: INTERNAL MEDICINE

## 2021-01-22 PROCEDURE — 99205 OFFICE O/P NEW HI 60 MIN: CPT | Performed by: THORACIC SURGERY (CARDIOTHORACIC VASCULAR SURGERY)

## 2021-01-22 PROCEDURE — 93880 EXTRACRANIAL BILAT STUDY: CPT | Performed by: PSYCHIATRY & NEUROLOGY

## 2021-01-22 PROCEDURE — 99152 MOD SED SAME PHYS/QHP 5/>YRS: CPT | Performed by: INTERNAL MEDICINE

## 2021-01-22 PROCEDURE — 77030010221 HC SPLNT WR POS TELE -B: Performed by: INTERNAL MEDICINE

## 2021-01-22 PROCEDURE — 77030008543 HC TBNG MON PRSS MRTM -A: Performed by: INTERNAL MEDICINE

## 2021-01-22 PROCEDURE — 74011000636 HC RX REV CODE- 636: Performed by: INTERNAL MEDICINE

## 2021-01-22 PROCEDURE — 74011250636 HC RX REV CODE- 250/636: Performed by: INTERNAL MEDICINE

## 2021-01-22 PROCEDURE — 83880 ASSAY OF NATRIURETIC PEPTIDE: CPT

## 2021-01-22 PROCEDURE — 77030019569 HC BND COMPR RAD TERU -B: Performed by: INTERNAL MEDICINE

## 2021-01-22 PROCEDURE — 36415 COLL VENOUS BLD VENIPUNCTURE: CPT

## 2021-01-22 PROCEDURE — 74011000250 HC RX REV CODE- 250: Performed by: INTERNAL MEDICINE

## 2021-01-22 PROCEDURE — 93880 EXTRACRANIAL BILAT STUDY: CPT

## 2021-01-22 PROCEDURE — 77030019698 HC SYR ANGI MDLON MRTM -A: Performed by: INTERNAL MEDICINE

## 2021-01-22 RX ORDER — SODIUM CHLORIDE 9 MG/ML
100 INJECTION, SOLUTION INTRAVENOUS CONTINUOUS
Status: DISCONTINUED | OUTPATIENT
Start: 2021-01-22 | End: 2021-01-22 | Stop reason: HOSPADM

## 2021-01-22 RX ORDER — ASPIRIN 81 MG/1
81 TABLET ORAL DAILY
Status: DISCONTINUED | OUTPATIENT
Start: 2021-01-23 | End: 2021-01-22 | Stop reason: HOSPADM

## 2021-01-22 RX ORDER — NALOXONE HYDROCHLORIDE 0.4 MG/ML
0.4 INJECTION, SOLUTION INTRAMUSCULAR; INTRAVENOUS; SUBCUTANEOUS AS NEEDED
Status: DISCONTINUED | OUTPATIENT
Start: 2021-01-22 | End: 2021-01-22 | Stop reason: HOSPADM

## 2021-01-22 RX ORDER — GLIMEPIRIDE 4 MG/1
4 TABLET ORAL
Status: DISCONTINUED | OUTPATIENT
Start: 2021-01-23 | End: 2021-01-22 | Stop reason: HOSPADM

## 2021-01-22 RX ORDER — LOSARTAN POTASSIUM 50 MG/1
100 TABLET ORAL DAILY
Status: DISCONTINUED | OUTPATIENT
Start: 2021-01-23 | End: 2021-01-22 | Stop reason: HOSPADM

## 2021-01-22 RX ORDER — VALSARTAN 160 MG/1
160 TABLET ORAL DAILY
Status: DISCONTINUED | OUTPATIENT
Start: 2021-01-23 | End: 2021-01-22

## 2021-01-22 RX ORDER — VERAPAMIL HYDROCHLORIDE 2.5 MG/ML
INJECTION, SOLUTION INTRAVENOUS AS NEEDED
Status: DISCONTINUED | OUTPATIENT
Start: 2021-01-22 | End: 2021-01-22 | Stop reason: HOSPADM

## 2021-01-22 RX ORDER — GUAIFENESIN 100 MG/5ML
LIQUID (ML) ORAL AS NEEDED
Status: DISCONTINUED | OUTPATIENT
Start: 2021-01-22 | End: 2021-01-22 | Stop reason: HOSPADM

## 2021-01-22 RX ORDER — ACETAMINOPHEN 325 MG/1
650 TABLET ORAL
Status: DISCONTINUED | OUTPATIENT
Start: 2021-01-22 | End: 2021-01-22 | Stop reason: HOSPADM

## 2021-01-22 RX ORDER — METFORMIN HYDROCHLORIDE 1000 MG/1
TABLET ORAL
Qty: 180 TAB | Refills: 3 | Status: SHIPPED | OUTPATIENT
Start: 2021-01-22 | End: 2021-03-10 | Stop reason: SDUPTHER

## 2021-01-22 RX ORDER — HEPARIN SODIUM 1000 [USP'U]/ML
INJECTION, SOLUTION INTRAVENOUS; SUBCUTANEOUS AS NEEDED
Status: DISCONTINUED | OUTPATIENT
Start: 2021-01-22 | End: 2021-01-22 | Stop reason: HOSPADM

## 2021-01-22 RX ORDER — SODIUM CHLORIDE 0.9 % (FLUSH) 0.9 %
5-40 SYRINGE (ML) INJECTION EVERY 8 HOURS
Status: DISCONTINUED | OUTPATIENT
Start: 2021-01-22 | End: 2021-01-22 | Stop reason: HOSPADM

## 2021-01-22 RX ORDER — HEPARIN SODIUM 200 [USP'U]/100ML
INJECTION, SOLUTION INTRAVENOUS
Status: COMPLETED | OUTPATIENT
Start: 2021-01-22 | End: 2021-01-22

## 2021-01-22 RX ORDER — SODIUM CHLORIDE 0.9 % (FLUSH) 0.9 %
5-40 SYRINGE (ML) INJECTION AS NEEDED
Status: DISCONTINUED | OUTPATIENT
Start: 2021-01-22 | End: 2021-01-22 | Stop reason: HOSPADM

## 2021-01-22 RX ORDER — GUAIFENESIN 100 MG/5ML
81 LIQUID (ML) ORAL
Status: COMPLETED | OUTPATIENT
Start: 2021-01-22 | End: 2021-01-22

## 2021-01-22 RX ORDER — SPIRONOLACTONE 25 MG/1
25 TABLET ORAL DAILY
Status: DISCONTINUED | OUTPATIENT
Start: 2021-01-23 | End: 2021-01-22 | Stop reason: HOSPADM

## 2021-01-22 RX ORDER — MIDAZOLAM HYDROCHLORIDE 1 MG/ML
INJECTION, SOLUTION INTRAMUSCULAR; INTRAVENOUS AS NEEDED
Status: DISCONTINUED | OUTPATIENT
Start: 2021-01-22 | End: 2021-01-22 | Stop reason: HOSPADM

## 2021-01-22 RX ORDER — LIDOCAINE HYDROCHLORIDE 10 MG/ML
INJECTION, SOLUTION EPIDURAL; INFILTRATION; INTRACAUDAL; PERINEURAL AS NEEDED
Status: DISCONTINUED | OUTPATIENT
Start: 2021-01-22 | End: 2021-01-22 | Stop reason: HOSPADM

## 2021-01-22 RX ORDER — METOPROLOL SUCCINATE 25 MG/1
25 TABLET, EXTENDED RELEASE ORAL DAILY
Status: DISCONTINUED | OUTPATIENT
Start: 2021-01-23 | End: 2021-01-22 | Stop reason: HOSPADM

## 2021-01-22 RX ORDER — HYDROCHLOROTHIAZIDE 25 MG/1
12.5 TABLET ORAL DAILY
Status: DISCONTINUED | OUTPATIENT
Start: 2021-01-23 | End: 2021-01-22 | Stop reason: HOSPADM

## 2021-01-22 RX ORDER — FENTANYL CITRATE 50 UG/ML
INJECTION, SOLUTION INTRAMUSCULAR; INTRAVENOUS AS NEEDED
Status: DISCONTINUED | OUTPATIENT
Start: 2021-01-22 | End: 2021-01-22 | Stop reason: HOSPADM

## 2021-01-22 RX ORDER — ROSUVASTATIN CALCIUM 10 MG/1
20 TABLET, COATED ORAL
Status: DISCONTINUED | OUTPATIENT
Start: 2021-01-22 | End: 2021-01-22 | Stop reason: HOSPADM

## 2021-01-22 RX ADMIN — ASPIRIN 81 MG: 81 TABLET, CHEWABLE ORAL at 09:51

## 2021-01-22 RX ADMIN — Medication 10 ML: at 14:14

## 2021-01-22 RX ADMIN — SODIUM CHLORIDE 100 ML/HR: 9 INJECTION, SOLUTION INTRAVENOUS at 09:29

## 2021-01-22 NOTE — DISCHARGE INSTRUCTIONS
[address] [place] [address]  [phone]        Patient ID: Jewell Wright  [id number]  [de-identified] y.o.  1940    Admit Date: 1/22/2021    Discharge Date: 1/22/2021     Admitting Physician: Ashely Sandoval MD     Discharge Physician: Ashely Sandoval MD    Admission Diagnoses:   Chest pain, unspecified type [R07.9]    Discharge Diagnoses: Active Problems:    S/P cardiac cath (1/22/2021)      Overview: 1/22/2021: MVD seen on cardiac cath, consult to CT surgery         Discharge Condition: Good    Cardiology Procedures this Admission:  Diagnostic left heart catheterization    Disposition: home    Reference discharge instructions provided by nursing for diet and activity. Signed:  Fatimah Levy NP  1/22/2021  2:45 PM        Radial Cardiac Catheterization/Angiography Discharge Instructions    It is normal to feel tired the first couple days. Take it easy and follow the physicians instructions. CHECK THE CATHETER INSERTION SITE DAILY:    Remove the wrist dressing 24 hours after the procedure. You may shower 24 hours after the procedure. Wash with soap and water and pat dry. Gentle cleaning of the site with soap and water is sufficient, cover with a dry clean dressing or bandage. Do not apply creams or powders to the area. No soaking the wrist for 3 days. Leave the puncture site open to air after 24 hours post-procedure. CALL THE PHYSICIANS:     If the site becomes red, swollen or feels warm to the touch  If there is bleeding or drainage or if there is unusual pain at the radial site. If there is any minor oozing, you may apply a band-aid and remove after 12 hours. If the bleeding continues, hold pressure with the middle finger against the puncture site and the thumb against the back of the wrist,call 911 to be transported to the hospital.  DO NOT DRIVE YOURSELF, [place] [address].     ACTIVITY:   For the first 24 hours do not manipulate the wrist.  No lifting, pushing or pulling over 3-5 pounds with the affected wrist for 7 daysand no straining the insertion site. Do not life grocery bags or the garbage can, do not run the vacuum  or  for 7 days. Start with short walks as in the hospital and gradually increase as tolerated each day. It is recommended to walk 30 minutes 5-7 days per week. Follow your physicians instructions on activity. Avoid walking outside in extremes of heat or cold. Walk inside when it is cold and windy or hot and humid. Things to keep in mind:  No driving for at least 24 hours, or as designated by your physician. Limit the number of times you go up and down the stairs  Take rests and pace yourself with activity. Be careful and do not strain with bowel movements. MEDICATIONS:    Take all medications as prescribed  Call your physician if you have any questions  Keep an updated list of your medications with you at all times and give a list to your physician and pharmacist    SIGNS AND SYMPTOMS:   Be cautious of symptoms of angina or recurrent symptoms such as chest discomfort, unusual shortness of breath or fatigue. These could be symptoms of restenosis, a new blockage or a heart attack. If your symptoms are relieved with rest it is still recommended that you notify your physician of recurrent chest pain or discomfort. For CHEST PAIN or symptoms of angina not relieved with rest:  If the discomfort is not relieved with rest, and you have been prescribed Nitroglycerin, take as directed (taken under the tongue, one at a time 5 minutes apart for a total of 3 doses). If the discomfort is not relieved after the 3rd nitroglycerin, call 911. If you have not been prescribed Nitroglycerin  and your chest discomfort is not relieved with rest, call 911. AFTER CARE:   Follow up with your physician as instructed.   Follow a heart healthy diet with proper portion control, daily stress management, daily exercise, blood pressure and cholesterol control , and smoking cessation.     HOLD METFORMIN UNTIL 1/24/2021

## 2021-01-22 NOTE — CONSULTS
CSS   History and Physical    Subjective:      Amina Ford is a [de-identified] y.o. male who was referred for cardiac evaluation by Dr. Dillon Trinh for CAD. He has had SOB with exertion that is better with rest. He is also complaining of LE edema. He denies CP, orthopnea/PND, claudication. He does have hip pain that limits his activity. 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 He is a former smoker, drinks alcohol occasionally. He has a family history of heart disease and stroke. He lives with his wife. Cardiac Testing    Cardiac catheterization: Diagnostic  Dominance: Right  Left Main   The vessel was visualized by angiography. The vessel is angiographically normal.   Left Anterior Descending   The vessel is moderate in size. The vessel is tortuous. Prox LAD lesion, 80% stenosed. Mid LAD lesion, 85% stenosed. Dist LAD lesion, 90% stenosed. Left Circumflex   Prox Cx lesion, 80% stenosed. First Obtuse Marginal Branch   1st Mrg lesion, 70% stenosed. Right Coronary Artery   The vessel is moderate in size. The vessel is tortuous. Prox RCA lesion, 90% stenosed. Mid RCA lesion, 80% stenosed. ECHO:   · LV: Estimated LVEF is 55 - 60%. Normal systolic function (ejection fraction normal) and diastolic function. Small left ventricle. Mild concentric hypertrophy. Wall motion: normal.  · LA: There is a small 10 mm x 6 mm spherical calcified undetermined mass within the left atrial appendage. Consider further imaging with CARLOS and/ or cardiac MRI if clinically indicated. · AV: Mild focal aortic valve leaflet calcification present without reduced excursion. Mild aortic valve regurgitation is present. · MV: Mitral valve non-specific thickening. · TV: Mild tricuspid valve regurgitation is present. Echo Findings    Left Ventricle Normal systolic function (ejection fraction normal) and diastolic function. Small left ventricle. Mild concentric hypertrophy.   Prasanth Rogel motion: normal. The estimated EF is 55 - 60%. Left Atrium Normal cavity size. There is a small  10 mm x 6 mm spherical calcified undetermined mass within the left atrial appendage. Right Ventricle Normal cavity size and global systolic function. Right Atrium Normal cavity size. Aortic Valve Trileaflet valve structure and no stenosis. There is mild focal leaflet calcification without reduced excursion. Normal aortic leaflet mobility. Mild aortic valve regurgitation. Mitral Valve No stenosis. Mitral valve non-specific thickening. Trace regurgitation. Tricuspid Valve Normal valve structure and no stenosis. Mild regurgitation. Pulmonic Valve Normal valve structure and no stenosis. Mild regurgitation. Aorta Normal aortic root and ascending aorta. Pulmonary Artery Pulmonary arterial systolic pressure (PASP) is 32 mmHg. Pulmonary hypertension not suggested by Doppler findings. Pericardium No evidence of pericardial effusion. Past Medical History:   Diagnosis Date    Arthritis     [facility] [address]     Diabetic neuropathy ([facility] [address]     Hypercholesterolemia     Hypertension 11/29/10    Shingles 2010    Thromboembolus [facility])      Past Surgical History:   Procedure Laterality Date    HX CARPAL TUNNEL RELEASE  left    HX HEART CATHETERIZATION  2005    Dr Aleksandra Dean      left arm fracture    LA COLONOSCOPY FLX DX W/COLLJ SPEC WHEN PFRMD  2012           Social History     Tobacco Use    Smoking status: Former Smoker     Packs/day: 0.50     Years: 20.00     Pack years: 10.00     Types: Cigarettes     Quit date: 2000     Years since quittin.0    Smokeless tobacco: Never Used    Tobacco comment: 15 years ago   Substance Use Topics    Alcohol use: Yes     Comment: occ. Family History   Problem Relation Age of Onset    Stroke Father     Heart Disease Brother     Cancer Brother      Prior to Admission medications    Medication Sig Start Date End Date Taking? Authorizing Provider   furosemide (LASIX) 80 mg tablet 1-2 tabs po every day as needed for swelling in legs. 10/26/20  Yes Nancy Naidu MD   aspirin delayed-release 81 mg tablet Take 1 Tab by mouth daily. To prevent heart attack and stroke 6/9/16  Yes Nancy Naidu MD   metoprolol succinate (TOPROL-XL) 25 mg XL tablet Take 1 Tab by mouth daily. 1/15/21   Marlee Jacobs MD   rosuvastatin (CRESTOR) 20 mg tablet One daily for cholesterol 12/7/20   Nancy Naidu MD   glimepiride (AMARYL) 4 mg tablet One daily for diabetes 12/7/20   Nancy Naidu MD   hydroCHLOROthiazide (HYDRODIURIL) 12.5 mg tablet TAKE 1 TABLET BY MOUTH EVERY DAY FOR BLOOD PRESSURE 12/7/20   Nancy Naidu MD   spironolactone (ALDACTONE) 25 mg tablet Take 1 Tab by mouth daily. For blood pressure 12/7/20   Nancy Naidu MD   valsartan (DIOVAN) 160 mg tablet TAKE 1 TABLET BY MOUTH EVERY DAY for blood pressure 10/26/20   Nancy Naidu MD   piroxicam (FELDENE) 10 mg capsule Take 1 Cap by mouth daily. For back and hip pain 10/26/20   Nancy Naidu MD   sildenafiL, pulmonary hypertension, (REVATIO) 20 mg tablet 2 tabs po prn, on an empty stomach, for erectile dysfucntion 10/26/20   Nancy Naidu MD   metFORMIN (GLUCOPHAGE) 1,000 mg tablet TAKE 1 TABLET BY MOUTH TWICE DAILY WITH MEALS 6/10/20   Nancy Naidu MD       Allergies   Allergen Reactions    Lipitor [Atorvastatin] Diarrhea       Review of Systems: pertinent positives in HPI  Consititutional: Denies fever or chills. Eyes:  Denies use of glasses or vision problems(cataracts). ENT:  Denies hearing or swallowing difficulty. CV: Denies CP, claudication, HTN. Resp: Denies dyspnea, productive cough. : Denies dialysis or kidney problems. GI: Denies ulcers, esophageal strictures, liver problems. M/S: Denies joint or bone problems, or implanted artificial hardware. Skin: Denies varicose veins, edema. Neuro: Denies strokes, or TIAs.   Psych: Denies anxiety or depression. Endocrine: Denies thyroid problems or diabetes. Heme/Lymphatic: Denies easy bruising or lymphedema. Objective:     VS:   Visit Vitals  /88   Pulse 70   Temp 97.4 °F (36.3 °C)   Resp 19   Ht 6' 1\" (1.854 m)   Wt 218 lb (98.9 kg)   SpO2 99%   BMI 28.76 kg/m²       Physical Exam:    General appearance: alert, cooperative, no distress  Head: normocephalic, without obvious abnormality; atraumatic  Eyes: conjunctivae/corneas clear; EOM's intact. Nose: nares normal; no drainage. Neck: no carotid bruit and no JVD  Lungs: clear to auscultation bilaterally  Heart: regular rate and rhythm; no murmur  Abdomen: soft, non-tender; bowel sounds normal  Extremities: moves all extremities; no weakness. Skin: Skin color normal; No varicose veins, 3+ LE edema. Neurologic: Grossly normal, left hand  weaker than right      Labs:   Recent Labs     01/22/21  0938   GLUCPOC 127*       Diagnostics:   PA and lateral: pending    Carotid doppler: pending    PFTS-FEV1: none    EKG: pending  Assessment:     Active Problems:    S/P cardiac cath (1/22/2021)      Overview: 1/22/2021: MVD seen on cardiac cath, consult to CT surgery         Plan:   The risk and benefit of surgery were reviewed with patient and family and all questions answered and the patient wishes to proceed. Risk include infection, bleeding, stroke, heart attack, irregular heart rhythm, kidney failure and death. STS Risk Calculator V2.81 - Discussed by surgeon with patient. Procedure: Isolated CAB CALCULATE   Risk of Mortality:  2.057%    Renal Failure:  6.913%    Permanent Stroke:  3.766%    Prolonged Ventilation:  8.966%    DSW Infection:  0.146%    Reoperation:  1.883%    Morbidity or Mortality:  18.728%    Short Length of Stay:  21.156%    Long Length of Stay:  10.462%       Treatment Plan:    1. CAD: On ASA, statin, BB. Preop workup in process, surgical plans per Dr. Amelia Coronel.    2. HTN: On BB, HCTZ, aldactone, valsartan  3. HLD: on statin  4. DM: on metformin, A1C 6.9 in 11/2020  5. LE edema/venous reflux: On lasix  6. Elevated Cr: Most recent Cr 1.99, will need IV fluids preop for hydration.        Signed By: Nicole Gates NP     January 22, 2021

## 2021-01-22 NOTE — INTERVAL H&P NOTE
Update History & Physical 
 
The Patient's History and Physical of January 15,2021, Cath/PCI was reviewed with the patient and I examined the patient. There was no change. The surgical site was confirmed by the patient and me. Plan:  The risk, benefits, expected outcome, and alternative to the recommended procedure have been discussed with the patient. Patient understands and wants to proceed with the procedure.  
 
Electronically signed by Gallito Sanchez MD on 1/22/2021 at 8:47 AM

## 2021-01-22 NOTE — PROGRESS NOTES
Pharmacist Discharge Medication Reconciliation    Significant PMH:   Past Medical History:   Diagnosis Date    Arthritis     Diabetes ([facility] [address]     Diabetic neuropathy ([facility] [address]     Hypercholesterolemia     Hypertension 11/29/10    Shingles 2010    [facility]embolus [facility])      Chief Complaint for this Admission: No chief complaint on file. Allergies: Lipitor [atorvastatin]    Discharge Medications:   Current Discharge Medication List        CONTINUE these medications which have CHANGED    Details   metFORMIN (GLUCOPHAGE) 1,000 mg tablet TAKE 1 TABLET BY MOUTH TWICE DAILY WITH MEALS  Qty: 180 Tab, Refills: 3    Comments: Hold until 1/24/2021           CONTINUE these medications which have NOT CHANGED    Details   furosemide (LASIX) 80 mg tablet 1-2 tabs po every day as needed for swelling in legs. Qty: 60 Tab, Refills: 12      aspirin delayed-release 81 mg tablet Take 1 Tab by mouth daily. To prevent heart attack and stroke  Qty: 30 Tab, Refills: 12      metoprolol succinate (TOPROL-XL) 25 mg XL tablet Take 1 Tab by mouth daily. Qty: 30 Tab, Refills: 3      rosuvastatin (CRESTOR) 20 mg tablet One daily for cholesterol  Qty: 90 Tab, Refills: 3      glimepiride (AMARYL) 4 mg tablet One daily for diabetes  Qty: 90 Tab, Refills: 3      hydroCHLOROthiazide (HYDRODIURIL) 12.5 mg tablet TAKE 1 TABLET BY MOUTH EVERY DAY FOR BLOOD PRESSURE  Qty: 90 Tab, Refills: 3      spironolactone (ALDACTONE) 25 mg tablet Take 1 Tab by mouth daily. For blood pressure  Qty: 90 Tab, Refills: 3      valsartan (DIOVAN) 160 mg tablet TAKE 1 TABLET BY MOUTH EVERY DAY for blood pressure  Qty: 90 Tab, Refills: 3      piroxicam (FELDENE) 10 mg capsule Take 1 Cap by mouth daily.  For back and hip pain  Qty: 30 Cap, Refills: 5      sildenafiL, pulmonary hypertension, (REVATIO) 20 mg tablet 2 tabs po prn, on an empty stomach, for erectile dysfucntion  Qty: 40 Tab, Refills: 2             The patient's chart, MAR and AVS were reviewed by Ema Jennings, [facility].    Discharging Provider: Keith Bautista / Genie Weinstein   (Remove the following comments before filing to note)  Recommendations/Clarifications: Other Interventions (patient counseling, changes made to AVS, etc):      Additional Comments:     Time spent: 15 min    Thank You,     Jhonny Reid, [facility]

## 2021-01-22 NOTE — PROGRESS NOTES
Bedside shift change report given to Mera Rodriguez (oncoming nurse) by Alen Amezquita (offgoing nurse). Report included the following information SBAR, Kardex, Procedure Summary, Intake/Output, MAR, Recent Results and Cardiac Rhythm NSR.

## 2021-01-22 NOTE — PROGRESS NOTES
TRANSFER - IN REPORT:    Verbal report received from Jonny Mckeon rn  on [address]  being received from Cath lab  for routine post - op      Report consisted of patients Situation, Background, Assessment and   Recommendations(SBAR). Information from the following report(s) SBAR, Kardex, Procedure Summary, Intake/Output, MAR and Cardiac Rhythm NSR was reviewed with the receiving nurse. Opportunity for questions and clarification was provided. Assessment completed upon patients arrival to unit and care assumed.

## 2021-01-22 NOTE — PROGRESS NOTES
Patient ambulating in hallway without difficulty. Right radial cath site remains C/D/I. I have reviewed discharge instructions with the patient and caregiver. The patient and caregiver verbalized understanding.

## 2021-01-22 NOTE — PROGRESS NOTES
Jose Manuel Schafer is recovering post-diagnostic [facility], he has diffuse MVD disease, CT surgery consulted. Right radial site dressing is CDI without swelling or bleeding. VSS. Rhythm NSR, a 1.62 second pause noted on tele review. Jose Manuel Schafer denies complaints at this time. If recovery continues to progress without complication, discharge is planned for later today. Continue IV fluids 100 cc/hr, baseline Cr 1.99. Will restart some home meds. CT surgery saw patient, pre-op diagnostic testing begun inpatient. Can D/C later this evening.      Michael Montoya, MARIVEL  DNP, RN, AGACNP-BC

## 2021-01-22 NOTE — Clinical Note
TRANSFER - OUT REPORT:     Verbal report given to: Obi Alberts RN. Report consisted of patient's Situation, Background, Assessment and   Recommendations(SBAR). Opportunity for questions and clarification was provided. Patient transported to: IVCU.

## 2021-01-22 NOTE — PROGRESS NOTES
Cath revealed severe 3 vessel CAD, normal LVEF. Reviewed with Dr. Jaya Lynn. CT surgery consult. Will need CARLOS pre-op or intra-op to evaluate left atrial mass as well that was seen on TTE.

## 2021-01-25 ENCOUNTER — TELEPHONE (OUTPATIENT)
Dept: CARDIOTHORACIC SURGERY | Age: 81
End: 2021-01-25

## 2021-01-25 DIAGNOSIS — I10 ESSENTIAL HYPERTENSION: Primary | ICD-10-CM

## 2021-01-25 RX ORDER — HYDRALAZINE HYDROCHLORIDE 10 MG/1
10 TABLET, FILM COATED ORAL 3 TIMES DAILY
Qty: 3 TAB | Refills: 0 | Status: SHIPPED | OUTPATIENT
Start: 2021-01-25 | End: 2021-02-08

## 2021-01-25 NOTE — TELEPHONE ENCOUNTER
Called patient and spoke with the patient and his wife and then called son to reiterate instructions. Last dose of valsartan, aldactone, metformin, HCTZ on 1/30. Start hydralazine 1/31. Pt to be admitted 2/1 for surgery 1/2.

## 2021-01-29 ENCOUNTER — TELEPHONE (OUTPATIENT)
Dept: FAMILY MEDICINE CLINIC | Age: 81
End: 2021-01-29

## 2021-01-29 ENCOUNTER — HOSPITAL ENCOUNTER (OUTPATIENT)
Dept: PREADMISSION TESTING | Age: 81
Discharge: HOME | DRG: 236 | End: 2021-01-29
Payer: MEDICARE

## 2021-01-29 PROCEDURE — U0003 INFECTIOUS AGENT DETECTION BY NUCLEIC ACID (DNA OR RNA); SEVERE ACUTE RESPIRATORY SYNDROME CORONAVIRUS 2 (SARS-COV-2) (CORONAVIRUS DISEASE [COVID-19]), AMPLIFIED PROBE TECHNIQUE, MAKING USE OF HIGH THROUGHPUT TECHNOLOGIES AS DESCRIBED BY CMS-2020-01-R: HCPCS

## 2021-01-29 NOTE — TELEPHONE ENCOUNTER
[facility]. Patient is having a Cabg next week @ [facility].   Please call @[phone].

## 2021-01-30 LAB — SARS-COV-2, COV2NT: NOT DETECTED

## 2021-02-01 ENCOUNTER — HOSPITAL ENCOUNTER (INPATIENT)
Age: 81
LOS: 7 days | Discharge: NURSING, REHAB OR LONG-TERM CARE | DRG: 236 | End: 2021-02-08
Attending: THORACIC SURGERY (CARDIOTHORACIC VASCULAR SURGERY) | Admitting: THORACIC SURGERY (CARDIOTHORACIC VASCULAR SURGERY)
Payer: MEDICARE

## 2021-02-01 ENCOUNTER — APPOINTMENT (OUTPATIENT)
Dept: GENERAL RADIOLOGY | Age: 81
DRG: 236 | End: 2021-02-01
Attending: NURSE PRACTITIONER
Payer: MEDICARE

## 2021-02-01 DIAGNOSIS — E11.65 TYPE 2 DIABETES MELLITUS WITH HYPERGLYCEMIA, WITHOUT LONG-TERM CURRENT USE OF INSULIN (HCC): ICD-10-CM

## 2021-02-01 DIAGNOSIS — I25.10 CORONARY ARTERY DISEASE INVOLVING NATIVE CORONARY ARTERY OF NATIVE HEART WITHOUT ANGINA PECTORIS: ICD-10-CM

## 2021-02-01 DIAGNOSIS — I10 ESSENTIAL HYPERTENSION: Chronic | ICD-10-CM

## 2021-02-01 DIAGNOSIS — R00.0 TACHYCARDIA: ICD-10-CM

## 2021-02-01 DIAGNOSIS — I25.10 ASHD (ARTERIOSCLEROTIC HEART DISEASE): ICD-10-CM

## 2021-02-01 DIAGNOSIS — Z95.1 S/P CABG X 3: ICD-10-CM

## 2021-02-01 LAB
ALBUMIN SERPL-MCNC: 3.5 G/DL (ref 3.5–5)
ALBUMIN/GLOB SERPL: 0.8 {RATIO} (ref 1.1–2.2)
ALP SERPL-CCNC: 80 U/L (ref 45–117)
ALT SERPL-CCNC: 28 U/L (ref 12–78)
ANION GAP SERPL CALC-SCNC: 5 MMOL/L (ref 5–15)
APTT PPP: 24.2 SEC (ref 22.1–31)
ARTERIAL PATENCY WRIST A: ABNORMAL
AST SERPL-CCNC: 29 U/L (ref 15–37)
BASE EXCESS BLD CALC-SCNC: 6 MMOL/L
BASOPHILS # BLD: 0 K/UL (ref 0–0.1)
BASOPHILS NFR BLD: 0 % (ref 0–1)
BDY SITE: ABNORMAL
BILIRUB SERPL-MCNC: 0.4 MG/DL (ref 0.2–1)
BNP SERPL-MCNC: 340 PG/ML
BUN SERPL-MCNC: 39 MG/DL (ref 6–20)
BUN/CREAT SERPL: 27 (ref 12–20)
CA-I BLD-SCNC: 1.27 MMOL/L (ref 1.12–1.32)
CALCIUM SERPL-MCNC: 9.6 MG/DL (ref 8.5–10.1)
CHLORIDE SERPL-SCNC: 101 MMOL/L (ref 97–108)
CO2 SERPL-SCNC: 31 MMOL/L (ref 21–32)
CREAT SERPL-MCNC: 1.46 MG/DL (ref 0.7–1.3)
DIFFERENTIAL METHOD BLD: ABNORMAL
EOSINOPHIL # BLD: 0.3 K/UL (ref 0–0.4)
EOSINOPHIL NFR BLD: 3 % (ref 0–7)
ERYTHROCYTE [DISTWIDTH] IN BLOOD BY AUTOMATED COUNT: 14.1 % (ref 11.5–14.5)
GAS FLOW.O2 O2 DELIVERY SYS: ABNORMAL L/MIN
GLOBULIN SER CALC-MCNC: 4.6 G/DL (ref 2–4)
GLUCOSE SERPL-MCNC: 146 MG/DL (ref 65–100)
HCO3 BLD-SCNC: 30.2 MMOL/L (ref 22–26)
HCT VFR BLD AUTO: 32.2 % (ref 36.6–50.3)
HGB BLD-MCNC: 10 G/DL (ref 12.1–17)
HISTORY CHECKED?,CKHIST: NORMAL
IMM GRANULOCYTES # BLD AUTO: 0 K/UL (ref 0–0.04)
IMM GRANULOCYTES NFR BLD AUTO: 0 % (ref 0–0.5)
INR PPP: 1.1 (ref 0.9–1.1)
LYMPHOCYTES # BLD: 1.7 K/UL (ref 0.8–3.5)
LYMPHOCYTES NFR BLD: 20 % (ref 12–49)
MAGNESIUM SERPL-MCNC: 2.1 MG/DL (ref 1.6–2.4)
MCH RBC QN AUTO: 27.7 PG (ref 26–34)
MCHC RBC AUTO-ENTMCNC: 31.1 G/DL (ref 30–36.5)
MCV RBC AUTO: 89.2 FL (ref 80–99)
MONOCYTES # BLD: 0.8 K/UL (ref 0–1)
MONOCYTES NFR BLD: 9 % (ref 5–13)
NEUTS SEG # BLD: 5.7 K/UL (ref 1.8–8)
NEUTS SEG NFR BLD: 68 % (ref 32–75)
NRBC # BLD: 0 K/UL (ref 0–0.01)
NRBC BLD-RTO: 0 PER 100 WBC
PCO2 BLD: 47.7 MMHG (ref 35–45)
PH BLD: 7.41 [PH] (ref 7.35–7.45)
PLATELET # BLD AUTO: 244 K/UL (ref 150–400)
PMV BLD AUTO: 10.6 FL (ref 8.9–12.9)
PO2 BLD: 83 MMHG (ref 80–100)
POTASSIUM SERPL-SCNC: 4 MMOL/L (ref 3.5–5.1)
PROT SERPL-MCNC: 8.1 G/DL (ref 6.4–8.2)
PROTHROMBIN TIME: 11.1 SEC (ref 9–11.1)
RBC # BLD AUTO: 3.61 M/UL (ref 4.1–5.7)
SAO2 % BLD: 96 % (ref 92–97)
SODIUM SERPL-SCNC: 137 MMOL/L (ref 136–145)
SPECIMEN TYPE: ABNORMAL
THERAPEUTIC RANGE,PTTT: NORMAL SECS (ref 58–77)
TOTAL RESP. RATE, ITRR: 16
TSH SERPL DL<=0.05 MIU/L-ACNC: 4.1 UIU/ML (ref 0.36–3.74)
WBC # BLD AUTO: 8.5 K/UL (ref 4.1–11.1)

## 2021-02-01 PROCEDURE — 85025 COMPLETE CBC W/AUTO DIFF WBC: CPT

## 2021-02-01 PROCEDURE — 86923 COMPATIBILITY TEST ELECTRIC: CPT

## 2021-02-01 PROCEDURE — 83880 ASSAY OF NATRIURETIC PEPTIDE: CPT

## 2021-02-01 PROCEDURE — 74011250637 HC RX REV CODE- 250/637: Performed by: NURSE PRACTITIONER

## 2021-02-01 PROCEDURE — 82803 BLOOD GASES ANY COMBINATION: CPT

## 2021-02-01 PROCEDURE — 65660000000 HC RM CCU STEPDOWN

## 2021-02-01 PROCEDURE — 86901 BLOOD TYPING SEROLOGIC RH(D): CPT

## 2021-02-01 PROCEDURE — 85730 THROMBOPLASTIN TIME PARTIAL: CPT

## 2021-02-01 PROCEDURE — 83036 HEMOGLOBIN GLYCOSYLATED A1C: CPT

## 2021-02-01 PROCEDURE — 84443 ASSAY THYROID STIM HORMONE: CPT

## 2021-02-01 PROCEDURE — 74011000250 HC RX REV CODE- 250: Performed by: NURSE PRACTITIONER

## 2021-02-01 PROCEDURE — 85610 PROTHROMBIN TIME: CPT

## 2021-02-01 PROCEDURE — 36415 COLL VENOUS BLD VENIPUNCTURE: CPT

## 2021-02-01 PROCEDURE — 36600 WITHDRAWAL OF ARTERIAL BLOOD: CPT

## 2021-02-01 PROCEDURE — 74011250636 HC RX REV CODE- 250/636: Performed by: THORACIC SURGERY (CARDIOTHORACIC VASCULAR SURGERY)

## 2021-02-01 PROCEDURE — 71046 X-RAY EXAM CHEST 2 VIEWS: CPT

## 2021-02-01 PROCEDURE — 80053 COMPREHEN METABOLIC PANEL: CPT

## 2021-02-01 PROCEDURE — 83735 ASSAY OF MAGNESIUM: CPT

## 2021-02-01 RX ORDER — SODIUM CHLORIDE 0.9 % (FLUSH) 0.9 %
5-40 SYRINGE (ML) INJECTION AS NEEDED
Status: DISCONTINUED | OUTPATIENT
Start: 2021-02-01 | End: 2021-02-02

## 2021-02-01 RX ORDER — SODIUM CHLORIDE 9 MG/ML
75 INJECTION, SOLUTION INTRAVENOUS CONTINUOUS
Status: DISCONTINUED | OUTPATIENT
Start: 2021-02-01 | End: 2021-02-02

## 2021-02-01 RX ORDER — MUPIROCIN 20 MG/G
OINTMENT TOPICAL 2 TIMES DAILY
Status: DISCONTINUED | OUTPATIENT
Start: 2021-02-01 | End: 2021-02-02

## 2021-02-01 RX ORDER — SODIUM CHLORIDE 0.9 % (FLUSH) 0.9 %
5-40 SYRINGE (ML) INJECTION EVERY 8 HOURS
Status: DISCONTINUED | OUTPATIENT
Start: 2021-02-01 | End: 2021-02-02

## 2021-02-01 RX ORDER — CHLORHEXIDINE GLUCONATE 1.2 MG/ML
15 RINSE ORAL EVERY 12 HOURS
Status: DISCONTINUED | OUTPATIENT
Start: 2021-02-01 | End: 2021-02-02

## 2021-02-01 RX ORDER — AMIODARONE HYDROCHLORIDE 200 MG/1
400 TABLET ORAL EVERY 12 HOURS
Status: DISCONTINUED | OUTPATIENT
Start: 2021-02-01 | End: 2021-02-02

## 2021-02-01 RX ORDER — ASPIRIN 81 MG/1
81 TABLET ORAL DAILY
Status: DISCONTINUED | OUTPATIENT
Start: 2021-02-02 | End: 2021-02-02

## 2021-02-01 RX ORDER — ROSUVASTATIN CALCIUM 20 MG/1
20 TABLET, COATED ORAL
Status: DISCONTINUED | OUTPATIENT
Start: 2021-02-01 | End: 2021-02-02

## 2021-02-01 RX ORDER — DEXMEDETOMIDINE HYDROCHLORIDE 4 UG/ML
.1-1.5 INJECTION, SOLUTION INTRAVENOUS
Status: DISCONTINUED | OUTPATIENT
Start: 2021-02-02 | End: 2021-02-02

## 2021-02-01 RX ORDER — METOPROLOL TARTRATE 25 MG/1
25 TABLET, FILM COATED ORAL 2 TIMES DAILY
Status: DISCONTINUED | OUTPATIENT
Start: 2021-02-01 | End: 2021-02-02

## 2021-02-01 RX ADMIN — ROSUVASTATIN 20 MG: 20 TABLET, FILM COATED ORAL at 21:15

## 2021-02-01 RX ADMIN — AMIODARONE HYDROCHLORIDE 400 MG: 200 TABLET ORAL at 21:15

## 2021-02-01 RX ADMIN — METOPROLOL TARTRATE 25 MG: 25 TABLET, FILM COATED ORAL at 21:15

## 2021-02-01 RX ADMIN — MUPIROCIN: 20 OINTMENT TOPICAL at 17:53

## 2021-02-01 RX ADMIN — CHLORHEXIDINE GLUCONATE 0.12% ORAL RINSE 15 ML: 1.2 LIQUID ORAL at 17:53

## 2021-02-01 RX ADMIN — Medication 10 ML: at 21:15

## 2021-02-01 RX ADMIN — SODIUM CHLORIDE 75 ML/HR: 9 INJECTION, SOLUTION INTRAVENOUS at 17:50

## 2021-02-01 RX ADMIN — Medication 10 ML: at 17:56

## 2021-02-01 NOTE — H&P
CSS   History and Physical    Subjective:   **information obtained from previous consult note -no changes     Gregorio Encarnacion is a [de-identified] y.o. male who was referred for cardiac evaluation by Dr. Mindi Hanks for CAD. He has had SOB with exertion that is better with rest. He is also complaining of LE edema. He denies CP, orthopnea/PND, claudication. He does have hip pain that limits his activity.      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      He is a former smoker, drinks alcohol occasionally. He has a family history of heart disease and stroke. He lives with his wife.         Cardiac Testing     Cardiac catheterization: Diagnostic  Dominance: Right  Left Main   The vessel was visualized by angiography. The vessel is angiographically normal.   Left Anterior Descending   The vessel is moderate in size. The vessel is tortuous. Prox LAD lesion, 80% stenosed. Mid LAD lesion, 85% stenosed. Dist LAD lesion, 90% stenosed. Left Circumflex   Prox Cx lesion, 80% stenosed. First Obtuse Marginal Branch   1st Mrg lesion, 70% stenosed. Right Coronary Artery   The vessel is moderate in size. The vessel is tortuous. Prox RCA lesion, 90% stenosed. Mid RCA lesion, 80% stenosed.      ECHO:   · LV: Estimated LVEF is 55 - 60%. Normal systolic function (ejection fraction normal) and diastolic function. Small left ventricle. Mild concentric hypertrophy. Wall motion: normal.  · LA: There is a small 10 mm x 6 mm spherical calcified undetermined mass within the left atrial appendage. Consider further imaging with CARLOS and/ or cardiac MRI if clinically indicated. · AV: Mild focal aortic valve leaflet calcification present without reduced excursion. Mild aortic valve regurgitation is present. · MV: Mitral valve non-specific thickening.   · TV: Mild tricuspid valve regurgitation is present.      Echo Findings     Left Ventricle Normal systolic function (ejection fraction normal) and diastolic function. Small left ventricle. Mild concentric hypertrophy.  Wall motion: normal. The estimated EF is 55 - 60%. Left Atrium Normal cavity size. There is a small  10 mm x 6 mm spherical calcified undetermined mass within the left atrial appendage. Right Ventricle Normal cavity size and global systolic function. Right Atrium Normal cavity size. Aortic Valve Trileaflet valve structure and no stenosis. There is mild focal leaflet calcification without reduced excursion. Normal aortic leaflet mobility.  Mild aortic valve regurgitation. Mitral Valve No stenosis. Mitral valve non-specific thickening. Trace regurgitation. Tricuspid Valve Normal valve structure and no stenosis. Mild regurgitation. Pulmonic Valve Normal valve structure and no stenosis. Mild regurgitation. Aorta Normal aortic root and ascending aorta. Pulmonary Artery Pulmonary arterial systolic pressure (PASP) is 32 mmHg. Pulmonary hypertension not suggested by Doppler findings. Pericardium No evidence of pericardial effusion. Past Medical History:   Diagnosis Date    Arthritis     Diabetes ([facility] [address]     Diabetic neuropathy ([facility] [address]     Hypercholesterolemia     Hypertension 11/29/10    Shingles 2010    [facility]embolus [facility])      Past Surgical History:   Procedure Laterality Date    HX CARPAL TUNNEL RELEASE  left    HX HEART CATHETERIZATION  2005    Dr Lafonda Sacks      left arm fracture    [place] COLONOSCOPY FLX DX W/COLLJ SPEC WHEN PFRMD  2012           Social History     Tobacco Use    Smoking status: Former Smoker     Packs/day: 0.50     Years: 20.00     Pack years: 10.00     Types: Cigarettes     Quit date: 2000     Years since quittin.1    Smokeless tobacco: Never Used    Tobacco comment: 15 years ago   Substance Use Topics    Alcohol use: Yes     Comment: occ.       Family History   Problem Relation Age of Onset    Stroke Father     Heart Disease Brother     Cancer Brother      Prior to Admission medications    Medication Sig Start Date End Date Taking? Authorizing Provider   hydrALAZINE (APRESOLINE) 10 mg tablet Take 1 Tab by mouth three (3) times daily. 1/25/21   Sina Haney NP   metFORMIN (GLUCOPHAGE) 1,000 mg tablet TAKE 1 TABLET BY MOUTH TWICE DAILY WITH MEALS 1/22/21   Rahel LONG NP   metoprolol succinate (TOPROL-XL) 25 mg XL tablet Take 1 Tab by mouth daily. 1/15/21   Marlee Jacobs MD   rosuvastatin (CRESTOR) 20 mg tablet One daily for cholesterol 12/7/20   Anny Rubi MD   glimepiride (AMARYL) 4 mg tablet One daily for diabetes 12/7/20   Anny Rubi MD   hydroCHLOROthiazide (HYDRODIURIL) 12.5 mg tablet TAKE 1 TABLET BY MOUTH EVERY DAY FOR BLOOD PRESSURE 12/7/20   Anny Rubi MD   spironolactone (ALDACTONE) 25 mg tablet Take 1 Tab by mouth daily. For blood pressure 12/7/20   Anny Rubi MD   furosemide (LASIX) 80 mg tablet 1-2 tabs po every day as needed for swelling in legs. 10/26/20   Anny Rubi MD   valsartan (DIOVAN) 160 mg tablet TAKE 1 TABLET BY MOUTH EVERY DAY for blood pressure 10/26/20   Anny Rubi MD   piroxicam (FELDENE) 10 mg capsule Take 1 Cap by mouth daily. For back and hip pain 10/26/20   Anny Rubi MD   sildenafiL, pulmonary hypertension, (REVATIO) 20 mg tablet 2 tabs po prn, on an empty stomach, for erectile dysfucntion 10/26/20   Anny Rubi MD   aspirin delayed-release 81 mg tablet Take 1 Tab by mouth daily. To prevent heart attack and stroke 6/9/16   Anny Rubi MD       Allergies   Allergen Reactions    Lipitor [Atorvastatin] Diarrhea       Review of Systems: pertinent positives in HPI  Consititutional: Denies fever or chills. Eyes:  Denies use of glasses or vision problems(cataracts). ENT:  Denies hearing or swallowing difficulty. CV: Denies CP, claudication, HTN. Resp: Denies dyspnea, productive cough. : Denies dialysis or kidney problems.   GI: Denies ulcers, esophageal strictures, liver problems. M/S: Denies joint or bone problems, or implanted artificial hardware. Skin: Denies varicose veins, edema. Neuro: Denies strokes, or TIAs. Psych: Denies anxiety or depression. Endocrine: Denies thyroid problems or diabetes. Heme/Lymphatic: Denies easy bruising or lymphedema. Objective:     VS:     Physical Exam:    General appearance: alert, cooperative, no distress  Head: normocephalic, without obvious abnormality; atraumatic  Eyes: conjunctivae/corneas clear; EOM's intact. Nose: nares normal; no drainage. Neck: no carotid bruit and no JVD  Lungs: clear to auscultation bilaterally  Heart: regular rate and rhythm; no murmur  Abdomen: soft, non-tender; bowel sounds normal  Extremities: moves all extremities; no weakness. Skin: Skin color normal; No varicose veins or 3-4+ LE edema. Neurologic: Grossly normal      Labs: No results for input(s): WBC, HGB, HCT, PLT, NA, K, BUN, CREA, GLU, GLUCPOC, INR, HGBEXT, HCTEXT, PLTEXT, INREXT, HGBEXT, HCTEXT, PLTEXT, INREXT in the last 72 hours. No lab exists for component: GLPOC    Diagnostics:   PA and lateral: pending    Carotid doppler:  Right Carotid    There is mild stenosis in the right CCA. The right CCA has mild and heterogeneous plaque. There is mild stenosis in the right ICA (<50%) and at the proximal segment. The right ICA has mild and heterogeneous plaque. There is mild stenosis in the right ECA. The right ECA has mild and heterogeneous plaque. The right vertebral is antegrade. The right subclavian with triphasic waveforms. Left Carotid    There is mild stenosis in the left CCA. The left CCA has mild and heterogeneous plaque. There is mild stenosis in the left ICA (<50%) and at the proximal segment . The left ICA has mild and heterogeneous plaque. There is mild stenosis in the left ECA. The left ECA has mild and heterogeneous plaque. The left vertebral flow is bidirectional. The left subclavian with monophasic waveforms. PFTS-FEV1: pending    EKG: pending    Assessment:     Active Problems:    ASHD (arteriosclerotic heart disease) (1/15/2021)        Plan:   The risk and benefit of surgery were reviewed with patient and family and all questions answered and the patient wishes to proceed. Risk include infection, bleeding, stroke, heart attack, irregular heart rhythm, kidney failure and death. The patient was instructed to take Last dose of valsartan, aldactone, metformin, HCTZ on 1/30. Start hydralazine 1/31 . Surgery is scheduled for 2/2/21. STS Risk Calculator V2.81 - Discussed by surgeon with patient. Procedure: Isolated CAB CALCULATE   Risk of Mortality:  2.057%    Renal Failure:  6.913%    Permanent Stroke:  3.766%    Prolonged Ventilation:  8.966%    DSW Infection:  0.146%    Reoperation:  1.883%    Morbidity or Mortality:  18.728%    Short Length of Stay:  21.156%    Long Length of Stay:  10.462%         Treatment Plan:    1. CAD: On ASA, statin, BB. Preop workup in process, surgical plans per Dr. Lanny Rodriguez. 2. HTN: On BB, HCTZ, aldactone, valsartan. Hold HCTZ, aldactone, valsartan 48 hrs preop. 3. HLD: on statin  4. DM: on metformin, A1C 6.9 in 11/2020  5. LE edema/venous reflux: On lasix  6. Elevated Cr: Most recent Cr 1.99, will need IV fluids preop for hydration.          Signed By: Jimmie Birch NP     February 1, 2021

## 2021-02-02 ENCOUNTER — HOSPITAL ENCOUNTER (OUTPATIENT)
Dept: NON INVASIVE DIAGNOSTICS | Age: 81
Discharge: HOME | End: 2021-02-02
Attending: THORACIC SURGERY (CARDIOTHORACIC VASCULAR SURGERY)

## 2021-02-02 ENCOUNTER — ANESTHESIA (OUTPATIENT)
Dept: CARDIOTHORACIC SURGERY | Age: 81
DRG: 236 | End: 2021-02-02
Payer: MEDICARE

## 2021-02-02 ENCOUNTER — APPOINTMENT (OUTPATIENT)
Dept: GENERAL RADIOLOGY | Age: 81
DRG: 236 | End: 2021-02-02
Attending: PHYSICIAN ASSISTANT
Payer: MEDICARE

## 2021-02-02 ENCOUNTER — ANESTHESIA EVENT (OUTPATIENT)
Dept: CARDIOTHORACIC SURGERY | Age: 81
DRG: 236 | End: 2021-02-02
Payer: MEDICARE

## 2021-02-02 PROBLEM — Z95.1 S/P CABG X 3: Status: ACTIVE | Noted: 2021-02-02

## 2021-02-02 LAB
ADMINISTERED INITIALS, ADMINIT: NORMAL
ALBUMIN SERPL-MCNC: 3 G/DL (ref 3.5–5)
ALBUMIN/GLOB SERPL: 1.1 {RATIO} (ref 1.1–2.2)
ALP SERPL-CCNC: 51 U/L (ref 45–117)
ALT SERPL-CCNC: 18 U/L (ref 12–78)
ANION GAP SERPL CALC-SCNC: 5 MMOL/L (ref 5–15)
ANION GAP SERPL CALC-SCNC: 7 MMOL/L (ref 5–15)
APPEARANCE UR: CLEAR
APTT PPP: 24.7 SEC (ref 22.1–31)
ARTERIAL PATENCY WRIST A: ABNORMAL
ARTERIAL PATENCY WRIST A: ABNORMAL
AST SERPL-CCNC: 26 U/L (ref 15–37)
BACTERIA URNS QL MICRO: NEGATIVE /HPF
BASE DEFICIT BLDA-SCNC: 3.4 MMOL/L
BASE EXCESS BLDA CALC-SCNC: 0 MMOL/L
BASOPHILS # BLD: 0 K/UL (ref 0–0.1)
BASOPHILS NFR BLD: 0 % (ref 0–1)
BDY SITE: ABNORMAL
BDY SITE: ABNORMAL
BILIRUB SERPL-MCNC: 0.2 MG/DL (ref 0.2–1)
BILIRUB UR QL: NEGATIVE
BREATHS.SPONTANEOUS ON VENT: 13
BUN SERPL-MCNC: 24 MG/DL (ref 6–20)
BUN SERPL-MCNC: 24 MG/DL (ref 6–20)
BUN/CREAT SERPL: 18 (ref 12–20)
BUN/CREAT SERPL: 19 (ref 12–20)
CA-I BLD-SCNC: 1.22 MMOL/L (ref 1.13–1.32)
CALCIUM SERPL-MCNC: 8.9 MG/DL (ref 8.5–10.1)
CALCIUM SERPL-MCNC: 9 MG/DL (ref 8.5–10.1)
CHLORIDE SERPL-SCNC: 109 MMOL/L (ref 97–108)
CHLORIDE SERPL-SCNC: 110 MMOL/L (ref 97–108)
CO2 SERPL-SCNC: 26 MMOL/L (ref 21–32)
CO2 SERPL-SCNC: 27 MMOL/L (ref 21–32)
COLOR UR: ABNORMAL
CREAT SERPL-MCNC: 1.25 MG/DL (ref 0.7–1.3)
CREAT SERPL-MCNC: 1.31 MG/DL (ref 0.7–1.3)
D50 ADMINISTERED, D50ADM: 0 ML
D50 ORDER, D50ORD: 0 ML
DIFFERENTIAL METHOD BLD: ABNORMAL
EOSINOPHIL # BLD: 0.1 K/UL (ref 0–0.4)
EOSINOPHIL NFR BLD: 1 % (ref 0–7)
EPITH CASTS URNS QL MICRO: ABNORMAL /LPF
ERYTHROCYTE [DISTWIDTH] IN BLOOD BY AUTOMATED COUNT: 13.4 % (ref 11.5–14.5)
EST. AVERAGE GLUCOSE BLD GHB EST-MCNC: 154 MG/DL
FIO2 ON VENT: 40 %
FIO2 ON VENT: 80 %
GAS FLOW.O2 SETTING OXYMISER: 12 L/MIN
GLOBULIN SER CALC-MCNC: 2.8 G/DL (ref 2–4)
GLSCOM COMMENTS: NORMAL
GLUCOSE BLD STRIP.AUTO-MCNC: 104 MG/DL (ref 65–100)
GLUCOSE BLD STRIP.AUTO-MCNC: 121 MG/DL (ref 65–100)
GLUCOSE BLD STRIP.AUTO-MCNC: 122 MG/DL (ref 65–100)
GLUCOSE BLD STRIP.AUTO-MCNC: 125 MG/DL (ref 65–100)
GLUCOSE BLD STRIP.AUTO-MCNC: 139 MG/DL (ref 65–100)
GLUCOSE BLD STRIP.AUTO-MCNC: 143 MG/DL (ref 65–100)
GLUCOSE BLD STRIP.AUTO-MCNC: 153 MG/DL (ref 65–100)
GLUCOSE BLD STRIP.AUTO-MCNC: 92 MG/DL (ref 65–100)
GLUCOSE SERPL-MCNC: 118 MG/DL (ref 65–100)
GLUCOSE SERPL-MCNC: 138 MG/DL (ref 65–100)
GLUCOSE UR STRIP.AUTO-MCNC: NEGATIVE MG/DL
GLUCOSE, GLC: 101 MG/DL
GLUCOSE, GLC: 102 MG/DL
GLUCOSE, GLC: 104 MG/DL
GLUCOSE, GLC: 112 MG/DL
GLUCOSE, GLC: 114 MG/DL
GLUCOSE, GLC: 117 MG/DL
GLUCOSE, GLC: 117 MG/DL
GLUCOSE, GLC: 120 MG/DL
GLUCOSE, GLC: 121 MG/DL
GLUCOSE, GLC: 122 MG/DL
GLUCOSE, GLC: 123 MG/DL
GLUCOSE, GLC: 125 MG/DL
GLUCOSE, GLC: 143 MG/DL
GLUCOSE, GLC: 153 MG/DL
GLUCOSE, GLC: 92 MG/DL
HBA1C MFR BLD: 7 % (ref 4–5.6)
HCO3 BLDA-SCNC: 23 MMOL/L (ref 22–26)
HCO3 BLDA-SCNC: 23 MMOL/L (ref 22–26)
HCT VFR BLD AUTO: 25.5 % (ref 36.6–50.3)
HCT VFR BLD AUTO: 26.3 % (ref 36.6–50.3)
HGB BLD-MCNC: 8.4 G/DL (ref 12.1–17)
HGB BLD-MCNC: 8.8 G/DL (ref 12.1–17)
HGB UR QL STRIP: NEGATIVE
HIGH TARGET, HITG: 130 MG/DL
HIGH TARGET, HITG: 140 MG/DL
HYALINE CASTS URNS QL MICRO: ABNORMAL /LPF (ref 0–5)
IMM GRANULOCYTES # BLD AUTO: 0.1 K/UL (ref 0–0.04)
IMM GRANULOCYTES NFR BLD AUTO: 1 % (ref 0–0.5)
INR PPP: 1.2 (ref 0.9–1.1)
INSULIN ADMINSTERED, INSADM: 1.3 UNITS/HOUR
INSULIN ADMINSTERED, INSADM: 1.3 UNITS/HOUR
INSULIN ADMINSTERED, INSADM: 1.6 UNITS/HOUR
INSULIN ADMINSTERED, INSADM: 1.7 UNITS/HOUR
INSULIN ADMINSTERED, INSADM: 1.7 UNITS/HOUR
INSULIN ADMINSTERED, INSADM: 1.8 UNITS/HOUR
INSULIN ADMINSTERED, INSADM: 1.9 UNITS/HOUR
INSULIN ADMINSTERED, INSADM: 2.2 UNITS/HOUR
INSULIN ADMINSTERED, INSADM: 3.1 UNITS/HOUR
INSULIN ADMINSTERED, INSADM: 3.1 UNITS/HOUR
INSULIN ADMINSTERED, INSADM: 3.3 UNITS/HOUR
INSULIN ADMINSTERED, INSADM: 3.3 UNITS/HOUR
INSULIN ADMINSTERED, INSADM: 4.7 UNITS/HOUR
INSULIN ORDER, INSORD: 1.3 UNITS/HOUR
INSULIN ORDER, INSORD: 1.3 UNITS/HOUR
INSULIN ORDER, INSORD: 1.6 UNITS/HOUR
INSULIN ORDER, INSORD: 1.7 UNITS/HOUR
INSULIN ORDER, INSORD: 1.7 UNITS/HOUR
INSULIN ORDER, INSORD: 1.8 UNITS/HOUR
INSULIN ORDER, INSORD: 1.9 UNITS/HOUR
INSULIN ORDER, INSORD: 2.2 UNITS/HOUR
INSULIN ORDER, INSORD: 3.1 UNITS/HOUR
INSULIN ORDER, INSORD: 3.1 UNITS/HOUR
INSULIN ORDER, INSORD: 3.3 UNITS/HOUR
INSULIN ORDER, INSORD: 3.3 UNITS/HOUR
INSULIN ORDER, INSORD: 4.7 UNITS/HOUR
KETONES UR QL STRIP.AUTO: NEGATIVE MG/DL
LEUKOCYTE ESTERASE UR QL STRIP.AUTO: NEGATIVE
LOW TARGET, LOT: 100 MG/DL
LOW TARGET, LOT: 95 MG/DL
LYMPHOCYTES # BLD: 0.9 K/UL (ref 0.8–3.5)
LYMPHOCYTES NFR BLD: 9 % (ref 12–49)
MAGNESIUM SERPL-MCNC: 2.6 MG/DL (ref 1.6–2.4)
MAGNESIUM SERPL-MCNC: 2.8 MG/DL (ref 1.6–2.4)
MCH RBC QN AUTO: 28.6 PG (ref 26–34)
MCHC RBC AUTO-ENTMCNC: 32.9 G/DL (ref 30–36.5)
MCV RBC AUTO: 86.7 FL (ref 80–99)
MINUTES UNTIL NEXT BG, NBG: 60 MIN
MONOCYTES # BLD: 1 K/UL (ref 0–1)
MONOCYTES NFR BLD: 10 % (ref 5–13)
MULTIPLIER, MUL: 0.03
MULTIPLIER, MUL: 0.04
MULTIPLIER, MUL: 0.05
NEUTS SEG # BLD: 7.5 K/UL (ref 1.8–8)
NEUTS SEG NFR BLD: 79 % (ref 32–75)
NITRITE UR QL STRIP.AUTO: NEGATIVE
NRBC # BLD: 0 K/UL (ref 0–0.01)
NRBC BLD-RTO: 0 PER 100 WBC
ORDER INITIALS, ORDINIT: NORMAL
PCO2 BLDA: 34 MMHG (ref 35–45)
PCO2 BLDA: 44 MMHG (ref 35–45)
PEEP RESPIRATORY: 5 CM[H2O]
PEEP RESPIRATORY: 5 CM[H2O]
PH BLDA: 7.33 [PH] (ref 7.35–7.45)
PH BLDA: 7.46 [PH] (ref 7.35–7.45)
PH UR STRIP: 7 [PH] (ref 5–8)
PLATELET # BLD AUTO: 126 K/UL (ref 150–400)
PMV BLD AUTO: 10.6 FL (ref 8.9–12.9)
PO2 BLDA: 161 MMHG (ref 80–100)
PO2 BLDA: 280 MMHG (ref 80–100)
POTASSIUM SERPL-SCNC: 3.7 MMOL/L (ref 3.5–5.1)
POTASSIUM SERPL-SCNC: 4.5 MMOL/L (ref 3.5–5.1)
PRESSURE SUPPORT SETTING VENT: 10 CM[H2O]
PRESSURE SUPPORT SETTING VENT: 6 CM[H2O]
PROT SERPL-MCNC: 5.8 G/DL (ref 6.4–8.2)
PROT UR STRIP-MCNC: 30 MG/DL
PROTHROMBIN TIME: 12.7 SEC (ref 9–11.1)
RBC # BLD AUTO: 2.94 M/UL (ref 4.1–5.7)
RBC #/AREA URNS HPF: ABNORMAL /HPF (ref 0–5)
SAO2 % BLD: 100 % (ref 92–97)
SAO2 % BLD: 99 % (ref 92–97)
SAO2% DEVICE SAO2% SENSOR NAME: ABNORMAL
SAO2% DEVICE SAO2% SENSOR NAME: ABNORMAL
SERVICE CMNT-IMP: ABNORMAL
SERVICE CMNT-IMP: NORMAL
SODIUM SERPL-SCNC: 142 MMOL/L (ref 136–145)
SODIUM SERPL-SCNC: 142 MMOL/L (ref 136–145)
SP GR UR REFRACTOMETRY: 1.02 (ref 1–1.03)
SPECIMEN SITE: ABNORMAL
SPECIMEN SITE: ABNORMAL
THERAPEUTIC RANGE,PTTT: NORMAL SECS (ref 58–77)
UA: UC IF INDICATED,UAUC: ABNORMAL
UROBILINOGEN UR QL STRIP.AUTO: 1 EU/DL (ref 0.2–1)
VENTILATION MODE VENT: ABNORMAL
VENTILATION MODE VENT: ABNORMAL
VT SETTING VENT: 550 ML
WBC # BLD AUTO: 9.5 K/UL (ref 4.1–11.1)
WBC URNS QL MICRO: ABNORMAL /HPF (ref 0–4)

## 2021-02-02 PROCEDURE — 85730 THROMBOPLASTIN TIME PARTIAL: CPT

## 2021-02-02 PROCEDURE — 77030005401 HC CATH RAD ARRO -A: Performed by: NURSE ANESTHETIST, CERTIFIED REGISTERED

## 2021-02-02 PROCEDURE — 77030006689 HC BLD OPHTH BVR BD -A: Performed by: THORACIC SURGERY (CARDIOTHORACIC VASCULAR SURGERY)

## 2021-02-02 PROCEDURE — 74011250637 HC RX REV CODE- 250/637: Performed by: THORACIC SURGERY (CARDIOTHORACIC VASCULAR SURGERY)

## 2021-02-02 PROCEDURE — 77030014008 HC SPNG HEMSTAT J&J -C: Performed by: THORACIC SURGERY (CARDIOTHORACIC VASCULAR SURGERY)

## 2021-02-02 PROCEDURE — 77030002996 HC SUT SLK J&J -A: Performed by: THORACIC SURGERY (CARDIOTHORACIC VASCULAR SURGERY)

## 2021-02-02 PROCEDURE — 021109W BYPASS CORONARY ARTERY, TWO ARTERIES FROM AORTA WITH AUTOLOGOUS VENOUS TISSUE, OPEN APPROACH: ICD-10-PCS | Performed by: THORACIC SURGERY (CARDIOTHORACIC VASCULAR SURGERY)

## 2021-02-02 PROCEDURE — 77030018835 HC SOL IRR LR ICUM -A: Performed by: THORACIC SURGERY (CARDIOTHORACIC VASCULAR SURGERY)

## 2021-02-02 PROCEDURE — 77030010605 HC BLWR MR MAL MEDT -B: Performed by: THORACIC SURGERY (CARDIOTHORACIC VASCULAR SURGERY)

## 2021-02-02 PROCEDURE — P9045 ALBUMIN (HUMAN), 5%, 250 ML: HCPCS | Performed by: PHYSICIAN ASSISTANT

## 2021-02-02 PROCEDURE — 81001 URINALYSIS AUTO W/SCOPE: CPT

## 2021-02-02 PROCEDURE — 77030012390 HC DRN CHST BTL GTNG -B: Performed by: THORACIC SURGERY (CARDIOTHORACIC VASCULAR SURGERY)

## 2021-02-02 PROCEDURE — 94002 VENT MGMT INPAT INIT DAY: CPT

## 2021-02-02 PROCEDURE — 77030021678 HC GLIDESCP STAT DISP VERT -B: Performed by: NURSE ANESTHETIST, CERTIFIED REGISTERED

## 2021-02-02 PROCEDURE — 74011000258 HC RX REV CODE- 258: Performed by: THORACIC SURGERY (CARDIOTHORACIC VASCULAR SURGERY)

## 2021-02-02 PROCEDURE — 93005 ELECTROCARDIOGRAM TRACING: CPT

## 2021-02-02 PROCEDURE — 80053 COMPREHEN METABOLIC PANEL: CPT

## 2021-02-02 PROCEDURE — 77030040504 HC DRN WND MDII -B: Performed by: THORACIC SURGERY (CARDIOTHORACIC VASCULAR SURGERY)

## 2021-02-02 PROCEDURE — 77030010797: Performed by: THORACIC SURGERY (CARDIOTHORACIC VASCULAR SURGERY)

## 2021-02-02 PROCEDURE — 74011000258 HC RX REV CODE- 258: Performed by: NURSE ANESTHETIST, CERTIFIED REGISTERED

## 2021-02-02 PROCEDURE — 76060000039 HC ANESTHESIA 4 TO 4.5 HR: Performed by: THORACIC SURGERY (CARDIOTHORACIC VASCULAR SURGERY)

## 2021-02-02 PROCEDURE — 77030020263 HC SOL INJ SOD CL0.9% LFCR 1000ML: Performed by: THORACIC SURGERY (CARDIOTHORACIC VASCULAR SURGERY)

## 2021-02-02 PROCEDURE — 76010000114 HC CV SURG 4 TO 4.5 HR: Performed by: THORACIC SURGERY (CARDIOTHORACIC VASCULAR SURGERY)

## 2021-02-02 PROCEDURE — 77030022199 HC SYS HARV VESL GTNG -G1: Performed by: THORACIC SURGERY (CARDIOTHORACIC VASCULAR SURGERY)

## 2021-02-02 PROCEDURE — 77030010516 HC APPL HEMA CLP TELE -B: Performed by: THORACIC SURGERY (CARDIOTHORACIC VASCULAR SURGERY)

## 2021-02-02 PROCEDURE — 77030041076 HC DRSG AG OPTICELL MDII -A: Performed by: THORACIC SURGERY (CARDIOTHORACIC VASCULAR SURGERY)

## 2021-02-02 PROCEDURE — 65620000000 HC RM CCU GENERAL

## 2021-02-02 PROCEDURE — 77030018729 HC ELECTRD DEFIB PAD CARD -B: Performed by: THORACIC SURGERY (CARDIOTHORACIC VASCULAR SURGERY)

## 2021-02-02 PROCEDURE — 85018 HEMOGLOBIN: CPT

## 2021-02-02 PROCEDURE — 74011636637 HC RX REV CODE- 636/637: Performed by: THORACIC SURGERY (CARDIOTHORACIC VASCULAR SURGERY)

## 2021-02-02 PROCEDURE — 36415 COLL VENOUS BLD VENIPUNCTURE: CPT

## 2021-02-02 PROCEDURE — 2709999900 HC NON-CHARGEABLE SUPPLY: Performed by: THORACIC SURGERY (CARDIOTHORACIC VASCULAR SURGERY)

## 2021-02-02 PROCEDURE — 77030041244 HC CBL PACE EXT TEMP REMG -B: Performed by: THORACIC SURGERY (CARDIOTHORACIC VASCULAR SURGERY)

## 2021-02-02 PROCEDURE — 74011250636 HC RX REV CODE- 250/636: Performed by: PHYSICIAN ASSISTANT

## 2021-02-02 PROCEDURE — 74011000258 HC RX REV CODE- 258: Performed by: PHYSICIAN ASSISTANT

## 2021-02-02 PROCEDURE — 77030019579 HC CBL PACE DISP REMG -B: Performed by: THORACIC SURGERY (CARDIOTHORACIC VASCULAR SURGERY)

## 2021-02-02 PROCEDURE — 74011000250 HC RX REV CODE- 250: Performed by: THORACIC SURGERY (CARDIOTHORACIC VASCULAR SURGERY)

## 2021-02-02 PROCEDURE — 77030008771 HC TU NG SALEM SUMP -A: Performed by: NURSE ANESTHETIST, CERTIFIED REGISTERED

## 2021-02-02 PROCEDURE — 74011250637 HC RX REV CODE- 250/637: Performed by: PHYSICIAN ASSISTANT

## 2021-02-02 PROCEDURE — 2709999900 HC NON-CHARGEABLE SUPPLY

## 2021-02-02 PROCEDURE — 77030002933 HC SUT MCRYL J&J -A: Performed by: THORACIC SURGERY (CARDIOTHORACIC VASCULAR SURGERY)

## 2021-02-02 PROCEDURE — C1713 ANCHOR/SCREW BN/BN,TIS/BN: HCPCS | Performed by: THORACIC SURGERY (CARDIOTHORACIC VASCULAR SURGERY)

## 2021-02-02 PROCEDURE — 77030018673: Performed by: THORACIC SURGERY (CARDIOTHORACIC VASCULAR SURGERY)

## 2021-02-02 PROCEDURE — 74011250636 HC RX REV CODE- 250/636: Performed by: NURSE ANESTHETIST, CERTIFIED REGISTERED

## 2021-02-02 PROCEDURE — 85610 PROTHROMBIN TIME: CPT

## 2021-02-02 PROCEDURE — 82962 GLUCOSE BLOOD TEST: CPT

## 2021-02-02 PROCEDURE — 06BP4ZZ EXCISION OF RIGHT SAPHENOUS VEIN, PERCUTANEOUS ENDOSCOPIC APPROACH: ICD-10-PCS | Performed by: THORACIC SURGERY (CARDIOTHORACIC VASCULAR SURGERY)

## 2021-02-02 PROCEDURE — 77030026438 HC STYL ET INTUB CARD -A: Performed by: NURSE ANESTHETIST, CERTIFIED REGISTERED

## 2021-02-02 PROCEDURE — 77030008517 HC TBNG INSUF ENDO STOR -B: Performed by: THORACIC SURGERY (CARDIOTHORACIC VASCULAR SURGERY)

## 2021-02-02 PROCEDURE — 77030033150: Performed by: THORACIC SURGERY (CARDIOTHORACIC VASCULAR SURGERY)

## 2021-02-02 PROCEDURE — 74011250636 HC RX REV CODE- 250/636: Performed by: THORACIC SURGERY (CARDIOTHORACIC VASCULAR SURGERY)

## 2021-02-02 PROCEDURE — 77030008684 HC TU ET CUF COVD -B: Performed by: NURSE ANESTHETIST, CERTIFIED REGISTERED

## 2021-02-02 PROCEDURE — 77030003010 HC SUT SURG STL J&J -B: Performed by: THORACIC SURGERY (CARDIOTHORACIC VASCULAR SURGERY)

## 2021-02-02 PROCEDURE — 02100Z9 BYPASS CORONARY ARTERY, ONE ARTERY FROM LEFT INTERNAL MAMMARY, OPEN APPROACH: ICD-10-PCS | Performed by: THORACIC SURGERY (CARDIOTHORACIC VASCULAR SURGERY)

## 2021-02-02 PROCEDURE — C1751 CATH, INF, PER/CENT/MIDLINE: HCPCS | Performed by: NURSE ANESTHETIST, CERTIFIED REGISTERED

## 2021-02-02 PROCEDURE — 77030013861 HC PNCH AORT CLNCUT QUES -B: Performed by: THORACIC SURGERY (CARDIOTHORACIC VASCULAR SURGERY)

## 2021-02-02 PROCEDURE — 77030010389 HC WRE ATR PACE AEMC -B: Performed by: THORACIC SURGERY (CARDIOTHORACIC VASCULAR SURGERY)

## 2021-02-02 PROCEDURE — 85025 COMPLETE CBC W/AUTO DIFF WBC: CPT

## 2021-02-02 PROCEDURE — 77030002986 HC SUT PROL J&J -A: Performed by: THORACIC SURGERY (CARDIOTHORACIC VASCULAR SURGERY)

## 2021-02-02 PROCEDURE — 77030013798 HC KT TRNSDUC PRSSR EDWD -B: Performed by: NURSE ANESTHETIST, CERTIFIED REGISTERED

## 2021-02-02 PROCEDURE — 77030010819: Performed by: THORACIC SURGERY (CARDIOTHORACIC VASCULAR SURGERY)

## 2021-02-02 PROCEDURE — 77030019908 HC STETH ESOPH SIMS -A: Performed by: NURSE ANESTHETIST, CERTIFIED REGISTERED

## 2021-02-02 PROCEDURE — 5A1221Z PERFORMANCE OF CARDIAC OUTPUT, CONTINUOUS: ICD-10-PCS | Performed by: THORACIC SURGERY (CARDIOTHORACIC VASCULAR SURGERY)

## 2021-02-02 PROCEDURE — 77030006921 HC BLD STRNL SAW TERU -B: Performed by: THORACIC SURGERY (CARDIOTHORACIC VASCULAR SURGERY)

## 2021-02-02 PROCEDURE — 77030027138 HC INCENT SPIROMETER -A

## 2021-02-02 PROCEDURE — 77030013079 HC BLNKT BAIR HGGR 3M -A: Performed by: ANESTHESIOLOGY

## 2021-02-02 PROCEDURE — 77030002987 HC SUT PROL J&J -B: Performed by: THORACIC SURGERY (CARDIOTHORACIC VASCULAR SURGERY)

## 2021-02-02 PROCEDURE — 71045 X-RAY EXAM CHEST 1 VIEW: CPT

## 2021-02-02 PROCEDURE — 77030013798 HC KT TRNSDUC PRSSR EDWD -B: Performed by: THORACIC SURGERY (CARDIOTHORACIC VASCULAR SURGERY)

## 2021-02-02 PROCEDURE — 83735 ASSAY OF MAGNESIUM: CPT

## 2021-02-02 PROCEDURE — 77030020061 HC IV BLD WRMR ADMIN SET 3M -B: Performed by: ANESTHESIOLOGY

## 2021-02-02 PROCEDURE — 77030037878 HC DRSG MEPILEX >48IN BORD MOLN -B: Performed by: THORACIC SURGERY (CARDIOTHORACIC VASCULAR SURGERY)

## 2021-02-02 PROCEDURE — 77030002973 HC SUT PLEDG CV SFT OVL TELE -B: Performed by: THORACIC SURGERY (CARDIOTHORACIC VASCULAR SURGERY)

## 2021-02-02 PROCEDURE — 77030010813: Performed by: THORACIC SURGERY (CARDIOTHORACIC VASCULAR SURGERY)

## 2021-02-02 PROCEDURE — 77030002912 HC SUT ETHBND J&J -A: Performed by: THORACIC SURGERY (CARDIOTHORACIC VASCULAR SURGERY)

## 2021-02-02 PROCEDURE — 74011000250 HC RX REV CODE- 250: Performed by: NURSE ANESTHETIST, CERTIFIED REGISTERED

## 2021-02-02 PROCEDURE — 80051 ELECTROLYTE PANEL: CPT

## 2021-02-02 PROCEDURE — 77030008771 HC TU NG SALEM SUMP -A: Performed by: THORACIC SURGERY (CARDIOTHORACIC VASCULAR SURGERY)

## 2021-02-02 PROCEDURE — 77030005513 HC CATH URETH FOL11 MDII -B: Performed by: THORACIC SURGERY (CARDIOTHORACIC VASCULAR SURGERY)

## 2021-02-02 PROCEDURE — 77030007667 HC INSRT SUT HLD MEDT -B: Performed by: THORACIC SURGERY (CARDIOTHORACIC VASCULAR SURGERY)

## 2021-02-02 PROCEDURE — 77030006247 HC LD PCMKR MYOCRD BPLR TEMP MEDT -B: Performed by: THORACIC SURGERY (CARDIOTHORACIC VASCULAR SURGERY)

## 2021-02-02 PROCEDURE — 77030003020 HC SUT TICRN COVD -A: Performed by: THORACIC SURGERY (CARDIOTHORACIC VASCULAR SURGERY)

## 2021-02-02 PROCEDURE — P9016 RBC LEUKOCYTES REDUCED: HCPCS

## 2021-02-02 PROCEDURE — 74011000250 HC RX REV CODE- 250: Performed by: PHYSICIAN ASSISTANT

## 2021-02-02 PROCEDURE — B24BZZ4 ULTRASONOGRAPHY OF HEART WITH AORTA, TRANSESOPHAGEAL: ICD-10-PCS | Performed by: ANESTHESIOLOGY

## 2021-02-02 PROCEDURE — 77030003029 HC SUT VCRL J&J -B: Performed by: THORACIC SURGERY (CARDIOTHORACIC VASCULAR SURGERY)

## 2021-02-02 PROCEDURE — 77030006920 HC BLD STRNL SAW STRY -B: Performed by: THORACIC SURGERY (CARDIOTHORACIC VASCULAR SURGERY)

## 2021-02-02 PROCEDURE — 77030014491 HC PLEDG PTFE BARD -A: Performed by: THORACIC SURGERY (CARDIOTHORACIC VASCULAR SURGERY)

## 2021-02-02 PROCEDURE — 77030020167 HC PERF SET MULT MEDT -B: Performed by: THORACIC SURGERY (CARDIOTHORACIC VASCULAR SURGERY)

## 2021-02-02 RX ORDER — ACETAMINOPHEN 325 MG/1
650 TABLET ORAL EVERY 4 HOURS
Status: DISPENSED | OUTPATIENT
Start: 2021-02-02 | End: 2021-02-04

## 2021-02-02 RX ORDER — OXYCODONE HYDROCHLORIDE 5 MG/1
5 TABLET ORAL
Status: DISCONTINUED | OUTPATIENT
Start: 2021-02-02 | End: 2021-02-08 | Stop reason: HOSPADM

## 2021-02-02 RX ORDER — PROPOFOL 10 MG/ML
INJECTION, EMULSION INTRAVENOUS AS NEEDED
Status: DISCONTINUED | OUTPATIENT
Start: 2021-02-02 | End: 2021-02-02 | Stop reason: HOSPADM

## 2021-02-02 RX ORDER — POTASSIUM CHLORIDE 29.8 MG/ML
20 INJECTION INTRAVENOUS
Status: COMPLETED | OUTPATIENT
Start: 2021-02-02 | End: 2021-02-03

## 2021-02-02 RX ORDER — POTASSIUM CHLORIDE 29.8 MG/ML
20 INJECTION INTRAVENOUS ONCE
Status: DISCONTINUED | OUTPATIENT
Start: 2021-02-02 | End: 2021-02-02

## 2021-02-02 RX ORDER — NALOXONE HYDROCHLORIDE 0.4 MG/ML
0.4 INJECTION, SOLUTION INTRAMUSCULAR; INTRAVENOUS; SUBCUTANEOUS AS NEEDED
Status: DISCONTINUED | OUTPATIENT
Start: 2021-02-02 | End: 2021-02-08 | Stop reason: HOSPADM

## 2021-02-02 RX ORDER — DOBUTAMINE HYDROCHLORIDE 200 MG/100ML
0-10 INJECTION INTRAVENOUS
Status: DISCONTINUED | OUTPATIENT
Start: 2021-02-02 | End: 2021-02-03

## 2021-02-02 RX ORDER — ONDANSETRON 2 MG/ML
4 INJECTION INTRAMUSCULAR; INTRAVENOUS
Status: DISCONTINUED | OUTPATIENT
Start: 2021-02-02 | End: 2021-02-08 | Stop reason: HOSPADM

## 2021-02-02 RX ORDER — MAGNESIUM SULFATE 1 G/100ML
1 INJECTION INTRAVENOUS AS NEEDED
Status: DISCONTINUED | OUTPATIENT
Start: 2021-02-02 | End: 2021-02-03

## 2021-02-02 RX ORDER — ROCURONIUM BROMIDE 10 MG/ML
INJECTION, SOLUTION INTRAVENOUS AS NEEDED
Status: DISCONTINUED | OUTPATIENT
Start: 2021-02-02 | End: 2021-02-02 | Stop reason: HOSPADM

## 2021-02-02 RX ORDER — ALBUMIN HUMAN 50 G/1000ML
12.5 SOLUTION INTRAVENOUS
Status: COMPLETED | OUTPATIENT
Start: 2021-02-02 | End: 2021-02-03

## 2021-02-02 RX ORDER — DOPAMINE HYDROCHLORIDE 320 MG/100ML
0-20 INJECTION, SOLUTION INTRAVENOUS
Status: DISCONTINUED | OUTPATIENT
Start: 2021-02-02 | End: 2021-02-02

## 2021-02-02 RX ORDER — INSULIN LISPRO 100 [IU]/ML
INJECTION, SOLUTION INTRAVENOUS; SUBCUTANEOUS
Status: DISCONTINUED | OUTPATIENT
Start: 2021-02-04 | End: 2021-02-05

## 2021-02-02 RX ORDER — AMIODARONE HYDROCHLORIDE 200 MG/1
400 TABLET ORAL EVERY 12 HOURS
Status: DISCONTINUED | OUTPATIENT
Start: 2021-02-03 | End: 2021-02-03

## 2021-02-02 RX ORDER — SODIUM CHLORIDE, SODIUM LACTATE, POTASSIUM CHLORIDE, CALCIUM CHLORIDE 600; 310; 30; 20 MG/100ML; MG/100ML; MG/100ML; MG/100ML
25 INJECTION, SOLUTION INTRAVENOUS CONTINUOUS
Status: DISCONTINUED | OUTPATIENT
Start: 2021-02-02 | End: 2021-02-02

## 2021-02-02 RX ORDER — POTASSIUM CHLORIDE 29.8 MG/ML
20 INJECTION INTRAVENOUS
Status: DISCONTINUED | OUTPATIENT
Start: 2021-02-02 | End: 2021-02-03

## 2021-02-02 RX ORDER — AMOXICILLIN 250 MG
1 CAPSULE ORAL 2 TIMES DAILY
Status: DISCONTINUED | OUTPATIENT
Start: 2021-02-03 | End: 2021-02-08 | Stop reason: HOSPADM

## 2021-02-02 RX ORDER — CHLORHEXIDINE GLUCONATE 1.2 MG/ML
10 RINSE ORAL EVERY 12 HOURS
Status: DISCONTINUED | OUTPATIENT
Start: 2021-02-02 | End: 2021-02-08 | Stop reason: HOSPADM

## 2021-02-02 RX ORDER — NOREPINEPHRINE BITARTRATE/D5W 8 MG/250ML
2-16 PLASTIC BAG, INJECTION (ML) INTRAVENOUS
Status: DISCONTINUED | OUTPATIENT
Start: 2021-02-02 | End: 2021-02-02

## 2021-02-02 RX ORDER — MAGNESIUM SULFATE HEPTAHYDRATE 40 MG/ML
2 INJECTION, SOLUTION INTRAVENOUS ONCE
Status: COMPLETED | OUTPATIENT
Start: 2021-02-02 | End: 2021-02-02

## 2021-02-02 RX ORDER — PROTAMINE SULFATE 10 MG/ML
250 INJECTION, SOLUTION INTRAVENOUS
Status: DISCONTINUED | OUTPATIENT
Start: 2021-02-02 | End: 2021-02-02

## 2021-02-02 RX ORDER — FENTANYL CITRATE 50 UG/ML
INJECTION, SOLUTION INTRAMUSCULAR; INTRAVENOUS AS NEEDED
Status: DISCONTINUED | OUTPATIENT
Start: 2021-02-02 | End: 2021-02-02 | Stop reason: HOSPADM

## 2021-02-02 RX ORDER — MUPIROCIN 20 MG/G
OINTMENT TOPICAL 2 TIMES DAILY
Status: COMPLETED | OUTPATIENT
Start: 2021-02-02 | End: 2021-02-07

## 2021-02-02 RX ORDER — OXYCODONE HYDROCHLORIDE 5 MG/1
10 TABLET ORAL
Status: DISCONTINUED | OUTPATIENT
Start: 2021-02-02 | End: 2021-02-08 | Stop reason: HOSPADM

## 2021-02-02 RX ORDER — MAGNESIUM SULFATE 100 %
4 CRYSTALS MISCELLANEOUS AS NEEDED
Status: DISCONTINUED | OUTPATIENT
Start: 2021-02-02 | End: 2021-02-08

## 2021-02-02 RX ORDER — INSULIN LISPRO 100 [IU]/ML
INJECTION, SOLUTION INTRAVENOUS; SUBCUTANEOUS
Status: DISCONTINUED | OUTPATIENT
Start: 2021-02-02 | End: 2021-02-05

## 2021-02-02 RX ORDER — INSULIN GLARGINE 100 [IU]/ML
1-50 INJECTION, SOLUTION SUBCUTANEOUS
Status: ACTIVE | OUTPATIENT
Start: 2021-02-02 | End: 2021-02-03

## 2021-02-02 RX ORDER — SODIUM CHLORIDE 0.9 % (FLUSH) 0.9 %
5-40 SYRINGE (ML) INJECTION AS NEEDED
Status: DISCONTINUED | OUTPATIENT
Start: 2021-02-02 | End: 2021-02-03

## 2021-02-02 RX ORDER — LANOLIN ALCOHOL/MO/W.PET/CERES
400 CREAM (GRAM) TOPICAL 2 TIMES DAILY
Status: DISCONTINUED | OUTPATIENT
Start: 2021-02-03 | End: 2021-02-08 | Stop reason: HOSPADM

## 2021-02-02 RX ORDER — GUAIFENESIN 100 MG/5ML
81 LIQUID (ML) ORAL DAILY
Status: DISCONTINUED | OUTPATIENT
Start: 2021-02-03 | End: 2021-02-08 | Stop reason: HOSPADM

## 2021-02-02 RX ORDER — LIDOCAINE HYDROCHLORIDE 20 MG/ML
INJECTION, SOLUTION EPIDURAL; INFILTRATION; INTRACAUDAL; PERINEURAL AS NEEDED
Status: DISCONTINUED | OUTPATIENT
Start: 2021-02-02 | End: 2021-02-02 | Stop reason: HOSPADM

## 2021-02-02 RX ORDER — ONDANSETRON 2 MG/ML
INJECTION INTRAMUSCULAR; INTRAVENOUS AS NEEDED
Status: DISCONTINUED | OUTPATIENT
Start: 2021-02-02 | End: 2021-02-02 | Stop reason: HOSPADM

## 2021-02-02 RX ORDER — POLYETHYLENE GLYCOL 3350 17 G/17G
17 POWDER, FOR SOLUTION ORAL DAILY
Status: DISCONTINUED | OUTPATIENT
Start: 2021-02-03 | End: 2021-02-08 | Stop reason: HOSPADM

## 2021-02-02 RX ORDER — FAMOTIDINE 20 MG/1
20 TABLET, FILM COATED ORAL DAILY
Status: DISCONTINUED | OUTPATIENT
Start: 2021-02-03 | End: 2021-02-03

## 2021-02-02 RX ORDER — SODIUM CHLORIDE 9 MG/ML
INJECTION, SOLUTION INTRAVENOUS
Status: DISCONTINUED | OUTPATIENT
Start: 2021-02-02 | End: 2021-02-02 | Stop reason: HOSPADM

## 2021-02-02 RX ORDER — PROTAMINE SULFATE 10 MG/ML
INJECTION, SOLUTION INTRAVENOUS AS NEEDED
Status: DISCONTINUED | OUTPATIENT
Start: 2021-02-02 | End: 2021-02-02 | Stop reason: HOSPADM

## 2021-02-02 RX ORDER — SUFENTANIL CITRATE 50 UG/ML
INJECTION EPIDURAL; INTRAVENOUS AS NEEDED
Status: DISCONTINUED | OUTPATIENT
Start: 2021-02-02 | End: 2021-02-02 | Stop reason: HOSPADM

## 2021-02-02 RX ORDER — SODIUM CHLORIDE 9 MG/ML
10 INJECTION, SOLUTION INTRAVENOUS CONTINUOUS
Status: DISCONTINUED | OUTPATIENT
Start: 2021-02-02 | End: 2021-02-03

## 2021-02-02 RX ORDER — ALBUTEROL SULFATE 0.83 MG/ML
2.5 SOLUTION RESPIRATORY (INHALATION)
Status: DISCONTINUED | OUTPATIENT
Start: 2021-02-02 | End: 2021-02-08 | Stop reason: HOSPADM

## 2021-02-02 RX ORDER — MIDAZOLAM HYDROCHLORIDE 1 MG/ML
1 INJECTION, SOLUTION INTRAMUSCULAR; INTRAVENOUS
Status: DISCONTINUED | OUTPATIENT
Start: 2021-02-02 | End: 2021-02-03

## 2021-02-02 RX ORDER — DESMOPRESSIN ACETATE 4 UG/ML
2 INJECTION, SOLUTION INTRAVENOUS; SUBCUTANEOUS ONCE
Status: COMPLETED | OUTPATIENT
Start: 2021-02-02 | End: 2021-02-02

## 2021-02-02 RX ORDER — SUFENTANIL CITRATE 50 UG/ML
INJECTION EPIDURAL; INTRAVENOUS
Status: DISCONTINUED | OUTPATIENT
Start: 2021-02-02 | End: 2021-02-02 | Stop reason: HOSPADM

## 2021-02-02 RX ORDER — FACIAL-BODY WIPES
10 EACH TOPICAL DAILY PRN
Status: DISCONTINUED | OUTPATIENT
Start: 2021-02-02 | End: 2021-02-08 | Stop reason: HOSPADM

## 2021-02-02 RX ORDER — PAPAVERINE HYDROCHLORIDE 30 MG/ML
INJECTION INTRAMUSCULAR; INTRAVENOUS AS NEEDED
Status: DISCONTINUED | OUTPATIENT
Start: 2021-02-02 | End: 2021-02-03

## 2021-02-02 RX ORDER — NITROGLYCERIN 20 MG/100ML
0-200 INJECTION INTRAVENOUS
Status: DISCONTINUED | OUTPATIENT
Start: 2021-02-02 | End: 2021-02-02

## 2021-02-02 RX ORDER — HEPARIN SODIUM 1000 [USP'U]/ML
INJECTION, SOLUTION INTRAVENOUS; SUBCUTANEOUS AS NEEDED
Status: DISCONTINUED | OUTPATIENT
Start: 2021-02-02 | End: 2021-02-02 | Stop reason: HOSPADM

## 2021-02-02 RX ORDER — DEXTROSE 50 % IN WATER (D50W) INTRAVENOUS SYRINGE
12.5-25 AS NEEDED
Status: DISCONTINUED | OUTPATIENT
Start: 2021-02-02 | End: 2021-02-08 | Stop reason: HOSPADM

## 2021-02-02 RX ORDER — SODIUM CHLORIDE 450 MG/100ML
10 INJECTION, SOLUTION INTRAVENOUS CONTINUOUS
Status: DISCONTINUED | OUTPATIENT
Start: 2021-02-02 | End: 2021-02-03

## 2021-02-02 RX ORDER — SODIUM CHLORIDE 0.9 % (FLUSH) 0.9 %
5-40 SYRINGE (ML) INJECTION EVERY 8 HOURS
Status: DISCONTINUED | OUTPATIENT
Start: 2021-02-02 | End: 2021-02-03

## 2021-02-02 RX ORDER — MIDAZOLAM HYDROCHLORIDE 1 MG/ML
INJECTION, SOLUTION INTRAMUSCULAR; INTRAVENOUS AS NEEDED
Status: DISCONTINUED | OUTPATIENT
Start: 2021-02-02 | End: 2021-02-02 | Stop reason: HOSPADM

## 2021-02-02 RX ORDER — MORPHINE SULFATE 10 MG/ML
4 INJECTION, SOLUTION INTRAMUSCULAR; INTRAVENOUS
Status: DISCONTINUED | OUTPATIENT
Start: 2021-02-02 | End: 2021-02-03

## 2021-02-02 RX ADMIN — MIDAZOLAM HYDROCHLORIDE 1 MG: 1 INJECTION, SOLUTION INTRAMUSCULAR; INTRAVENOUS at 10:46

## 2021-02-02 RX ADMIN — ACETAMINOPHEN 650 MG: 325 TABLET ORAL at 20:52

## 2021-02-02 RX ADMIN — MAGNESIUM SULFATE HEPTAHYDRATE 2 G: 40 INJECTION, SOLUTION INTRAVENOUS at 15:01

## 2021-02-02 RX ADMIN — MIDAZOLAM HYDROCHLORIDE 2 MG: 1 INJECTION, SOLUTION INTRAMUSCULAR; INTRAVENOUS at 14:30

## 2021-02-02 RX ADMIN — POTASSIUM CHLORIDE 20 MEQ: 400 INJECTION, SOLUTION INTRAVENOUS at 17:43

## 2021-02-02 RX ADMIN — FENTANYL CITRATE 50 MCG: 50 INJECTION INTRAMUSCULAR; INTRAVENOUS at 10:38

## 2021-02-02 RX ADMIN — POTASSIUM CHLORIDE 20 MEQ: 400 INJECTION, SOLUTION INTRAVENOUS at 18:48

## 2021-02-02 RX ADMIN — MUPIROCIN: 20 OINTMENT TOPICAL at 20:53

## 2021-02-02 RX ADMIN — SUFENTANIL CITRATE 0.3 MCG/KG/HR: 50 INJECTION EPIDURAL; INTRAVENOUS at 12:24

## 2021-02-02 RX ADMIN — DEXMEDETOMIDINE 0.3 MCG/KG/HR: 100 INJECTION, SOLUTION, CONCENTRATE INTRAVENOUS at 14:30

## 2021-02-02 RX ADMIN — PHENYLEPHRINE HYDROCHLORIDE 20 MCG/MIN: 10 INJECTION INTRAVENOUS at 13:43

## 2021-02-02 RX ADMIN — MIDAZOLAM HYDROCHLORIDE 2 MG: 1 INJECTION, SOLUTION INTRAMUSCULAR; INTRAVENOUS at 10:36

## 2021-02-02 RX ADMIN — PROPOFOL 80 MG: 10 INJECTION, EMULSION INTRAVENOUS at 12:21

## 2021-02-02 RX ADMIN — HEPARIN SODIUM 38000 UNITS: 1000 INJECTION, SOLUTION INTRAVENOUS; SUBCUTANEOUS at 13:21

## 2021-02-02 RX ADMIN — NITROGLYCERIN 80 MCG/MIN: 20 INJECTION INTRAVENOUS at 12:50

## 2021-02-02 RX ADMIN — AMINOCAPROIC ACID 10 G/HR: 250 INJECTION, SOLUTION INTRAVENOUS at 12:24

## 2021-02-02 RX ADMIN — LIDOCAINE HYDROCHLORIDE 80 MG: 20 INJECTION, SOLUTION EPIDURAL; INFILTRATION; INTRACAUDAL; PERINEURAL at 12:19

## 2021-02-02 RX ADMIN — ONDANSETRON HYDROCHLORIDE 4 MG: 2 INJECTION, SOLUTION INTRAMUSCULAR; INTRAVENOUS at 15:30

## 2021-02-02 RX ADMIN — WATER 2 G: 1 INJECTION INTRAMUSCULAR; INTRAVENOUS; SUBCUTANEOUS at 08:40

## 2021-02-02 RX ADMIN — Medication 3 AMPULE: at 10:37

## 2021-02-02 RX ADMIN — MUPIROCIN: 20 OINTMENT TOPICAL at 08:39

## 2021-02-02 RX ADMIN — WATER 2 G: 1 INJECTION INTRAMUSCULAR; INTRAVENOUS; SUBCUTANEOUS at 15:30

## 2021-02-02 RX ADMIN — ALBUMIN (HUMAN) 12.5 G: 12.5 INJECTION, SOLUTION INTRAVENOUS at 18:37

## 2021-02-02 RX ADMIN — Medication 10 ML: at 21:04

## 2021-02-02 RX ADMIN — SODIUM CHLORIDE 100 MCG: 900 INJECTION, SOLUTION INTRAVENOUS at 12:50

## 2021-02-02 RX ADMIN — FAMOTIDINE 20 MG: 10 INJECTION, SOLUTION INTRAVENOUS at 20:53

## 2021-02-02 RX ADMIN — PROTAMINE SULFATE 300 MG: 10 INJECTION, SOLUTION INTRAVENOUS at 15:01

## 2021-02-02 RX ADMIN — Medication 10 ML: at 17:15

## 2021-02-02 RX ADMIN — FENTANYL CITRATE 150 MCG: 50 INJECTION INTRAMUSCULAR; INTRAVENOUS at 12:19

## 2021-02-02 RX ADMIN — ALBUMIN (HUMAN) 12.5 G: 12.5 INJECTION, SOLUTION INTRAVENOUS at 20:30

## 2021-02-02 RX ADMIN — FENTANYL CITRATE 50 MCG: 50 INJECTION INTRAMUSCULAR; INTRAVENOUS at 10:46

## 2021-02-02 RX ADMIN — ALBUMIN (HUMAN) 12.5 G: 12.5 INJECTION, SOLUTION INTRAVENOUS at 22:12

## 2021-02-02 RX ADMIN — SODIUM CHLORIDE 1.8 UNITS/HR: 9 INJECTION, SOLUTION INTRAVENOUS at 12:44

## 2021-02-02 RX ADMIN — WATER 2 G: 1 INJECTION INTRAMUSCULAR; INTRAVENOUS; SUBCUTANEOUS at 12:30

## 2021-02-02 RX ADMIN — DESMOPRESSIN ACETATE 28 MCG: 4 SOLUTION INTRAVENOUS at 15:06

## 2021-02-02 RX ADMIN — SODIUM CHLORIDE, POTASSIUM CHLORIDE, SODIUM LACTATE AND CALCIUM CHLORIDE 25 ML/HR: 600; 310; 30; 20 INJECTION, SOLUTION INTRAVENOUS at 10:37

## 2021-02-02 RX ADMIN — CHLORHEXIDINE GLUCONATE 0.12% ORAL RINSE 10 ML: 1.2 LIQUID ORAL at 20:53

## 2021-02-02 RX ADMIN — SODIUM CHLORIDE 10 ML/HR: 4.5 INJECTION, SOLUTION INTRAVENOUS at 17:09

## 2021-02-02 RX ADMIN — PHENYLEPHRINE HYDROCHLORIDE 10 MCG/MIN: 10 INJECTION INTRAVENOUS at 20:08

## 2021-02-02 RX ADMIN — SUFENTANIL CITRATE 30 MCG: 50 INJECTION EPIDURAL; INTRAVENOUS at 12:44

## 2021-02-02 RX ADMIN — SODIUM CHLORIDE: 9 INJECTION, SOLUTION INTRAVENOUS at 12:08

## 2021-02-02 RX ADMIN — PROPOFOL 20 MG: 10 INJECTION, EMULSION INTRAVENOUS at 12:51

## 2021-02-02 RX ADMIN — PROPOFOL 50 MG: 10 INJECTION, EMULSION INTRAVENOUS at 12:49

## 2021-02-02 RX ADMIN — ROCURONIUM BROMIDE 100 MG: 10 INJECTION INTRAVENOUS at 12:21

## 2021-02-02 RX ADMIN — CEFAZOLIN SODIUM 2 G: 1 INJECTION, POWDER, FOR SOLUTION INTRAMUSCULAR; INTRAVENOUS at 20:52

## 2021-02-02 RX ADMIN — SODIUM CHLORIDE: 9 INJECTION, SOLUTION INTRAVENOUS at 12:15

## 2021-02-02 RX ADMIN — Medication 10 ML: at 06:10

## 2021-02-02 NOTE — PROGRESS NOTES
1630  Patient received from OR  To CCU room 2548 via bed accompanied by OR personnel. Patient is s/p CABG x 3 with LIMA and RSVG. Connected to monitors. Report from [address] staff, ZEHRA, and Kelly La, [address].   2120  Bloodwork drawn and sent to lab. PCXR completed and reviewed by LOREN Hill. Dressing noted on right great toe. Removed per Audrey Mcclain NP. Noted to have dime-sized red bruise on tip of great toe. Per NP, patient self-reported that he \"stubbed the toe\" prior to admission. 1700  EKG completed. HR 65-70 SR under pacer rhythm. Tonio hugger applied for warming. Daughter at bedside for brief visit. 1745  KCl 20 mEq started for K+ 3.7.  (Total of 2 runs ordered per electrolyte replacement orders). 1800  Remains asleep. Hemodynamics steady. 1900  Hemodynamics steady. Precedex now off. Patient remains asleep. Does not respond to finger prick for fsbs. Bedside and verbal report given to Jeanne Gibbons RN.

## 2021-02-02 NOTE — PROGRESS NOTES
End of Shift Note    Bedside shift change report given to Fabiana Arreaga (oncoming nurse) by Abby John (offgoing nurse). Report included the following information SBAR, Kardex and MAR    Shift worked:  7p-7a     Shift summary and any significant changes:     No acute changes. Patient bradycardic, cardiologist paged and informed. Only order is to hold any beta blockers but no beta blockers scheduled during this shift, oncoming RN aware. Patient resting comfortably in bed with no complaints. NPO all night. Concerns for physician to address:                 Activity:  Activity Level: Up ad margaret  Number times ambulated in hallways past shift: 0  Number of times OOB to chair past shift: 1    Cardiac:   Cardiac Monitoring: Yes      Cardiac Rhythm: Sinus bradycardia    Access:   Current line(s): PIV     Genitourinary:   Urinary status: voiding    Respiratory:      Chronic home O2 use?: NO  Incentive spirometer at bedside: NO     GI:  Last Bowel Movement Date: 02/01/21  Current diet:  DIET NPO  Passing flatus: YES  Tolerating current diet: YES, NPO       Pain Management:   Patient states pain is manageable on current regimen: N/A    Skin:     Interventions: increase time out of bed    Patient Safety:  Fall Score:    Interventions: gripper socks       Length of Stay:  Expected LOS: - - -  Actual LOS: 1      Abby John

## 2021-02-02 NOTE — PERIOP NOTES
4049:  TRANSFER - IN REPORT:    Verbal report received from [facility], [facility] on Radha Conklin  being received from [phone] for ordered procedure      Report consisted of patients Situation, Background, Assessment and   Recommendations(SBAR). Information from the following report(s) SBAR, Kardex, Intake/Output, MAR, Recent Results and Cardiac Rhythm SB was reviewed with the receiving nurse. Opportunity for questions and clarification was provided. 2434:  Assessment completed upon patients arrival to unit and care assumed.      5251:  Security at bedside to lock up phone & wedding band

## 2021-02-02 NOTE — PROGRESS NOTES
End of Shift Note    Bedside shift change report given to magy (oncoming nurse) by Mendoza Wells (offgoing nurse). Report included the following information SBAR, Kardex, ED Summary, Intake/Output, MAR and Recent Results    Shift worked:  [id number]     Shift summary and any significant changes:     patient admitted mid shift. Iv access obtained. Labs drawn. ekg done. Blood pressures obtained in arms bilaterally. Patient ambulated in room during shift. Maintenance fluids running.    Concerns for physician to address:  none     Zone phone for oncoming shift:   n/a       Activity:  Activity Level: Up ad margaret  Number times ambulated in hallways past shift: 0  Number of times OOB to chair past shift: 2    Cardiac:   Cardiac Monitoring: Yes      Cardiac Rhythm: Normal sinus rhythm    Access:   Current line(s): PIV     Genitourinary:   Urinary status: voiding    Respiratory:      Chronic home O2 use?: NO  Incentive spirometer at bedside: NO     GI:  Last Bowel Movement Date: 02/01/21  Current diet:  DIET NPO  DIET DIABETIC CONSISTENT CARB Regular  Passing flatus: YES  Tolerating current diet: YES       Pain Management:   Patient states pain is manageable on current regimen: YES    Skin:     Interventions: increase time out of bed    Patient Safety:  Fall Score:    Interventions: gripper socks and pt to call before getting OOB       Length of Stay:  Expected LOS: - - -  Actual LOS: 0      Roxane [place] Pamela

## 2021-02-02 NOTE — PROGRESS NOTES
Report given to Chuckie bear RN. Chuckie Samayoa RN aware of sinus bradycardia on monitor. All questions and concerns clarified. VSS. No c/o pain or discomfort this am. Held all morning PO meds per orders; given IV ancef. Pt A&O X4.

## 2021-02-02 NOTE — ANESTHESIA PROCEDURE NOTES
Arterial Line Placement    Start time: 2/2/2021 11:36 AM  End time: 2/2/2021 11:45 AM  Performed by: Ya Cornejo CRNA  Authorized by: Ya Cornejo CRNA     Pre-Procedure  Indications:  Arterial pressure monitoring and blood sampling  Preanesthetic Checklist: patient identified, risks and benefits discussed, anesthesia consent, site marked, patient being monitored, timeout performed and patient being monitored    Timeout Time: 11:36        Procedure:   Prep:  ChloraPrep  Seldinger Technique?: No    Orientation:  Right  Location:  Radial artery  Catheter size:  20 G  Number of attempts:  1  Cont Cardiac Output Sensor: No      Assessment:   Post-procedure:  Sterile dressing applied  Patient Tolerance:  Patient tolerated the procedure well with no immediate complications

## 2021-02-02 NOTE — PROGRESS NOTES
Paged on call cardio MD Patel d/t patient being bradycardic, heart rate dipped as low as 38bmp. Heart rate is maintaining in the lower 40's while patient is resting. Blood pressure is currently 113/61, respirations 16, pulse 45, oxygenation 100% on room air. Patient is asymptomatic and resting comfortably. MD informed RN to hold any beta blockers and continue to monitor.

## 2021-02-02 NOTE — BRIEF OP NOTE
Brief Postoperative Note    Patient: Jamaal Beltre  YOB: 1940  MRN: [id number]    Date of Procedure: 2/2/2021     Pre-Op Diagnosis: CAD    Post-Op Diagnosis: Same as preoperative diagnosis.       Procedure(s):  CABG x 3 LIMA to LAD, RSVG to OM, RSVG to PDA  Right Endoscopic Saphenous Vein [place]    Surgeon(s):  Pako Sauer MD    Physician Assistant: Yessenia Cleaning PA-C    Anesthesia: General     Estimated Blood Loss (mL): 1125cc    Cell Saver: 436LB    Complications: None    Specimens: * No specimens in log *     Implants: * No implants in log *    Drains:   Donnita Maizes #1 02/02/21 Mid Chest (Active)       Donnita Maizes #2 02/02/21 Left;Upper Chest (Active)       Donnita Maizes #3 02/02/21 Posterior Chest (Active)       [REMOVED] Orogastric Tube 02/02/21 (Removed)       Findings: CAD    Electronically Signed by Ron Paul PA-C on 2/2/2021 at 4:13 PM

## 2021-02-02 NOTE — PERIOP NOTES
Negative covid 19 test result on 01/29/21adhered to quarantine guidelines prior to admission. Denies S/S. Belongings to PACU, 4 pt belongings bags, 1 blue bag and 1 cane. Family in waiting area, Daughter, Jovon Shipman. Mepilex to sacrum, no redness, tenderness or signs of breakdown. Dry dressing applied to R. Great toe.     Ephedrine, 10mg  administered by Dr Felix Force

## 2021-02-02 NOTE — ANESTHESIA PREPROCEDURE EVALUATION
Relevant Problems   No relevant active problems       Anesthetic History   No history of anesthetic complications            Review of Systems / Medical History  Patient summary reviewed, nursing notes reviewed and pertinent labs reviewed    Pulmonary  Within defined limits                 Neuro/Psych       CVA (mild left hand weakness)      Comments: Diabetic polyneuropathy Cardiovascular    Hypertension          CAD, PAD and hyperlipidemia    Exercise tolerance: <4 METS     GI/Hepatic/Renal         Renal disease: CRI       Endo/Other    Diabetes: type 2    Arthritis     Other Findings              Physical Exam    Airway  Mallampati: II  TM Distance: > 6 cm  Neck ROM: normal range of motion   Mouth opening: Normal     Cardiovascular  Regular rate and rhythm,  S1 and S2 normal,  no murmur, click, rub, or gallop             Dental    Dentition: Full lower dentures and Full upper dentures     Pulmonary  Breath sounds clear to auscultation               Abdominal  GI exam deferred       Other Findings            Anesthetic Plan    ASA: 4  Anesthesia type: general    Monitoring Plan: Arterial line, BIS, CVP, [place] and CARLOS    Post procedure ventilation   Induction: Intravenous  Anesthetic plan and risks discussed with: Patient

## 2021-02-02 NOTE — PROGRESS NOTES
Cardiac Surgery Care Coordinator- called the daughter, Nabor Loera, of Ponce Platt, introduced role of the Cardiac Surgery Coordinator. She is in her car at this time. Reviewed plan of care and day of surgery expectations. Provided family with an update from OR. Encouraged family to verbalize and emotional support given. Will continue to update throughout the day.   Vanda Johnson RN

## 2021-02-02 NOTE — ANESTHESIA PROCEDURE NOTES
CARLOS        Procedure Details: probe placement, image aquisition & interpretation    Risks and benefits discussed with the patient and plans are to proceed    Procedure Note    Performed by: Aly Downey MD  Authorized by:  Aly Downey MD       Indications: assessment of ascending aorta and assessment of surgical repair  Modalities: 2D, CF, CWD, PWD  Probe Type: multiplane and epiaortic  Insertion: atraumatic  Patient Status: intubated and sedated    Echocardiographic and Doppler Measurements   Aorta  Size  Diam(cm)  Dissection PlaqueThick(mm)  Plaque Mobile    Ascending normal  No 0-3 No    Arch normal  No >3 No    Descending normal  No >3 No          Valves  Annulus  Stenosis  Area/Grad  Regurg  Leaflet   Morph  Leaflet   Motion    Aortic normal none  1+ normal normal    Mitral normal none  1+ thickened normal    Tricuspid normal none  1+ normal normal          Atria  Size  SEC (smoke)  Thrombus  Tumor  Device    Rt Atrium normal No No No No    Lt Atrium normal No No No No     Interatrial Septum Morphology: normal    Interventricular Septum Morphology: normal    Ventricle  Cavity Size  Cavity Dimension Hypertrophy  Thrombus  Gloal FXN  EF    RV normal  No no normal     LV normal  Yes No normal 60%       Regional Function  (1 = normal, 2 = mildly hypokinetic, 3 = severely hypokinetic, 4 = akinetic, 5 = dyskinetic) LAV - [place]   ME LAV = 0  ME LAV = 90  ME LAV = 130   Basal Sept:1 Basal Ant:1 Basal Post:1   Mid Sept:1 Mid Ant:1 Mid Post:1   Apical Sept:1 Apical Ant:1 Basal Ant Sept:1   Basal Lat:1 Basal Inf:1 Mid Ant Sept:1   Mid Lat:1 Mid Inf:1    Apical Lat:1 Apical Inf:1      Diastolic function: Grade 1 diastolic dysfunction  Pericardium: normal    Post Intervention Follow-up Study  Ventricular Global Function: unchanged  Ventricular Regional Function: unchanged     Valve  Function  Regurgitation  Area    Aortic no change      Mitral no change      Tricuspid no change      Prosthetic normal Complications: None  Comments: Epiaortic ultrasound- very small focal anterior calcium distal to cannulation site.  Cannulation, clamp, and proximal sites free of disease

## 2021-02-02 NOTE — PROGRESS NOTES
Met with Avi Marina Ayon's family and Dr. Donovan Ganser. Update given, Encouraged family to verbalize and offered emotional support. Reinforced waiting room instructions, family to wait in the critical care waiting room until contacted by the nursing staff.  Ross Shook RN

## 2021-02-02 NOTE — ANESTHESIA PROCEDURE NOTES
Central Line and Pulmonary Artery Catheter Placement    Start time: 2/2/2021 10:45 AM  End time: 2/2/2021 11:05 AM  Performed by: Aureliano Barker CRNA  Authorized by: Aureliano Barker CRNA     Indications: vascular access, central pressure monitoring and need for vasopressors  Preanesthetic Checklist: patient identified, risks and benefits discussed, anesthesia consent, site marked, patient being monitored and timeout performed    Timeout Time: 10:36       Pre-procedure: All elements of maximal sterile barrier technique followed?  Yes    2% Chlorhexidine for cutaneous antisepsis, Hand hygiene performed prior to catheter insertion and Ultrasound guidance    Sterile Ultrasound Technique followed?: Yes            Procedure:   Prep:  ChloraPrep  Location: internal jugular  Orientation:  Right  Patient position:  Trendelenburg  Catheter type:  Double lumen  Catheter size:  9 Fr  Catheter length:  10 cm  Number of attempts:  1  Successful placement: Yes      Assessment:   Post-procedure:  Catheter secured and sterile dressing with CHG applied  Assessment:  Blood return through all ports, free fluid flow and guidewire removal verified  Insertion:  Uncomplicated  Patient tolerance:  Patient tolerated the procedure well with no immediate complications  9 Fr MAC and 8 Fr CCO PA Catheter

## 2021-02-02 NOTE — PROGRESS NOTES
Met with family of Radha Conklin, provided family with update. Family without questions or concerns at this time. Will continue to follow for educational and emotional needs.  Geovanni Tomlin RN

## 2021-02-02 NOTE — PERIOP NOTES
TRANSFER - OUT REPORT:    Verbal report given to Thekurtis Ramon on [address]  being transferred to CCU for routine post - op       Report consisted of patients Situation, Background, Assessment and   Recommendations(SBAR). Information from the following report(s) SBAR, OR Summary, Procedure Summary and Procedure Verification was reviewed with the receiving nurse. Lines:   Double Lumen 02/02/21 Right (Active)       Peripheral IV 02/01/21 Right Antecubital (Active)   Site Assessment Clean, dry, & intact 02/02/21 1032   Phlebitis Assessment 0 02/02/21 1032   Infiltration Assessment 0 02/02/21 0800   Dressing Status Clean, dry, & intact 02/02/21 1032   Dressing Type Tape;Transparent 02/02/21 1032   Hub Color/Line Status Infusing;Green 02/02/21 1032   Alcohol Cap Used No 02/01/21 1848       Arterial Line 02/02/21 Right Radial artery (Active)        Opportunity for questions and clarification was provided.       Patient transported with:   Monitor  O2 @ 10 liters  Registered Nurse

## 2021-02-02 NOTE — CARDIO/PULMONARY
Cardiac Rehab Note: chart review Consult has been acknowledged pre-op teaching CABG 2/2/21 EF 55-60%  on 1/19/21 per echo Smoking history assessed. Patient is a former smoker. Smoking Cessation Program link has not been added to the AVS. Patient in pre-op holding at this time. CP Rehab will follow.

## 2021-02-03 ENCOUNTER — APPOINTMENT (OUTPATIENT)
Dept: GENERAL RADIOLOGY | Age: 81
DRG: 236 | End: 2021-02-03
Attending: PHYSICIAN ASSISTANT
Payer: MEDICARE

## 2021-02-03 LAB
ADMINISTERED INITIALS, ADMINIT: NORMAL
ALBUMIN SERPL-MCNC: 3.5 G/DL (ref 3.5–5)
ALBUMIN/GLOB SERPL: 1.3 {RATIO} (ref 1.1–2.2)
ALP SERPL-CCNC: 43 U/L (ref 45–117)
ALT SERPL-CCNC: 16 U/L (ref 12–78)
ANION GAP SERPL CALC-SCNC: 4 MMOL/L (ref 5–15)
AST SERPL-CCNC: 32 U/L (ref 15–37)
ATRIAL RATE: 26 BPM
ATRIAL RATE: 53 BPM
BILIRUB SERPL-MCNC: 0.2 MG/DL (ref 0.2–1)
BUN SERPL-MCNC: 25 MG/DL (ref 6–20)
BUN/CREAT SERPL: 18 (ref 12–20)
CALCIUM SERPL-MCNC: 9.1 MG/DL (ref 8.5–10.1)
CALCULATED P AXIS, ECG09: 42 DEGREES
CALCULATED R AXIS, ECG10: -3 DEGREES
CALCULATED R AXIS, ECG10: 44 DEGREES
CALCULATED T AXIS, ECG11: -25 DEGREES
CALCULATED T AXIS, ECG11: -32 DEGREES
CHLORIDE SERPL-SCNC: 111 MMOL/L (ref 97–108)
CO2 SERPL-SCNC: 27 MMOL/L (ref 21–32)
CREAT SERPL-MCNC: 1.42 MG/DL (ref 0.7–1.3)
D50 ADMINISTERED, D50ADM: 0 ML
D50 ORDER, D50ORD: 0 ML
DIAGNOSIS, 93000: NORMAL
DIAGNOSIS, 93000: NORMAL
ERYTHROCYTE [DISTWIDTH] IN BLOOD BY AUTOMATED COUNT: 13.6 % (ref 11.5–14.5)
GLOBULIN SER CALC-MCNC: 2.8 G/DL (ref 2–4)
GLSCOM COMMENTS: NORMAL
GLUCOSE BLD STRIP.AUTO-MCNC: 100 MG/DL (ref 65–100)
GLUCOSE BLD STRIP.AUTO-MCNC: 101 MG/DL (ref 65–100)
GLUCOSE BLD STRIP.AUTO-MCNC: 101 MG/DL (ref 65–100)
GLUCOSE BLD STRIP.AUTO-MCNC: 106 MG/DL (ref 65–100)
GLUCOSE BLD STRIP.AUTO-MCNC: 107 MG/DL (ref 65–100)
GLUCOSE BLD STRIP.AUTO-MCNC: 115 MG/DL (ref 65–100)
GLUCOSE BLD STRIP.AUTO-MCNC: 116 MG/DL (ref 65–100)
GLUCOSE BLD STRIP.AUTO-MCNC: 140 MG/DL (ref 65–100)
GLUCOSE BLD STRIP.AUTO-MCNC: 145 MG/DL (ref 65–100)
GLUCOSE BLD STRIP.AUTO-MCNC: 146 MG/DL (ref 65–100)
GLUCOSE BLD STRIP.AUTO-MCNC: 147 MG/DL (ref 65–100)
GLUCOSE BLD STRIP.AUTO-MCNC: 160 MG/DL (ref 65–100)
GLUCOSE BLD STRIP.AUTO-MCNC: 165 MG/DL (ref 65–100)
GLUCOSE BLD STRIP.AUTO-MCNC: 167 MG/DL (ref 65–100)
GLUCOSE BLD STRIP.AUTO-MCNC: 171 MG/DL (ref 65–100)
GLUCOSE BLD STRIP.AUTO-MCNC: 173 MG/DL (ref 65–100)
GLUCOSE BLD STRIP.AUTO-MCNC: 174 MG/DL (ref 65–100)
GLUCOSE BLD STRIP.AUTO-MCNC: 185 MG/DL (ref 65–100)
GLUCOSE BLD STRIP.AUTO-MCNC: 190 MG/DL (ref 65–100)
GLUCOSE BLD STRIP.AUTO-MCNC: 81 MG/DL (ref 65–100)
GLUCOSE BLD STRIP.AUTO-MCNC: 94 MG/DL (ref 65–100)
GLUCOSE BLD STRIP.AUTO-MCNC: 98 MG/DL (ref 65–100)
GLUCOSE SERPL-MCNC: 87 MG/DL (ref 65–100)
GLUCOSE, GLC: 100 MG/DL
GLUCOSE, GLC: 101 MG/DL
GLUCOSE, GLC: 106 MG/DL
GLUCOSE, GLC: 107 MG/DL
GLUCOSE, GLC: 115 MG/DL
GLUCOSE, GLC: 116 MG/DL
GLUCOSE, GLC: 140 MG/DL
GLUCOSE, GLC: 145 MG/DL
GLUCOSE, GLC: 146 MG/DL
GLUCOSE, GLC: 147 MG/DL
GLUCOSE, GLC: 160 MG/DL
GLUCOSE, GLC: 165 MG/DL
GLUCOSE, GLC: 167 MG/DL
GLUCOSE, GLC: 171 MG/DL
GLUCOSE, GLC: 173 MG/DL
GLUCOSE, GLC: 174 MG/DL
GLUCOSE, GLC: 185 MG/DL
GLUCOSE, GLC: 190 MG/DL
GLUCOSE, GLC: 81 MG/DL
GLUCOSE, GLC: 94 MG/DL
GLUCOSE, GLC: 98 MG/DL
HCT VFR BLD AUTO: 24.7 % (ref 36.6–50.3)
HGB BLD-MCNC: 7.9 G/DL (ref 12.1–17)
HIGH TARGET, HITG: 130 MG/DL
INSULIN ADMINSTERED, INSADM: 0.4 UNITS/HOUR
INSULIN ADMINSTERED, INSADM: 0.8 UNITS/HOUR
INSULIN ADMINSTERED, INSADM: 0.9 UNITS/HOUR
INSULIN ADMINSTERED, INSADM: 0.9 UNITS/HOUR
INSULIN ADMINSTERED, INSADM: 1 UNITS/HOUR
INSULIN ADMINSTERED, INSADM: 1.1 UNITS/HOUR
INSULIN ADMINSTERED, INSADM: 1.5 UNITS/HOUR
INSULIN ADMINSTERED, INSADM: 1.6 UNITS/HOUR
INSULIN ADMINSTERED, INSADM: 10.5 UNITS/HOUR
INSULIN ADMINSTERED, INSADM: 13.3 UNITS/HOUR
INSULIN ADMINSTERED, INSADM: 14 UNITS/HOUR
INSULIN ADMINSTERED, INSADM: 14.7 UNITS/HOUR
INSULIN ADMINSTERED, INSADM: 2.2 UNITS/HOUR
INSULIN ADMINSTERED, INSADM: 3.4 UNITS/HOUR
INSULIN ADMINSTERED, INSADM: 5.2 UNITS/HOUR
INSULIN ADMINSTERED, INSADM: 6.1 UNITS/HOUR
INSULIN ADMINSTERED, INSADM: 6.3 UNITS/HOUR
INSULIN ADMINSTERED, INSADM: 6.4 UNITS/HOUR
INSULIN ADMINSTERED, INSADM: 6.4 UNITS/HOUR
INSULIN ADMINSTERED, INSADM: 7.7 UNITS/HOUR
INSULIN ADMINSTERED, INSADM: 9.5 UNITS/HOUR
INSULIN ORDER, INSORD: 0.4 UNITS/HOUR
INSULIN ORDER, INSORD: 0.8 UNITS/HOUR
INSULIN ORDER, INSORD: 0.9 UNITS/HOUR
INSULIN ORDER, INSORD: 0.9 UNITS/HOUR
INSULIN ORDER, INSORD: 1 UNITS/HOUR
INSULIN ORDER, INSORD: 1.1 UNITS/HOUR
INSULIN ORDER, INSORD: 1.5 UNITS/HOUR
INSULIN ORDER, INSORD: 1.6 UNITS/HOUR
INSULIN ORDER, INSORD: 10.5 UNITS/HOUR
INSULIN ORDER, INSORD: 13.3 UNITS/HOUR
INSULIN ORDER, INSORD: 14 UNITS/HOUR
INSULIN ORDER, INSORD: 14.7 UNITS/HOUR
INSULIN ORDER, INSORD: 2.2 UNITS/HOUR
INSULIN ORDER, INSORD: 3.4 UNITS/HOUR
INSULIN ORDER, INSORD: 5.2 UNITS/HOUR
INSULIN ORDER, INSORD: 6.1 UNITS/HOUR
INSULIN ORDER, INSORD: 6.3 UNITS/HOUR
INSULIN ORDER, INSORD: 6.4 UNITS/HOUR
INSULIN ORDER, INSORD: 6.4 UNITS/HOUR
INSULIN ORDER, INSORD: 7.7 UNITS/HOUR
INSULIN ORDER, INSORD: 9.5 UNITS/HOUR
LOW TARGET, LOT: 95 MG/DL
MAGNESIUM SERPL-MCNC: 2.6 MG/DL (ref 1.6–2.4)
MCH RBC QN AUTO: 28.4 PG (ref 26–34)
MCHC RBC AUTO-ENTMCNC: 32 G/DL (ref 30–36.5)
MCV RBC AUTO: 88.8 FL (ref 80–99)
MINUTES UNTIL NEXT BG, NBG: 60 MIN
MULTIPLIER, MUL: 0.02
MULTIPLIER, MUL: 0.03
MULTIPLIER, MUL: 0.03
MULTIPLIER, MUL: 0.04
MULTIPLIER, MUL: 0.05
MULTIPLIER, MUL: 0.06
MULTIPLIER, MUL: 0.07
MULTIPLIER, MUL: 0.08
MULTIPLIER, MUL: 0.09
MULTIPLIER, MUL: 0.1
MULTIPLIER, MUL: 0.11
MULTIPLIER, MUL: 0.12
MULTIPLIER, MUL: 0.13
MULTIPLIER, MUL: 0.14
NRBC # BLD: 0 K/UL (ref 0–0.01)
NRBC BLD-RTO: 0 PER 100 WBC
ORDER INITIALS, ORDINIT: NORMAL
P-R INTERVAL, ECG05: 224 MS
PLATELET # BLD AUTO: 106 K/UL (ref 150–400)
PMV BLD AUTO: 10.9 FL (ref 8.9–12.9)
POTASSIUM SERPL-SCNC: 4.9 MMOL/L (ref 3.5–5.1)
PROT SERPL-MCNC: 6.3 G/DL (ref 6.4–8.2)
Q-T INTERVAL, ECG07: 438 MS
Q-T INTERVAL, ECG07: 440 MS
QRS DURATION, ECG06: 114 MS
QRS DURATION, ECG06: 98 MS
QTC CALCULATION (BEZET), ECG08: 410 MS
QTC CALCULATION (BEZET), ECG08: 471 MS
RBC # BLD AUTO: 2.78 M/UL (ref 4.1–5.7)
SERVICE CMNT-IMP: ABNORMAL
SERVICE CMNT-IMP: NORMAL
SODIUM SERPL-SCNC: 142 MMOL/L (ref 136–145)
VENTRICULAR RATE, ECG03: 53 BPM
VENTRICULAR RATE, ECG03: 69 BPM
WBC # BLD AUTO: 8.3 K/UL (ref 4.1–11.1)

## 2021-02-03 PROCEDURE — 65660000000 HC RM CCU STEPDOWN

## 2021-02-03 PROCEDURE — 74011000258 HC RX REV CODE- 258: Performed by: PHYSICIAN ASSISTANT

## 2021-02-03 PROCEDURE — 97530 THERAPEUTIC ACTIVITIES: CPT

## 2021-02-03 PROCEDURE — 97116 GAIT TRAINING THERAPY: CPT

## 2021-02-03 PROCEDURE — 80053 COMPREHEN METABOLIC PANEL: CPT

## 2021-02-03 PROCEDURE — 71045 X-RAY EXAM CHEST 1 VIEW: CPT

## 2021-02-03 PROCEDURE — 74011250637 HC RX REV CODE- 250/637: Performed by: PHYSICIAN ASSISTANT

## 2021-02-03 PROCEDURE — 74011000250 HC RX REV CODE- 250: Performed by: PHYSICIAN ASSISTANT

## 2021-02-03 PROCEDURE — 74011250636 HC RX REV CODE- 250/636: Performed by: PHYSICIAN ASSISTANT

## 2021-02-03 PROCEDURE — 97165 OT EVAL LOW COMPLEX 30 MIN: CPT | Performed by: OCCUPATIONAL THERAPIST

## 2021-02-03 PROCEDURE — 83735 ASSAY OF MAGNESIUM: CPT

## 2021-02-03 PROCEDURE — 85027 COMPLETE CBC AUTOMATED: CPT

## 2021-02-03 PROCEDURE — 74011636637 HC RX REV CODE- 636/637: Performed by: PHYSICIAN ASSISTANT

## 2021-02-03 PROCEDURE — 97162 PT EVAL MOD COMPLEX 30 MIN: CPT

## 2021-02-03 PROCEDURE — 77010033678 HC OXYGEN DAILY

## 2021-02-03 PROCEDURE — 77030012890

## 2021-02-03 PROCEDURE — 36415 COLL VENOUS BLD VENIPUNCTURE: CPT

## 2021-02-03 PROCEDURE — 82962 GLUCOSE BLOOD TEST: CPT

## 2021-02-03 PROCEDURE — 99232 SBSQ HOSP IP/OBS MODERATE 35: CPT | Performed by: INTERNAL MEDICINE

## 2021-02-03 PROCEDURE — 99233 SBSQ HOSP IP/OBS HIGH 50: CPT | Performed by: CLINICAL NURSE SPECIALIST

## 2021-02-03 PROCEDURE — 97530 THERAPEUTIC ACTIVITIES: CPT | Performed by: OCCUPATIONAL THERAPIST

## 2021-02-03 PROCEDURE — 74011250637 HC RX REV CODE- 250/637: Performed by: THORACIC SURGERY (CARDIOTHORACIC VASCULAR SURGERY)

## 2021-02-03 PROCEDURE — 93005 ELECTROCARDIOGRAM TRACING: CPT

## 2021-02-03 PROCEDURE — 97535 SELF CARE MNGMENT TRAINING: CPT | Performed by: OCCUPATIONAL THERAPIST

## 2021-02-03 RX ORDER — SODIUM CHLORIDE 0.9 % (FLUSH) 0.9 %
5-40 SYRINGE (ML) INJECTION AS NEEDED
Status: DISCONTINUED | OUTPATIENT
Start: 2021-02-03 | End: 2021-02-08 | Stop reason: HOSPADM

## 2021-02-03 RX ORDER — GABAPENTIN 300 MG/1
300 CAPSULE ORAL 3 TIMES DAILY
Status: DISCONTINUED | OUTPATIENT
Start: 2021-02-03 | End: 2021-02-08 | Stop reason: HOSPADM

## 2021-02-03 RX ORDER — SODIUM CHLORIDE 0.9 % (FLUSH) 0.9 %
5-40 SYRINGE (ML) INJECTION EVERY 8 HOURS
Status: DISCONTINUED | OUTPATIENT
Start: 2021-02-03 | End: 2021-02-08 | Stop reason: HOSPADM

## 2021-02-03 RX ORDER — PANTOPRAZOLE SODIUM 40 MG/1
40 TABLET, DELAYED RELEASE ORAL
Status: DISCONTINUED | OUTPATIENT
Start: 2021-02-04 | End: 2021-02-08 | Stop reason: HOSPADM

## 2021-02-03 RX ORDER — ATORVASTATIN CALCIUM 40 MG/1
40 TABLET, FILM COATED ORAL DAILY
Status: DISCONTINUED | OUTPATIENT
Start: 2021-02-03 | End: 2021-02-03

## 2021-02-03 RX ORDER — ROSUVASTATIN CALCIUM 20 MG/1
20 TABLET, COATED ORAL
Status: DISCONTINUED | OUTPATIENT
Start: 2021-02-03 | End: 2021-02-08 | Stop reason: HOSPADM

## 2021-02-03 RX ORDER — TRAMADOL HYDROCHLORIDE 50 MG/1
50 TABLET ORAL
Status: DISCONTINUED | OUTPATIENT
Start: 2021-02-03 | End: 2021-02-08 | Stop reason: HOSPADM

## 2021-02-03 RX ORDER — TAMSULOSIN HYDROCHLORIDE 0.4 MG/1
0.4 CAPSULE ORAL DAILY
Status: DISCONTINUED | OUTPATIENT
Start: 2021-02-03 | End: 2021-02-08 | Stop reason: HOSPADM

## 2021-02-03 RX ORDER — LANOLIN ALCOHOL/MO/W.PET/CERES
1 CREAM (GRAM) TOPICAL
Status: DISCONTINUED | OUTPATIENT
Start: 2021-02-04 | End: 2021-02-08 | Stop reason: HOSPADM

## 2021-02-03 RX ADMIN — POLYETHYLENE GLYCOL 3350 17 G: 17 POWDER, FOR SOLUTION ORAL at 08:17

## 2021-02-03 RX ADMIN — CHLORHEXIDINE GLUCONATE 0.12% ORAL RINSE 10 ML: 1.2 LIQUID ORAL at 08:16

## 2021-02-03 RX ADMIN — CEFAZOLIN SODIUM 2 G: 1 INJECTION, POWDER, FOR SOLUTION INTRAMUSCULAR; INTRAVENOUS at 02:19

## 2021-02-03 RX ADMIN — OXYCODONE 5 MG: 5 TABLET ORAL at 06:00

## 2021-02-03 RX ADMIN — DOCUSATE SODIUM - SENNOSIDES 1 TABLET: 50; 8.6 TABLET, FILM COATED ORAL at 08:17

## 2021-02-03 RX ADMIN — GABAPENTIN 300 MG: 300 CAPSULE ORAL at 21:33

## 2021-02-03 RX ADMIN — SODIUM CHLORIDE 9.5 UNITS/HR: 9 INJECTION, SOLUTION INTRAVENOUS at 20:18

## 2021-02-03 RX ADMIN — ACETAMINOPHEN 650 MG: 325 TABLET ORAL at 17:31

## 2021-02-03 RX ADMIN — ROSUVASTATIN 20 MG: 20 TABLET, FILM COATED ORAL at 21:33

## 2021-02-03 RX ADMIN — ACETAMINOPHEN 650 MG: 325 TABLET ORAL at 12:43

## 2021-02-03 RX ADMIN — OXYCODONE 5 MG: 5 TABLET ORAL at 02:05

## 2021-02-03 RX ADMIN — MUPIROCIN: 20 OINTMENT TOPICAL at 21:34

## 2021-02-03 RX ADMIN — CHLORHEXIDINE GLUCONATE 0.12% ORAL RINSE 10 ML: 1.2 LIQUID ORAL at 21:33

## 2021-02-03 RX ADMIN — GABAPENTIN 300 MG: 300 CAPSULE ORAL at 10:56

## 2021-02-03 RX ADMIN — TAMSULOSIN HYDROCHLORIDE 0.4 MG: 0.4 CAPSULE ORAL at 12:43

## 2021-02-03 RX ADMIN — FAMOTIDINE 20 MG: 20 TABLET, FILM COATED ORAL at 08:17

## 2021-02-03 RX ADMIN — CEFAZOLIN SODIUM 2 G: 1 INJECTION, POWDER, FOR SOLUTION INTRAMUSCULAR; INTRAVENOUS at 15:42

## 2021-02-03 RX ADMIN — MUPIROCIN: 20 OINTMENT TOPICAL at 08:19

## 2021-02-03 RX ADMIN — ACETAMINOPHEN 650 MG: 325 TABLET ORAL at 02:16

## 2021-02-03 RX ADMIN — CEFAZOLIN SODIUM 2 G: 1 INJECTION, POWDER, FOR SOLUTION INTRAMUSCULAR; INTRAVENOUS at 08:16

## 2021-02-03 RX ADMIN — ACETAMINOPHEN 650 MG: 325 TABLET ORAL at 08:17

## 2021-02-03 RX ADMIN — ASPIRIN 81 MG: 81 TABLET, CHEWABLE ORAL at 08:17

## 2021-02-03 RX ADMIN — SODIUM CHLORIDE 14.7 UNITS/HR: 9 INJECTION, SOLUTION INTRAVENOUS at 22:32

## 2021-02-03 RX ADMIN — DOCUSATE SODIUM - SENNOSIDES 1 TABLET: 50; 8.6 TABLET, FILM COATED ORAL at 17:32

## 2021-02-03 RX ADMIN — OXYCODONE 5 MG: 5 TABLET ORAL at 10:56

## 2021-02-03 RX ADMIN — Medication 10 ML: at 05:19

## 2021-02-03 RX ADMIN — Medication 10 ML: at 13:56

## 2021-02-03 RX ADMIN — GABAPENTIN 300 MG: 300 CAPSULE ORAL at 15:43

## 2021-02-03 RX ADMIN — ACETAMINOPHEN 650 MG: 325 TABLET ORAL at 21:33

## 2021-02-03 RX ADMIN — SODIUM CHLORIDE 14 UNITS/HR: 9 INJECTION, SOLUTION INTRAVENOUS at 23:29

## 2021-02-03 RX ADMIN — ACETAMINOPHEN 650 MG: 325 TABLET ORAL at 06:00

## 2021-02-03 RX ADMIN — Medication 10 ML: at 21:34

## 2021-02-03 RX ADMIN — CEFAZOLIN SODIUM 2 G: 1 INJECTION, POWDER, FOR SOLUTION INTRAMUSCULAR; INTRAVENOUS at 21:33

## 2021-02-03 RX ADMIN — Medication 10 ML: at 15:45

## 2021-02-03 NOTE — PROGRESS NOTES
[facility]  ICU Progress Note    Admit Date: 2021  POD:  1 Day Post-Op    Procedure:  Procedure(s):  ON PUMP CORONARY ARTERY BYPASS GRAFT (CABG) x3, EVH, CARDIOPLEGIA, INTRAOPERATIVE CARLOS DONE BY DR. August Up. [address] DONE BY Jennifer PIMENTEL. Subjective/24 Hour Summary:   Pt seen with Dr. Shannan Singh. Doing well overall, delined overnight. [place] remains to monitor UOP as it started to tail off a bit this morning. Cr slightly elevated but below preop baseline. In some pain this morning. Objective:   Vitals:  Blood pressure (!) 95/59, pulse 71, temperature 97.6 °F (36.4 °C), resp. rate 19, weight 220 lb 7.4 oz (100 kg), SpO2 100 %. Temp (24hrs), Av.2 °F (35.7 °C), Min:95.3 °F (35.2 °C), Max:97.6 °F (36.4 °C)    Hemodynamics:   CO: CO (l/min): 4.7 l/min   CI: CI (l/min/m2): 2.3 l/min/m2   CVP: CVP (mmHg): 10 mmHg (21 0300)   SVR: SVR (dyne*sec)/cm5: 1226 (dyne*sec)/cm5 (21 0591)   PAP Systolic: PAP Systolic: 24 (53/80/20 7582)   PAP Diastolic: PAP Diastolic: 13 (13/11/63 7515)   PVR:     SV02: SVO2 (%): 74 % (21 0300)   SCV02:      EKG/Rhythm:  SR with 1st degree block    Extubation Date / Time: 2/3/2021 @ 2245    CT Output: 350cc (240cc overnight)    Ventilator Settings:  Mode Rate Tidal Volume Pressure FiO2 PEEP   CPAP   550 ml  6 cm H2O 40 % 5 cm H20     Peak airway pressure: 12 cm H2O    Minute ventilation: 8 l/min        Oxygen Therapy:  Oxygen Therapy  O2 Sat (%): 100 % (21 08)  Pulse via Oximetry: 71 beats per minute (21 0800)  O2 Device: Nasal cannula (02/03/21 0800)  O2 Flow Rate (L/min): 2 l/min (21 0800)  FIO2 (%): 40 % (21 2200)    CXR:  CXR Results  (Last 48 hours)               21 0550  XR CHEST PORT Final result    Impression:  Interval removal of pulmonary artery catheter.        No other significant change                Narrative:  Clinical history: postop heart   INDICATION:   postop heart   COMPARISON: 2021       FINDINGS:   AP portable upright view of the chest demonstrates a stable  cardiopericardial   silhouette. There is no pleural effusion. .Left basilar atelectasis and   interstitial opacity not changed. Pleural drainage catheters on the left. Mediastinal drain. Venous access sheath on the right. Pulmonary artery catheter   has been removed. There is no pneumothorax. . Patient is on a cardiac monitor. 02/02/21 1657  XR CHEST PORT Final result    Impression:  Life support lines and tubes as described above. Mild pulmonary   edema. Narrative: Indication: Status post CABG. Comparison to 2/1/2021. Portable exam obtained at 8822 demonstrates placement of   a right jugular [place] catheter with the tip projecting over the main   pulmonary artery outflow tract. There is no pneumothorax. ET tube, NG tube,   mediastinal drain, and left-sided chest tube are all present. Mild pulmonary   edema is noted. The patient is status post median sternotomy. 02/01/21 1621  XR CHEST PA LAT Final result    Impression:  No acute findings       Narrative:  EXAM: XR CHEST PA LAT       INDICATION: pre-op heart       COMPARISON: 1/22/2021. FINDINGS: PA and lateral radiographs of the chest on 3 images demonstrate clear   lungs and pleural margins. The cardiac and mediastinal contours and pulmonary   vascularity are normal. No hilar enlargement is shown. Mild lower thoracic spine   degenerative changes are noted.                   Admission Weight: Last Weight   Weight: 206 lb 5.6 oz (93.6 kg) Weight: 220 lb 7.4 oz (100 kg)     Intake / Output / Drain:  Current Shift: 02/03 0701 - 02/03 1900  In: 41.2 [I.V.:41.2]  Out: 80 [Urine:75; Drains:5]  Last 24 hrs.:     Intake/Output Summary (Last 24 hours) at 2/3/2021 1018  Last data filed at 2/3/2021 0800  Gross per 24 hour   Intake 3752.53 ml   Output 3795 ml   Net -42.47 ml       EXAM:  General:   NAD Lungs:   Clear to auscultation bilaterally. Incision:  No erythema, drainage or swelling. Heart:  Regular rate and rhythm, S1, S2 normal, no murmur, click, rub or gallop. Abdomen:   Soft, non-tender. Bowel sounds normal. No masses,  No organomegaly. Extremities:  No edema. PPP. Neurologic:  Gross motor and sensory apparatus intact. Labs:   Recent Labs     21  1641   WBC 8.3  --  9.5   HGB 7.9* 8.8* 8.4*   HCT 24.7* 26.3* 25.5*   *  --  126*     Recent Labs     21  04121    142   K 4.9 4.5   * 110*   CO2 27 27   BUN 25* 24*   CREA 1.42* 1.31*   GLU 87 118*   CA 9.1 8.9   MG 2.6* 2.6*     Recent Labs     21  04121  1641   AP 43* 51   TP 6.3* 5.8*   ALB 3.5 3.0*   GLOB 2.8 2.8     Recent Labs     21  1641 21  1527   INR 1.2* 1.1   PTP 12.7* 11.1   APTT 24.7 24.2      Recent Labs     21  1653   PHI 7.41   PCO2I 47.7*   PO2I 83     No results for input(s): CPK, CKMB, TROIQ, BNPP in the last 72 hours. Assessment:     Active Problems:    ASHD (arteriosclerotic heart disease) (1/15/2021)      CAD (coronary artery disease) (2021)      S/P CABG x 3 (2021)      Overview: CABG x 3 LIMA to LAD, RSVG to OM, RSVG to PDA      Right Endoscopic Saphenous Vein [place]         Plan/Recommendations/Medical Decision Makin.  CAD s/p CABG: ASA/Statin, hold BB for today  2.  HTN: On BB, HCTZ, aldactone, valsartan preop. Hold for now and reintroduce as tolerated. 3. HLD: on statin  4. DM: on metformin PTA, insulin drip per protocol for now  5. LE edema/venous reflux: hold lasix today. Give compression hose. 6. Elevated Cr: Most recent Cr 1.99, hydrated preop, Cr. 1.4 today. Keep martinez today. 7. Acute post op resp insufficiency: wean NC as tolerated. TCDB. IS. Progressive ambulation  8. Acute blood loss anemia: monitor HH, chest tube output    Dispo: keep chest tubes today.  Okay for stepdown.     Signed By: LOREN Garnett

## 2021-02-03 NOTE — PROGRESS NOTES
2005: Patient awake and calm. Follows commands. WIN. BP dropping into 80's. Started low dose Juan. 2035: Patient still too sleepy to change to CPAP. Will reevaluate. One PRN albumin given for volume and to assist weaning off Juan drip. PA 24/11/16 and CVP 4  2100: Remains sleep, will reevaluate. 2200: Patient placed on CPAP. 2245: Extubated to 4 l NC. Tolerated well. Wrist restraints D/Isaias.  0330: Pulmonary artery catheter removed per protocol. Patient hemodynamically stable off vasopressors for four or more hours with the current values:  SVO2 75  PAD 12  CVP 8  CI 2.3  Systolic arterial blood pressure 125/53/77  Chest tube drainage remains <50 ml per hour and urine output remains ? 30 ml per hour. Patient maintaining SpO2 > 94% on 2 L NC . Patient has underlying cardiac rhythm and is not pacer dependent. 0601: Discontinued right Leni and dressing applied. 0645: OOB to chair.

## 2021-02-03 NOTE — PROGRESS NOTES
4359 Report received from night nurse, hospitals, RN. Assumed care of patient. Patient currently up in chair, tolerating well. VSS. See documented assessment. 65 Rounds with Dr. Donovan Ganser, plans to leave [place] in today. Work with PT/OT. 1942 Assisted patient back to bed, 2 person assistance mostly because of line/tube management. 1125 PT/OT working with patient, assisted to ambulate in the herring and returned to the chair. Bed alarm in place.

## 2021-02-03 NOTE — PROGRESS NOTES
Problem: Self Care Deficits Care Plan (Adult)  Goal: *Acute Goals and Plan of Care (Insert Text)  Description:     FUNCTIONAL STATUS PRIOR TO ADMISSION: Patient having difficulty verbalizing prior level of function. HOME SUPPORT: The patient lived with his wife. Occupational Therapy Goals  Initiated 2/3/2021  1. Patient will perform grooming standing at sink with supervision/set-up within 7 day(s). 2.  Patient will perform upper body dressing with minimal assistance/contact guard assist within 7 day(s). 3.  Patient will perform lower body dressing with minimal assistance/contact guard assist within 7 day(s). 4.  Patient will perform toilet transfers with supervision/set-up within 7 day(s). 5.  Patient will perform all aspects of toileting with minimal assistance/contact guard assist within 7 day(s). Outcome: Progressing Towards Goal    OCCUPATIONAL THERAPY EVALUATION  Patient: Tiffani Millan [de-identified] y.o. male)  Date: 2/3/2021  Primary Diagnosis: CAD (coronary artery disease) [I25.10]  Procedure(s) (LRB):  ON PUMP CORONARY ARTERY BYPASS GRAFT (CABG) x3, EVH, CARDIOPLEGIA, INTRAOPERATIVE CARLOS DONE BY DR. Soumya Duarte. EVH DONE BY MARIMAR PIMENTEL. (N/A) 1 Day Post-Op   Precautions:  Sternal(move in the tube)    ASSESSMENT  Based on the objective data described below, the patient presents with sternal/move in the tube precautions, GW, decreased activity tolerance, decreased attention, impaired processing, impaired problem solving, decreased safety awareness and decreased balance which is impairing his  functional independence. The patient's functional baseline is unclear, as he is having difficulty with clearly reporting this. He is now performing ADLs at a supervision/setup to mod A level and is CGA of 2 to min A of 2 for functional mobility. The patient will benefit from acute OT intervention and will need SNF rehab after discharge. Functional Outcome Measure:   The patient scored 35/100 on the Barthel Index outcome measure which is indicative of a 65% impairment in function. PLAN :  Recommendations and Planned Interventions: self care training, functional mobility training, therapeutic exercise, balance training, therapeutic activities, cognitive retraining, endurance activities, patient education, home safety training and family training/education    Frequency/Duration: Patient will be followed by occupational therapy 5 times a week to address goals. Recommendation for discharge: (in order for the patient to meet his/her long term goals)  Therapy up to 5 days/week in SNF setting    Equipment recommendations for successful discharge (if) home: TBD       OBJECTIVE DATA SUMMARY:   HISTORY:   Past Medical History:   Diagnosis Date    Arthritis     [facility]     [facility]     Hypercholesterolemia     Hypertension 11/29/10    Shingles 2010    Thromboembolus [facility])      Past Surgical History:   Procedure Laterality Date    HX CARPAL TUNNEL RELEASE  left    HX HEART CATHETERIZATION  12/2005    Dr Brayan Reynolds      left arm fracture    OR COLONOSCOPY FLX DX W/COLLJ SPEC WHEN PFRMD  11/12/2012            Expanded or extensive additional review of patient history:     Home Situation  Home Environment: Private residence  # Steps to Enter: 2  Rails to Enter: Yes  Hand Rails : Bilateral  One/Two Story Residence: Two story  # of Interior Steps: 5  Living Alone: No  Support Systems: Spouse/Significant Other/Partner  Patient Expects to be Discharged to[de-identified] Unknown  Current DME Used/Available at Home: Cane, straight  Tub or Shower Type: Tub/Shower combination    Hand dominance: Right    EXAMINATION OF PERFORMANCE DEFICITS:  Cognitive/Behavioral Status:  Neurologic State: Alert  Orientation Level: Oriented to place;Oriented to person;Oriented to situation  Cognition: Follows commands;Decreased attention/concentration; Impaired decision making;Poor safety awareness        Safety/Judgement: Decreased awareness of need for assistance;Decreased awareness of need for safety;Decreased insight into deficits    Cognitive Retraining  Problem Solving: Deductive reason;General alternative solution; Identifying the problem  Attention to Task: Distractibility; Single task  Following Commands: Follows one step commands/directions  Safety/Judgement: Decreased awareness of need for assistance;Decreased awareness of need for safety;Decreased insight into deficits    Hearing: Auditory  Auditory Impairment: None    Vision/Perceptual:    Acuity: Within Defined Limits    Corrective Lenses: Reading glasses    Range of Motion:  AROM: Generally decreased, functional    Strength:  Strength: Generally decreased, functional    Coordination:  Coordination: Generally decreased, functional            Tone & Sensation:  Tone: Normal  Sensation: Intact       Balance:  Sitting: Impaired; Without support  Sitting - Static: Good (unsupported)  Sitting - Dynamic: Good (unsupported)  Standing: Impaired; With support  Standing - Static: Constant support;Good  Standing - Dynamic : Fair    Functional Mobility and Transfers for ADLs:  Bed Mobility:  Rolling: Minimum assistance;Assist x2  Supine to Sit: Minimum assistance;Assist x2  Scooting: Minimum assistance;Assist x2    Transfers:  Sit to Stand: Minimum assistance;Assist x2  Stand to Sit: Minimum assistance;Assist x1  Bed to Chair: Contact guard assistance;Assist x2    ADL Assessment:  Feeding: Supervision;Setup    Oral Facial Hygiene/Grooming: Contact guard assistance    Bathing: Moderate assistance    Upper Body Dressing: Moderate assistance    Lower Body Dressing: Moderate assistance    Toileting:  Moderate assistance                Therapeutic Exercise:     CARDIAC  EXERCISE   Sets   Reps   Active Active Assist   Passive Self ROM   Comments   Shoulder flexion 1 10 []                                            [x]                                            [] []                                               Shoulder abduction 1 10 []                                            [x]                                            []                                            []                                               Scapular elevation 1 10 [x]                                            []                                            []                                            []                                               Scapular retraction 1 10 [x]                                            []                                            []                                            []                                               Trunk rotation 1 10 [x]                                            []                                            []                                            []                                               Trunk sidebending 1 10 [x]                                            []                                            []                                            []                                                  Functional Measure:  Barthel Index:    Bathin  Bladder: 0  Bowels: 10  Groomin  Dressin  Feeding: 10  Mobility: 0  Stairs: 0  Toilet Use: 0  Transfer (Bed to Chair and Back): 10  Total: 35/100        Percentage of impairment   0%   1-19%   20-39%   40-59%   60-79%   80-99%   100%   Barthel Score 0-100 100 99-80 79-60 59-40 20-39 1-19   0     The Barthel ADL Index: Guidelines  1. The index should be used as a record of what a patient does, not as a record of what a patient could do. 2. The main aim is to establish degree of independence from any help, physical or verbal, however minor and for whatever reason. 3. The need for supervision renders the patient not independent. 4. A patient's performance should be established using the best available evidence.  Asking the patient, friends/relatives and nurses are the usual sources, but direct observation and common sense are also important. However direct testing is not needed. 5. Usually the patient's performance over the preceding 24-48 hours is important, but occasionally longer periods will be relevant. 6. Middle categories imply that the patient supplies over 50 per cent of the effort. 7. Use of aids to be independent is allowed. Nick Nicole., Barthel, D.W. (3137). Functional evaluation: the Barthel Index. [facility] (14)2. Tyrel Cosme william PAM Dey, Farrah Allison., Callum Dunne., [facility] [place], [address] [facility] (1999). Measuring the change indisability after inpatient rehabilitation; comparison of the responsiveness of the Barthel Index and Functional [place] Measure. Journal of Neurology, Neurosurgery, and Psychiatry, 66(4), 986-014. Isaiah Jim, N.J.A, JULIA Klein, & Armand Cadet M.A. (2004.) Assessment of post-stroke quality of life in cost-effectiveness studies: The usefulness of the Barthel Index and the EuroQoL-5D. Quality of Life Research, 15, 564-65          Activity Tolerance:   Fair      After treatment patient left in no apparent distress:    Sitting in chair and Call bell within reach    COMMUNICATION/EDUCATION:   The patients plan of care was discussed with: Physical Therapist and Registered Nurse. Patient/family agree to work toward stated goals and plan of care. This patients plan of care is appropriate for delegation to [facility].     Thank you for this referral.  Jamaal Giang, OTR/L  Time Calculation: 29 mins

## 2021-02-03 NOTE — PROGRESS NOTES
[place] PLAN:  DC home w/ family support, HH when medically stable. Reason for Admission:  Pt is an [de-identified] yo male with hx of CAD admitted from home and recommended for CABG which he underwent on 2/2. Pt is progressing well per protocol and was transferred to [facility] 2267 for routine progression of care. Pt lives w/ his wife in a 3-story home - his bedroom is on 3rd fl. He is indep for mobility to include driving locally. He does have a SPC that uses at times. He has 4 adult children who live locally and are supportive. RUR Score:  13% Low RUR                  Plan for utilizing home health:   Pt was given FOC for [facility] providers - did not have a preference-will use any provider in network w/ insur. Ref sent to [facility]. FOC on chart. PCP: First and Last name:  Dr. Loli Pressley   Name of Practice:  [facility]   Are you a current patient: Yes/No:  Yes   Approximate date of last visit: last week   Can you participate in a virtual visit with your PCP:  Yes if pravin is present                    Current Advanced Directive/Advance Care Plan:   Anna 13 (ACP) Conversation      Date of Conversation: 2/3/21  Conducted with: Cinthya Gordillo Decision Maker:   Default is wife Steve. Click here to complete [address] including selection of the Healthcare Decision Maker Relationship (ie \"Primary\")    Content/Action Overview:   Not on file. Pt's pravin thinks pt has ACP documents at home and will bring in to be scanned if she can find. Would accept blank [facility] ACP form. Reviewed DNR/DNI and patient     Length of Voluntary ACP Conversation in minutes:  8  Shantel Simms, MSW                                    Transition of Care Plan:                    Initial PT/OT evals indicate possible need for Rehab depending on pt's progress. Discussed w/ pt and pravin - [facility] would be first choice if necessary.     Ref was sent to [facility] [address] in event pt is able to return home. Pt/pravin related story that on night before pt came to hospital, he was unable to get out of bathtub and EMS had to be called. This and fact that pt lives in 3 story home will need to be considered prior to dc. Family to transport at dc unless IP Rehab is necess. 2nd IM  F/U appmts    Care Management Interventions  PCP Verified by CM: Yes  Palliative Care Criteria Met (RRAT>21 & CHF Dx)?: No  Mode of Transport at Discharge:  Other (see comment)  Transition of Care Consult (CM Consult): Discharge Planning  Discharge Durable Medical Equipment: No  Physical Therapy Consult: Yes  Occupational Therapy Consult: Yes  Speech Therapy Consult: No  Current Support Network: Lives with Spouse  Confirm Follow Up Transport: Family  The Plan for Transition of Care is Related to the Following Treatment Goals : Safe Discharge  The Patient and/or Patient Representative was Provided with a Choice of Provider and Agrees with the Discharge Plan?: Yes  Name of the Patient Representative Who was Provided with a Choice of Provider and Agrees with the Discharge Plan: [address] HOMAR Ramos Nibbe of Choice List was Provided with Basic Dialogue that Supports the Patient's Individualized Plan of Care/Goals, Treatment Preferences and Shares the Quality Data Associated with the Providers?: Yes  Discharge Location  Discharge Placement: Home with home health     Sue Beltre MSW

## 2021-02-03 NOTE — PROGRESS NOTES
1400: patient arrived to room 2267, assisted patient to bed to rest. Daughter at bedside. Insulin drip infusing, pacerbox connected and on at rate of 70, AAI. Chest tube pleurvac had fallen over with transferring of patient, output fluids unable to be properly monitored. New pleurvac placed. 1700: Patient weaned off oxygen, now on room air with saturations at 99%.

## 2021-02-03 NOTE — PROGRESS NOTES
Cardiac Surgery Care Coordinator-  Met with Jose Manuel Schafer. Reviewed plan of care and discussed goals for the day. Jose Manuel Schafer has a good understanding of his plan for the day. Reinforced sternal precautions and encouraged continued use of the incentive spirometer. Rory Ayon can pull 500ml with good effort. Encouraged Jose Manuel Schafer to verbalize. Will continue to follow for educational and emotional needs.  Qamar Burris RN

## 2021-02-03 NOTE — PROGRESS NOTES
02/02/21 2200 02/02/21 2215 02/02/21 2216   ABCDEF Bundle   SBT Safety Screen Passed Yes  --   --    SBT Trial Passed  --   --  Yes   Weaning Parameters   Spontaneous Breathing Trial Complete  --   --  Yes   Resp Rate Observed 15 14 13   Ve 8 6.7 6    [phone]   [facility] 26 21 25

## 2021-02-03 NOTE — DIABETES MGMT
[facility]  PROGRAM FOR DIABETES HEALTH    CLINICAL NURSE SPECIALIST CONSULT   INITIAL NOTE    Initial Presentation   Abdirahman Johnson is a [de-identified] y.o. male admitted 2/1/21 with dyspnea with exertion, accompanied by LE edema. .   Cardiac cath:  Prox LAD lesion, 80% stenosed. Mid LAD lesion, 85% stenosed. Dist LAD lesion, 90% stenosed. Left Circumflex   Prox Cx lesion, 80% stenosed. First Obtuse [place]   1st Mrg lesion, 70% stenosed. Right Coronary Artery   The vessel is moderate in size. The vessel is tortuous. Prox RCA lesion, 90% stenosed. Mid RCA lesion, 80% stenosed. Echo:  LV: Estimated LVEF is 55 - 60%. Normal systolic function (ejection fraction normal) and diastolic function. Small left ventricle. Mild concentric hypertrophy. Wall motion: normal.  Carotid doppler: There is mild stenosis in the right & left CCA. HX:   Past Medical History:   Diagnosis Date    Arthritis     [facility]     [facility]     Hypercholesterolemia     Hypertension 11/29/10    Shingles 2010    Thromboembolus ([facility]    Hip pain    DX: ASHD. TX: 2/2/21 CABG x 3 LIMA to LAD, RSVG to OM, RSVG to PDA. Right Endoscopic Saphenous Vein [place]    Clinical Course   Current course has been uncomplicated. 2/3/21 02 NC. BG management via Glucostabilizer per protocol. Diabetes    Patient has known Type 2 diabetes, treated with oral agents PTA. Family history negative for diabetes. Admission  and A1c 7% indicate good diabetes control.    Consulted by Provider for advanced diabetes nursing assessment and care, specifically related to   [x] Inpatient management strategy    Diabetes-related medical history  Neurological complications  Peripheral neuropathy  Macrovascular disease  CAD, stroke (2018)    Diabetes medication history  Drug class Currently in use Discontinued Never used   Biguanide Metformin 1000mg twice daily     DDP-4 inhibitor       Sulfonylurea Glimiperide 4mg D Thiazolidinedione      GLP-1 RA      SGLT-2 inhibitors      Basal insulin      Bolus insulin      Fixed Dose  Combinations        Subjective   I check my blood sugar.     Patient reports the following home diabetes self-care practices:  Eating pattern-Wife prepares some meals; daughter comes on weekends to cook. [x] Lunch  Cereal with banana. Eggs & sausage with toast (weekend). [x] Dinner  Beans with ham. Applesauce/banana/peaches. Cabbage. [x] Beverages Water. Physical activity pattern  [x] Non-Aerobic exercise   Monitoring pattern  [x] Breakfast 80-130s  [x] Dinner  110s  Taking medications pattern  [x] Consistent administration  [x] Affordable     Objective   Physical exam  General Alert, oriented and in no acute distress. Conversant and cooperative. Ambulated with PT/OT. Vital Signs Afebrile. NSR. Normotensive. Visit Vitals  /64   Pulse 70   Temp 97.6 °F (36.4 °C)   Resp 17   Wt 100 kg (220 lb 7.4 oz)   SpO2 100%   BMI 29.09 kg/m²     Skin  Warm and dry. Extremities Bruised tip of right foot (patient stated it happened PTA)    Diabetic foot exam:    Left Foot     Visual Exam: normal Edematous - pitting. Thick nails (son cuts nails for him)   Pulse DP: 1+ (weak)    Right Foot   Visual Exam: normal  Edematous - pitting. Thick nails    Pulse DP: 1+ (weak)    Laboratory  Tests Today 2/2 2/1/21   A1c   7%   BG 87 138 146   Anion gap 4 7 5   Serum triglycerides      WBC 8.3 9.5 8.5   Serum creatinine 1.42 1.25 1.46   GFR 58 >60 56   AST 43 51 29   ALT 16 18 28   Blood gas:     CXR:   There is no pleural effusion. .Left basilar atelectasis and interstitial opacity not changed. No pneumothorax     Factors impacting BG management  Factor Dose Comments   Nutrition:  Carb-controlled meals   60 grams/meal   Begins today at lunch   Drugs:  Blood transfusion(s)   PRBCs 2//2/21   A1cs inaccurate after this date. Pain Scheduled Neurontin  Oxycodone prn Rated @8. Infection Prophylactic Ancef Afebrile.  WBC normal.     Blood glucose pattern        Assessment and Plan   Nursing Diagnosis Risk for unstable blood glucose pattern   Nursing Intervention Domain 2823 Decision-making Support   Nursing Interventions Examined current inpatient diabetes control   Explored factors facilitating and impeding inpatient management  Identified self-management practices impeding diabetes control  Explored corrective strategies with patient and responsible inpatient provider   Informed patient of rational for insulin strategy while hospitalized  Instructed patient that he can likely be transitioned back to his home regimen at discharge     Evaluation   This  male, with Type 2 diabetes, did achieve diabetes control prior to admission (PTA), as evidenced by admission BG of 139 and A1c of 7%. During this hospitalization, the patient has achieved inpatient blood glucose target of 100-180mg/dl via Glucostabilizer. Continue per CV surgery protocol. Believe he can transition back to home diabetes regimen at discharge. Recommendations      Glucostabilizer per protocol    Billing Code(s)   [x] [id number] IP subsequent hospital care - 35 minutes [] [id number] Prolonged Services - 65 minutes [] [id number] Prolonged Services - 110 minutes  [] [id number] IP subsequent hospital care - 25 minutes [] 07506 Prolonged Services - 55 minutes [] [id number] Prolonged Services - 100 minutes  [] [id number] IP subsequent hospital care - 15 minutes [] 17918 Prolonged Services - 45 minutes [] 28393 Prolonged Services - 90 minutes    Before making these care recommendations, I personally reviewed the hospitalization record, including notes, laboratory & diagnostic data and current medications, and examined the patient at the bedside (circumstances permitting) before making care recommendations.      Total minutes: Tad Franco, CNS  Diabetes Clinical Nurse Specialist  Program for Diabetes Health  Access via [address] [place] [address]

## 2021-02-03 NOTE — PROGRESS NOTES
Problem: Mobility Impaired (Adult and Pediatric)  Goal: *Acute Goals and Plan of Care (Insert Text)  Description: FUNCTIONAL STATUS PRIOR TO ADMISSION: Patient was independent without use of DME. Patient with history of CVA affecting L hand. HOME SUPPORT PRIOR TO ADMISSION: The patient lived with his wife. Physical Therapy Goals  Initiated 2/3/2021  1. Patient will move from supine to sit and sit to supine , scoot up and down, and roll side to side in bed with independence within 5 days. 2.  Patient will perform sit to/from stand with modified independence within 5 days. 3.  Patient will ambulate 250 feet with least restrictive assistive device and modified independence within 5 days. 4.  Patient will ascend/descend 8 stairs with one sided handrail(s) with modified independence within 5 days. 5.  Patient will perform cardiac exercises per protocol with modified independence within 5 days. 6.  Patient will verbally recall and functionally demonstrate mindful-based movements (\"move in the tube\") principles without cues within 5 days. Outcome: Not Met   PHYSICAL THERAPY EVALUATION  Patient: Kailee Pereyra [de-identified[age] y.o. male)  Date: 2/3/2021  Primary Diagnosis: CAD (coronary artery disease) [I25.10]  Procedure(s) (LRB):  ON PUMP CORONARY ARTERY BYPASS GRAFT (CABG) x3, EVH, CARDIOPLEGIA, INTRAOPERATIVE CARLOS DONE BY DR. Tavares Silveira. EVH DONE BY MARIMAR PIMENTEL. (N/A) 1 Day Post-Op   Precautions:   Sternal(move in the tube)      ASSESSMENT  Based on the objective data described below, the patient presents with decreased strength, decreased functional mobility, impaired balance, confusion with decreased processing, and unsteady gait s/p CABG x 3 POD 1. Patient is functioning below his baseline with mild confusion and post op pain. Patient required constant verbal cues for compliance and re-education on sternal precautions. Patient required min A x 2 for all functional mobility.  He ambulated in the hallway with CGA-min A and cardiac cart, demonstrating flexed trunk posture, shuffled steps, and decreased gait speed. Patient with VSS on 2L O2. Patient  was educated on  understanding of mindful-based movements (\"move in the tube\") principles of keeping UEs proximal to ribcage to prevent lateral pull on the sternum during load-bearing activities with visual and verbal cues required for compliance. Current Level of Function Impacting Discharge (mobility/balance): min A x 2 with cart    Functional Outcome Measure: The patient scored 35/100 on the Barthel outcome measure which is indicative of moderate functional impairment. Other factors to consider for discharge: confusion, decreased problem solving and processing, impaired balance     Patient will benefit from skilled therapy intervention to address the above noted impairments. PLAN :  Recommendations and Planned Interventions: bed mobility training, transfer training, gait training, therapeutic exercises, neuromuscular re-education, patient and family training/education, and therapeutic activities      Frequency/Duration: Patient will be followed by physical therapy:  daily to address goals. Recommendation for discharge: (in order for the patient to meet his/her long term goals)  Rehab pending progress and mental status    This discharge recommendation:  Has not yet been discussed the attending provider and/or case management    IF patient discharges home will need the following DME: to be determined (TBD)         SUBJECTIVE:   Patient stated [facility] [place] I can use my arms.     OBJECTIVE DATA SUMMARY:   Patient mobilized on continuous portable monitor/telemetry.   HISTORY:    Past Medical History:   Diagnosis Date    Arthritis     Diabetes ([facility] [address]     Diabetic neuropathy ([facility] [address]     Hypercholesterolemia     Hypertension 11/29/10    Shingles 2010    Thromboembolus [facility])      Past Surgical History:   Procedure Laterality Date    HX CARPAL TUNNEL RELEASE  left    HX HEART CATHETERIZATION  12/2005    Dr Joe Mello ORTHOPAEDIC      left arm fracture    [place] COLONOSCOPY FLX DX W/COLLJ [facility] INPATIENT REHABILITATION WHEN PFRMD  11/12/2012            Personal factors and/or comorbidities impacting plan of care: pain, confusion, decreased processing and problem solving    Home Situation  Home Environment: Private residence  # Steps to Enter: 2  Rails to Enter: Yes  Hand Rails : Bilateral  One/Two Story Residence: Two story  # of Interior Steps: 5  Living Alone: No  Support Systems: Spouse/Significant Other/Partner  Patient Expects to be Discharged to[de-identified] Unknown  Current DME Used/Available at Home: Cane, straight  Tub or Shower Type: Tub/Shower combination    EXAMINATION/PRESENTATION/DECISION MAKING:     Critical Behavior:  Neurologic State: Alert  Orientation Level: Oriented to place, Oriented to person, Oriented to situation  Cognition: Follows commands, Decreased attention/concentration, Impaired decision making, Poor safety awareness  Safety/Judgement: Decreased awareness of need for assistance, Decreased awareness of need for safety, Decreased insight into deficits  Hearing: Auditory  Auditory Impairment: None  Skin:    Edema:   Range Of Motion:  AROM: Generally decreased, functional           PROM: Generally decreased, functional           Strength:    Strength: Generally decreased, functional                    Tone & Sensation:   Tone: Normal              Sensation: Intact               Coordination:  Coordination: Generally decreased, functional  Vision:   Acuity: Within Defined Limits  Corrective Lenses: Reading glasses  Functional Mobility:  Bed Mobility:  Rolling: Minimum assistance;Assist x2  Supine to Sit: Minimum assistance;Assist x2     Scooting: Minimum assistance;Assist x2  Transfers:  Sit to Stand: Minimum assistance;Assist x2  Stand to Sit: Minimum assistance;Assist x1        Bed to Chair: Contact guard assistance;Assist x2              Balance:   Sitting: Impaired; Without support  Sitting - Static: Good (unsupported)  Sitting - Dynamic: Good (unsupported)  Standing: Impaired; With support  Standing - Static: Constant support;Good  Standing - Dynamic : Fair  Ambulation/Gait Training:  Distance (ft): 90 Feet (ft)  Assistive Device: Gait belt(cardiac cart )  Ambulation - Level of Assistance: Contact guard assistance;Assist x1     Gait Description (WDL): Exceptions to WDL  Gait Abnormalities: Antalgic;Decreased step clearance;Trunk sway increased(flexed trunk posture)        Base of Support: Widened     Speed/Gina: Shuffled; Slow  Step Length: Right shortened;Left shortened                       Cardiac diagnosis intervention:  Patient instructed and educated on mindful movement principles based on Move in The Tube concept to include maintaining bilateral elbows close to rib cage when performing any load-bearing activity such as getting in/out of bed, pushing up from a chair, opening a door, or lifting a box. Patient was given a handout with diagrams of each correct/incorrect method of performing each of the above tasks. Therapeutic Exercises:   Patient instructed on the benefits and demonstrated cardiac exercises while sitting with Contact guard assistance. Instructed and indicated understanding on how to progress reps, sets against gravity, pacing through progressive muscle strengthening standing based on surgeon clearance for more weight in prep for basic and instrumental ADLs. Instruction on the use of household items in place of weights as needed.     CARDIAC   EXERCISE    Sets    Reps    Active  Active Assist    Passive  Self ROM    Comments    Shoulder flexion  1  5   [x]                            []                             []                             []                                Shoulder abduction  1  5  [x]                             []                             []                             []                                Scapular elevation  1  5  [x] []                              []                             []                                Scapular retraction  1  5  [x]                             []                             []                             []                                Trunk rotation  1  5  [x]                             []                             []                             []                                Trunk sidebending  1  5  [x]                             []                              []                             []                                          Functional Measure:  Barthel Index:    Bathin  Bladder: 0  Bowels: 10  Groomin  Dressin  Feeding: 10  Mobility: 0  Stairs: 0  Toilet Use: 0  Transfer (Bed to Chair and Back): 10  Total: 35/100       The Barthel ADL Index: Guidelines  1. The index should be used as a record of what a patient does, not as a record of what a patient could do. 2. The main aim is to establish degree of independence from any help, physical or verbal, however minor and for whatever reason. 3. The need for supervision renders the patient not independent. 4. A patient's performance should be established using the best available evidence. Asking the patient, friends/relatives and nurses are the usual sources, but direct observation and common sense are also important. However direct testing is not needed. 5. Usually the patient's performance over the preceding 24-48 hours is important, but occasionally longer periods will be relevant. 6. Middle categories imply that the patient supplies over 50 per cent of the effort. 7. Use of aids to be independent is allowed. Edg[place] Barthel, D.W. ([id number]). Functional evaluation: the Barthel Index. [address] [facility] (14)2. [facility] william PAM Dey Romualdo [facility].Gloria., Cee, [address] ().  Measuring the change indisability after inpatient rehabilitation; comparison of the responsiveness of the Barthel Index and Functional Falls Measure. Journal of Neurology, Neurosurgery, and Psychiatry, 664), [phone]. Mauricio GAUDENCIO Grey.SHARONDA, JULIA Klein, & Roverto Bishop M.A. (2004.) Assessment of post-stroke quality of life in cost-effectiveness studies: The usefulness of the Barthel Index and the EuroQoL-5D. Quality of Life Research, 15, 877-13            Physical Therapy Evaluation Charge Determination   History Examination Presentation Decision-Making   MEDIUM  Complexity : 1-2 comorbidities / personal factors will impact the outcome/ POC  MEDIUM Complexity : 3 Standardized tests and measures addressing body structure, function, activity limitation and / or participation in recreation  MEDIUM Complexity : Evolving with changing characteristics  Other outcome measures Barthel  MEDIUM      Based on the above components, the patient evaluation is determined to be of the following complexity level: MEDIUM      Activity Tolerance:   Fair and requires rest breaks    After treatment patient left in no apparent distress:   Sitting in chair and Call bell within reach    COMMUNICATION/EDUCATION:   The patients plan of care was discussed with: Occupational therapist, Registered nurse, and Case management. Fall prevention education was provided and the patient/caregiver indicated understanding., Patient/family have participated as able in goal setting and plan of care. , and Patient/family agree to work toward stated goals and plan of care.     Thank you for this referral.  Gerardo Jade, PT, DPT   Time Calculation: 32 mins

## 2021-02-03 NOTE — PROGRESS NOTES
Cardiology Progress Note      2/3/2021 9:13 AM    Admit Date: 2/1/2021    Admit Diagnosis: CAD (coronary artery disease) [I25.10]      Subjective:     Chas Shaikh is s/p CABG day 1. Extubated, sitting in chair. Has post op pain. Denies any SOB. Paced rhythm. BP low normal. Not on pressors.     Visit Vitals  BP (!) 95/59 (BP 1 Location: Left upper arm, BP Patient Position: At rest;Sitting)   Pulse 71   Temp 97.6 °F (36.4 °C)   Resp 19   Wt 220 lb 7.4 oz (100 kg)   SpO2 100%   BMI 29.09 kg/m²       Current Facility-Administered Medications   Medication Dose Route Frequency    gabapentin (NEURONTIN) capsule 300 mg  300 mg Oral TID    traMADoL (ULTRAM) tablet 50 mg  50 mg Oral Q6H PRN    insulin regular (NOVOLIN R, HUMULIN R) 100 Units in 0.9% sodium chloride 100 mL infusion  1-10 Units/hr IntraVENous TITRATE    papaverine injection    PRN    heparin (porcine) 4,000 Units in 0.9% sodium chloride 1,000 mL Irrigation    PRN    sodium chloride (NS) flush 5-40 mL  5-40 mL IntraVENous Q8H    sodium chloride (NS) flush 5-40 mL  5-40 mL IntraVENous PRN    0.45% sodium chloride infusion  10 mL/hr IntraVENous CONTINUOUS    0.9% sodium chloride infusion  10 mL/hr IntraVENous CONTINUOUS    acetaminophen (TYLENOL) tablet 650 mg  650 mg Oral Q4H    oxyCODONE IR (ROXICODONE) tablet 5 mg  5 mg Oral Q4H PRN    oxyCODONE IR (ROXICODONE) tablet 10 mg  10 mg Oral Q4H PRN    morphine 10 mg/ml injection 4 mg  4 mg IntraVENous Q2H PRN    naloxone (NARCAN) injection 0.4 mg  0.4 mg IntraVENous PRN    mupirocin (BACTROBAN) 2 % ointment   Both Nostrils BID    ceFAZolin (ANCEF) 2 g in sterile water (preservative free) 20 mL IV syringe  2 g IntraVENous Q6H    ondansetron (ZOFRAN) injection 4 mg  4 mg IntraVENous Q4H PRN    albuterol (PROVENTIL VENTOLIN) nebulizer solution 2.5 mg  2.5 mg Nebulization Q4H PRN    aspirin chewable tablet 81 mg  81 mg Oral DAILY    midazolam (VERSED) injection 1 mg  1 mg IntraVENous Q1H PRN    chlorhexidine (PERIDEX) 0.12 % mouthwash 10 mL  10 mL Oral Q12H    famotidine (PEPCID) tablet 20 mg  20 mg Oral DAILY    magnesium oxide (MAG-OX) tablet 400 mg  400 mg Oral BID    calcium chloride 1 g in 0.9% sodium chloride 250 mL IVPB  1 g IntraVENous PRN    bisacodyL (DULCOLAX) suppository 10 mg  10 mg Rectal DAILY PRN    senna-docusate (PERICOLACE) 8.6-50 mg per tablet 1 Tab  1 Tab Oral BID    polyethylene glycol (MIRALAX) packet 17 g  17 g Oral DAILY    ELECTROLYTE REPLACEMENT NOTE: Nurse to review Serum Potassium and Magnesuim levels and Initiate Electrolyte Replacement Protocol as needed  1 Each Other PRN    magnesium sulfate 1 g/100 ml IVPB (premix or compounded)  1 g IntraVENous PRN    glucose chewable tablet 16 g  4 Tab Oral PRN    dextrose (D50W) injection syrg 12.5-25 g  12.5-25 g IntraVENous PRN    glucagon (GLUCAGEN) injection 1 mg  1 mg IntraMUSCular PRN    insulin lispro (HUMALOG) injection   SubCUTAneous TIDAC    [START ON 2/4/2021] insulin lispro (HUMALOG) injection   SubCUTAneous AC&HS    insulin glargine (LANTUS) injection 1-50 Units  1-50 Units SubCUTAneous ONCE PRN    potassium chloride 20 mEq in 50 ml IVPB  20 mEq IntraVENous Q2H PRN    potassium chloride 20 mEq in 50 ml IVPB  20 mEq IntraVENous Q2H PRN         Objective:      Physical Exam:  Visit Vitals  /64   Pulse 70   Temp 97.6 °F (36.4 °C)   Resp 17   Wt 220 lb 7.4 oz (100 kg)   SpO2 100%   BMI 29.09 kg/m²     General Appearance:  Well developed, well nourished,alert and oriented x 3, and individual in no acute distress. Ears/Nose/Mouth/Throat:   Hearing grossly normal.         Neck: Supple. Chest:   Lungs clear to auscultation bilaterally. Cardiovascular:  Regular rate and rhythm, S1, S2 normal, no murmur. Abdomen:   Soft, non-tender, bowel sounds are active. Extremities: No edema bilaterally. Skin: Warm and dry.                Data Review:   Labs:    Recent Results (from the past 24 hour(s))   GLUCOSE, POC    Collection Time: 02/02/21 10:22 AM   Result Value Ref Range    Glucose (POC) 139 (H) 65 - 100 mg/dL    Performed by Romero Asp    Collection Time: 02/02/21 12:43 PM   Result Value Ref Range    Glucose 120 mg/dL    Insulin order 1.8 units/hour    Insulin adminstered 1.8 units/hour    Multiplier 0.030     Low target 100 mg/dL    High target 140 mg/dL    D50 order 0.0 ml    D50 administered 0.00 ml    Minutes until next BG 60 min    Order initials lbm     Administered initials lbm     GLSCOM Comments     GLUCOSTABILIZER    Collection Time: 02/02/21  1:30 PM   Result Value Ref Range    Glucose 117 mg/dL    Insulin order 1.7 units/hour    Insulin adminstered 1.7 units/hour    Multiplier 0.030     Low target 100 mg/dL    High target 140 mg/dL    D50 order 0.0 ml    D50 administered 0.00 ml    Minutes until next BG 60 min    Order initials lbm     Administered initials lbm     GLSCOM Comments     GLUCOSTABILIZER    Collection Time: 02/02/21  2:02 PM   Result Value Ref Range    Glucose 117 mg/dL    Insulin order 1.7 units/hour    Insulin adminstered 1.7 units/hour    Multiplier 0.030     Low target 100 mg/dL    High target 140 mg/dL    D50 order 0.0 ml    D50 administered 0.00 ml    Minutes until next BG 60 min    Order initials lbm     Administered initials lbm     GLSCOM Comments     GLUCOSTABILIZER    Collection Time: 02/02/21  2:33 PM   Result Value Ref Range    Glucose 114 mg/dL    Insulin order 1.6 units/hour    Insulin adminstered 1.6 units/hour    Multiplier 0.030     Low target 100 mg/dL    High target 140 mg/dL    D50 order 0.0 ml    D50 administered 0.00 ml    Minutes until next BG 60 min    Order initials lbm     Administered initials lbm     GLSCOM Comments     GLUCOSTABILIZER    Collection Time: 02/02/21  2:54 PM   Result Value Ref Range    Glucose 112 mg/dL    Insulin order 1.6 units/hour    Insulin adminstered 1.6 units/hour    Multiplier 0.030     Low target 100 mg/dL    High target 140 mg/dL    D50 order 0.0 ml    D50 administered 0.00 ml    Minutes until next BG 60 min    Order initials ksd     Administered initials ksd     GLSCOM Comments     GLUCOSTABILIZER    Collection Time: 02/02/21  3:18 PM   Result Value Ref Range    Glucose 102 mg/dL    Insulin order 1.3 units/hour    Insulin adminstered 1.3 units/hour    Multiplier 0.030     Low target 100 mg/dL    High target 140 mg/dL    D50 order 0.0 ml    D50 administered 0.00 ml    Minutes until next BG 60 min    Order initials lbm     Administered initials lbm     GLSCOM Comments     GLUCOSTABILIZER    Collection Time: 02/02/21  4:10 PM   Result Value Ref Range    Glucose 123 mg/dL    Insulin order 1.9 units/hour    Insulin adminstered 1.9 units/hour    Multiplier 0.030     Low target 100 mg/dL    High target 140 mg/dL    D50 order 0.0 ml    D50 administered 0.00 ml    Minutes until next BG 60 min    Order initials lbm     Administered initials lbm     GLSCOM Comments     METABOLIC PANEL, COMPREHENSIVE    Collection Time: 02/02/21  4:41 PM   Result Value Ref Range    Sodium 142 136 - 145 mmol/L    Potassium 3.7 3.5 - 5.1 mmol/L    Chloride 109 (H) 97 - 108 mmol/L    CO2 26 21 - 32 mmol/L    Anion gap 7 5 - 15 mmol/L    Glucose 138 (H) 65 - 100 mg/dL    BUN 24 (H) 6 - 20 MG/DL    Creatinine 1.25 0.70 - 1.30 MG/DL    BUN/Creatinine ratio 19 12 - 20      GFR est AA >60 >60 ml/min/1.73m2    GFR est non-AA 56 (L) >60 ml/min/1.73m2    Calcium 9.0 8.5 - 10.1 MG/DL    Bilirubin, total 0.2 0.2 - 1.0 MG/DL    ALT (SGPT) 18 12 - 78 U/L    AST (SGOT) 26 15 - 37 U/L    Alk.  phosphatase 51 45 - 117 U/L    Protein, total 5.8 (L) 6.4 - 8.2 g/dL    Albumin 3.0 (L) 3.5 - 5.0 g/dL    Globulin 2.8 2.0 - 4.0 g/dL    A-G Ratio 1.1 1.1 - 2.2     MAGNESIUM    Collection Time: 02/02/21  4:41 PM   Result Value Ref Range    Magnesium 2.8 (H) 1.6 - 2.4 mg/dL   CBC WITH AUTOMATED DIFF    Collection Time: 02/02/21  4:41 PM   Result Value Ref Range    WBC 9.5 4.1 - 11.1 K/uL    RBC 2.94 (L) 4.10 - 5.70 M/uL    HGB 8.4 (L) 12.1 - 17.0 g/dL    HCT 25.5 (L) 36.6 - 50.3 %    MCV 86.7 80.0 - 99.0 FL    MCH 28.6 26.0 - 34.0 PG    MCHC 32.9 30.0 - 36.5 g/dL    RDW 13.4 11.5 - 14.5 %    PLATELET 383 (L) 539 - 400 K/uL    MPV 10.6 8.9 - 12.9 FL    NRBC 0.0 0  WBC    ABSOLUTE NRBC 0.00 0.00 - 0.01 K/uL    NEUTROPHILS 79 (H) 32 - 75 %    LYMPHOCYTES 9 (L) 12 - 49 %    MONOCYTES 10 5 - 13 %    EOSINOPHILS 1 0 - 7 %    BASOPHILS 0 0 - 1 %    IMMATURE GRANULOCYTES 1 (H) 0.0 - 0.5 %    ABS. NEUTROPHILS 7.5 1.8 - 8.0 K/UL    ABS. LYMPHOCYTES 0.9 0.8 - 3.5 K/UL    ABS. MONOCYTES 1.0 0.0 - 1.0 K/UL    ABS. EOSINOPHILS 0.1 0.0 - 0.4 K/UL    ABS. BASOPHILS 0.0 0.0 - 0.1 K/UL    ABS. IMM.  GRANS. 0.1 (H) 0.00 - 0.04 K/UL    DF AUTOMATED     PTT    Collection Time: 02/02/21  4:41 PM   Result Value Ref Range    aPTT 24.7 22.1 - 31.0 sec    aPTT, therapeutic range     58.0 - 77.0 SECS   PROTHROMBIN TIME + INR    Collection Time: 02/02/21  4:41 PM   Result Value Ref Range    INR 1.2 (H) 0.9 - 1.1      Prothrombin time 12.7 (H) 9.0 - 11.1 sec   GLUCOSE, POC    Collection Time: 02/02/21  4:45 PM   Result Value Ref Range    Glucose (POC) 143 (H) 65 - 100 mg/dL    Performed by Ronna Wiggins    Collection Time: 02/02/21  4:45 PM   Result Value Ref Range    Glucose 143 mg/dL    Insulin order 3.3 units/hour    Insulin adminstered 3.3 units/hour    Multiplier 0.040     Low target 95 mg/dL    High target 130 mg/dL    D50 order 0.0 ml    D50 administered 0.00 ml    Minutes until next BG 60 min    Order initials mdf     Administered initials mdf     GLSCOM Comments     BLOOD GAS + IONIZED CALCIUM    Collection Time: 02/02/21  4:48 PM   Result Value Ref Range    pH 7.46 (H) 7.35 - 7.45      PCO2 34 (L) 35 - 45 mmHg    PO2 280 (H) 80 - 100 mmHg    BICARBONATE 23 22 - 26 mmol/L    BASE EXCESS 0.0 mmol/L    O2  (H) 92 - 97 %    Calcium, ionized 1.22 1.13 - 1.32 mmol/L    O2 METHOD VENT      FIO2 80 %    MODE SIMV      Tidal volume 550.0      SET RATE 12      PRESSURE SUPPORT 10      PEEP/CPAP 5.0      Sample source ARTERIAL      SITE DRAWN FROM ARTERIAL LINE      RAMSES'S TEST NOT APPLICABLE     GLUCOSE, POC    Collection Time: 02/02/21  5:49 PM   Result Value Ref Range    Glucose (POC) 153 (H) 65 - 100 mg/dL    Performed by Rob Bosch    Collection Time: 02/02/21  5:49 PM   Result Value Ref Range    Glucose 153 mg/dL    Insulin order 4.7 units/hour    Insulin adminstered 4.7 units/hour    Multiplier 0.050     Low target 95 mg/dL    High target 130 mg/dL    D50 order 0.0 ml    D50 administered 0.00 ml    Minutes until next BG 60 min    Order initials mdf     Administered initials isi VASQUEZ Comments     GLUCOSE, POC    Collection Time: 02/02/21  6:45 PM   Result Value Ref Range    Glucose (POC) 122 (H) 65 - 100 mg/dL    Performed by Rob Bosch    Collection Time: 02/02/21  6:45 PM   Result Value Ref Range    Glucose 122 mg/dL    Insulin order 3.1 units/hour    Insulin adminstered 3.1 units/hour    Multiplier 0.050     Low target 95 mg/dL    High target 130 mg/dL    D50 order 0.0 ml    D50 administered 0.00 ml    Minutes until next BG 60 min    Order initials mdf     Administered initials mdjoy VASQUEZ Comments     GLUCOSE, POC    Collection Time: 02/02/21  7:51 PM   Result Value Ref Range    Glucose (POC) 121 (H) 65 - 100 mg/dL    Performed by Adona Dixie    Collection Time: 02/02/21  7:51 PM   Result Value Ref Range    Glucose 121 mg/dL    Insulin order 3.1 units/hour    Insulin adminstered 3.1 units/hour    Multiplier 0.050     Low target 95 mg/dL    High target 130 mg/dL    D50 order 0.0 ml    D50 administered 0.00 ml    Minutes until next BG 60 min    Order initials mjo     Administered initials mjo     GLSCOM Comments     HGB & HCT    Collection Time: 02/02/21  7:53 PM   Result Value Ref Range    HGB 8.8 (L) 12.1 - 17.0 g/dL    HCT 26.3 (L) 36.6 - 17.3 %   METABOLIC PANEL, BASIC    Collection Time: 02/02/21  8:02 PM   Result Value Ref Range    Sodium 142 136 - 145 mmol/L    Potassium 4.5 3.5 - 5.1 mmol/L    Chloride 110 (H) 97 - 108 mmol/L    CO2 27 21 - 32 mmol/L    Anion gap 5 5 - 15 mmol/L    Glucose 118 (H) 65 - 100 mg/dL    BUN 24 (H) 6 - 20 MG/DL    Creatinine 1.31 (H) 0.70 - 1.30 MG/DL    BUN/Creatinine ratio 18 12 - 20      GFR est AA >60 >60 ml/min/1.73m2    GFR est non-AA 53 (L) >60 ml/min/1.73m2    Calcium 8.9 8.5 - 10.1 MG/DL   MAGNESIUM    Collection Time: 02/02/21  8:02 PM   Result Value Ref Range    Magnesium 2.6 (H) 1.6 - 2.4 mg/dL   GLUCOSE, POC    Collection Time: 02/02/21  8:56 PM   Result Value Ref Range    Glucose (POC) 125 (H) 65 - 100 mg/dL    Performed by Vinay Delgado    Collection Time: 02/02/21  8:56 PM   Result Value Ref Range    Glucose 125 mg/dL    Insulin order 3.3 units/hour    Insulin adminstered 3.3 units/hour    Multiplier 0.050     Low target 95 mg/dL    High target 130 mg/dL    D50 order 0.0 ml    D50 administered 0.00 ml    Minutes until next BG 60 min    Order initials mjo     Administered initials chanelle     GLSCOM Comments     GLUCOSE, POC    Collection Time: 02/02/21  9:59 PM   Result Value Ref Range    Glucose (POC) 104 (H) 65 - 100 mg/dL    Performed by Vinay Delgado    Collection Time: 02/02/21  9:59 PM   Result Value Ref Range    Glucose 104 mg/dL    Insulin order 2.2 units/hour    Insulin adminstered 2.2 units/hour    Multiplier 0.050     Low target 95 mg/dL    High target 130 mg/dL    D50 order 0.0 ml    D50 administered 0.00 ml    Minutes until next BG 60 min    Order initials mjo     Administered initials mjo     GLSCOM Comments     BLOOD GAS, ARTERIAL    Collection Time: 02/02/21 10:29 PM   Result Value Ref Range    pH 7.33 (L) 7.35 - 7.45      PCO2 44 35 - 45 mmHg    PO2 161 (H) 80 - 100 mmHg    O2 SAT 99 (H) 92 - 97 %    BICARBONATE 23 22 - 26 mmol/L    BASE DEFICIT 3.4 mmol/L    O2 METHOD VENT      FIO2 40 %    MODE CPAP      SPONTANEOUS RATE 13      PRESSURE SUPPORT 6      PEEP/CPAP 5.0      Sample source ARTERIAL      SITE DRAWN FROM ARTERIAL LINE      RAMSES'S TEST NOT APPLICABLE     GLUCOSE, POC    Collection Time: 02/02/21 11:02 PM   Result Value Ref Range    Glucose (POC) 92 65 - 100 mg/dL    Performed by Eugenio Mcmahon    Collection Time: 02/02/21 11:03 PM   Result Value Ref Range    Glucose 92 mg/dL    Insulin order 1.3 units/hour    Insulin adminstered 1.3 units/hour    Multiplier 0.040     Low target 95 mg/dL    High target 130 mg/dL    D50 order 0.0 ml    D50 administered 0.00 ml    Minutes until next BG 60 min    Order initials mjo     Administered initials mjo     JONHOM Comments     GLUCOSE, POC    Collection Time: 02/02/21 11:58 PM   Result Value Ref Range    Glucose (POC) 101 (H) 65 - 100 mg/dL    Performed by Eugenio Mcmahon    Collection Time: 02/02/21 11:59 PM   Result Value Ref Range    Glucose 101 mg/dL    Insulin order 1.6 units/hour    Insulin adminstered 1.6 units/hour    Multiplier 0.040     Low target 95 mg/dL    High target 130 mg/dL    D50 order 0.0 ml    D50 administered 0.00 ml    Minutes until next BG 60 min    Order initials mjo     Administered initials mjo     JONHOM Comments     GLUCOSE, POC    Collection Time: 02/03/21  1:06 AM   Result Value Ref Range    Glucose (POC) 101 (H) 65 - 100 mg/dL    Performed by Eugenio Mcmahon    Collection Time: 02/03/21  1:06 AM   Result Value Ref Range    Glucose 101 mg/dL    Insulin order 1.6 units/hour    Insulin adminstered 1.6 units/hour    Multiplier 0.040     Low target 95 mg/dL    High target 130 mg/dL    D50 order 0.0 ml    D50 administered 0.00 ml    Minutes until next BG 60 min    Order initials mjo     Administered initials mjo     GLSCOM Comments     GLUCOSE, POC    Collection Time: 02/03/21  2:12 AM   Result Value Ref Range Glucose (POC) 116 (H) 65 - 100 mg/dL    Performed by Kristy Goldsmith    Collection Time: 02/03/21  2:13 AM   Result Value Ref Range    Glucose 116 mg/dL    Insulin order 2.2 units/hour    Insulin adminstered 2.2 units/hour    Multiplier 0.040     Low target 95 mg/dL    High target 130 mg/dL    D50 order 0.0 ml    D50 administered 0.00 ml    Minutes until next BG 60 min    Order initials mjo     Administered initials mjo     GLSCOM Comments     GLUCOSE, POC    Collection Time: 02/03/21  3:27 AM   Result Value Ref Range    Glucose (POC) 98 65 - 100 mg/dL    Performed by Kristy Goldsmith    Collection Time: 02/03/21  3:28 AM   Result Value Ref Range    Glucose 98 mg/dL    Insulin order 1.5 units/hour    Insulin adminstered 1.5 units/hour    Multiplier 0.040     Low target 95 mg/dL    High target 130 mg/dL    D50 order 0.0 ml    D50 administered 0.00 ml    Minutes until next BG 60 min    Order initials mjo     Administered initials mjo     GLSCOM Comments     METABOLIC PANEL, COMPREHENSIVE    Collection Time: 02/03/21  4:11 AM   Result Value Ref Range    Sodium 142 136 - 145 mmol/L    Potassium 4.9 3.5 - 5.1 mmol/L    Chloride 111 (H) 97 - 108 mmol/L    CO2 27 21 - 32 mmol/L    Anion gap 4 (L) 5 - 15 mmol/L    Glucose 87 65 - 100 mg/dL    BUN 25 (H) 6 - 20 MG/DL    Creatinine 1.42 (H) 0.70 - 1.30 MG/DL    BUN/Creatinine ratio 18 12 - 20      GFR est AA 58 (L) >60 ml/min/1.73m2    GFR est non-AA 48 (L) >60 ml/min/1.73m2    Calcium 9.1 8.5 - 10.1 MG/DL    Bilirubin, total 0.2 0.2 - 1.0 MG/DL    ALT (SGPT) 16 12 - 78 U/L    AST (SGOT) 32 15 - 37 U/L    Alk.  phosphatase 43 (L) 45 - 117 U/L    Protein, total 6.3 (L) 6.4 - 8.2 g/dL    Albumin 3.5 3.5 - 5.0 g/dL    Globulin 2.8 2.0 - 4.0 g/dL    A-G Ratio 1.3 1.1 - 2.2     MAGNESIUM    Collection Time: 02/03/21  4:11 AM   Result Value Ref Range    Magnesium 2.6 (H) 1.6 - 2.4 mg/dL   CBC W/O DIFF    Collection Time: 02/03/21  4:11 AM   Result Value Ref Range    WBC 8.3 4.1 - 11.1 K/uL    RBC 2.78 (L) 4.10 - 5.70 M/uL    HGB 7.9 (L) 12.1 - 17.0 g/dL    HCT 24.7 (L) 36.6 - 50.3 %    MCV 88.8 80.0 - 99.0 FL    MCH 28.4 26.0 - 34.0 PG    MCHC 32.0 30.0 - 36.5 g/dL    RDW 13.6 11.5 - 14.5 %    PLATELET 585 (L) 190 - 400 K/uL    MPV 10.9 8.9 - 12.9 FL    NRBC 0.0 0  WBC    ABSOLUTE NRBC 0.00 0.00 - 0.01 K/uL   EKG, 12 LEAD, INITIAL    Collection Time: 02/03/21  4:22 AM   Result Value Ref Range    Ventricular Rate 53 BPM    Atrial Rate 53 BPM    P-R Interval 224 ms    QRS Duration 98 ms    Q-T Interval 438 ms    QTC Calculation (Bezet) 410 ms    Calculated P Axis 42 degrees    Calculated R Axis -3 degrees    Calculated T Axis -25 degrees    Diagnosis       Sinus bradycardia with 1st degree AV block  ST elevation, consider anterolateral injury or acute infarct  ** ** ACUTE MI / STEMI ** **  Abnormal ECG  When compared with ECG of 02-FEB-2021 17:35,  MANUAL COMPARISON REQUIRED, DATA IS UNCONFIRMED     GLUCOSE, POC    Collection Time: 02/03/21  4:35 AM   Result Value Ref Range    Glucose (POC) 94 65 - 100 mg/dL    Performed by [employer]    Collection Time: 02/03/21  4:36 AM   Result Value Ref Range    Glucose 94 mg/dL    Insulin order 1.0 units/hour    Insulin adminstered 1.0 units/hour    Multiplier 0.030     Low target 95 mg/dL    High target 130 mg/dL    D50 order 0.0 ml    D50 administered 0.00 ml    Minutes until next BG 60 min    Order initials mjo     Administered initials mjo     GLSCOM Yeni     GLUCOSE, POC    Collection Time: 02/03/21  5:39 AM   Result Value Ref Range    Glucose (POC) 81 65 - 100 mg/dL    Performed by [employer]    Collection Time: 02/03/21  5:40 AM   Result Value Ref Range    Glucose 81 mg/dL    Insulin order 0.4 units/hour    Insulin adminstered 0.4 units/hour    Multiplier 0.020     Low target 95 mg/dL    High target 130 mg/dL    D50 order 0.0 ml    D50 administered 0.00 ml    Minutes until next BG 60 min    Order initials mjo     Administered initials mjo     GLSCOM Comments     GLUCOSE, POC    Collection Time: 02/03/21  6:54 AM   Result Value Ref Range    Glucose (POC) 100 65 - 100 mg/dL    Performed by Stewart North    Collection Time: 02/03/21  6:54 AM   Result Value Ref Range    Glucose 100 mg/dL    Insulin order 0.8 units/hour    Insulin adminstered 0.8 units/hour    Multiplier 0.020     Low target 95 mg/dL    High target 130 mg/dL    D50 order 0.0 ml    D50 administered 0.00 ml    Minutes until next BG 60 min    Order initials mjo     Administered initials mjo     GLSCOM Comments     GLUCOSE, POC    Collection Time: 02/03/21  7:57 AM   Result Value Ref Range    Glucose (POC) 106 (H) 65 - 100 mg/dL    Performed by Sathish Madrid    Collection Time: 02/03/21  7:58 AM   Result Value Ref Range    Glucose 106 mg/dL    Insulin order 0.9 units/hour    Insulin adminstered 0.9 units/hour    Multiplier 0.020     Low target 95 mg/dL    High target 130 mg/dL    D50 order 0.0 ml    D50 administered 0.00 ml    Minutes until next BG 60 min    Order initials ap     Administered initials ap     GLSCOM Comments     GLUCOSTABILIZER    Collection Time: 02/03/21  9:01 AM   Result Value Ref Range    Glucose 107 mg/dL    Insulin order 0.9 units/hour    Insulin adminstered 0.9 units/hour    Multiplier 0.020     Low target 95 mg/dL    High target 130 mg/dL    D50 order 0.0 ml    D50 administered 0.00 ml    Minutes until next BG 60 min    Order initials ap     Administered initials ap     GLSCOM Comments         Telemetry: paced      Assessment:     Hospital Problems  Date Reviewed: 2/2/2021          Codes Class Noted POA    S/P CABG x 3 ICD-10-CM: Z95.1  ICD-9-CM: V45.81  2/2/2021 Unknown    Overview Signed 2/2/2021  4:15 PM by Peter Boss PA-C     CABG x 3 LIMA to LAD, RSVG to OM, RSVG to PDA  Right Endoscopic Saphenous Vein [place]             CAD (coronary artery disease) ICD-10-CM: I25.10  ICD-9-CM: 414.00  2/1/2021 Unknown        ASHD (arteriosclerotic heart disease) ICD-10-CM: I25.10  ICD-9-CM: 414.00  1/15/2021 Yes              Plan:     Stable post CABG. Continue ASA, statin. Beta blocker when BP stable.     Lisette Navas MD

## 2021-02-03 NOTE — ANESTHESIA POSTPROCEDURE EVALUATION
Post-Anesthesia Evaluation and Assessment    Patient: Gregorio Encarnacion MRN: [id number]  SSN: [id number]    YOB: 1940  Age: [de-identified] y.o. Sex: male      I have evaluated the patient and they are stable and ready for discharge from the PACU. Cardiovascular Function/Vital Signs  Visit Vitals  BP (!) 111/57 (BP 1 Location: Left upper arm, BP Patient Position: At rest)   Pulse 74   Temp (!) 35.6 °C (96 °F)   Resp 19   Wt 100 kg (220 lb 7.4 oz)   SpO2 100%   BMI 29.09 kg/m²       Patient is status post General anesthesia for Procedure(s):  ON PUMP CORONARY ARTERY BYPASS GRAFT (CABG) x3, EVH, CARDIOPLEGIA, INTRAOPERATIVE CARLOS DONE BY DR. Kalyani Van. [address] DONE BY Pete PIMENTEL. .    Nausea/Vomiting: None    Postoperative hydration reviewed and adequate. Pain:  Pain Scale 1: Numeric (0 - 10) (02/03/21 1200)  Pain Intensity 1: 4 (02/03/21 1200)   Managed    Neurological Status:   Neuro (WDL): Within Defined Limits (02/02/21 6174)  Neuro  Neurologic State: Alert (02/03/21 1200)  Orientation Level: Oriented to place;Oriented to person;Oriented to situation (02/03/21 1200)  Cognition: Follows commands;Decreased attention/concentration; Impaired decision making;Poor safety awareness (02/03/21 1200)  Speech: Appropriate for age (02/03/21 0800)  Assessment L Pupil: Round;Brisk (02/03/21 0800)  Size L Pupil (mm): 2 (02/02/21 2000)  Assessment R Pupil: Round;Brisk (02/03/21 0800)  Size R Pupil (mm): 2 (02/02/21 2000)  LUE Motor Response: Purposeful (02/03/21 0800)  LLE Motor Response: Purposeful (02/03/21 0800)  RUE Motor Response: Purposeful (02/03/21 0800)  RLE Motor Response: Purposeful (02/03/21 0800)   At baseline    Mental Status, Level of Consciousness: Alert and  oriented to person, place, and time    Pulmonary Status:   O2 Device: Nasal cannula (02/03/21 0800)   Adequate oxygenation and airway patent    Complications related to anesthesia: None    Post-anesthesia assessment completed.  No concerns    Signed By: Rafi Keenan Jaziel Lay MD     February 3, 2021              Procedure(s):  ON PUMP CORONARY ARTERY BYPASS GRAFT (CABG) x3, EVH, CARDIOPLEGIA, INTRAOPERATIVE CARLOS DONE BY DR. Les Drew. [address] DONE BY Ulyess Cushing PA. Nidhi Craig general    <[email or web address]>    INITIAL Post-op Vital signs:   Vitals Value Taken Time   /57 02/03/21 1200   Temp 35.6 °C (96 °F) 02/03/21 1200   Pulse 72 02/03/21 1248   Resp 19 02/03/21 1248   SpO2 100 % 02/03/21 1248   Vitals shown include unvalidated device data.

## 2021-02-04 ENCOUNTER — APPOINTMENT (OUTPATIENT)
Dept: GENERAL RADIOLOGY | Age: 81
DRG: 236 | End: 2021-02-04
Attending: PHYSICIAN ASSISTANT
Payer: MEDICARE

## 2021-02-04 LAB
ADMINISTERED INITIALS, ADMINIT: NORMAL
ALBUMIN SERPL-MCNC: 3.1 G/DL (ref 3.5–5)
ALBUMIN/GLOB SERPL: 1 {RATIO} (ref 1.1–2.2)
ALP SERPL-CCNC: 54 U/L (ref 45–117)
ALT SERPL-CCNC: 13 U/L (ref 12–78)
ANION GAP SERPL CALC-SCNC: 7 MMOL/L (ref 5–15)
APTT PPP: 31.9 SEC (ref 22.1–31)
AST SERPL-CCNC: 46 U/L (ref 15–37)
BILIRUB SERPL-MCNC: 0.2 MG/DL (ref 0.2–1)
BUN SERPL-MCNC: 29 MG/DL (ref 6–20)
BUN/CREAT SERPL: 17 (ref 12–20)
CALCIUM SERPL-MCNC: 8.9 MG/DL (ref 8.5–10.1)
CHLORIDE SERPL-SCNC: 106 MMOL/L (ref 97–108)
CO2 SERPL-SCNC: 26 MMOL/L (ref 21–32)
CREAT SERPL-MCNC: 1.68 MG/DL (ref 0.7–1.3)
D50 ADMINISTERED, D50ADM: 0 ML
D50 ADMINISTERED, D50ADM: 8 ML
D50 ORDER, D50ORD: 0 ML
D50 ORDER, D50ORD: 8 ML
ERYTHROCYTE [DISTWIDTH] IN BLOOD BY AUTOMATED COUNT: 13.9 % (ref 11.5–14.5)
GLOBULIN SER CALC-MCNC: 3.1 G/DL (ref 2–4)
GLSCOM COMMENTS: NORMAL
GLUCOSE BLD STRIP.AUTO-MCNC: 109 MG/DL (ref 65–100)
GLUCOSE BLD STRIP.AUTO-MCNC: 111 MG/DL (ref 65–100)
GLUCOSE BLD STRIP.AUTO-MCNC: 117 MG/DL (ref 65–100)
GLUCOSE BLD STRIP.AUTO-MCNC: 127 MG/DL (ref 65–100)
GLUCOSE BLD STRIP.AUTO-MCNC: 133 MG/DL (ref 65–100)
GLUCOSE BLD STRIP.AUTO-MCNC: 134 MG/DL (ref 65–100)
GLUCOSE BLD STRIP.AUTO-MCNC: 139 MG/DL (ref 65–100)
GLUCOSE BLD STRIP.AUTO-MCNC: 211 MG/DL (ref 65–100)
GLUCOSE BLD STRIP.AUTO-MCNC: 217 MG/DL (ref 65–100)
GLUCOSE BLD STRIP.AUTO-MCNC: 80 MG/DL (ref 65–100)
GLUCOSE BLD STRIP.AUTO-MCNC: 83 MG/DL (ref 65–100)
GLUCOSE BLD STRIP.AUTO-MCNC: 89 MG/DL (ref 65–100)
GLUCOSE BLD STRIP.AUTO-MCNC: 93 MG/DL (ref 65–100)
GLUCOSE BLD STRIP.AUTO-MCNC: 94 MG/DL (ref 65–100)
GLUCOSE BLD STRIP.AUTO-MCNC: 95 MG/DL (ref 65–100)
GLUCOSE BLD STRIP.AUTO-MCNC: 95 MG/DL (ref 65–100)
GLUCOSE SERPL-MCNC: 86 MG/DL (ref 65–100)
GLUCOSE, GLC: 109 MG/DL
GLUCOSE, GLC: 111 MG/DL
GLUCOSE, GLC: 127 MG/DL
GLUCOSE, GLC: 134 MG/DL
GLUCOSE, GLC: 139 MG/DL
GLUCOSE, GLC: 80 MG/DL
GLUCOSE, GLC: 83 MG/DL
GLUCOSE, GLC: 89 MG/DL
GLUCOSE, GLC: 93 MG/DL
GLUCOSE, GLC: 94 MG/DL
GLUCOSE, GLC: 95 MG/DL
GLUCOSE, GLC: 95 MG/DL
HCT VFR BLD AUTO: 26.6 % (ref 36.6–50.3)
HGB BLD-MCNC: 8.2 G/DL (ref 12.1–17)
HIGH TARGET, HITG: 130 MG/DL
INR PPP: 1.2 (ref 0.9–1.1)
INSULIN ADMINSTERED, INSADM: 0 UNITS/HOUR
INSULIN ADMINSTERED, INSADM: 1.7 UNITS/HOUR
INSULIN ADMINSTERED, INSADM: 1.8 UNITS/HOUR
INSULIN ADMINSTERED, INSADM: 11.9 UNITS/HOUR
INSULIN ADMINSTERED, INSADM: 2.1 UNITS/HOUR
INSULIN ADMINSTERED, INSADM: 2.7 UNITS/HOUR
INSULIN ADMINSTERED, INSADM: 2.8 UNITS/HOUR
INSULIN ADMINSTERED, INSADM: 3.1 UNITS/HOUR
INSULIN ADMINSTERED, INSADM: 4 UNITS/HOUR
INSULIN ADMINSTERED, INSADM: 4.4 UNITS/HOUR
INSULIN ADMINSTERED, INSADM: 5.3 UNITS/HOUR
INSULIN ADMINSTERED, INSADM: 5.3 UNITS/HOUR
INSULIN ORDER, INSORD: 0 UNITS/HOUR
INSULIN ORDER, INSORD: 1.7 UNITS/HOUR
INSULIN ORDER, INSORD: 1.8 UNITS/HOUR
INSULIN ORDER, INSORD: 11.9 UNITS/HOUR
INSULIN ORDER, INSORD: 2.1 UNITS/HOUR
INSULIN ORDER, INSORD: 2.7 UNITS/HOUR
INSULIN ORDER, INSORD: 2.8 UNITS/HOUR
INSULIN ORDER, INSORD: 3.1 UNITS/HOUR
INSULIN ORDER, INSORD: 4 UNITS/HOUR
INSULIN ORDER, INSORD: 4.4 UNITS/HOUR
INSULIN ORDER, INSORD: 5.3 UNITS/HOUR
INSULIN ORDER, INSORD: 5.3 UNITS/HOUR
LOW TARGET, LOT: 95 MG/DL
MAGNESIUM SERPL-MCNC: 2.3 MG/DL (ref 1.6–2.4)
MCH RBC QN AUTO: 27.7 PG (ref 26–34)
MCHC RBC AUTO-ENTMCNC: 30.8 G/DL (ref 30–36.5)
MCV RBC AUTO: 89.9 FL (ref 80–99)
MINUTES UNTIL NEXT BG, NBG: 15 MIN
MINUTES UNTIL NEXT BG, NBG: 60 MIN
MULTIPLIER, MUL: 0.05
MULTIPLIER, MUL: 0.06
MULTIPLIER, MUL: 0.08
MULTIPLIER, MUL: 0.08
MULTIPLIER, MUL: 0.1
MULTIPLIER, MUL: 0.12
MULTIPLIER, MUL: 0.12
MULTIPLIER, MUL: 0.15
MULTIPLIER, MUL: 0.15
NRBC # BLD: 0 K/UL (ref 0–0.01)
NRBC BLD-RTO: 0 PER 100 WBC
ORDER INITIALS, ORDINIT: NORMAL
PLATELET # BLD AUTO: 129 K/UL (ref 150–400)
PMV BLD AUTO: 11.2 FL (ref 8.9–12.9)
POTASSIUM SERPL-SCNC: 4.2 MMOL/L (ref 3.5–5.1)
PROT SERPL-MCNC: 6.2 G/DL (ref 6.4–8.2)
PROTHROMBIN TIME: 12.2 SEC (ref 9–11.1)
RBC # BLD AUTO: 2.96 M/UL (ref 4.1–5.7)
SERVICE CMNT-IMP: ABNORMAL
SERVICE CMNT-IMP: NORMAL
SODIUM SERPL-SCNC: 139 MMOL/L (ref 136–145)
THERAPEUTIC RANGE,PTTT: ABNORMAL SECS (ref 58–77)
WBC # BLD AUTO: 8.1 K/UL (ref 4.1–11.1)

## 2021-02-04 PROCEDURE — 74011250637 HC RX REV CODE- 250/637: Performed by: PHYSICIAN ASSISTANT

## 2021-02-04 PROCEDURE — 33533 CABG ARTERIAL SINGLE: CPT | Performed by: THORACIC SURGERY (CARDIOTHORACIC VASCULAR SURGERY)

## 2021-02-04 PROCEDURE — 71045 X-RAY EXAM CHEST 1 VIEW: CPT

## 2021-02-04 PROCEDURE — 83735 ASSAY OF MAGNESIUM: CPT

## 2021-02-04 PROCEDURE — 99232 SBSQ HOSP IP/OBS MODERATE 35: CPT | Performed by: CLINICAL NURSE SPECIALIST

## 2021-02-04 PROCEDURE — 36415 COLL VENOUS BLD VENIPUNCTURE: CPT

## 2021-02-04 PROCEDURE — 74011636637 HC RX REV CODE- 636/637: Performed by: THORACIC SURGERY (CARDIOTHORACIC VASCULAR SURGERY)

## 2021-02-04 PROCEDURE — 74011250636 HC RX REV CODE- 250/636: Performed by: PHYSICIAN ASSISTANT

## 2021-02-04 PROCEDURE — 85610 PROTHROMBIN TIME: CPT

## 2021-02-04 PROCEDURE — 74011000250 HC RX REV CODE- 250: Performed by: PHYSICIAN ASSISTANT

## 2021-02-04 PROCEDURE — 51798 US URINE CAPACITY MEASURE: CPT

## 2021-02-04 PROCEDURE — 99232 SBSQ HOSP IP/OBS MODERATE 35: CPT | Performed by: INTERNAL MEDICINE

## 2021-02-04 PROCEDURE — 97110 THERAPEUTIC EXERCISES: CPT

## 2021-02-04 PROCEDURE — 2709999900 HC NON-CHARGEABLE SUPPLY

## 2021-02-04 PROCEDURE — 33508 ENDOSCOPIC VEIN HARVEST: CPT | Performed by: THORACIC SURGERY (CARDIOTHORACIC VASCULAR SURGERY)

## 2021-02-04 PROCEDURE — P9045 ALBUMIN (HUMAN), 5%, 250 ML: HCPCS | Performed by: NURSE PRACTITIONER

## 2021-02-04 PROCEDURE — 97530 THERAPEUTIC ACTIVITIES: CPT | Performed by: OCCUPATIONAL THERAPIST

## 2021-02-04 PROCEDURE — 74011000258 HC RX REV CODE- 258: Performed by: PHYSICIAN ASSISTANT

## 2021-02-04 PROCEDURE — 33518 CABG ARTERY-VEIN TWO: CPT | Performed by: THORACIC SURGERY (CARDIOTHORACIC VASCULAR SURGERY)

## 2021-02-04 PROCEDURE — 97530 THERAPEUTIC ACTIVITIES: CPT

## 2021-02-04 PROCEDURE — 82962 GLUCOSE BLOOD TEST: CPT

## 2021-02-04 PROCEDURE — 85027 COMPLETE CBC AUTOMATED: CPT

## 2021-02-04 PROCEDURE — 85730 THROMBOPLASTIN TIME PARTIAL: CPT

## 2021-02-04 PROCEDURE — 65660000000 HC RM CCU STEPDOWN

## 2021-02-04 PROCEDURE — 80053 COMPREHEN METABOLIC PANEL: CPT

## 2021-02-04 PROCEDURE — 74011636637 HC RX REV CODE- 636/637: Performed by: PHYSICIAN ASSISTANT

## 2021-02-04 PROCEDURE — 74011250636 HC RX REV CODE- 250/636: Performed by: NURSE PRACTITIONER

## 2021-02-04 PROCEDURE — 97110 THERAPEUTIC EXERCISES: CPT | Performed by: OCCUPATIONAL THERAPIST

## 2021-02-04 RX ORDER — ALBUMIN HUMAN 50 G/1000ML
12.5 SOLUTION INTRAVENOUS ONCE
Status: COMPLETED | OUTPATIENT
Start: 2021-02-04 | End: 2021-02-05

## 2021-02-04 RX ORDER — ALBUMIN HUMAN 50 G/1000ML
12.5 SOLUTION INTRAVENOUS AS NEEDED
Status: DISCONTINUED | OUTPATIENT
Start: 2021-02-04 | End: 2021-02-08 | Stop reason: HOSPADM

## 2021-02-04 RX ORDER — INSULIN GLARGINE 100 [IU]/ML
12 INJECTION, SOLUTION SUBCUTANEOUS ONCE
Status: COMPLETED | OUTPATIENT
Start: 2021-02-04 | End: 2021-02-04

## 2021-02-04 RX ADMIN — FERROUS SULFATE TAB 325 MG (65 MG ELEMENTAL FE) 325 MG: 325 (65 FE) TAB at 08:52

## 2021-02-04 RX ADMIN — INSULIN GLARGINE 12 UNITS: 100 INJECTION, SOLUTION SUBCUTANEOUS at 11:49

## 2021-02-04 RX ADMIN — Medication 10 ML: at 15:00

## 2021-02-04 RX ADMIN — ASPIRIN 81 MG: 81 TABLET, CHEWABLE ORAL at 08:52

## 2021-02-04 RX ADMIN — SODIUM CHLORIDE 11.9 UNITS/HR: 9 INJECTION, SOLUTION INTRAVENOUS at 00:33

## 2021-02-04 RX ADMIN — OXYCODONE 5 MG: 5 TABLET ORAL at 02:53

## 2021-02-04 RX ADMIN — PANTOPRAZOLE SODIUM 40 MG: 40 TABLET, DELAYED RELEASE ORAL at 08:52

## 2021-02-04 RX ADMIN — TAMSULOSIN HYDROCHLORIDE 0.4 MG: 0.4 CAPSULE ORAL at 08:51

## 2021-02-04 RX ADMIN — ROSUVASTATIN 20 MG: 20 TABLET, FILM COATED ORAL at 21:26

## 2021-02-04 RX ADMIN — SODIUM CHLORIDE 1.8 UNITS/HR: 9 INJECTION, SOLUTION INTRAVENOUS at 05:03

## 2021-02-04 RX ADMIN — OXYCODONE 5 MG: 5 TABLET ORAL at 06:47

## 2021-02-04 RX ADMIN — DOCUSATE SODIUM - SENNOSIDES 1 TABLET: 50; 8.6 TABLET, FILM COATED ORAL at 08:52

## 2021-02-04 RX ADMIN — GABAPENTIN 300 MG: 300 CAPSULE ORAL at 08:52

## 2021-02-04 RX ADMIN — ALBUMIN (HUMAN) 12.5 G: 12.5 INJECTION, SOLUTION INTRAVENOUS at 16:59

## 2021-02-04 RX ADMIN — ALBUMIN (HUMAN) 12.5 G: 12.5 INJECTION, SOLUTION INTRAVENOUS at 10:22

## 2021-02-04 RX ADMIN — Medication 10 ML: at 06:41

## 2021-02-04 RX ADMIN — INSULIN LISPRO 2 UNITS: 100 INJECTION, SOLUTION INTRAVENOUS; SUBCUTANEOUS at 13:19

## 2021-02-04 RX ADMIN — CEFAZOLIN SODIUM 2 G: 1 INJECTION, POWDER, FOR SOLUTION INTRAMUSCULAR; INTRAVENOUS at 03:08

## 2021-02-04 RX ADMIN — TRAMADOL HYDROCHLORIDE 50 MG: 50 TABLET, FILM COATED ORAL at 10:22

## 2021-02-04 RX ADMIN — MUPIROCIN: 20 OINTMENT TOPICAL at 08:51

## 2021-02-04 RX ADMIN — DEXTROSE MONOHYDRATE 8 ML: 25 INJECTION, SOLUTION INTRAVENOUS at 02:53

## 2021-02-04 RX ADMIN — MAGNESIUM OXIDE TAB 400 MG (241.3 MG ELEMENTAL MG) 400 MG: 400 (241.3 MG) TAB at 08:51

## 2021-02-04 RX ADMIN — Medication 10 ML: at 21:30

## 2021-02-04 RX ADMIN — TRAMADOL HYDROCHLORIDE 50 MG: 50 TABLET, FILM COATED ORAL at 01:39

## 2021-02-04 RX ADMIN — SODIUM CHLORIDE 2.1 UNITS/HR: 9 INJECTION, SOLUTION INTRAVENOUS at 06:55

## 2021-02-04 RX ADMIN — GABAPENTIN 300 MG: 300 CAPSULE ORAL at 17:00

## 2021-02-04 RX ADMIN — ALBUMIN (HUMAN) 12.5 G: 12.5 INJECTION, SOLUTION INTRAVENOUS at 19:51

## 2021-02-04 RX ADMIN — GABAPENTIN 300 MG: 300 CAPSULE ORAL at 21:26

## 2021-02-04 RX ADMIN — CHLORHEXIDINE GLUCONATE 0.12% ORAL RINSE 10 ML: 1.2 LIQUID ORAL at 08:51

## 2021-02-04 RX ADMIN — INSULIN LISPRO 2 UNITS: 100 INJECTION, SOLUTION INTRAVENOUS; SUBCUTANEOUS at 21:59

## 2021-02-04 RX ADMIN — CHLORHEXIDINE GLUCONATE 0.12% ORAL RINSE 10 ML: 1.2 LIQUID ORAL at 21:26

## 2021-02-04 RX ADMIN — POLYETHYLENE GLYCOL 3350 17 G: 17 POWDER, FOR SOLUTION ORAL at 08:51

## 2021-02-04 RX ADMIN — MUPIROCIN: 20 OINTMENT TOPICAL at 21:29

## 2021-02-04 RX ADMIN — MAGNESIUM OXIDE TAB 400 MG (241.3 MG ELEMENTAL MG) 400 MG: 400 (241.3 MG) TAB at 19:53

## 2021-02-04 NOTE — PROGRESS NOTES
[facility]  Progress Note    Admit Date: 2021  POD:  2 Day Post-Op    Procedure:  Procedure(s):  ON PUMP CORONARY ARTERY BYPASS GRAFT (CABG) x3, EVH, CARDIOPLEGIA, INTRAOPERATIVE CARLOS DONE BY DR. Abad Marquez. [address] DONE BY Danilo PIMENTEL. Subjective/24 Hour Summary:   Pt seen with Dr. Vani Delgadillo. Afebrile, on RA. Up in chair. SBP in the 80s after getting OOB. Objective:   Vitals:  Blood pressure (!) 84/56, pulse 92, temperature 98.1 °F (36.7 °C), resp. rate 17, height 6' 1\" (1.854 m), weight 216 lb 11.4 oz (98.3 kg), SpO2 100 %. Temp (24hrs), Av.4 °F (36.3 °C), Min:96 °F (35.6 °C), Max:98.1 °F (36.7 °C)    EKG/Rhythm:  SR with 1st degree block    CT Output: 204 ml/24 hrs    Ventilator Settings:  Mode Rate Tidal Volume Pressure FiO2 PEEP   CPAP   550 ml  6 cm H2O 40 % 5 cm H20     Peak airway pressure: 12 cm H2O    Minute ventilation: 8 l/min        Oxygen Therapy:  Oxygen Therapy  O2 Sat (%): 100 % (21)  Pulse via Oximetry: 91 beats per minute (21)  O2 Device: Room air (21)  O2 Flow Rate (L/min): 2 l/min (21 1520)  FIO2 (%): 40 % (21 2200)    CXR:  CXR Results  (Last 48 hours)               21 0549  XR CHEST PORT Final result    Impression:  Diminished left basilar atelectasis and effusion. No other significant change                    Narrative:  Clinical history: postop heart   INDICATION:   postop heart   COMPARISON: 2/3/2021       FINDINGS:   AP portable upright view of the chest demonstrates a stable  cardiopericardial   silhouette. There is no pleural effusion. .Left basilar atelectasis and   interstitial opacity slightly diminished. Pleural drainage catheters on the   left. Mediastinal drain. .There is no pneumothorax. . Patient is on a cardiac   monitor. 21 0550  XR CHEST PORT Final result    Impression:  Interval removal of pulmonary artery catheter.        No other significant change                Narrative:  Clinical history: postop heart   INDICATION:   postop heart   COMPARISON: 2/2/2021       FINDINGS:   AP portable upright view of the chest demonstrates a stable  cardiopericardial   silhouette. There is no pleural effusion. .Left basilar atelectasis and   interstitial opacity not changed. Pleural drainage catheters on the left. Mediastinal drain. Venous access sheath on the right. Pulmonary artery catheter   has been removed. There is no pneumothorax. . Patient is on a cardiac monitor. 02/02/21 1657  XR CHEST PORT Final result    Impression:  Life support lines and tubes as described above. Mild pulmonary   edema. Narrative: Indication: Status post CABG. Comparison to 2/1/2021. Portable exam obtained at 3974 demonstrates placement of   a right jugular Liberty-Alireza catheter with the tip projecting over the main   pulmonary artery outflow tract. There is no pneumothorax. ET tube, NG tube,   mediastinal drain, and left-sided chest tube are all present. Mild pulmonary   edema is noted. The patient is status post median sternotomy. Admission Weight: Last Weight   Weight: 206 lb 5.6 oz (93.6 kg) Weight: 216 lb 11.4 oz (98.3 kg)     Intake / Output / Drain:  Current Shift: 02/04 0701 - 02/04 1900  In: 480 [P.O.:480]  Out: -   Last 24 hrs.:     Intake/Output Summary (Last 24 hours) at 2/4/2021 0906  Last data filed at 2/4/2021 0857  Gross per 24 hour   Intake 2532.67 ml   Output 1299 ml   Net 1233.67 ml       EXAM:  General:   NAD                                                                                          Lungs:   Clear to auscultation bilaterally. Incision:  dsg CDI   Heart:  Regular rate and rhythm, S1, S2 normal, no murmur, click, rub or gallop. Abdomen:   Soft, non-tender. Bowel sounds normal. No masses,  No organomegaly. Extremities:  3+ LE edema. PPP. Neurologic:  Gross motor and sensory apparatus intact.      Labs:   Recent Labs     02/04/21  0345 02/03/21  0411 WBC 8.1 8.3   HGB 8.2* 7.9*   HCT 26.6* 24.7*   * 106*     Recent Labs     21  0345 21  0411    142   K 4.2 4.9    111*   CO2 26 27   BUN 29* 25*   CREA 1.68* 1.42*   GLU 86 87   CA 8.9 9.1   MG 2.3 2.6*     Recent Labs     21  0345 21  0411   AP 54 43*   TP 6.2* 6.3*   ALB 3.1* 3.5   GLOB 3.1 2.8     Recent Labs     21  0345 21  1641   INR 1.2* 1.2*   PTP 12.2* 12.7*   APTT 31.9* 24.7      Recent Labs     21  1653   PHI 7.41   PCO2I 47.7*   PO2I 83     No results for input(s): CPK, CKMB, TROIQ, BNPP in the last 72 hours. Assessment:     Active Problems:    ASHD (arteriosclerotic heart disease) (1/15/2021)      CAD (coronary artery disease) (2021)      S/P CABG x 3 (2021)      Overview: CABG x 3 LIMA to LAD, RSVG to OM, RSVG to PDA      Right Endoscopic Saphenous Vein [place]         Plan/Recommendations/Medical Decision Makin.  CAD s/p CABG: ASA/Statin, hold BB for today. Give albumin this morning for hypotension. 2. HTN: On BB, HCTZ, aldactone, valsartan preop. Hold for now and reintroduce as tolerated. 3. HLD: on statin  4. DM: on metformin PTA, insulin drip per protocol for now  5. LE edema/venous reflux: hold lasix today. Cont compression hose. 6. Elevated Cr: Cr 1.68, around baseline, Monitor. 7. Acute post op resp insufficiency: wean NC as tolerated. TCDB. IS. Progressive ambulation  8. Acute blood loss anemia: monitor HH, chest tube output    Dispo: PT/OT, Discuss with case management, will likely need rehab.      Signed By: Kelechi Alicea NP

## 2021-02-04 NOTE — PROGRESS NOTES
Bedside shift change report given to [address] (oncoming nurse) by German Harris RN (offgoing nurse). Report included the following information SBAR, Intake/Output, MAR, Recent Results and Cardiac Rhythm NSR. I have reviewed and agree with documentation provided this shift by Wilfred Segura RN, orientee.

## 2021-02-04 NOTE — PROGRESS NOTES
1900: Bedside and Verbal shift change report given to Moustapha (oncoming nurse) by Shaggy Claros (offgoing nurse). Report included the following information SBAR, Kardex, Intake/Output, MAR and Cardiac Rhythm NS.     0250: Pt blood sugar 80, per glucostabilizer insulin held and 8ml d50 given    0308: Pt blood sugar 109, insulin restarted at 5.3    0600: Pt bathed, gown changed, and up in chair. 0700: End of Shift Note    Bedside shift change report given to Riccardo (oncoming nurse) by Rachel Gerber RN (offgoing nurse). Report included the following information SBAR, Kardex, Intake/Output, MAR and Cardiac Rhythm NS    Shift worked:  [id number]     Shift summary and any significant changes:     michelle removed     Concerns for physician to address:  Hypotensive this morning after getting up to the chair. Pain poorly controlled     Zone phone for oncoming shift:          Activity:  Activity Level: Up with Assistance  Number times ambulated in hallways past shift: 0  Number of times OOB to chair past shift: 1    Cardiac:   Cardiac Monitoring: Yes      Cardiac Rhythm: Normal sinus rhythm    Access:   Current line(s): PIV     Genitourinary:   Urinary status: voiding    Respiratory:   O2 Device: Room air  Chronic home O2 use?: NO  Incentive spirometer at bedside: YES  Actual Volume (ml): 500 ml  GI:  Last Bowel Movement Date: 02/01/21  Current diet:  DIET CARDIAC Regular  Passing flatus: YES  Tolerating current diet: YES  % Diet Eaten: 100 %    Pain Management:   Patient states pain is manageable on current regimen: YES    Skin:  Romulo Score: 19  Interventions: increase time out of bed    Patient Safety:  Fall Score:  Total Score: 3  Interventions: bed/chair alarm, gripper socks and pt to call before getting OOB  High Fall Risk: Yes    Length of Stay:  Expected LOS: 6d 0h  Actual LOS: 3      Rachel Gerber RN

## 2021-02-04 NOTE — WOUND CARE
Wound Consult Wound care consulted for  Toe wound Patient admitted on 21 with eval for CAD, patient had CABG Patient lives at home with wife, he is independent with ADLs PAST MEDICAL HISTORY Past Medical History:  
Diagnosis Date  Arthritis  Diabetes ([facility] [address]  Diabetic neuropathy ([facility] [address]  Hypercholesterolemia  Hypertension 11/29/10  Shingles 2010  Thromboembolus ([facility] [address] SOCIAL HISTORY Social History Tobacco Use  Smoking status: Former Smoker Packs/day: 0.50 Years: 20.00 Pack years: 10.00 Types: Cigarettes Quit date: 2000 Years since quittin.1  Smokeless tobacco: Never Used  Tobacco comment: 15 years ago Substance Use Topics  Alcohol use: Yes Comment: occ.  Drug use: Not Currently Wt Readings from Last 3 Encounters:  
21 98.3 kg (216 lb 11.4 oz) 21 98.9 kg (218 lb 0.6 oz) 21 98.9 kg (218 lb) Visit Vitals BP (!) 84/61 (BP 1 Location: Left upper arm, BP Patient Position: At rest) Pulse 99 Temp 98.5 °F (36.9 °C) Resp 20 Ht 6' 1\" (1.854 m) Wt 98.3 kg (216 lb 11.4 oz) SpO2 94% BMI 28.59 kg/m² LABS Last POC glucose 117 WBC 8.1 Hgb A1C 7 RBC 2.96 Hgb 8.2 ASSESSMENT 
 
A&O x 4 Last Romulo Score   18 Mobility needs minimal assist with turning and repositioning 21 1623 Wound Toe (Comment  which one) Right great toe wound Date First Assessed/Time First Assessed: 21 1930   Present on Hospital Admission: Yes  Primary Wound Type: Diabetic Ulcer  Location: Toe (Comment  which one)  Wound Location Orientation: Right  Wound Description: great toe wound Wound Image Wound Etiology Diabetic Dressing Status New dressing applied Cleansed Cleansed with saline Dressing/Treatment Betadine swabs/Povidone Iodine;Gauze dressing/dressing sponge Wound Length (cm) 0.9 cm Wound Width (cm) 0.8 cm Wound Depth (cm) 0 cm Wound Surface Area (cm^2) 0.72 cm^2 Wound Volume (cm^3) 0 cm^3 Wound Assessment Eschar dry Drainage Amount None Wound Odor None Yolanda-Wound/Incision Assessment Intact Edges Undefined edges PLAN Skin Care & Pressure Relief Recommendations Minimize layers of linen Turn/reposition approximately every 2 hours Offload heels pillows Pad bony prominences Use only 1 absorbent pad under patient, do not use incontinent briefs/diapers Wound Care Recommendations Paint right great toe wound with betadine daily and cover with gauze and secure with medipore tape Teaching completed with  
[x] Patient          
[] Family member      
[] Caregiver         
[] Nursing 
[] Other Topics Discussed: daily foot inspection, neuropathy, proper fitting footwear, tight glycemic control, s/s of infection Patient/Caregiver Teaching Level of patient/caregiver understanding able to:  
[x] Indicates understanding       [] Needs reinforcement 
[] Unsuccessful      [] Verbal Understanding 
[] Demonstrated understanding       [] No evidence of learning 
[] Refused teaching         [] N/A Maryan Reina RN, BSN, [facility]

## 2021-02-04 NOTE — PROGRESS NOTES
2015: Pt A&Ox4, resting in bed, no distress. NSR, RA. C/o some incisional soreness. Lungs clear, diminished. Hypoactive BS. Dsg to sternal incision and brian c/d/i. Pacer wires to external generator, AAI 70/18. Montoya draining clear yellow urine. Achieving 750mL on IS. Denies needs at this time. Call bell within reach.

## 2021-02-04 NOTE — PROGRESS NOTES
Problem: Self Care Deficits Care Plan (Adult)  Goal: *Acute Goals and Plan of Care (Insert Text)  Description:     FUNCTIONAL STATUS PRIOR TO ADMISSION: Patient having difficulty verbalizing prior level of function. HOME SUPPORT: The patient lived with his wife. Occupational Therapy Goals  Initiated 2/3/2021  1. Patient will perform grooming standing at sink with supervision/set-up within 7 day(s). 2.  Patient will perform upper body dressing with minimal assistance/contact guard assist within 7 day(s). 3.  Patient will perform lower body dressing with minimal assistance/contact guard assist within 7 day(s). 4.  Patient will perform toilet transfers with supervision/set-up within 7 day(s). 5.  Patient will perform all aspects of toileting with minimal assistance/contact guard assist within 7 day(s). Outcome: Progressing Towards Goal    OCCUPATIONAL THERAPY TREATMENT  Patient: Gavino Brooks [de-identified[age] y.o. male)  Date: 2/4/2021  Diagnosis: CAD (coronary artery disease) [I25.10] <principal problem not specified>  Procedure(s) (LRB):  ON PUMP CORONARY ARTERY BYPASS GRAFT (CABG) x3, EVH, CARDIOPLEGIA, INTRAOPERATIVE CARLOS DONE BY DR. Sabrina Donovan. EVH DONE BY MARIMAR PIMENTEL. (N/A) 2 Days Post-Op  Precautions: Sternal(move in the tube)  Chart, occupational therapy assessment, plan of care, and goals were reviewed. ASSESSMENT  Patient demonstrating decreased processing for command following and general confusion, as well as impaired motor control, gross rigidity and impaired initiation of movement this session. He was unable to recall his sternal/move in the tube precautions, requiring constant cueing for adherence. Patient able to effectively perform 5/6 cardiac exercises, but requires constant cueing. He was unable properly perform the scapular retraction exercise despite significant cueing.  Overall he was min to max A for functional mobility performed, he was total A for LB dressing and he required max A for use urinal for toileting. At this time the patient's progress is slow, and he will clearly need SNF rehab after discharge based on his performance this session. PLAN :  Patient continues to benefit from skilled intervention to address the above impairments. Continue treatment per established plan of care. to address goals. Recommendation for discharge: (in order for the patient to meet his/her long term goals)  Therapy up to 5 days/week in SNF setting               OBJECTIVE DATA SUMMARY:   Cognitive/Behavioral Status:  Neurologic State: Alert  Orientation Level: Oriented to person;Oriented to place;Oriented to situation  Cognition: Decreased attention/concentration(impaired processing)  Perception: Appears intact  Perseveration: No perseveration noted  Safety/Judgement: Decreased awareness of need for safety;Decreased insight into deficits    Functional Mobility and Transfers for ADLs:  Bed Mobility:  Presented up in chair  Scooting: Maximum assistance    Transfers:  Sit to Stand: Moderate assistance     Bed to Chair: Minimum assistance(taking steps with support of RW)    Balance:  Sitting: Impaired  Sitting - Static: Fair (occasional)  Sitting - Dynamic: Fair (occasional)  Standing: Impaired; With support  Standing - Static: Fair  Standing - Dynamic : Fair    ADL Intervention:     Lower Body Dressing Assistance  Socks:  Total assistance (dependent)    Toileting  Toileting Assistance: Maximum assistance(attempting to use urinal)    Cognitive Retraining  Safety/Judgement: Decreased awareness of need for safety;Decreased insight into deficits    Therapeutic Exercises:      CARDIAC  EXERCISE   Sets   Reps   Active Active Assist   Passive Self ROM   Comments   Shoulder flexion 1 10 [x]                                            []                                            []                                            []                                               Shoulder abduction 1 10 [x] []                                            []                                            []                                               Scapular elevation 1 10 [x]                                            []                                            []                                            []                                               Scapular retraction     []                                            []                                            []                                            []                                            Patient unable to perform despite cueing   Trunk rotation 1 10 [x]                                            []                                            []                                            []                                               Trunk sidebending 1 10 [x]                                            []                                            []                                            []                                                  []                                            []                                            []                                            []                                                      Activity Tolerance:   Poor  Please refer to the flowsheet for vital signs taken during this treatment.     After treatment patient left in no apparent distress:   Supine in bed and Call bell within reach    COMMUNICATION/COLLABORATION:   The patients plan of care was discussed with: Physical Therapist and Registered Nurse    Nabeel García OTR/L  Time Calculation: 32 mins

## 2021-02-04 NOTE — PROGRESS NOTES
[address] [facility] call back from [facility] NP pertaining to patient being hypotensive. 1600 See new orders. End of Shift Note    Bedside shift change report given to Kieran Simon (oncoming nurse) by Candida Panda RN (offgoing nurse). Report included the following information SBAR    Shift worked:  7a-7p     Shift summary and any significant changes:     hypotnsive     Concerns for physician to address:  no     Zone phone for oncoming shift:   [phone]       Activity:  Activity Level: Up with Assistance  Number times ambulated in hallways past shift: 1  Number of times OOB to chair past shift: 1    Cardiac:   Cardiac Monitoring: Yes      Cardiac Rhythm: Normal sinus rhythm    Access:   Current line(s): PIV     Genitourinary:   Urinary status: voiding    Respiratory:   O2 Device: Room air  Chronic home O2 use?: N/A  Incentive spirometer at bedside: YES  Actual Volume (ml): 1000 ml  GI:  Last Bowel Movement Date: 02/01/21  Current diet:  DIET CARDIAC Regular  Passing flatus: YES  Tolerating current diet: YES  % Diet Eaten: 100 %    Pain Management:   Patient states pain is manageable on current regimen: YES    Skin:  Romulo Score: 18  Interventions: float heels    Patient Safety:  Fall Score:  Total Score: 4  Interventions: gripper socks  High Fall Risk: Yes    Length of Stay:  Expected LOS: 6d 0h  Actual LOS: 1401 Oswald Patel RN

## 2021-02-04 NOTE — PROGRESS NOTES
Cardiology Progress Note      2/4/2021 9:03 AM    Admit Date: 2/1/2021    Admit Diagnosis: CAD (coronary artery disease) [I25.10]      Subjective:     Kailee Pereyra has low BP, asymptomatic. metoprolol held.     Visit Vitals  BP (!) 84/56 (BP 1 Location: Left upper arm, BP Patient Position: At rest)   Pulse 92   Temp 98.1 °F (36.7 °C)   Resp 17   Ht 6' 1\" (1.854 m)   Wt 216 lb 11.4 oz (98.3 kg)   SpO2 100%   BMI 28.59 kg/m²       Current Facility-Administered Medications   Medication Dose Route Frequency    gabapentin (NEURONTIN) capsule 300 mg  300 mg Oral TID    traMADoL (ULTRAM) tablet 50 mg  50 mg Oral Q6H PRN    rosuvastatin (CRESTOR) tablet 20 mg  20 mg Oral QHS    pantoprazole (PROTONIX) tablet 40 mg  40 mg Oral ACB    sodium chloride (NS) flush 5-40 mL  5-40 mL IntraVENous Q8H    sodium chloride (NS) flush 5-40 mL  5-40 mL IntraVENous PRN    ferrous sulfate tablet 325 mg  1 Tab Oral DAILY WITH BREAKFAST    tamsulosin (FLOMAX) capsule 0.4 mg  0.4 mg Oral DAILY    insulin regular (NOVOLIN R, HUMULIN R) 100 Units in 0.9% sodium chloride 100 mL infusion  1-10 Units/hr IntraVENous TITRATE    heparin (porcine) 4,000 Units in 0.9% sodium chloride 1,000 mL Irrigation    PRN    oxyCODONE IR (ROXICODONE) tablet 5 mg  5 mg Oral Q4H PRN    oxyCODONE IR (ROXICODONE) tablet 10 mg  10 mg Oral Q4H PRN    naloxone (NARCAN) injection 0.4 mg  0.4 mg IntraVENous PRN    mupirocin (BACTROBAN) 2 % ointment   Both Nostrils BID    ceFAZolin (ANCEF) 2 g in sterile water (preservative free) 20 mL IV syringe  2 g IntraVENous Q6H    ondansetron (ZOFRAN) injection 4 mg  4 mg IntraVENous Q4H PRN    albuterol (PROVENTIL VENTOLIN) nebulizer solution 2.5 mg  2.5 mg Nebulization Q4H PRN    aspirin chewable tablet 81 mg  81 mg Oral DAILY    chlorhexidine (PERIDEX) 0.12 % mouthwash 10 mL  10 mL Oral Q12H    magnesium oxide (MAG-OX) tablet 400 mg  400 mg Oral BID    bisacodyL (DULCOLAX) suppository 10 mg  10 mg Rectal DAILY PRN    senna-docusate (PERICOLACE) 8.6-50 mg per tablet 1 Tab  1 Tab Oral BID    polyethylene glycol (MIRALAX) packet 17 g  17 g Oral DAILY    glucose chewable tablet 16 g  4 Tab Oral PRN    dextrose (D50W) injection syrg 12.5-25 g  12.5-25 g IntraVENous PRN    glucagon (GLUCAGEN) injection 1 mg  1 mg IntraMUSCular PRN    insulin lispro (HUMALOG) injection   SubCUTAneous TIDAC    insulin lispro (HUMALOG) injection   SubCUTAneous AC&HS         Objective:      Physical Exam:  Visit Vitals  BP (!) 88/64   Pulse 96   Temp 98.6 °F (37 °C)   Resp 16   Ht 6' 1\" (1.854 m)   Wt 216 lb 11.4 oz (98.3 kg)   SpO2 100%   BMI 28.59 kg/m²     General Appearance:  Well developed, well nourished,alert and oriented x 3, and individual in no acute distress. Ears/Nose/Mouth/Throat:   Hearing grossly normal.         Neck: Supple. Chest:   Lungs clear to auscultation bilaterally. Cardiovascular:  Regular rate and rhythm, S1, S2 normal, no murmur. Abdomen:   Soft, non-tender, bowel sounds are active. Extremities: No edema bilaterally. Skin: Warm and dry.                Data Review:   Labs:    Recent Results (from the past 24 hour(s))   GLUCOSE, POC    Collection Time: 02/03/21 10:09 AM   Result Value Ref Range    Glucose (POC) 115 (H) 65 - 100 mg/dL    Performed by Yohan Malik Osler    Collection Time: 02/03/21 10:10 AM   Result Value Ref Range    Glucose 115 mg/dL    Insulin order 1.1 units/hour    Insulin adminstered 1.1 units/hour    Multiplier 0.020     Low target 95 mg/dL    High target 130 mg/dL    D50 order 0.0 ml    D50 administered 0.00 ml    Minutes until next BG 60 min    Order initials ap     Administered initials ap     GLSCOM Comments     GLUCOSE, POC    Collection Time: 02/03/21 11:31 AM   Result Value Ref Range    Glucose (POC) 174 (H) 65 - 100 mg/dL    Performed by Yohan Malik Osler    Collection Time: 02/03/21 11:32 AM   Result Value Ref Range    Glucose 174 mg/dL    Insulin order 3.4 units/hour    Insulin adminstered 3.4 units/hour    Multiplier 0.030     Low target 95 mg/dL    High target 130 mg/dL    D50 order 0.0 ml    D50 administered 0.00 ml    Minutes until next BG 60 min    Order initials ap     Administered initials ap     GLSCOM Comments     GLUCOSE, POC    Collection Time: 02/03/21 12:37 PM   Result Value Ref Range    Glucose (POC) 190 (H) 65 - 100 mg/dL    Performed by Jerrlyn Duverney Willam Gibbon) Raj Blackbird    Collection Time: 02/03/21 12:37 PM   Result Value Ref Range    Glucose 190 mg/dL    Insulin order 5.2 units/hour    Insulin adminstered 5.2 units/hour    Multiplier 0.040     Low target 95 mg/dL    High target 130 mg/dL    D50 order 0.0 ml    D50 administered 0.00 ml    Minutes until next BG 60 min    Order initials ap     Administered initials ric     GLSCOM Comments     GLUCOSE, POC    Collection Time: 02/03/21  1:52 PM   Result Value Ref Range    Glucose (POC) 185 (H) 65 - 100 mg/dL    Performed by Jerrlyn Duverney Willam Gibbon) Raj Blackbird    Collection Time: 02/03/21  1:53 PM   Result Value Ref Range    Glucose 185 mg/dL    Insulin order 6.3 units/hour    Insulin adminstered 6.3 units/hour    Multiplier 0.050     Low target 95 mg/dL    High target 130 mg/dL    D50 order 0.0 ml    D50 administered 0.00 ml    Minutes until next BG 60 min    Order initials ap     Administered initials ric     GLSCOM Comments     GLUCOSE, POC    Collection Time: 02/03/21  2:55 PM   Result Value Ref Range    Glucose (POC) 167 (H) 65 - 100 mg/dL    Performed by Jerrlyn Duverney Willam Gibbon) Raj Blackbird    Collection Time: 02/03/21  2:56 PM   Result Value Ref Range    Glucose 167 mg/dL    Insulin order 6.4 units/hour    Insulin adminstered 6.4 units/hour    Multiplier 0.060     Low target 95 mg/dL    High target 130 mg/dL    D50 order 0.0 ml    D50 administered 0.00 ml    Minutes until next BG 60 min    Order initials [facility]     Administered initials [facility]     GLSCOM Comments GLUCOSE, POC    Collection Time: 02/03/21  4:01 PM   Result Value Ref Range    Glucose (POC) 147 (H) 65 - 100 mg/dL    Performed by [employer]    Collection Time: 02/03/21  4:02 PM   Result Value Ref Range    Glucose 147 mg/dL    Insulin order 6.1 units/hour    Insulin adminstered 6.1 units/hour    Multiplier 0.070     Low target 95 mg/dL    High target 130 mg/dL    D50 order 0.0 ml    D50 administered 0.00 ml    Minutes until next BG 60 min    Order initials [facility]     Administered initials [facility]     GLSCOM Comments     GLUCOSE, POC    Collection Time: 02/03/21  5:04 PM   Result Value Ref Range    Glucose (POC) 140 (H) 65 - 100 mg/dL    Performed by [employer]    Collection Time: 02/03/21  5:05 PM   Result Value Ref Range    Glucose 140 mg/dL    Insulin order 6.4 units/hour    Insulin adminstered 6.4 units/hour    Multiplier 0.080     Low target 95 mg/dL    High target 130 mg/dL    D50 order 0.0 ml    D50 administered 0.00 ml    Minutes until next BG 60 min    Order initials [facility]     Administered initials [facility]     GLSCOM Comments     GLUCOSE, POC    Collection Time: 02/03/21  6:08 PM   Result Value Ref Range    Glucose (POC) 145 (H) 65 - 100 mg/dL    Performed by [employer]    Collection Time: 02/03/21  6:08 PM   Result Value Ref Range    Glucose 145 mg/dL    Insulin order 7.7 units/hour    Insulin adminstered 7.7 units/hour    Multiplier 0.090     Low target 95 mg/dL    High target 130 mg/dL    D50 order 0.0 ml    D50 administered 0.00 ml    Minutes until next BG 60 min    Order initials kb     Administered initials kb     GLSCOM Comments     GLUCOSE, POC    Collection Time: 02/03/21  7:10 PM   Result Value Ref Range    Glucose (POC) 165 (H) 65 - 100 mg/dL    Performed by [employer]    Collection Time: 02/03/21  7:10 PM   Result Value Ref Range    Glucose 165 mg/dL    Insulin order 10.5 units/hour    Insulin adminstered 10.5 units/hour Multiplier 0.100     Low target 95 mg/dL    High target 130 mg/dL    D50 order 0.0 ml    D50 administered 0.00 ml    Minutes until next BG 60 min    Order initials [facility]     Administered initials [facility]     GLSCOM Comments     GLUCOSE, POC    Collection Time: 02/03/21  8:08 PM   Result Value Ref Range    Glucose (POC) 146 (H) 65 - 100 mg/dL    Performed by Robby Crouch (PCT)    GLUCOSTABILIZER    Collection Time: 02/03/21  8:17 PM   Result Value Ref Range    Glucose 146 mg/dL    Insulin order 9.5 units/hour    Insulin adminstered 9.5 units/hour    Multiplier 0.110     Low target 95 mg/dL    High target 130 mg/dL    D50 order 0.0 ml    D50 administered 0.00 ml    Minutes until next BG 60 min    Order initials rf     Administered initials rf     GLSCOM Comments     GLUCOSE, POC    Collection Time: 02/03/21  9:29 PM   Result Value Ref Range    Glucose (POC) 171 (H) 65 - 100 mg/dL    Performed by Omega Goyal RN    GLUCOSTABILIZER    Collection Time: 02/03/21  9:30 PM   Result Value Ref Range    Glucose 171 mg/dL    Insulin order 13.3 units/hour    Insulin adminstered 13.3 units/hour    Multiplier 0.120     Low target 95 mg/dL    High target 130 mg/dL    D50 order 0.0 ml    D50 administered 0.00 ml    Minutes until next BG 60 min    Order initials rf     Administered initials rf     GLSCOM Comments     GLUCOSE, POC    Collection Time: 02/03/21 10:31 PM   Result Value Ref Range    Glucose (POC) 173 (H) 65 - 100 mg/dL    Performed by Omega Goyal RN    GLUCOSTABILIZER    Collection Time: 02/03/21 10:32 PM   Result Value Ref Range    Glucose 173 mg/dL    Insulin order 14.7 units/hour    Insulin adminstered 14.7 units/hour    Multiplier 0.130     Low target 95 mg/dL    High target 130 mg/dL    D50 order 0.0 ml    D50 administered 0.00 ml    Minutes until next BG 60 min    Order initials rf     Administered initials rf     GLSCOM Comments     GLUCOSE, POC    Collection Time: 02/03/21 11:28 PM   Result Value Ref Range    Glucose (POC) 160 (H) 65 - 100 mg/dL    Performed by Lyubov Rosario RN    GLUCOSTABILIZER    Collection Time: 02/03/21 11:29 PM   Result Value Ref Range    Glucose 160 mg/dL    Insulin order 14.0 units/hour    Insulin adminstered 14.0 units/hour    Multiplier 0.140     Low target 95 mg/dL    High target 130 mg/dL    D50 order 0.0 ml    D50 administered 0.00 ml    Minutes until next BG 60 min    Order initials rf     Administered initials rf     GLSCOM Comments     GLUCOSE, POC    Collection Time: 02/04/21 12:31 AM   Result Value Ref Range    Glucose (POC) 139 (H) 65 - 100 mg/dL    Performed by Lyubov Rosario RN    GLUCOSTABILIZER    Collection Time: 02/04/21 12:32 AM   Result Value Ref Range    Glucose 139 mg/dL    Insulin order 11.9 units/hour    Insulin adminstered 11.9 units/hour    Multiplier 0.150     Low target 95 mg/dL    High target 130 mg/dL    D50 order 0.0 ml    D50 administered 0.00 ml    Minutes until next BG 60 min    Order initials rf     Administered initials rf     GLSCOM Comments     GLUCOSE, POC    Collection Time: 02/04/21  1:36 AM   Result Value Ref Range    Glucose (POC) 95 65 - 100 mg/dL    Performed by Lyubov Rosario RN    GLUCOSTABILIZER    Collection Time: 02/04/21  1:36 AM   Result Value Ref Range    Glucose 95 mg/dL    Insulin order 5.3 units/hour    Insulin adminstered 5.3 units/hour    Multiplier 0.150     Low target 95 mg/dL    High target 130 mg/dL    D50 order 0.0 ml    D50 administered 0.00 ml    Minutes until next BG 60 min    Order initials rf     Administered initials rf     GLSCOM Comments     GLUCOSE, POC    Collection Time: 02/04/21  2:47 AM   Result Value Ref Range    Glucose (POC) 80 65 - 100 mg/dL    Performed by Lyubov Rosario RN    GLUCOSTABILIZER    Collection Time: 02/04/21  2:48 AM   Result Value Ref Range    Glucose 80 mg/dL    Insulin order 0.0 units/hour    Insulin adminstered 0.0 units/hour    Multiplier 0.120     Low target 95 mg/dL    High target 130 mg/dL    D50 order 8.0 ml    D50 administered 8.00 ml    Minutes until next BG 15 min    Order initials rf     Administered initials rf     GLSCOM Comments     GLUCOSE, POC    Collection Time: 02/04/21  3:06 AM   Result Value Ref Range    Glucose (POC) 109 (H) 65 - 100 mg/dL    Performed by Theodore Hamilton    Collection Time: 02/04/21  3:06 AM   Result Value Ref Range    Glucose 109 mg/dL    Insulin order 5.3 units/hour    Insulin adminstered 5.3 units/hour    Multiplier 0.120     Low target 95 mg/dL    High target 130 mg/dL    D50 order 0.0 ml    D50 administered 0.00 ml    Minutes until next BG 60 min    Order initials rf     Administered initials rf     GLSCOM Comments     METABOLIC PANEL, COMPREHENSIVE    Collection Time: 02/04/21  3:45 AM   Result Value Ref Range    Sodium 139 136 - 145 mmol/L    Potassium 4.2 3.5 - 5.1 mmol/L    Chloride 106 97 - 108 mmol/L    CO2 26 21 - 32 mmol/L    Anion gap 7 5 - 15 mmol/L    Glucose 86 65 - 100 mg/dL    BUN 29 (H) 6 - 20 MG/DL    Creatinine 1.68 (H) 0.70 - 1.30 MG/DL    BUN/Creatinine ratio 17 12 - 20      GFR est AA 48 (L) >60 ml/min/1.73m2    GFR est non-AA 40 (L) >60 ml/min/1.73m2    Calcium 8.9 8.5 - 10.1 MG/DL    Bilirubin, total 0.2 0.2 - 1.0 MG/DL    ALT (SGPT) 13 12 - 78 U/L    AST (SGOT) 46 (H) 15 - 37 U/L    Alk.  phosphatase 54 45 - 117 U/L    Protein, total 6.2 (L) 6.4 - 8.2 g/dL    Albumin 3.1 (L) 3.5 - 5.0 g/dL    Globulin 3.1 2.0 - 4.0 g/dL    A-G Ratio 1.0 (L) 1.1 - 2.2     MAGNESIUM    Collection Time: 02/04/21  3:45 AM   Result Value Ref Range    Magnesium 2.3 1.6 - 2.4 mg/dL   CBC W/O DIFF    Collection Time: 02/04/21  3:45 AM   Result Value Ref Range    WBC 8.1 4.1 - 11.1 K/uL    RBC 2.96 (L) 4.10 - 5.70 M/uL    HGB 8.2 (L) 12.1 - 17.0 g/dL    HCT 26.6 (L) 36.6 - 50.3 %    MCV 89.9 80.0 - 99.0 FL    MCH 27.7 26.0 - 34.0 PG    MCHC 30.8 30.0 - 36.5 g/dL    RDW 13.9 11.5 - 14.5 %    PLATELET 181 (L) 422 - 400 K/uL    MPV 11.2 8.9 - 12.9 FL    NRBC 0.0 0  WBC ABSOLUTE NRBC 0.00 0.00 - 0.01 K/uL   PTT    Collection Time: 02/04/21  3:45 AM   Result Value Ref Range    aPTT 31.9 (H) 22.1 - 31.0 sec    aPTT, therapeutic range     58.0 - 77.0 SECS   PROTHROMBIN TIME + INR    Collection Time: 02/04/21  3:45 AM   Result Value Ref Range    INR 1.2 (H) 0.9 - 1.1      Prothrombin time 12.2 (H) 9.0 - 11.1 sec   GLUCOSE, POC    Collection Time: 02/04/21  3:58 AM   Result Value Ref Range    Glucose (POC) 89 65 - 100 mg/dL    Performed by Lindsey Campbell RN    GLUCOSTABILIZER    Collection Time: 02/04/21  3:59 AM   Result Value Ref Range    Glucose 89 mg/dL    Insulin order 2.8 units/hour    Insulin adminstered 2.8 units/hour    Multiplier 0.096     Low target 95 mg/dL    High target 130 mg/dL    D50 order 0.0 ml    D50 administered 0.00 ml    Minutes until next BG 60 min    Order initials rf     Administered initials rf     GLSCOM Comments     GLUCOSE, POC    Collection Time: 02/04/21  5:03 AM   Result Value Ref Range    Glucose (POC) 83 65 - 100 mg/dL    Performed by Lindsey Campbell RN    GLUCOSTABILIZER    Collection Time: 02/04/21  5:03 AM   Result Value Ref Range    Glucose 83 mg/dL    Insulin order 1.8 units/hour    Insulin adminstered 1.8 units/hour    Multiplier 0.077     Low target 95 mg/dL    High target 130 mg/dL    D50 order 0.0 ml    D50 administered 0.00 ml    Minutes until next BG 60 min    Order initials rf     Administered initials rf     GLSCOM Comments     GLUCOSE, POC    Collection Time: 02/04/21  6:05 AM   Result Value Ref Range    Glucose (POC) 95 65 - 100 mg/dL    Performed by Lindsey Campbell RN    GLUCOSTABILIZER    Collection Time: 02/04/21  6:06 AM   Result Value Ref Range    Glucose 95 mg/dL    Insulin order 2.7 units/hour    Insulin adminstered 2.7 units/hour    Multiplier 0.077     Low target 95 mg/dL    High target 130 mg/dL    D50 order 0.0 ml    D50 administered 0.00 ml    Minutes until next BG 60 min    Order initials AD     Administered initials AD     GLSCOM Comments     GLUCOSE, POC    Collection Time: 02/04/21  6:54 AM   Result Value Ref Range    Glucose (POC) 94 65 - 100 mg/dL    Performed by Denice Chi RN    GLUCOSTABILIZER    Collection Time: 02/04/21  6:55 AM   Result Value Ref Range    Glucose 94 mg/dL    Insulin order 2.1 units/hour    Insulin adminstered 2.1 units/hour    Multiplier 0.062     Low target 95 mg/dL    High target 130 mg/dL    D50 order 0.0 ml    D50 administered 0.00 ml    Minutes until next BG 60 min    Order initials rf     Administered initials rf     GLSCOM Comments     GLUCOSE, POC    Collection Time: 02/04/21  8:22 AM   Result Value Ref Range    Glucose (POC) 93 65 - 100 mg/dL    Performed by Shanae Pressley    Collection Time: 02/04/21  8:26 AM   Result Value Ref Range    Glucose 93 mg/dL    Insulin order 1.7 units/hour    Insulin adminstered 1.7 units/hour    Multiplier 0.050     Low target 95 mg/dL    High target 130 mg/dL    D50 order 0.0 ml    D50 administered 0.00 ml    Minutes until next BG 60 min    Order initials rs     Administered initials rs     GLSCOM Comments         Telemetry: normal sinus rhythm      Assessment:     Hospital Problems  Date Reviewed: 2/2/2021          Codes Class Noted POA    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 Albumin per CT surgery.     [id number] Regla Jernigan MD

## 2021-02-04 NOTE — PROGRESS NOTES
Cardiac Surgery Care Coordinator- Met with Lincoln Lock, reviewed plan of care and discussed potential discharge date. Reinforced sternal precautions and encouraged continued use of the incentive spirometer. Reviewed goals for the day and emphasized the importance of increased activity to meet discharge goals. Will continue to follow for educational and emotional needs.  Floyr Dc RN

## 2021-02-04 NOTE — PROGRESS NOTES
Problem: Mobility Impaired (Adult and Pediatric)  Goal: *Acute Goals and Plan of Care (Insert Text)  Description: FUNCTIONAL STATUS PRIOR TO ADMISSION: Patient was independent without use of DME. Patient with history of CVA affecting L hand. HOME SUPPORT PRIOR TO ADMISSION: The patient lived with his wife. Physical Therapy Goals  Initiated 2/3/2021  1. Patient will move from supine to sit and sit to supine , scoot up and down, and roll side to side in bed with independence within 5 days. 2.  Patient will perform sit to/from stand with modified independence within 5 days. 3.  Patient will ambulate 250 feet with least restrictive assistive device and modified independence within 5 days. 4.  Patient will ascend/descend 8 stairs with one sided handrail(s) with modified independence within 5 days. 5.  Patient will perform cardiac exercises per protocol with modified independence within 5 days. 6.  Patient will verbally recall and functionally demonstrate mindful-based movements (\"move in the tube\") principles without cues within 5 days. Outcome: Not Progressing Towards Goal    PHYSICAL THERAPY TREATMENT  Patient: Catrachito Able [de-identified][de-identified] y.o. male)  Date: 2/4/2021  Diagnosis: CAD (coronary artery disease) [I25.10] <principal problem not specified>  Procedure(s) (LRB):  ON PUMP CORONARY ARTERY BYPASS GRAFT (CABG) x3, EVH, CARDIOPLEGIA, INTRAOPERATIVE CARLOS DONE BY DR. Brian Ware. EVH DONE BY MARIMAR PIMENTEL. (N/A) 2 Days Post-Op  Precautions: Sternal(move in the tube)  Chart, physical therapy assessment, plan of care and goals were reviewed. ASSESSMENT  Patient continues with skilled PT services and is not progressing towards goals. Limited today by hypotension. Was able to perform post cardiac exercises with assistance due to weakness. As noted below, BP dropped further in standing and did not recover until tilted back in recliner to elevate LE higher. His nurse was notified.  Deferred gait training this session due to hypotension. Vitals:    02/04/21 1223 02/04/21 1229 02/04/21 1231 02/04/21 1234   BP: (!) 81/57 (!) 78/67 (!) 76/53 (!) 90/59   BP 1 Location: Left upper arm Left upper arm Left upper arm Left lower arm   BP Patient Position: Sitting Standing  Comment: starting to feel lightheaded Sitting  Comment: recovery Sitting  Comment: recliner tilted back   Pulse: 92 (!) 108 (!) 101 98   Resp:       Temp:       SpO2: 100% RA      Weight:       Height:             Patient is not verbalizing and is not demonstrating understanding of mindful-based movements (\"move in the tube\") principles of keeping UEs proximal to ribcage to prevent lateral pull on the sternum during load-bearing activities with visual, verbal, and manual cues required for compliance. Current Level of Function Impacting Discharge (mobility/balance): mod a sit to standing    Other factors to consider for discharge: independent PLOF; lives with spouse         PLAN :  Patient continues to benefit from skilled intervention to address the above impairments. Continue treatment per established plan of care. to address goals. Recommendation for discharge: (in order for the patient to meet his/her long term goals)  To be determined: rehab vs HHPT/OT    This discharge recommendation:  Has not yet been discussed the attending provider and/or case management    IF patient discharges home will need the following DME: to be determined (TBD)     SUBJECTIVE:   Patient stated \" the food is good here.     OBJECTIVE DATA SUMMARY:   Patient mobilized on continuous portable monitor/telemetry.   Critical Behavior:  Neurologic State: Alert  Orientation Level: Oriented X4  Cognition: Decreased attention/concentration, Appropriate decision making, Follows commands  Safety/Judgement: Decreased insight into deficits, Awareness of environment, Good awareness of safety precautions, Fall prevention(weakness)    Functional Mobility Training:  Bed Mobility:  Rolling: (sitting in chair when PT arrived)                   Transfers:  Sit to Stand: Moderate assistance;Minimum assistance;Assist x2  Stand to Sit: Moderate assistance                               Balance:  Sitting: Impaired  Sitting - Static: Fair (occasional)  Sitting - Dynamic: Poor (constant support)  Standing: Impaired; With support  Standing - Static: Fair;Constant support  Standing - Dynamic : Fair;Constant support    Ambulation/Gait Training:              Gait Description (WDL): (deferred due to low BP)     Right Side Weight Bearing: Full  Left Side Weight Bearing: Full                            Cardiac diagnosis intervention:  Patient instructed and educated on mindful movement principles based on Move in The Tube concept to include maintaining bilateral elbows close to rib cage when performing any load-bearing activity such as getting in/out of bed, pushing up from a chair, opening a door, or lifting a box. Patient was given a handout with diagrams of each correct/incorrect method of performing each of the above tasks. Therapeutic Exercises:   Patient instructed on the benefits and demonstrated cardiac exercises while seated with Minimum assistance. Instructed and indicated understanding on how to progress reps, sets against gravity, pacing through progressive muscle strengthening standing based on surgeon clearance for more weight in prep for basic and instrumental ADLs. Instruction on the use of household items in place of weights as needed.      CARDIAC  EXERCISE   Sets   Reps   Active Active Assist   Passive Self ROM   Comments   Shoulder flexion 1 5 []                                            [x]                                            []                                            []                                               Shoulder abduction 1      5 []                                            [x]                                            []                                            [] Scapular elevation 1 5 []                                            [x]                                            []                                            []                                               Scapular retraction 1 5 []                                            [x]                                            []                                            []                                               Trunk rotation 1 5 []                                            [x]                                            []                                            []                                               Trunk sidebending 1 5 []                                            [x]                                            []                                            []                                                  []                                            []                                            []                                            []                                                   Pain Ratin/10 anterior chest    Activity Tolerance:   Fair, SpO2 stable on RA, requires rest breaks, and signs and symptoms of orthostatic hypotension    After treatment patient left in no apparent distress:   Sitting in chair, Heels elevated for pressure relief, Call bell within reach, and chair tilted to elevate legs higher to increase BP      COMMUNICATION/COLLABORATION:   The patients plan of care was discussed with: Occupational therapist, Registered nurse, and Rehabilitation technician.      Flaca Lama, PT   Time Calculation: 26 mins

## 2021-02-04 NOTE — DIABETES MGMT
[facility]    CLINICAL NURSE SPECIALIST CONSULT   Follow-up NOTE    Initial Presentation   Rizwana Marie is a [de-identified] y.o. male admitted 2/1/21 with dyspnea with exertion, accompanied by LE edema. .   Cardiac cath:  Prox LAD lesion, 80% stenosed. Mid LAD lesion, 85% stenosed. Dist LAD lesion, 90% stenosed. Left Circumflex   Prox Cx lesion, 80% stenosed. First Obtuse [place]   1st Mrg lesion, 70% stenosed. Right Coronary Artery   The vessel is moderate in size. The vessel is tortuous. Prox RCA lesion, 90% stenosed. Mid RCA lesion, 80% stenosed. Echo:  LV: Estimated LVEF is 55 - 60%. Normal systolic function (ejection fraction normal) and diastolic function. Small left ventricle. Mild concentric hypertrophy. Wall motion: normal.  Carotid doppler: There is mild stenosis in the right & left CCA. HX:   Past Medical History:   Diagnosis Date    Arthritis     [facility]     Diabetic neuropathy [facility]     Hypercholesterolemia     Hypertension 11/29/10    Shingles 2010    Thromboembolus ([facility] [facility]    Hip pain    DX: ASHD. TX: 2/2/21 CABG x 3 LIMA to LAD, RSVG to OM, RSVG to PDA. Right Endoscopic Saphenous Vein [place]    Clinical Course   Current course has been uncomplicated. 2/3/21 02 NC. BG management via Glucostabilizer per protocol. 2/4/21 worked with PT and OT. Transferred out of CCU yesterday. Per CM plan to discharge home with [facility]. C/o of some incisional soreness over night. Chest tube in place. Pacer with external wires still intact. Low BP this morning after getting up to the chair, getting albumin now. Diabetes    Patient has known Type 2 diabetes, treated with oral agents PTA. Family history negative for diabetes. Admission  and A1c 7% indicate good diabetes control.    Consulted by Provider for advanced diabetes nursing assessment and care, specifically related to   [x] Inpatient management strategy    Diabetes-related medical history  Neurological complications  Peripheral neuropathy  Macrovascular disease  CAD, stroke (2018)    Diabetes medication history  Drug class Currently in use Discontinued Never used   Biguanide Metformin 1000mg twice daily     DDP-4 inhibitor       Sulfonylurea Glimiperide 4mg D     Thiazolidinedione      GLP-1 RA      SGLT-2 inhibitors      Basal insulin      Bolus insulin      Fixed Dose  Combinations        Subjective   My chest gets sore when I cough.     Patient reports the following home diabetes self-care practices:  Eating pattern-Wife prepares some meals; daughter comes on weekends to cook. [x] Lunch  Cereal with banana. Eggs & sausage with toast (weekend). [x] Dinner  Beans with ham. Applesauce/banana/peaches. Cabbage. [x] Beverages Water. Physical activity pattern  [x] Non-Aerobic exercise   Monitoring pattern  [x] Breakfast 80-130s  [x] Dinner  110s  Taking medications pattern  [x] Consistent administration  [x] Affordable     Objective   Physical exam  General Alert, oriented and in no acute distress. Conversant and cooperative. Sitting up in the chair. DERICK Hwang Vital Signs Afebrile. NSR. hypotensive. Visit Vitals  BP (!) 83/63 (BP 1 Location: Left upper arm, BP Patient Position: Sitting)   Pulse 94   Temp 98.6 °F (37 °C)   Resp 14   Ht 6' 1\" (1.854 m)   Wt 98.3 kg (216 lb 11.4 oz)   SpO2 97%   BMI 28.59 kg/m²     Skin  Warm and dry. Extremities Bruised tip of right foot (patient stated it happened PTA)    Diabetic foot exam:    Left Foot     Visual Exam: normal Edematous - pitting. Thick nails (son cuts nails for him)   Pulse DP: 1+ (weak)    Right Foot   Visual Exam: normal  Edematous - pitting.  Thick nails    Pulse DP: 1+ (weak)    Laboratory  Tests 2/4 2/3 2/2 2/1/21   A1c    7%   BG 86 87 138 146   Anion gap 7 4 7 5   Serum triglycerides       WBC 8.1 8.3 9.5 8.5   Serum creatinine 1.68 1.42 1.25 1.46   GFR 48 58 >60 56   AST 46 43 51 29   ALT 13 16 18 28   Blood gas:     CXR:   There is no pleural effusion. .Left basilar atelectasis and interstitial opacity not changed. No pneumothorax     Factors impacting BG management  Factor Dose Comments   Nutrition:  Carb-controlled meals   60 grams/meal   Tolerating well and per patient ate most of his breakfast    Drugs:  Blood transfusion(s)   PRBCs 2//2/21   A1cs inaccurate after this date. Pain Scheduled Neurontin  Oxycodone and ultram prn Rated @8. Infection Prophylactic Ancef Afebrile. WBC normal.     Blood glucose pattern        Assessment and Plan   Nursing Diagnosis Risk for unstable blood glucose pattern   Nursing Intervention Domain 5251 Decision-making Support   Nursing Interventions Examined current inpatient diabetes control   Explored factors facilitating and impeding inpatient management  Explored corrective strategies with patient and responsible inpatient provider      Evaluation   This  male, with Type 2 diabetes, did achieve diabetes control prior to admission (PTA), as evidenced by admission BG of 139 and A1c of 7%. During this hospitalization, the patient has achieved inpatient blood glucose target of 100-180mg/dl via Glucostabilizer. Note today that he is tolerating his diet well and is scheduled to transition off of the insulin infusion at 1400. BG 80-120s. Spoke with nursing and order already placed per protocol for basal dose to be given. Continue correctional and monitor BG trends. Believe he can transition back to home diabetes regimen at discharge as long as kidney function is not an issue.     Recommendations      Transition off Glucostabilizer per protocol with 12 units of Lantus     Continue corrective scale     Billing Code(s)   [] [id number] IP subsequent hospital care - 35 minutes [] [id number] Prolonged Services - 65 minutes [] 27535 Prolonged Services - 110 minutes  [x] 84288 IP subsequent hospital care - 25 minutes [] 04147 Prolonged Services - 55 minutes [] 39289 Prolonged Services - 100 minutes  [] 21616 IP subsequent hospital care - 15 minutes [] [id number] Prolonged Services - 45 minutes [] 50349 Prolonged Services - 90 minutes    Before making these care recommendations, I personally reviewed the hospitalization record, including notes, laboratory & diagnostic data and current medications, and examined the patient at the bedside (circumstances permitting) before making care recommendations.      Total minutes: 1910 Severo VELIZ, RN, ACCNS-AG  Diabetes Clinical Nurse Specialist  [facility]  Access via [address] [place] [address]

## 2021-02-04 NOTE — PROGRESS NOTES
RAPID RESPONSE TEAM - follow up    Rounded on patient due to recent transfer from CCU. Discussed with primary RN. No acute concerns, VSS, MEWS 2. Pt receiving multiple runs of albumin for BP. Patient Vitals for the past 4 hrs:   Temp Pulse Resp BP SpO2   02/04/21 1800  (!) 104  (!) 88/57 96 %   02/04/21 1700  100  (!) 69/54 97 %   02/04/21 1600  96  (!) 81/48 95 %   02/04/21 1500 98.5 °F (36.9 °C) 99 20 (!) 84/61 94 %       Please call with any questions or concerns.      Adam Ballard 79

## 2021-02-05 ENCOUNTER — APPOINTMENT (OUTPATIENT)
Dept: GENERAL RADIOLOGY | Age: 81
DRG: 236 | End: 2021-02-05
Attending: PHYSICIAN ASSISTANT
Payer: MEDICARE

## 2021-02-05 ENCOUNTER — APPOINTMENT (OUTPATIENT)
Dept: NON INVASIVE DIAGNOSTICS | Age: 81
DRG: 236 | End: 2021-02-05
Attending: NURSE PRACTITIONER
Payer: MEDICARE

## 2021-02-05 LAB
ABO + RH BLD: NORMAL
ALBUMIN SERPL-MCNC: 3.2 G/DL (ref 3.5–5)
ALBUMIN/GLOB SERPL: 1 {RATIO} (ref 1.1–2.2)
ALP SERPL-CCNC: 63 U/L (ref 45–117)
ALT SERPL-CCNC: 14 U/L (ref 12–78)
ANION GAP SERPL CALC-SCNC: 8 MMOL/L (ref 5–15)
AST SERPL-CCNC: 73 U/L (ref 15–37)
BILIRUB SERPL-MCNC: 0.5 MG/DL (ref 0.2–1)
BLD PROD TYP BPU: NORMAL
BLD PROD TYP BPU: NORMAL
BLOOD GROUP ANTIBODIES SERPL: NORMAL
BPU ID: NORMAL
BPU ID: NORMAL
BUN SERPL-MCNC: 26 MG/DL (ref 6–20)
BUN/CREAT SERPL: 19 (ref 12–20)
CALCIUM SERPL-MCNC: 8.9 MG/DL (ref 8.5–10.1)
CHLORIDE SERPL-SCNC: 102 MMOL/L (ref 97–108)
CO2 SERPL-SCNC: 24 MMOL/L (ref 21–32)
CREAT SERPL-MCNC: 1.36 MG/DL (ref 0.7–1.3)
CROSSMATCH RESULT,%XM: NORMAL
CROSSMATCH RESULT,%XM: NORMAL
ECHO LV EDV A4C: 54.89 ML
ECHO LV EDV INDEX A4C: 24.5 ML/M2
ECHO LV EJECTION FRACTION A4C: 51 PERCENT
ECHO LV ESV A4C: 26.94 ML
ECHO LV ESV INDEX A4C: 12 ML/M2
ECHO LV INTERNAL DIMENSION DIASTOLIC: 3.97 CM (ref 4.2–5.9)
ECHO LV INTERNAL DIMENSION SYSTOLIC: 2.91 CM
ECHO LV IVSD: 1.48 CM (ref 0.6–1)
ECHO LV MASS 2D: 212.5 G (ref 88–224)
ECHO LV MASS INDEX 2D: 95 G/M2 (ref 49–115)
ECHO LV POSTERIOR WALL DIASTOLIC: 1.37 CM (ref 0.6–1)
ERYTHROCYTE [DISTWIDTH] IN BLOOD BY AUTOMATED COUNT: 14.1 % (ref 11.5–14.5)
GLOBULIN SER CALC-MCNC: 3.1 G/DL (ref 2–4)
GLUCOSE BLD STRIP.AUTO-MCNC: 185 MG/DL (ref 65–100)
GLUCOSE BLD STRIP.AUTO-MCNC: 188 MG/DL (ref 65–100)
GLUCOSE BLD STRIP.AUTO-MCNC: 215 MG/DL (ref 65–100)
GLUCOSE BLD STRIP.AUTO-MCNC: 229 MG/DL (ref 65–100)
GLUCOSE SERPL-MCNC: 162 MG/DL (ref 65–100)
HCT VFR BLD AUTO: 25.3 % (ref 36.6–50.3)
HGB BLD-MCNC: 8.1 G/DL (ref 12.1–17)
MAGNESIUM SERPL-MCNC: 2.2 MG/DL (ref 1.6–2.4)
MCH RBC QN AUTO: 28.4 PG (ref 26–34)
MCHC RBC AUTO-ENTMCNC: 32 G/DL (ref 30–36.5)
MCV RBC AUTO: 88.8 FL (ref 80–99)
NRBC # BLD: 0 K/UL (ref 0–0.01)
NRBC BLD-RTO: 0 PER 100 WBC
PLATELET # BLD AUTO: 143 K/UL (ref 150–400)
PMV BLD AUTO: 11.4 FL (ref 8.9–12.9)
POTASSIUM SERPL-SCNC: 4.5 MMOL/L (ref 3.5–5.1)
PROT SERPL-MCNC: 6.3 G/DL (ref 6.4–8.2)
RBC # BLD AUTO: 2.85 M/UL (ref 4.1–5.7)
SERVICE CMNT-IMP: ABNORMAL
SODIUM SERPL-SCNC: 134 MMOL/L (ref 136–145)
SPECIMEN EXP DATE BLD: NORMAL
STATUS OF UNIT,%ST: NORMAL
STATUS OF UNIT,%ST: NORMAL
UNIT DIVISION, %UDIV: 0
UNIT DIVISION, %UDIV: 0
WBC # BLD AUTO: 8.6 K/UL (ref 4.1–11.1)

## 2021-02-05 PROCEDURE — 80053 COMPREHEN METABOLIC PANEL: CPT

## 2021-02-05 PROCEDURE — P9045 ALBUMIN (HUMAN), 5%, 250 ML: HCPCS | Performed by: NURSE PRACTITIONER

## 2021-02-05 PROCEDURE — 74011250637 HC RX REV CODE- 250/637: Performed by: PHYSICIAN ASSISTANT

## 2021-02-05 PROCEDURE — 97110 THERAPEUTIC EXERCISES: CPT

## 2021-02-05 PROCEDURE — 74011000250 HC RX REV CODE- 250: Performed by: THORACIC SURGERY (CARDIOTHORACIC VASCULAR SURGERY)

## 2021-02-05 PROCEDURE — 85027 COMPLETE CBC AUTOMATED: CPT

## 2021-02-05 PROCEDURE — 83735 ASSAY OF MAGNESIUM: CPT

## 2021-02-05 PROCEDURE — 74011250636 HC RX REV CODE- 250/636: Performed by: NURSE PRACTITIONER

## 2021-02-05 PROCEDURE — 74011250637 HC RX REV CODE- 250/637: Performed by: NURSE PRACTITIONER

## 2021-02-05 PROCEDURE — 74011250637 HC RX REV CODE- 250/637: Performed by: THORACIC SURGERY (CARDIOTHORACIC VASCULAR SURGERY)

## 2021-02-05 PROCEDURE — 97530 THERAPEUTIC ACTIVITIES: CPT

## 2021-02-05 PROCEDURE — 71045 X-RAY EXAM CHEST 1 VIEW: CPT

## 2021-02-05 PROCEDURE — 97535 SELF CARE MNGMENT TRAINING: CPT | Performed by: OCCUPATIONAL THERAPIST

## 2021-02-05 PROCEDURE — 51798 US URINE CAPACITY MEASURE: CPT

## 2021-02-05 PROCEDURE — 97110 THERAPEUTIC EXERCISES: CPT | Performed by: OCCUPATIONAL THERAPIST

## 2021-02-05 PROCEDURE — 82962 GLUCOSE BLOOD TEST: CPT

## 2021-02-05 PROCEDURE — 74011000250 HC RX REV CODE- 250: Performed by: PHYSICIAN ASSISTANT

## 2021-02-05 PROCEDURE — 93308 TTE F-UP OR LMTD: CPT | Performed by: INTERNAL MEDICINE

## 2021-02-05 PROCEDURE — 36415 COLL VENOUS BLD VENIPUNCTURE: CPT

## 2021-02-05 PROCEDURE — 65660000000 HC RM CCU STEPDOWN

## 2021-02-05 PROCEDURE — 99232 SBSQ HOSP IP/OBS MODERATE 35: CPT | Performed by: INTERNAL MEDICINE

## 2021-02-05 PROCEDURE — 74011636637 HC RX REV CODE- 636/637: Performed by: PHYSICIAN ASSISTANT

## 2021-02-05 PROCEDURE — 93308 TTE F-UP OR LMTD: CPT

## 2021-02-05 PROCEDURE — 74011636637 HC RX REV CODE- 636/637: Performed by: NURSE PRACTITIONER

## 2021-02-05 RX ORDER — ALBUMIN HUMAN 50 G/1000ML
12.5 SOLUTION INTRAVENOUS ONCE
Status: COMPLETED | OUTPATIENT
Start: 2021-02-05 | End: 2021-02-05

## 2021-02-05 RX ORDER — GLIMEPIRIDE 1 MG/1
2 TABLET ORAL
Status: DISCONTINUED | OUTPATIENT
Start: 2021-02-05 | End: 2021-02-08

## 2021-02-05 RX ORDER — INSULIN GLARGINE 100 [IU]/ML
14 INJECTION, SOLUTION SUBCUTANEOUS
Status: DISCONTINUED | OUTPATIENT
Start: 2021-02-05 | End: 2021-02-08 | Stop reason: HOSPADM

## 2021-02-05 RX ORDER — MAGNESIUM SULFATE 100 %
4 CRYSTALS MISCELLANEOUS AS NEEDED
Status: DISCONTINUED | OUTPATIENT
Start: 2021-02-05 | End: 2021-02-08 | Stop reason: HOSPADM

## 2021-02-05 RX ORDER — INSULIN LISPRO 100 [IU]/ML
INJECTION, SOLUTION INTRAVENOUS; SUBCUTANEOUS
Status: DISCONTINUED | OUTPATIENT
Start: 2021-02-05 | End: 2021-02-08 | Stop reason: HOSPADM

## 2021-02-05 RX ORDER — METOPROLOL TARTRATE 25 MG/1
12.5 TABLET, FILM COATED ORAL EVERY 12 HOURS
Status: DISCONTINUED | OUTPATIENT
Start: 2021-02-06 | End: 2021-02-08

## 2021-02-05 RX ORDER — DEXTROSE 50 % IN WATER (D50W) INTRAVENOUS SYRINGE
12.5-25 AS NEEDED
Status: DISCONTINUED | OUTPATIENT
Start: 2021-02-05 | End: 2021-02-08 | Stop reason: HOSPADM

## 2021-02-05 RX ORDER — FUROSEMIDE 40 MG/1
40 TABLET ORAL DAILY
Status: DISCONTINUED | OUTPATIENT
Start: 2021-02-05 | End: 2021-02-08

## 2021-02-05 RX ORDER — METOPROLOL TARTRATE 25 MG/1
12.5 TABLET, FILM COATED ORAL EVERY 12 HOURS
Status: DISCONTINUED | OUTPATIENT
Start: 2021-02-05 | End: 2021-02-05

## 2021-02-05 RX ORDER — LIDOCAINE HYDROCHLORIDE 20 MG/ML
JELLY TOPICAL ONCE
Status: COMPLETED | OUTPATIENT
Start: 2021-02-05 | End: 2021-02-05

## 2021-02-05 RX ADMIN — MAGNESIUM OXIDE TAB 400 MG (241.3 MG ELEMENTAL MG) 400 MG: 400 (241.3 MG) TAB at 17:13

## 2021-02-05 RX ADMIN — INSULIN LISPRO 1 UNITS: 100 INJECTION, SOLUTION INTRAVENOUS; SUBCUTANEOUS at 23:16

## 2021-02-05 RX ADMIN — Medication 10 ML: at 23:15

## 2021-02-05 RX ADMIN — INSULIN LISPRO 2 UNITS: 100 INJECTION, SOLUTION INTRAVENOUS; SUBCUTANEOUS at 12:15

## 2021-02-05 RX ADMIN — CHLORHEXIDINE GLUCONATE 0.12% ORAL RINSE 10 ML: 1.2 LIQUID ORAL at 09:32

## 2021-02-05 RX ADMIN — LIDOCAINE HYDROCHLORIDE: 20 JELLY TOPICAL at 01:00

## 2021-02-05 RX ADMIN — GABAPENTIN 300 MG: 300 CAPSULE ORAL at 23:15

## 2021-02-05 RX ADMIN — INSULIN LISPRO 2 UNITS: 100 INJECTION, SOLUTION INTRAVENOUS; SUBCUTANEOUS at 17:13

## 2021-02-05 RX ADMIN — MUPIROCIN: 20 OINTMENT TOPICAL at 09:33

## 2021-02-05 RX ADMIN — ASPIRIN 81 MG: 81 TABLET, CHEWABLE ORAL at 09:30

## 2021-02-05 RX ADMIN — GLIMEPIRIDE 2 MG: 1 TABLET ORAL at 12:15

## 2021-02-05 RX ADMIN — METOPROLOL TARTRATE 12.5 MG: 25 TABLET ORAL at 15:39

## 2021-02-05 RX ADMIN — DOCUSATE SODIUM - SENNOSIDES 1 TABLET: 50; 8.6 TABLET, FILM COATED ORAL at 09:31

## 2021-02-05 RX ADMIN — INSULIN LISPRO 2 UNITS: 100 INJECTION, SOLUTION INTRAVENOUS; SUBCUTANEOUS at 09:32

## 2021-02-05 RX ADMIN — INSULIN GLARGINE 14 UNITS: 100 INJECTION, SOLUTION SUBCUTANEOUS at 23:16

## 2021-02-05 RX ADMIN — Medication 10 ML: at 15:39

## 2021-02-05 RX ADMIN — CHLORHEXIDINE GLUCONATE 0.12% ORAL RINSE 10 ML: 1.2 LIQUID ORAL at 23:15

## 2021-02-05 RX ADMIN — PANTOPRAZOLE SODIUM 40 MG: 40 TABLET, DELAYED RELEASE ORAL at 09:30

## 2021-02-05 RX ADMIN — DOCUSATE SODIUM - SENNOSIDES 1 TABLET: 50; 8.6 TABLET, FILM COATED ORAL at 17:13

## 2021-02-05 RX ADMIN — POLYETHYLENE GLYCOL 3350 17 G: 17 POWDER, FOR SOLUTION ORAL at 09:31

## 2021-02-05 RX ADMIN — ALBUMIN (HUMAN) 12.5 G: 12.5 INJECTION, SOLUTION INTRAVENOUS at 12:18

## 2021-02-05 RX ADMIN — MAGNESIUM OXIDE TAB 400 MG (241.3 MG ELEMENTAL MG) 400 MG: 400 (241.3 MG) TAB at 09:30

## 2021-02-05 RX ADMIN — GABAPENTIN 300 MG: 300 CAPSULE ORAL at 15:38

## 2021-02-05 RX ADMIN — ROSUVASTATIN 20 MG: 20 TABLET, FILM COATED ORAL at 23:16

## 2021-02-05 RX ADMIN — TAMSULOSIN HYDROCHLORIDE 0.4 MG: 0.4 CAPSULE ORAL at 09:30

## 2021-02-05 RX ADMIN — GABAPENTIN 300 MG: 300 CAPSULE ORAL at 09:30

## 2021-02-05 RX ADMIN — FERROUS SULFATE TAB 325 MG (65 MG ELEMENTAL FE) 325 MG: 325 (65 FE) TAB at 09:30

## 2021-02-05 RX ADMIN — MUPIROCIN: 20 OINTMENT TOPICAL at 23:17

## 2021-02-05 RX ADMIN — INSULIN LISPRO 3 UNITS: 100 INJECTION, SOLUTION INTRAVENOUS; SUBCUTANEOUS at 12:14

## 2021-02-05 NOTE — PROGRESS NOTES
Problem: Mobility Impaired (Adult and Pediatric)  Goal: *Acute Goals and Plan of Care (Insert Text)  Description: FUNCTIONAL STATUS PRIOR TO ADMISSION: Patient was independent without use of DME. Patient with history of CVA affecting L hand. HOME SUPPORT PRIOR TO ADMISSION: The patient lived with his wife. Physical Therapy Goals  Initiated 2/3/2021  1. Patient will move from supine to sit and sit to supine , scoot up and down, and roll side to side in bed with independence within 5 days. 2.  Patient will perform sit to/from stand with modified independence within 5 days. 3.  Patient will ambulate 250 feet with least restrictive assistive device and modified independence within 5 days. 4.  Patient will ascend/descend 8 stairs with one sided handrail(s) with modified independence within 5 days. 5.  Patient will perform cardiac exercises per protocol with modified independence within 5 days. 6.  Patient will verbally recall and functionally demonstrate mindful-based movements (\"move in the tube\") principles without cues within 5 days. Outcome: Not Progressing Towards Goal    PHYSICAL THERAPY TREATMENT  Patient: Chas Shaikh [de-identified][de-identified] y.o. male)  Date: 2/5/2021  Diagnosis: CAD (coronary artery disease) [I25.10] <principal problem not specified>  Procedure(s) (LRB):  ON PUMP CORONARY ARTERY BYPASS GRAFT (CABG) x3, EVH, CARDIOPLEGIA, INTRAOPERATIVE CARLOS DONE BY DR. Joshua Nugent. EVH DONE BY MARIMAR PIMENTEL. (N/A) 3 Days Post-Op  Precautions: Sternal(move in the tube)  Chart, physical therapy assessment, plan of care and goals were reviewed. ASSESSMENT  Patient continues with skilled PT services and is not progressing towards goals. Still limited by hypotension. Had been able to ambulate 90 feet in 2/3/21, but only out of bed to chair since then due to low BP. Patient is asymptomatic and denies feeling lightheaded. Generalized weakness with decreased attention to task requires increased assistance for mobilizing. Deferred gait training due to low BP. Needed assist with meal set up, but was able to feed himself afterwards. Vitals:    02/05/21 1215 02/05/21 1238 02/05/21 1300 02/05/21 1400   BP: (!) 86/53 (!) 77/53 (!) 88/63 (!) 94/54   BP 1 Location:  Left upper arm     BP Patient Position:  Sitting     Pulse: (!) 119 (!) 117 (!) 110 (!) 114   Resp:  18 17    Temp:  98.6 °F (37 °C) 98.4 °F (36.9 °C)    SpO2:  96% 100% 97%   Weight:       Height:             Patient is not verbalizing and is not demonstrating understanding of mindful-based movements (\"move in the tube\") principles of keeping UEs proximal to ribcage to prevent lateral pull on the sternum during load-bearing activities with verbal, manual, and tactile cues required for compliance. Current Level of Function Impacting Discharge (mobility/balance): mod a supine to sitting and sit to standing. Min a x 2 bed to chair    Other factors to consider for discharge: h/o cva with residual L hand weakness; independent/active PLOF; supportive spouse       PLAN :  Patient continues to benefit from skilled intervention to address the above impairments. Continue treatment per established plan of care. to address goals. Recommendation for discharge: (in order for the patient to meet his/her long term goals)  Therapy up to 5 days/week in SNF setting vs IPR depending on progress    This discharge recommendation:  Has been made in collaboration with the attending provider and/or case management    IF patient discharges home will need the following DME: bedside commode, rolling walker, and wheelchair       SUBJECTIVE:   Patient stated my appetite is good.     OBJECTIVE DATA SUMMARY:   Patient mobilized on continuous portable monitor/telemetry.   Critical Behavior:  Neurologic State: Alert  Orientation Level: Disoriented to time, Oriented to person, Oriented to place, Oriented to situation  Cognition: Decreased attention/concentration, Decreased command following  Safety/Judgement: Awareness of environment, Decreased insight into deficits, Decreased awareness of need for safety, Fall prevention    Functional Mobility Training:  Bed Mobility:  Rolling: Minimum assistance  Supine to Sit: Moderate assistance     Scooting: Moderate assistance          Transfers:  Sit to Stand: Moderate assistance  Stand to Sit: Moderate assistance        Bed to Chair: Minimum assistance x 2;Additional time; Adaptive equipment(RW)                      Balance:  Sitting: Impaired  Sitting - Static: Fair (occasional)  Sitting - Dynamic: Poor (constant support)  Standing: Impaired  Standing - Static: Fair;Constant support  Standing - Dynamic : Fair;Constant support    Ambulation/Gait Training:  Distance (ft): 3 Feet (ft)  Assistive Device: Gait belt;Walker, rolling  Ambulation - Level of Assistance: Minimal assistance;Contact guard assistance;Assist x2        Gait Abnormalities: Decreased step clearance;Trunk sway increased; Step to gait; Shuffling gait(fwd trunk; post cog)  Right Side Weight Bearing: Full  Left Side Weight Bearing: Full  Base of Support: Widened     Speed/Gina: Shuffled; Slow  Step Length: Right shortened;Left shortened             Cardiac diagnosis intervention:  Patient instructed and educated on mindful movement principles based on Move in The Tube concept to include maintaining bilateral elbows close to rib cage when performing any load-bearing activity such as getting in/out of bed, pushing up from a chair, opening a door, or lifting a box. Patient was given a handout with diagrams of each correct/incorrect method of performing each of the above tasks. Therapeutic Exercises:   Patient instructed on the benefits and demonstrated cardiac exercises while seated with Moderate Assistance.  Instructed and indicated understanding on how to progress reps, sets against gravity, pacing through progressive muscle strengthening standing based on surgeon clearance for more weight in prep for basic and instrumental ADLs. Instruction on the use of household items in place of weights as needed.      CARDIAC  EXERCISE   Sets   Reps   Active Active Assist   Passive Self ROM   Comments   Shoulder flexion 1 5 []                                            [x]                                            []                                            []                                               Shoulder abduction 1      5 []                                            [x]                                            []                                            []                                               Scapular elevation 1 5 []                                            [x]                                            []                                            []                                               Scapular retraction 1 5 []                                            [x]                                            []                                            []                                               Trunk rotation 1 5 []                                            [x]                                            []                                            []                                               Trunk sidebending 1 5 []                                            [x]                                            []                                            []                                                  []                                            []                                            []                                            []                                                   Pain Rating:  No complaints    Activity Tolerance:   Fair, SpO2 stable on RA, requires frequent rest breaks, and signs and symptoms of orthostatic hypotension    After treatment patient left in no apparent distress:   Sitting in chair, Call bell within reach, and tray table in front with lunch    COMMUNICATION/COLLABORATION:   The patients plan of care was discussed with: Occupational therapist, Registered nurse, and Rehabilitation technician.      Diamond Escalante, PT   Time Calculation: 28 mins

## 2021-02-05 NOTE — PROGRESS NOTES
Problem: Self Care Deficits Care Plan (Adult)  Goal: *Acute Goals and Plan of Care (Insert Text)  Description:     FUNCTIONAL STATUS PRIOR TO ADMISSION: Patient having difficulty verbalizing prior level of function. HOME SUPPORT: The patient lived with his wife. Occupational Therapy Goals  Initiated 2/3/2021  1. Patient will perform grooming standing at sink with supervision/set-up within 7 day(s). 2.  Patient will perform upper body dressing with minimal assistance/contact guard assist within 7 day(s). 3.  Patient will perform lower body dressing with minimal assistance/contact guard assist within 7 day(s). 4.  Patient will perform toilet transfers with supervision/set-up within 7 day(s). 5.  Patient will perform all aspects of toileting with minimal assistance/contact guard assist within 7 day(s). Outcome: Not Progressing Towards Goal    OCCUPATIONAL THERAPY TREATMENT  Patient: Trupti Castillo [de-identified][de-identified] y.o. male)  Date: 2/5/2021  Diagnosis: CAD (coronary artery disease) [I25.10] <principal problem not specified>  Procedure(s) (LRB):  ON PUMP CORONARY ARTERY BYPASS GRAFT (CABG) x3, EVH, CARDIOPLEGIA, INTRAOPERATIVE CARLOS DONE BY DR. Zhu Ar. EVH DONE BY MARIMAR PIMENTEL. (N/A) 3 Days Post-Op  Precautions: Sternal(move in the tube)  Chart, occupational therapy assessment, plan of care, and goals were reviewed. ASSESSMENT  Patient is not progressing with OT intervention, 2/2 his continued orthostatic hypotension and acute cognitive decline. No carryover noted in  regards to ADL, transfer, sternal/move in the tube precaution or cardiac exercise training provided in previous sessions. Constant cuing and physical guidance required for performance of all cardiac exercises and patient still unable to perform scapular retraction. Functionally he was max A to total A for dressing ADLs performed, and he was max A for sit to stand and scooting. At this point the patient remains in need of SNF rehab after discharge. PLAN :  Patient continues to benefit from skilled intervention to address the above impairments. Continue treatment per established plan of care. to address goals. Recommendation for discharge: (in order for the patient to meet his/her long term goals)  Therapy up to 5 days/week in SNF setting      Equipment recommendations for successful discharge (if) home:TBD         OBJECTIVE DATA SUMMARY:   Cognitive/Behavioral Status:  Neurologic State: Alert;Confused  Orientation Level: Oriented to person;Oriented to place;Oriented to situation  Cognition: Decreased attention/concentration; Impaired decision making;Poor safety awareness;Memory loss;Decreased command following(impaired processing )  Perception: Appears intact  Perseveration: No perseveration noted  Safety/Judgement: Awareness of environment;Decreased insight into deficits; Decreased awareness of need for safety; Fall prevention    Functional Mobility and Transfers for ADLs:  Bed Mobility:  Rolling: Minimum assistance  Supine to Sit: Moderate assistance  Scooting: Maximum assistance    Transfers:  Sit to Stand: Maximum assistance     Bed to Chair: Minimum assistance; Additional time; Adaptive equipment(RW)    Balance:  Sitting: Impaired  Sitting - Static: Fair (occasional)  Sitting - Dynamic: Poor (constant support)  Standing: Impaired  Standing - Static: Fair;Constant support  Standing - Dynamic : Fair;Constant support    ADL Intervention:  Upper Body Dressing Assistance  Shirt simulation with hospital gown: Maximum assistance; Compensatory technique training  Cues: Verbal cues provided; Tactile cues provided;Visual cues provided    Lower Body Dressing Assistance  Socks: Total assistance (dependent)    Cognitive Retraining  Problem Solving: Deductive reason; Identifying the problem;General alternative solution  Organizing/Sequencing: Breaking task down; Two step sequence;Prioritizing  Attention to Task: Distractibility; Single task  Following Commands:  Follows one step commands/directions  Safety/Judgement: Awareness of environment;Decreased insight into deficits; Decreased awareness of need for safety; Fall prevention  Cues: Tactile cues provided;Verbal cues provided;Visual cues provided    Therapeutic Exercises:      CARDIAC  EXERCISE   Sets   Reps   Active Active Assist   Passive Self ROM   Comments   Shoulder flexion 1 10 []                                            [x]                                            []                                            []                                               Shoulder abduction 1 10 []                                            [x]                                            []                                            []                                               Scapular elevation 1 10 [x]                                            []                                            []                                            []                                               Scapular retraction   []                                            []                                            []                                            []                                            Patient unable to perform despite significant cueing   Trunk rotation 1 10 [x]                                            []                                            []                                            []                                               Trunk sidebending 1 10 [x]                                            []                                            []                                            []                                                  []                                            []                                            []                                            []                                                        Activity Tolerance:   Poor  Continued orthostatic hypotension    BP sitting in chair/legs dependent: 94/54  BP seated s/p standin/54  BP seated with BLEs elevated: 99/64        After treatment patient left in no apparent distress:   Sitting in chair, Call bell within reach, and BLEs elevated    COMMUNICATION/COLLABORATION:   The patients plan of care was discussed with: Physical Therapist and Registered Nurse    Juliocesar Lemos OTR/L  Time Calculation: 26 mins

## 2021-02-05 NOTE — PROGRESS NOTES
Bedside and Verbal shift change report given to Trish Botello (oncoming nurse) by Corine Pineda (offgoing nurse). Report included the following information SBAR, Kardex, Procedure Summary, Intake/Output, MAR and Recent Results. 0900: Chest tube removed by Dr. Philip Gray, pacer wires capped and taped. 1045: Patient resting in bed, laying on right side. No complaints of pain. Blood pressure remains low with systolic in 03'C. Will hold on metoprolol and lasix until patient gets up to work with therapy. Cardiac surgery team aware. 1115: Patient continent of 225 ml of urine    1145: Physical therapy at bedside to work with patient and attempt to get him up. Blood pressure at 92/61 while resting. Sitting at side of bed blood pressure dropped to 79/66. Patient not dizzy or lightheaded. Therapy doing sit to stand exercises with patient and pressure up to 84/64. Will update cardiac surgery team.    1220: Patient up to recliner, remains asymptomatic with pressure at 86/53. Albumin now infusing per orders. Metoprolol and Lasix held per orders Will continue to monitor. 1400: Occupational therapy at bedside, patient orthostatic when standing up. Recovered up to 102/65 with sitting back down and legs elevated. Patient continues to deny pain at this time. 1535: Patient assisted back to bed, blood pressure at 107/68 at this time. Dr. Hellen Hogan at bedside, stated ok to given metoprolol at this time. Heart rate at 115, will continue to monitor.      1600: ECHO done at bedside    1730: Blood pressures remaining stable, last at 98/73

## 2021-02-05 NOTE — PROGRESS NOTES
1930 - Bedside and Verbal shift change report given to Fareed Main RN (oncoming nurse) by Messi Singh RN (offgoing nurse). Report included the following information SBAR, Kardex, Intake/Output, MAR, Recent Results and Cardiac Rhythm NSR/ST. Pt currently HR in low 100's. Hypotensive at 80/58. Pt denies any pain/discomfort, lightheadedness, SOB, dizziness, CP. Albumin order for 1700, per day shift nurse waiting to be stocked by pharmacy. 1951 - Albumin infusion initiated, pt HR 90's-low 100's. Pt has equal /UE and LE movement, though is weak. Tongue midline and equal strength and symmetry to facial movements. Pulling 750 mL on IS. Lung sounds diminished in bases, CT marked at 350, verified with Rokisha. Pt put out 200 cc on day shift. Denies any pain/discomfort. Call bell within reach. 2110 - BP taken on both arms lying down. On L arm BP 85/52, on R arm /49. Pt had differing BP on arms documented on 2/1. Discussed with Rapid response RN. Pt denies any chest pain or pain elsewhere. Continuing to encourage IS, reaching 750 mL.     2300 - CT drainage marked at 380, pt put out 30 cc. HR in high 90's BP SBP >100. Denies any pain/discomfort. Pt coughed up yellow tinged mucous, remains RA with Ow sat 95%. Reaching 750 mL on IS.     0025 - Pt has voided 50 mL, dribbling urine output pt stated \"it feels like its there it just wont come\". Post void bladder scan done, 378 mL left. Encouraged to attempt to void again in urinal.    0056 - Pt unable to void remaining urine, requiring straight cath per order for Bladder scan >250. Lidocaine jelly ordered per protocol with straight catheterization for comfort. 0200 - Multiple attempts at straight cath with multiple RN's. Using coude and sterile technique. 200 mL out. Pt expressed relief. Call bell within reach. 0650 - BP 95/57. Pt up to be weighed. Pt max assist, unable to straighten himself up all the way to take a step. HR up to 130 sinus tachycardia.  Pt back to bed as unable to step to ambulate to the chair. When back to bed pt felt urge to void and voided 125 mL. End of Shift Note    Bedside shift change report given to Nena Lawson RN (oncoming nurse) by Henrietta Chapman (offgoing nurse). Report included the following information SBAR, Kardex, Intake/Output, MAR, Recent Results and Cardiac Rhythm NSR/ST    Shift worked:  7p-7a     Shift summary and any significant changes:     Pt hypotensive at beginning of shift, third 250 mL bottle of 5% albumin increased pressures, prn dose not given. Straight cathed one time with 200 output. Pt able to void at end of shift with 125 output. Total  mL. Remained NSR/ST with HR . CT output 80 mL overnight. Sternal dressing taken down and cleansed per order. Attempted to get pt out of bed as BP has remained stable. Pt very weak and barely able to hold self up. Unable to take a step to ambulate. Concerns for physician to address:  Difficulty with voiding. Zone phone for oncoming shift:          Activity:  Activity Level: Up with Assistance  Number times ambulated in hallways past shift: 0  Number of times OOB to chair past shift: 0    Cardiac:   Cardiac Monitoring: Yes      Cardiac Rhythm: Normal sinus rhythm, Sinus tachycardia    Access:   Current line(s): PIV     Genitourinary:   Urinary status: voiding and straight cath    Respiratory:   O2 Device: Room air  Chronic home O2 use?: NO  Incentive spirometer at bedside: YES  Actual Volume (ml): 750 ml  GI:  Last Bowel Movement Date: 02/01/21  Current diet:  DIET CARDIAC Regular  Passing flatus: YES  Tolerating current diet: YES  % Diet Eaten: 100 %    Pain Management:   Patient states pain is manageable on current regimen: YES    Skin:  Romulo Score: 15  Interventions: turn team, float heels, increase time out of bed and limit briefs    Patient Safety:  Fall Score:  Total Score: 4  Interventions: bed/chair alarm, assistive device (walker, cane, etc), gripper socks, pt to call before getting OOB and stay with me (per policy)  High Fall Risk: Yes    Length of Stay:  Expected LOS: 6d 0h  Actual LOS: [facility]

## 2021-02-05 NOTE — PROGRESS NOTES
GL[facility] Plan    *Disposition: Inpatient rehab  *Transport at d/c: BLS  *[facility]  *2nd IMM Letter    CM spoke to the pt regarding the recommendation from the NP is for inpatient rehab. Pt stated that he would like to follow up with his family regarding the recommendation. Pt did give CM permission to speak to his family. CM spoke to pt's son, Dean Chowdhury [phone] regarding the recommendation. Son wants to speak to his sister and mother regarding the recommendation. Son will have his sister follow up with CM. Pt is not medically for d/c today but potential d/c next week. CM will continue to follow and assist with d/c planning. Shaggy Burk.Constantino.   Care Manager [facility]  [phone]

## 2021-02-05 NOTE — DIABETES MGMT
[facility]  PROGRAM FOR DIABETES HEALTH    CLINICAL NURSE SPECIALIST CONSULT   Follow-up NOTE    Initial Presentation   Moose Anders is a [de-identified] y.o. male admitted 2/1/21 with dyspnea with exertion, accompanied by LE edema. .   Cardiac cath:  Prox LAD lesion, 80% stenosed. Mid LAD lesion, 85% stenosed. Dist LAD lesion, 90% stenosed. Left Circumflex   Prox Cx lesion, 80% stenosed. First Obtuse Marg[place] Branch   1st Mrg lesion, 70% stenosed. Right Coronary Artery   The vessel is moderate in size. The vessel is tortuous. Prox RCA lesion, 90% stenosed. Mid RCA lesion, 80% stenosed. Echo:  LV: Estimated LVEF is 55 - 60%. Normal systolic function (ejection fraction normal) and diastolic function. Small left ventricle. Mild concentric hypertrophy. Wall motion: normal.  Carotid doppler: There is mild stenosis in the right & left CCA. HX:   Past Medical History:   Diagnosis Date    Arthritis     Diabetes [facility]     Diabetic neuropathy ([facility]     Hypercholesterolemia     Hypertension 11/29/10    Shingles 2010    Thromboembolus ([facility]    Hip pain    DX: ASHD. TX: 2/2/21 CABG x 3 LIMA to LAD, RSVG to OM, RSVG to PDA. Right Endoscopic Saphenous Vein [place]    Clinical Course   Current course has been uncomplicated. 2/3/21 02 NC. BG management via Glucostabilizer per protocol. Transferred out of .  2/4/21 Worked with PT and OT. Per CM, the plan is to discharge home with [facility]. C/o of some incisional soreness overnight. Chest tube in place. Pacer with external wires still intact. Low BP this morning after getting up to the chair; getting albumin now. 2/5/21 Alert. Eating well per nursing. CT out. Low BP persists. Albumin +. Diabetes    Patient has known Type 2 diabetes, treated with oral agents PTA. Family history negative for diabetes. Admission  and A1c 7% indicate good diabetes control.    Consulted by Provider for advanced diabetes nursing assessment and care, specifically related to [x] Inpatient management strategy    Diabetes-related medical history  Neurological complications  Peripheral neuropathy  Macrovascular disease  CAD, stroke (2018)    Diabetes medication history  Drug class Currently in use Discontinued Never used   Biguanide Metformin 1000mg twice daily     DDP-4 inhibitor       Sulfonylurea Glimiperide 4mg D     Thiazolidinedione      GLP-1 RA      SGLT-2 inhibitors      Basal insulin      Bolus insulin      Fixed Dose  Combinations        Subjective   I'm getting ready to eat now.     Patient reports the following home diabetes self-care practices:  Eating pattern-Wife prepares some meals; daughter comes on weekends to cook. [x] Lunch  Cereal with banana. Eggs & sausage with toast (weekend). [x] Dinner  Beans with ham. Applesauce/banana/peaches. Cabbage. [x] Beverages Water. Physical activity pattern  [x] Non-Aerobic exercise   Monitoring pattern  [x] Breakfast 80-130s  [x] Dinner  110s  Taking medications pattern  [x] Consistent administration  [x] Affordable     Objective   Physical exam  General Alert, oriented and in no acute distress. Family member at bedside  Vital Signs Afebrile. ST. Normotensive. Visit Vitals  /68   Pulse (!) 119   Temp 98.8 °F (37.1 °C)   Resp 18   Ht 6' 1\" (1.854 m)   Wt 99.4 kg (219 lb 2.2 oz)   SpO2 98%   BMI 28.91 kg/m²     Laboratory  Tests 2/5 2/4 2/3 2/2 2/1/21   A1c     7%    86 87 138 146   Anion gap 8 7 4 7 5   Serum triglycerides        WBC 8.6 8.1 8.3 9.5 8.5   Serum creatinine 1.36 1.68 1.42 1.25 1.46   GFR >60 48 58 >60 56   AST 73 46 43 51 29   ALT 14 13 16 18 28   Blood gas:     CXR:   There is no pleural effusion. .Left basilar atelectasis and interstitial opacity not changed. No pneumothorax   2/5/21 CXR: Diminished lung volumes. Pleural and mediastinal drains are stable. Minimal atelectasis.      Factors impacting BG management  Factor Dose Comments   Nutrition:  Carb-controlled meals   60 grams/meal   [facility] appetite per nursing. Drugs:  Blood transfusion(s)   PRBCs 2//2/21   A1cs inaccurate after this date. Pain Scheduled Neurontin  Oxycodone and ultram prn Rated @0. Blood glucose pattern        Assessment and Plan   Nursing Diagnosis Risk for unstable blood glucose pattern   Nursing Intervention Domain 3215 Decision-making Support   Nursing Interventions Examined current inpatient diabetes control   Explored factors facilitating and impeding inpatient management  Explored corrective strategies with patient and responsible inpatient provider      Evaluation   This  male, with Type 2 diabetes, did achieve diabetes control prior to admission (PTA), as evidenced by admission BG of 139 and A1c of 7%. During this hospitalization, the patient has achieved inpatient blood glucose target of 100-180mg/dl via Glucostabilizer. Transitioned off of the insulin infusion 2/4/21. BGs rising last evening. BGs above target today probably due to great appetite. Would add a low dose of Lantus insulin for the weekend, until glimiperide can be advanced and metformin added back. Discussed with Tyler Centeno NP. Recommendations      Lantus insulin 14 units D     Use HIGH sensitivity corrective scale     Billing Code(s)   [] 92794 IP subsequent hospital care - 35 minutes [] 12087 Prolonged Services - 65 minutes [] 37031 Prolonged Services - 110 minutes  [x] 60899 IP subsequent hospital care - 25 minutes [] 19754 Prolonged Services - 55 minutes [] 57135 Prolonged Services - 100 minutes  [] 79775 IP subsequent hospital care - 15 minutes [] 77690 Prolonged Services - 45 minutes [] 44389 Prolonged Services - 90 minutes    Before making these care recommendations, I personally reviewed the hospitalization record, including notes, laboratory & diagnostic data and current medications, and examined the patient at the bedside (circumstances permitting) before making care recommendations.      Total minutes: 25    Ricco Neri Robinson Lay DNP, RN, ACNS-BC, BC-ADM  Diabetes Clinical Nurse Specialist  Program for Diabetes Health  Access via [employer] [employer]

## 2021-02-05 NOTE — OP NOTES
[address]  OPERATIVE REPORT    Name:  Magno Guerrero  MR#:  [id number]  :  1940  ACCOUNT #:  [de-identified]  DATE OF SERVICE:  2021    PREOPERATIVE DIAGNOSES:  1. Severe multivessel coronary artery disease. 2.  Severe venous reflux. 3.  Mild carotid artery stenosis. POSTOPERATIVE DIAGNOSES:  1. Severe multivessel coronary artery disease. 2.  Severe venous reflux. 3.  Mild carotid artery stenosis. PROCEDURE PERFORMED:  1. Coronary artery bypass grafting x3:  Reverse saphenous vein graft to obtuse marginal, reverse saphenous vein graft to posterior descending artery, left internal mammary artery to LAD. 2.  Endoscopic harvest of saphenous vein. SURGEON:  Angelia Duran MD    ASSISTANT:  Too Thorpe PA-C. The assistance of a PA was required due to the critical nature of the surgery and the experience of the PA. ANESTHESIA:  General endotracheal.    ANESTHESIOLOGIST:  Marilyn Norman MD    COMPLICATIONS:  None. SPECIMENS REMOVED:  None. IMPLANTS:  None. ESTIMATED BLOOD LOSS:  200 mL. DETAILS:  The patient is an 70-year-old gentleman with severe multivessel coronary artery disease who was referred for coronary artery bypass grafting. PROCEDURE:  He was prepped and draped in a sterile fashion. A time-out was performed. An incision was made over the sternum. A median sternotomy was made. The left internal mammary artery was dissected free from left chest wall. The left internal mammary artery was dissected free and ligated and divided, and wrapped in papaverine-soaked sponge. Heparin was administered for cardiopulmonary bypass. Simultaneously, the PA began endoscopic harvest of saphenous vein. A sternal retractor was then placed. The pericardium was opened widely and laterally. The ascending aorta was inspected with my fingers and was healthy as well as with epiaortic ultrasound, it was found to be healthy as well.   We then proceeded with aortic and right atrial cannulation as well as antegrade cardioplegic catheter. When we had a therapeutic ACT, we went on cardiopulmonary bypass. We then applied the aortic cross-clamp. I then delivered antegrade cardioplegia, we had a prompt diastolic arrest.  The physician assistant was critical in the identification, dissection, and exposure of the coronary artery targets. We turned our attention to the PDA, this was a moderate size vessel that had no issues with the anastomosis. We now turned our attention to the OM, this was a healthy place to sew and we had no issues. We finally turned our attention to the LAD. We performed a LIMA to LAD with no issues. We then performed a proximal vein graft anastomosis. We then released the aortic cross-clamp. We weaned from cardiopulmonary bypass with no support. We decannulated, we dried up our surgical sites. We gave protamine. We placed atrial and ventricular pacing wires, mediastinal chest tubes with pleural chest tube and then we closed the sternum with 8 stainless steel wires and soft tissue in multiple layers.         MD PARISA [email or web address]  D:  02/04/2021 12:47  T:  02/04/2021 20:50  JOB #:  [id number]

## 2021-02-05 NOTE — PROGRESS NOTES
Cardiology Progress Note      2/5/2021 3:05 PM    Admit Date: 2/1/2021    Admit Diagnosis: CAD (coronary artery disease) [I25.10]      Subjective:     Eduardo Suarez has no specific complaint. Bp low, tachycardia. Getting albumin.   Visit Vitals  /61   Pulse (!) 113   Temp 98.4 °F (36.9 °C)   Resp 17   Ht 6' 1\" (1.854 m)   Wt 219 lb 2.2 oz (99.4 kg)   SpO2 97%   BMI 28.91 kg/m²       Current Facility-Administered Medications   Medication Dose Route Frequency    metoprolol tartrate (LOPRESSOR) tablet 12.5 mg  12.5 mg Oral Q12H    furosemide (LASIX) tablet 40 mg  40 mg Oral DAILY    glimepiride (AMARYL) tablet 2 mg  2 mg Oral ACB    albumin human 5% (BUMINATE) solution 12.5 g  12.5 g IntraVENous PRN    gabapentin (NEURONTIN) capsule 300 mg  300 mg Oral TID    traMADoL (ULTRAM) tablet 50 mg  50 mg Oral Q6H PRN    rosuvastatin (CRESTOR) tablet 20 mg  20 mg Oral QHS    pantoprazole (PROTONIX) tablet 40 mg  40 mg Oral ACB    sodium chloride (NS) flush 5-40 mL  5-40 mL IntraVENous Q8H    sodium chloride (NS) flush 5-40 mL  5-40 mL IntraVENous PRN    ferrous sulfate tablet 325 mg  1 Tab Oral DAILY WITH BREAKFAST    tamsulosin (FLOMAX) capsule 0.4 mg  0.4 mg Oral DAILY    heparin (porcine) 4,000 Units in 0.9% sodium chloride 1,000 mL Irrigation    PRN    oxyCODONE IR (ROXICODONE) tablet 5 mg  5 mg Oral Q4H PRN    oxyCODONE IR (ROXICODONE) tablet 10 mg  10 mg Oral Q4H PRN    naloxone (NARCAN) injection 0.4 mg  0.4 mg IntraVENous PRN    mupirocin (BACTROBAN) 2 % ointment   Both Nostrils BID    ondansetron (ZOFRAN) injection 4 mg  4 mg IntraVENous Q4H PRN    albuterol (PROVENTIL VENTOLIN) nebulizer solution 2.5 mg  2.5 mg Nebulization Q4H PRN    aspirin chewable tablet 81 mg  81 mg Oral DAILY    chlorhexidine (PERIDEX) 0.12 % mouthwash 10 mL  10 mL Oral Q12H    magnesium oxide (MAG-OX) tablet 400 mg  400 mg Oral BID    bisacodyL (DULCOLAX) suppository 10 mg  10 mg Rectal DAILY PRN    senna-docusate (PERICOLACE) 8.6-50 mg per tablet 1 Tab  1 Tab Oral BID    polyethylene glycol (MIRALAX) packet 17 g  17 g Oral DAILY    glucose chewable tablet 16 g  4 Tab Oral PRN    dextrose (D50W) injection syrg 12.5-25 g  12.5-25 g IntraVENous PRN    glucagon (GLUCAGEN) injection 1 mg  1 mg IntraMUSCular PRN    insulin lispro (HUMALOG) injection   SubCUTAneous TIDAC    insulin lispro (HUMALOG) injection   SubCUTAneous AC&HS         Objective:      Physical Exam:  Visit Vitals  /68   Pulse (!) 119   Temp 98.8 °F (37.1 °C)   Resp 18   Ht 6' 1\" (1.854 m)   Wt 219 lb 2.2 oz (99.4 kg)   SpO2 98%   BMI 28.91 kg/m²     General Appearance:  Well developed, well nourished,alert and oriented x 3, and individual in no acute distress. Ears/Nose/Mouth/Throat:   Hearing grossly normal.         Neck: Supple. Chest:   Lungs clear to auscultation bilaterally. Cardiovascular:  Regular rate and rhythm, S1, S2 normal, no murmur. Abdomen:   Soft, non-tender, bowel sounds are active. Extremities: No edema bilaterally. Skin: Warm and dry.                Data Review:   Labs:    Recent Results (from the past 24 hour(s))   GLUCOSE, POC    Collection Time: 02/04/21  5:02 PM   Result Value Ref Range    Glucose (POC) 133 (H) 65 - 100 mg/dL    Performed by Gregory Dias    GLUCOSE, POC    Collection Time: 02/04/21  8:51 PM   Result Value Ref Range    Glucose (POC) 217 (H) 65 - 100 mg/dL    Performed by Cirilo Wick RN    GLUCOSE, POC    Collection Time: 02/04/21  9:53 PM   Result Value Ref Range    Glucose (POC) 211 (H) 65 - 100 mg/dL    Performed by Sina Hampton RN    MAGNESIUM    Collection Time: 02/05/21  5:44 AM   Result Value Ref Range    Magnesium 2.2 1.6 - 2.4 mg/dL   CBC W/O DIFF    Collection Time: 02/05/21  5:44 AM   Result Value Ref Range    WBC 8.6 4.1 - 11.1 K/uL    RBC 2.85 (L) 4.10 - 5.70 M/uL    HGB 8.1 (L) 12.1 - 17.0 g/dL    HCT 25.3 (L) 36.6 - 50.3 %    MCV 88.8 80.0 - 99.0 FL    MCH 28.4 26.0 - 34.0 PG    MCHC 32.0 30.0 - 36.5 g/dL    RDW 14.1 11.5 - 14.5 %    PLATELET 143 (L) 150 - 400 K/uL    MPV 11.4 8.9 - 12.9 FL    NRBC 0.0 0  WBC    ABSOLUTE NRBC 0.00 0.00 - 0.01 K/uL   METABOLIC PANEL, COMPREHENSIVE    Collection Time: 02/05/21  5:44 AM   Result Value Ref Range    Sodium 134 (L) 136 - 145 mmol/L    Potassium 4.5 3.5 - 5.1 mmol/L    Chloride 102 97 - 108 mmol/L    CO2 24 21 - 32 mmol/L    Anion gap 8 5 - 15 mmol/L    Glucose 162 (H) 65 - 100 mg/dL    BUN 26 (H) 6 - 20 MG/DL    Creatinine 1.36 (H) 0.70 - 1.30 MG/DL    BUN/Creatinine ratio 19 12 - 20      GFR est AA >60 >60 ml/min/1.73m2    GFR est non-AA 50 (L) >60 ml/min/1.73m2    Calcium 8.9 8.5 - 10.1 MG/DL    Bilirubin, total 0.5 0.2 - 1.0 MG/DL    ALT (SGPT) 14 12 - 78 U/L    AST (SGOT) 73 (H) 15 - 37 U/L    Alk. phosphatase 63 45 - 117 U/L    Protein, total 6.3 (L) 6.4 - 8.2 g/dL    Albumin 3.2 (L) 3.5 - 5.0 g/dL    Globulin 3.1 2.0 - 4.0 g/dL    A-G Ratio 1.0 (L) 1.1 - 2.2     GLUCOSE, POC    Collection Time: 02/05/21  7:36 AM   Result Value Ref Range    Glucose (POC) 185 (H) 65 - 100 mg/dL    Performed by Charisse Lai (CON)    GLUCOSE, POC    Collection Time: 02/05/21 11:23 AM   Result Value Ref Range    Glucose (POC) 229 (H) 65 - 100 mg/dL    Performed by Charisse Lai (CON)        Telemetry: normal sinus rhythm      Assessment:     Hospital Problems  Date Reviewed: 2/2/2021          Codes Class Noted POA    S/P CABG x 3 ICD-10-CM: Z95.1  ICD-9-CM: V45.81  2/2/2021 Unknown    Overview Signed 2/2/2021  4:15 PM by Armen Elizabeth, FLORECITA     CABG x 3 LIMA to LAD, RSVG to OM, RSVG to PDA  Right Endoscopic Saphenous Vein [place]             CAD (coronary artery disease) ICD-10-CM: I25.10  ICD-9-CM: 414.00  2/1/2021 Unknown        ASHD (arteriosclerotic heart disease) ICD-10-CM: I25.10  ICD-9-CM: 414.00  1/15/2021 Yes              Plan:     Resume low dose betablocker to keep Hr<100.    Has received albumin.    Discussed with  NEL.    Lisette Navas MD

## 2021-02-05 NOTE — PROGRESS NOTES
[facility]  Progress Note    Admit Date: 2021  POD:  3 Day Post-Op    Procedure:  Procedure(s):  ON PUMP CORONARY ARTERY BYPASS GRAFT (CABG) x3, EVH, CARDIOPLEGIA, INTRAOPERATIVE CARLOS DONE BY DR. Bella Gandhi. [address] DONE BY Cleve PIMENTEL. Subjective/24 Hour Summary:   Pt seen with Dr. Hendrix Res. Afebrile, on RA. In bed. No complaints. Objective:   Vitals:  Blood pressure 95/63, pulse (!) 108, temperature 99.1 °F (37.3 °C), resp. rate 16, height 6' 1\" (1.854 m), weight 219 lb 2.2 oz (99.4 kg), SpO2 96 %. Temp (24hrs), Av.4 °F (36.9 °C), Min:98.1 °F (36.7 °C), Max:99.1 °F (37.3 °C)    EKG/Rhythm:  SR with 1st degree block    CT Output: documentation unclear. Oxygen Therapy:  Oxygen Therapy  O2 Sat (%): 96 % (21 0808)  Pulse via Oximetry: 101 beats per minute (21 0630)  O2 Device: Room air (21 0430)  O2 Flow Rate (L/min): 2 l/min (21 1520)  FIO2 (%): 40 % (21 2200)    CXR:  CXR Results  (Last 48 hours)               21 0529  XR CHEST PORT Final result    Impression:  No significant interval change                Narrative:  Clinical history: postop heart   INDICATION:   postop heart   COMPARISON: 2021       FINDINGS:   AP portable upright view of the chest demonstrates a stable  cardiopericardial   silhouette. Diminished lung volumes. Pleural and mediastinal drains are   stable. Minimal atelectasis. Patient is on a cardiac monitor. 21 0549  XR CHEST PORT Final result    Impression:  Diminished left basilar atelectasis and effusion. No other significant change                    Narrative:  Clinical history: postop heart   INDICATION:   postop heart   COMPARISON: 2/3/2021       FINDINGS:   AP portable upright view of the chest demonstrates a stable  cardiopericardial   silhouette. There is no pleural effusion. .Left basilar atelectasis and   interstitial opacity slightly diminished. Pleural drainage catheters on the   left.  Mediastinal drain. .There is no pneumothorax. . Patient is on a cardiac   monitor. Admission Weight: Last Weight   Weight: 206 lb 5.6 oz (93.6 kg) Weight: 219 lb 2.2 oz (99.4 kg)     Intake / Output / Drain:  Current Shift: No intake/output data recorded. Last 24 hrs.:     Intake/Output Summary (Last 24 hours) at 2021 0952  Last data filed at 2021 0655  Gross per 24 hour   Intake 970 ml   Output 735 ml   Net 235 ml       EXAM:  General:   NAD                                                                                          Lungs:   Clear to auscultation bilaterally. Incision:  No erythema, drainage or swelling   Heart:  Regular rate and rhythm, S1, S2 normal, no murmur, click, rub or gallop. Abdomen:   Soft, non-tender. Bowel sounds normal. No masses,  No organomegaly. Extremities:  3+ LE edema. PPP. Neurologic:  Gross motor and sensory apparatus intact. Labs:   Recent Labs     21  0544 21  0345   WBC 8.6 8.1   HGB 8.1* 8.2*   HCT 25.3* 26.6*   * 129*     Recent Labs     21  0544 21  0345   * 139   K 4.5 4.2    106   CO2 24 26   BUN 26* 29*   CREA 1.36* 1.68*   * 86   CA 8.9 8.9   MG 2.2 2.3     Recent Labs     21  0544 21  0345   AP 63 54   TP 6.3* 6.2*   ALB 3.2* 3.1*   GLOB 3.1 3.1     Recent Labs     21  0345 21  1641   INR 1.2* 1.2*   PTP 12.2* 12.7*   APTT 31.9* 24.7      No results for input(s): PHI, PCO2I, PO2I, FIO2I in the last 72 hours. No results for input(s): CPK, CKMB, TROIQ, BNPP in the last 72 hours. Assessment:     Active Problems:    ASHD (arteriosclerotic heart disease) (1/15/2021)      CAD (coronary artery disease) (2021)      S/P CABG x 3 (2021)      Overview: CABG x 3 LIMA to LAD, RSVG to OM, RSVG to PDA      Right Endoscopic Saphenous Vein [place]         Plan/Recommendations/Medical Decision Makin.  CAD s/p CABG: ASA/Statin, start low dose BB.   2. HTN: On BB, HCTZ, aldactone, valsartan preop. Start low dose BB today. 3. HLD: on statin  4. DM: on metformin and amaryl PTA, transitioned off insulin gtt yesterday, will start amaryl today and continue SSI. hold on starting metformin while monitoring kidney function. Diabetes management consult. 5. LE edema/venous reflux: resume lasix today. Cont compression hose  6. Elevated Cr: Cr 1.36, around baseline, Monitor. 7. Acute post op resp insufficiency: wean NC as tolerated. TCDB. IS. Progressive ambulation  8. Acute blood loss anemia: monitor HH, chest tube output  9. Urinary retention: Cont flomax, reinsert martinez if unable to void  10. Orthostatic hypotension: Monitor with PT today. Dispo: PT/OT, Discuss with case management, will likely need rehab.      Signed By: Austin Eddy NP

## 2021-02-06 ENCOUNTER — APPOINTMENT (OUTPATIENT)
Dept: GENERAL RADIOLOGY | Age: 81
DRG: 236 | End: 2021-02-06
Attending: PHYSICIAN ASSISTANT
Payer: MEDICARE

## 2021-02-06 LAB
ANION GAP SERPL CALC-SCNC: 5 MMOL/L (ref 5–15)
BUN SERPL-MCNC: 21 MG/DL (ref 6–20)
BUN/CREAT SERPL: 19 (ref 12–20)
CALCIUM SERPL-MCNC: 9.1 MG/DL (ref 8.5–10.1)
CHLORIDE SERPL-SCNC: 104 MMOL/L (ref 97–108)
CO2 SERPL-SCNC: 26 MMOL/L (ref 21–32)
CREAT SERPL-MCNC: 1.12 MG/DL (ref 0.7–1.3)
ERYTHROCYTE [DISTWIDTH] IN BLOOD BY AUTOMATED COUNT: 13.9 % (ref 11.5–14.5)
GLUCOSE BLD STRIP.AUTO-MCNC: 191 MG/DL (ref 65–100)
GLUCOSE BLD STRIP.AUTO-MCNC: 215 MG/DL (ref 65–100)
GLUCOSE BLD STRIP.AUTO-MCNC: 245 MG/DL (ref 65–100)
GLUCOSE BLD STRIP.AUTO-MCNC: 275 MG/DL (ref 65–100)
GLUCOSE SERPL-MCNC: 158 MG/DL (ref 65–100)
HCT VFR BLD AUTO: 26.4 % (ref 36.6–50.3)
HGB BLD-MCNC: 8.5 G/DL (ref 12.1–17)
MAGNESIUM SERPL-MCNC: 2.2 MG/DL (ref 1.6–2.4)
MCH RBC QN AUTO: 28.2 PG (ref 26–34)
MCHC RBC AUTO-ENTMCNC: 32.2 G/DL (ref 30–36.5)
MCV RBC AUTO: 87.7 FL (ref 80–99)
NRBC # BLD: 0 K/UL (ref 0–0.01)
NRBC BLD-RTO: 0 PER 100 WBC
PLATELET # BLD AUTO: 182 K/UL (ref 150–400)
PMV BLD AUTO: 10.7 FL (ref 8.9–12.9)
POTASSIUM SERPL-SCNC: 4 MMOL/L (ref 3.5–5.1)
RBC # BLD AUTO: 3.01 M/UL (ref 4.1–5.7)
SARS-COV-2, COV2: NORMAL
SERVICE CMNT-IMP: ABNORMAL
SODIUM SERPL-SCNC: 135 MMOL/L (ref 136–145)
WBC # BLD AUTO: 11.5 K/UL (ref 4.1–11.1)

## 2021-02-06 PROCEDURE — 97110 THERAPEUTIC EXERCISES: CPT

## 2021-02-06 PROCEDURE — 83735 ASSAY OF MAGNESIUM: CPT

## 2021-02-06 PROCEDURE — 80048 BASIC METABOLIC PNL TOTAL CA: CPT

## 2021-02-06 PROCEDURE — 74011636637 HC RX REV CODE- 636/637: Performed by: NURSE PRACTITIONER

## 2021-02-06 PROCEDURE — 99232 SBSQ HOSP IP/OBS MODERATE 35: CPT | Performed by: INTERNAL MEDICINE

## 2021-02-06 PROCEDURE — 74011250637 HC RX REV CODE- 250/637: Performed by: THORACIC SURGERY (CARDIOTHORACIC VASCULAR SURGERY)

## 2021-02-06 PROCEDURE — 74011250637 HC RX REV CODE- 250/637: Performed by: NURSE PRACTITIONER

## 2021-02-06 PROCEDURE — 82962 GLUCOSE BLOOD TEST: CPT

## 2021-02-06 PROCEDURE — 74011250637 HC RX REV CODE- 250/637: Performed by: PHYSICIAN ASSISTANT

## 2021-02-06 PROCEDURE — 74011000250 HC RX REV CODE- 250: Performed by: PHYSICIAN ASSISTANT

## 2021-02-06 PROCEDURE — 85027 COMPLETE CBC AUTOMATED: CPT

## 2021-02-06 PROCEDURE — 36415 COLL VENOUS BLD VENIPUNCTURE: CPT

## 2021-02-06 PROCEDURE — 71045 X-RAY EXAM CHEST 1 VIEW: CPT

## 2021-02-06 PROCEDURE — U0005 INFEC AGEN DETEC AMPLI PROBE: HCPCS

## 2021-02-06 PROCEDURE — 65660000000 HC RM CCU STEPDOWN

## 2021-02-06 RX ORDER — ACETAMINOPHEN 325 MG/1
650 TABLET ORAL
Status: DISCONTINUED | OUTPATIENT
Start: 2021-02-06 | End: 2021-02-08 | Stop reason: HOSPADM

## 2021-02-06 RX ADMIN — DOCUSATE SODIUM - SENNOSIDES 1 TABLET: 50; 8.6 TABLET, FILM COATED ORAL at 09:47

## 2021-02-06 RX ADMIN — MUPIROCIN: 20 OINTMENT TOPICAL at 09:48

## 2021-02-06 RX ADMIN — Medication 10 ML: at 21:49

## 2021-02-06 RX ADMIN — MAGNESIUM OXIDE TAB 400 MG (241.3 MG ELEMENTAL MG) 400 MG: 400 (241.3 MG) TAB at 18:02

## 2021-02-06 RX ADMIN — INSULIN LISPRO 1 UNITS: 100 INJECTION, SOLUTION INTRAVENOUS; SUBCUTANEOUS at 21:46

## 2021-02-06 RX ADMIN — POLYETHYLENE GLYCOL 3350 17 G: 17 POWDER, FOR SOLUTION ORAL at 09:46

## 2021-02-06 RX ADMIN — Medication 10 ML: at 06:00

## 2021-02-06 RX ADMIN — PANTOPRAZOLE SODIUM 40 MG: 40 TABLET, DELAYED RELEASE ORAL at 09:47

## 2021-02-06 RX ADMIN — Medication 10 ML: at 15:48

## 2021-02-06 RX ADMIN — INSULIN LISPRO 5 UNITS: 100 INJECTION, SOLUTION INTRAVENOUS; SUBCUTANEOUS at 12:29

## 2021-02-06 RX ADMIN — FUROSEMIDE 40 MG: 40 TABLET ORAL at 09:47

## 2021-02-06 RX ADMIN — CHLORHEXIDINE GLUCONATE 0.12% ORAL RINSE 10 ML: 1.2 LIQUID ORAL at 09:46

## 2021-02-06 RX ADMIN — FERROUS SULFATE TAB 325 MG (65 MG ELEMENTAL FE) 325 MG: 325 (65 FE) TAB at 09:47

## 2021-02-06 RX ADMIN — GLIMEPIRIDE 2 MG: 1 TABLET ORAL at 09:48

## 2021-02-06 RX ADMIN — GABAPENTIN 300 MG: 300 CAPSULE ORAL at 09:47

## 2021-02-06 RX ADMIN — ACETAMINOPHEN 650 MG: 325 TABLET ORAL at 10:44

## 2021-02-06 RX ADMIN — DOCUSATE SODIUM - SENNOSIDES 1 TABLET: 50; 8.6 TABLET, FILM COATED ORAL at 18:02

## 2021-02-06 RX ADMIN — ROSUVASTATIN 20 MG: 20 TABLET, FILM COATED ORAL at 21:47

## 2021-02-06 RX ADMIN — ASPIRIN 81 MG: 81 TABLET, CHEWABLE ORAL at 09:47

## 2021-02-06 RX ADMIN — METOPROLOL TARTRATE 12.5 MG: 25 TABLET, FILM COATED ORAL at 09:47

## 2021-02-06 RX ADMIN — INSULIN LISPRO 2 UNITS: 100 INJECTION, SOLUTION INTRAVENOUS; SUBCUTANEOUS at 18:02

## 2021-02-06 RX ADMIN — TAMSULOSIN HYDROCHLORIDE 0.4 MG: 0.4 CAPSULE ORAL at 09:47

## 2021-02-06 RX ADMIN — MAGNESIUM OXIDE TAB 400 MG (241.3 MG ELEMENTAL MG) 400 MG: 400 (241.3 MG) TAB at 09:47

## 2021-02-06 RX ADMIN — INSULIN GLARGINE 14 UNITS: 100 INJECTION, SOLUTION SUBCUTANEOUS at 21:46

## 2021-02-06 RX ADMIN — GABAPENTIN 300 MG: 300 CAPSULE ORAL at 21:47

## 2021-02-06 RX ADMIN — GABAPENTIN 300 MG: 300 CAPSULE ORAL at 15:49

## 2021-02-06 RX ADMIN — MUPIROCIN: 20 OINTMENT TOPICAL at 21:49

## 2021-02-06 RX ADMIN — CHLORHEXIDINE GLUCONATE 0.12% ORAL RINSE 10 ML: 1.2 LIQUID ORAL at 21:48

## 2021-02-06 NOTE — ROUTINE PROCESS
End of Shift Note Bedside shift change report given to Alan  (oncoming nurse) by Dennie Sally (offgoing nurse). Report included the following information SBAR, Kardex, Intake/Output, MAR, Recent Results, Med Rec Status, Cardiac Rhythm NSR and Alarm Parameters Shift worked: 7p-7a Shift summary and any significant changes:  
 Uneventful night. Patient stable. Covid swab done as per orders. [facility]. Soft BP Concerns for physician to address: Soft BP Zone phone for oncoming shift:    
  
 
Activity: 
Activity Level: Up with Assistance Number times ambulated in hallways past shift: 0 Number of times OOB to chair past shift: 0 Cardiac:  
Cardiac Monitoring: Yes     
Cardiac Rhythm: Sinus tachycardia Access:  
Current line(s): PIV Genitourinary:  
Urinary status: voiding Respiratory:  
O2 Device: Room air Chronic home O2 use?: NO Incentive spirometer at bedside: NO Actual Volume (ml): 750 ml GI: 
Last Bowel Movement Date: 02/01/21 Current diet:  DIET CARDIAC Regular Passing flatus: YES Tolerating current diet: YES 
% Diet Eaten: 100 % Pain Management:  
Patient states pain is manageable on current regimen: YES Skin: 
Romulo Score: 16 Interventions: turn team and increase time out of bed Patient Safety: 
Fall Score: Total Score: 4 Interventions: bed/chair alarm and pt to call before getting OOB High Fall Risk: Yes Length of Stay: 
Expected LOS: 6d 0h 
Actual LOS: 5 Dennie Sally

## 2021-02-06 NOTE — PROGRESS NOTES
Problem: Mobility Impaired (Adult and Pediatric)  Goal: *Acute Goals and Plan of Care (Insert Text)  Description: FUNCTIONAL STATUS PRIOR TO ADMISSION: Patient was independent without use of DME. Patient with history of CVA affecting L hand. HOME SUPPORT PRIOR TO ADMISSION: The patient lived with his wife. Physical Therapy Goals  Initiated 2/3/2021  1. Patient will move from supine to sit and sit to supine , scoot up and down, and roll side to side in bed with independence within 5 days. 2.  Patient will perform sit to/from stand with modified independence within 5 days. 3.  Patient will ambulate 250 feet with least restrictive assistive device and modified independence within 5 days. 4.  Patient will ascend/descend 8 stairs with one sided handrail(s) with modified independence within 5 days. 5.  Patient will perform cardiac exercises per protocol with modified independence within 5 days. 6.  Patient will verbally recall and functionally demonstrate mindful-based movements (\"move in the tube\") principles without cues within 5 days. Outcome: Progressing Towards Goal    PHYSICAL THERAPY TREATMENT  Patient: Chas Shaikh [de-identified[age] y.o. male)  Date: 2/6/2021  Diagnosis: CAD (coronary artery disease) [I25.10] <principal problem not specified>  Procedure(s) (LRB):  ON PUMP CORONARY ARTERY BYPASS GRAFT (CABG) x3, EVH, CARDIOPLEGIA, INTRAOPERATIVE CARLOS DONE BY DR. Joshua Nugent. EVH DONE BY MARIMAR PIMENTEL. (N/A) 4 Days Post-Op  Precautions: Sternal(move in the tube)  Chart, physical therapy assessment, plan of care and goals were reviewed. ASSESSMENT  Patient continues with skilled PT services and is progressing towards goals. Pt was received sitting up in the chair and cleared by nursing to mobilize. Pt needed assistance with exercises as well as transfers and scooting. Speech very difficulty to understand. BP soft in the 69O systolic but improved with movement. He was left sitting up in the chair.   Provided handouts for visual input of exercises and precautions. Patient is not verbalizing and is not demonstrating understanding of mindful-based movements (\"move in the tube\") principles of keeping UEs proximal to ribcage to prevent lateral pull on the sternum during load-bearing activities with visual, verbal, and manual cues required for compliance. Current Level of Function Impacting Discharge (mobility/balance): max A    Other factors to consider for discharge:          PLAN :  Patient continues to benefit from skilled intervention to address the above impairments. Continue treatment per established plan of care. to address goals. Recommendation for discharge: (in order for the patient to meet his/her long term goals)  Therapy 3 hours per day 5-7 days per week    This discharge recommendation:  Has been made in collaboration with the attending provider and/or case management    IF patient discharges home will need the following DME: to be determined (TBD)       SUBJECTIVE:   Patient stated my blood pressure was low last night.     OBJECTIVE DATA SUMMARY:   Patient mobilized on continuous portable monitor/telemetry. Critical Behavior:  Neurologic State: Alert  Orientation Level: Oriented X4  Cognition: Follows commands  Safety/Judgement: Awareness of environment, Decreased insight into deficits, Decreased awareness of need for safety, Fall prevention    Functional Mobility Training:  Bed Mobility:           Scooting: Moderate assistance          Transfers:  Sit to Stand: Maximum assistance; Total assistance  Stand to Sit: Maximum assistance                               Balance:  Sitting: Impaired  Sitting - Static: Fair (occasional)  Sitting - Dynamic: Fair (occasional); Supported sitting         Cardiac diagnosis intervention:  Patient instructed and educated on mindful movement principles based on Move in The Tube concept to include maintaining bilateral elbows close to rib cage when performing any load-bearing activity such as getting in/out of bed, pushing up from a chair, opening a door, or lifting a box. Patient was given a handout with diagrams of each correct/incorrect method of performing each of the above tasks. Therapeutic Exercises:   Patient instructed on the benefits and demonstrated cardiac exercises while sitting with Minimum assistance. Instructed and indicated understanding on how to progress reps, sets against gravity, pacing through progressive muscle strengthening standing based on surgeon clearance for more weight in prep for basic and instrumental ADLs. Instruction on the use of household items in place of weights as needed.      CARDIAC  EXERCISE   Sets   Reps   Active Active Assist   Passive Self ROM   Comments   Shoulder flexion 1 5 [x]                                            []                                            []                                            []                                               Shoulder abduction 1      5 [x]                                            []                                            []                                            []                                               Scapular elevation 1 5 [x]                                            []                                            []                                            []                                               Scapular retraction 1 5 [x]                                            []                                            []                                            []                                               Trunk rotation 1 5 [x]                                            []                                            []                                            []                                               Trunk sidebending 1 5 [x]                                            []                                            [] []                                                  []                                            []                                            []                                            []                                                   Pain Rating:  No major complaints    Activity Tolerance:   Fair    After treatment patient left in no apparent distress:   Sitting in chair and Call bell within reach    COMMUNICATION/COLLABORATION:   The patients plan of care was discussed with: Registered nurse.      Kye Arnold, PT, DPT   Time Calculation: 15 mins

## 2021-02-06 NOTE — PROGRESS NOTES
Cardiology Progress Note      2/6/2021 11:03 AM    Admit Date: 2/1/2021    Admit Diagnosis: CAD (coronary artery disease) [I25.10]      Subjective:     Windell Gabo c/o leg swelling ( chronic). Tolerating metoprolol. HR better.     Visit Vitals  /67 (BP 1 Location: Left upper arm, BP Patient Position: At rest;Supine)   Pulse (!) 102   Temp 99.6 °F (37.6 °C)   Resp 20   Ht 6' 1\" (1.854 m)   Wt 212 lb 4.9 oz (96.3 kg)   SpO2 98%   BMI 28.01 kg/m²       Current Facility-Administered Medications   Medication Dose Route Frequency    acetaminophen (TYLENOL) tablet 650 mg  650 mg Oral Q6H PRN    furosemide (LASIX) tablet 40 mg  40 mg Oral DAILY    glimepiride (AMARYL) tablet 2 mg  2 mg Oral ACB    metoprolol tartrate (LOPRESSOR) tablet 12.5 mg  12.5 mg Oral Q12H    insulin glargine (LANTUS) injection 14 Units  14 Units SubCUTAneous QHS    insulin lispro (HUMALOG) injection   SubCUTAneous AC&HS    glucose chewable tablet 16 g  4 Tab Oral PRN    dextrose (D50W) injection syrg 12.5-25 g  12.5-25 g IntraVENous PRN    glucagon (GLUCAGEN) injection 1 mg  1 mg IntraMUSCular PRN    albumin human 5% (BUMINATE) solution 12.5 g  12.5 g IntraVENous PRN    gabapentin (NEURONTIN) capsule 300 mg  300 mg Oral TID    traMADoL (ULTRAM) tablet 50 mg  50 mg Oral Q6H PRN    rosuvastatin (CRESTOR) tablet 20 mg  20 mg Oral QHS    pantoprazole (PROTONIX) tablet 40 mg  40 mg Oral ACB    sodium chloride (NS) flush 5-40 mL  5-40 mL IntraVENous Q8H    sodium chloride (NS) flush 5-40 mL  5-40 mL IntraVENous PRN    ferrous sulfate tablet 325 mg  1 Tab Oral DAILY WITH BREAKFAST    tamsulosin (FLOMAX) capsule 0.4 mg  0.4 mg Oral DAILY    heparin (porcine) 4,000 Units in 0.9% sodium chloride 1,000 mL Irrigation    PRN    oxyCODONE IR (ROXICODONE) tablet 5 mg  5 mg Oral Q4H PRN    oxyCODONE IR (ROXICODONE) tablet 10 mg  10 mg Oral Q4H PRN    naloxone (NARCAN) injection 0.4 mg  0.4 mg IntraVENous PRN    mupirocin (BACTROBAN) 2 % ointment   Both Nostrils BID    ondansetron (ZOFRAN) injection 4 mg  4 mg IntraVENous Q4H PRN    albuterol (PROVENTIL VENTOLIN) nebulizer solution 2.5 mg  2.5 mg Nebulization Q4H PRN    aspirin chewable tablet 81 mg  81 mg Oral DAILY    chlorhexidine (PERIDEX) 0.12 % mouthwash 10 mL  10 mL Oral Q12H    magnesium oxide (MAG-OX) tablet 400 mg  400 mg Oral BID    bisacodyL (DULCOLAX) suppository 10 mg  10 mg Rectal DAILY PRN    senna-docusate (PERICOLACE) 8.6-50 mg per tablet 1 Tab  1 Tab Oral BID    polyethylene glycol (MIRALAX) packet 17 g  17 g Oral DAILY    glucose chewable tablet 16 g  4 Tab Oral PRN    dextrose (D50W) injection syrg 12.5-25 g  12.5-25 g IntraVENous PRN    glucagon (GLUCAGEN) injection 1 mg  1 mg IntraMUSCular PRN         Objective:      Physical Exam:  Visit Vitals  /67 (BP 1 Location: Left upper arm, BP Patient Position: At rest;Supine)   Pulse (!) 102   Temp 99.6 °F (37.6 °C)   Resp 20   Ht 6' 1\" (1.854 m)   Wt 212 lb 4.9 oz (96.3 kg)   SpO2 98%   BMI 28.01 kg/m²     General Appearance:  Well developed, well nourished,alert and oriented x 3, and individual in no acute distress. Ears/Nose/Mouth/Throat:   Hearing grossly normal.         Neck: Supple. Chest:   Lungs clear to auscultation bilaterally. Cardiovascular:  Regular rate and rhythm, S1, S2 normal, no murmur. Abdomen:   Soft, non-tender, bowel sounds are active. Extremities: No edema bilaterally. Skin: Warm and dry.                Data Review:   Labs:    Recent Results (from the past 24 hour(s))   GLUCOSE, POC    Collection Time: 02/05/21 11:23 AM   Result Value Ref Range    Glucose (POC) 229 (H) 65 - 100 mg/dL    Performed by Uri UNDERWOOD)    ECHO ADULT FOLLOW-UP OR LIMITED    Collection Time: 02/05/21  3:55 PM   Result Value Ref Range    IVSd 1.48 (A) 0.6 - 1.0 cm    LVIDd 3.97 (A) 4.2 - 5.9 cm    LVIDs 2.91 cm    LVPWd 1.37 (A) 0.6 - 1.0 cm    LV Ejection Fraction MOD 4C 51 percent    LV ED Vol A4C 54.89 mL    LV ES Vol A4C 26.94 mL    LV Mass .5 88 - 224 g    LV Mass AL Index 95.0 49 - 115 g/m2    LVED Vol Index A4C 24.5 mL/m2    LVES Vol Index A4C 12.0 mL/m2   GLUCOSE, POC    Collection Time: 02/05/21  4:47 PM   Result Value Ref Range    Glucose (POC) 188 (H) 65 - 100 mg/dL    Performed by Antonina Stearns    GLUCOSE, POC    Collection Time: 02/05/21  8:44 PM   Result Value Ref Range    Glucose (POC) 215 (H) 65 - 100 mg/dL    Performed by Verónica Bearden RN    SARS-COV-2    Collection Time: 02/06/21  4:39 AM   Result Value Ref Range    SARS-CoV-2 Please find results under separate order     METABOLIC PANEL, BASIC    Collection Time: 02/06/21  4:39 AM   Result Value Ref Range    Sodium 135 (L) 136 - 145 mmol/L    Potassium 4.0 3.5 - 5.1 mmol/L    Chloride 104 97 - 108 mmol/L    CO2 26 21 - 32 mmol/L    Anion gap 5 5 - 15 mmol/L    Glucose 158 (H) 65 - 100 mg/dL    BUN 21 (H) 6 - 20 MG/DL    Creatinine 1.12 0.70 - 1.30 MG/DL    BUN/Creatinine ratio 19 12 - 20      GFR est AA >60 >60 ml/min/1.73m2    GFR est non-AA >60 >60 ml/min/1.73m2    Calcium 9.1 8.5 - 10.1 MG/DL   MAGNESIUM    Collection Time: 02/06/21  4:39 AM   Result Value Ref Range    Magnesium 2.2 1.6 - 2.4 mg/dL   CBC W/O DIFF    Collection Time: 02/06/21  4:39 AM   Result Value Ref Range    WBC 11.5 (H) 4.1 - 11.1 K/uL    RBC 3.01 (L) 4.10 - 5.70 M/uL    HGB 8.5 (L) 12.1 - 17.0 g/dL    HCT 26.4 (L) 36.6 - 50.3 %    MCV 87.7 80.0 - 99.0 FL    MCH 28.2 26.0 - 34.0 PG    MCHC 32.2 30.0 - 36.5 g/dL    RDW 13.9 11.5 - 14.5 %    PLATELET 858 202 - 555 K/uL    MPV 10.7 8.9 - 12.9 FL    NRBC 0.0 0  WBC    ABSOLUTE NRBC 0.00 0.00 - 0.01 K/uL   GLUCOSE, POC    Collection Time: 02/06/21  8:21 AM   Result Value Ref Range    Glucose (POC) 191 (H) 65 - 100 mg/dL    Performed by Damir Jacobs PCT        Telemetry: normal sinus rhythm      Assessment:     Hospital Problems  Date Reviewed: 2/2/2021          Codes Class Noted POA    S/P CABG x 3 ICD-10-CM: Z95.1  ICD-9-CM: V45.81  2/2/2021 Unknown    Overview Signed 2/2/2021  4:15 PM by Gilberto Rolle PA-C     CABG x 3 LIMA to LAD, RSVG to OM, RSVG to PDA  Right Endoscopic Saphenous Vein [place]             CAD (coronary artery disease) ICD-10-CM: I25.10  ICD-9-CM: 414.00  2/1/2021 Unknown        ASHD (arteriosclerotic heart disease) ICD-10-CM: I25.10  ICD-9-CM: 414.00  1/15/2021 Yes              Plan:     Continue metoprolol. Incentive spirometry.     Joni Sabillon MD

## 2021-02-06 NOTE — PROGRESS NOTES
0730 Bedside shift change report given to Riccardo (oncoming nurse) by Mikhail Leo (offgoing nurse). Report included the following information SBAR.     4077 Low grade temp during shift. Dr. Noemi Timmons notified, see new order. End of Shift Note    Bedside shift change report given to Mikhail Leo (oncoming nurse) by Yasemin Apple RN (offgoing nurse). Report included the following information SBAR    Shift worked:  7a-7p     Shift summary and any significant changes:     no     Concerns for physician to address:  no     Zone phone for oncoming shift:   [phone]       Activity:  Activity Level: Up with Assistance  Number times ambulated in hallways past shift: 0  Number of times OOB to chair past shift: 1    Cardiac:   Cardiac Monitoring: Yes      Cardiac Rhythm: Sinus tachycardia    Access:   Current line(s): PIV     Genitourinary:   Urinary status: voiding    Respiratory:   O2 Device: Room air  Chronic home O2 use?: N/A  Incentive spirometer at bedside: YES  Actual Volume (ml): 700 ml  GI:  Last Bowel Movement Date: 02/01/21  Current diet:  DIET CARDIAC Regular  Passing flatus: YES  Tolerating current diet: YES  % Diet Eaten: 100 %    Pain Management:   Patient states pain is manageable on current regimen: YES    Skin:  Romulo Score: 18  Interventions: float heels    Patient Safety:  Fall Score:  Total Score: 4  Interventions: bed/chair alarm and gripper socks  High Fall Risk: Yes    Length of Stay:  Expected LOS: 6d 0h  Actual LOS: Germain Prieto RN

## 2021-02-06 NOTE — PROGRESS NOTES
[facility] FLOOR Progress Note    Admit Date: 2021  POD: 4 Days Post-Op      Procedure:  Procedure(s):  ON PUMP CORONARY ARTERY BYPASS GRAFT (CABG) x3, EVH, CARDIOPLEGIA, INTRAOPERATIVE CARLOS DONE BY DR. Gene Kiser. [address] DONE BY Rob PIMENTEL. Subjective:     LE edema persists - lasix; working on rehab; kidney function improving     Objective:     Blood pressure 95/61, pulse (!) 105, temperature 98.3 °F (36.8 °C), resp. rate 18, height 6' 1\" (1.854 m), weight 212 lb 4.9 oz (96.3 kg), SpO2 97 %. Temp (24hrs), Av.5 °F (36.9 °C), Min:98 °F (36.7 °C), Max:99.1 °F (37.3 °C)        Oxygen:    CXR    Medications reviewed    Admission Weight: Last Weight   Weight: 206 lb 5.6 oz (93.6 kg) Weight: 212 lb 4.9 oz (96.3 kg)     Intake / Output / Drain:  Current Shift: No intake/output data recorded. Last 24 hrs.:  1901 -  0700  In: 1150 [P.O.:900;  I.V.:250]  Out: 1610 [Urine:1550; Drains:60]    EXAM:  Visit Vitals  BP 95/61   Pulse (!) 105   Temp 98.3 °F (36.8 °C)   Resp 18   Ht 6' 1\" (1.854 m)   Wt 212 lb 4.9 oz (96.3 kg)   SpO2 97%   BMI 28.01 kg/m²                           Labs:  Recent Results (from the past 24 hour(s))   GLUCOSE, POC    Collection Time: 21 11:23 AM   Result Value Ref Range    Glucose (POC) 229 (H) 65 - 100 mg/dL    Performed by Agustin Dakins (CON)    ECHO ADULT FOLLOW-UP OR LIMITED    Collection Time: 21  3:55 PM   Result Value Ref Range    IVSd 1.48 (A) 0.6 - 1.0 cm    LVIDd 3.97 (A) 4.2 - 5.9 cm    LVIDs 2.91 cm    LVPWd 1.37 (A) 0.6 - 1.0 cm    LV Ejection Fraction MOD 4C 51 percent    LV ED Vol A4C 54.89 mL    LV ES Vol A4C 26.94 mL    LV Mass .5 88 - 224 g    LV Mass AL Index 95.0 49 - 115 g/m2    LVED Vol Index A4C 24.5 mL/m2    LVES Vol Index A4C 12.0 mL/m2   GLUCOSE, POC    Collection Time: 21  4:47 PM   Result Value Ref Range    Glucose (POC) 188 (H) 65 - 100 mg/dL    Performed by Fady Infante    GLUCOSE, POC    Collection Time: 21  8:44 PM   Result Value Ref Range    Glucose (POC) 215 (H) 65 - 100 mg/dL    Performed by Lolita Lynch RN    SARS-COV-2    Collection Time: 02/06/21  4:39 AM   Result Value Ref Range    SARS-CoV-2 Please find results under separate order     METABOLIC PANEL, BASIC    Collection Time: 02/06/21  4:39 AM   Result Value Ref Range    Sodium 135 (L) 136 - 145 mmol/L    Potassium 4.0 3.5 - 5.1 mmol/L    Chloride 104 97 - 108 mmol/L    CO2 26 21 - 32 mmol/L    Anion gap 5 5 - 15 mmol/L    Glucose 158 (H) 65 - 100 mg/dL    BUN 21 (H) 6 - 20 MG/DL    Creatinine 1.12 0.70 - 1.30 MG/DL    BUN/Creatinine ratio 19 12 - 20      GFR est AA >60 >60 ml/min/1.73m2    GFR est non-AA >60 >60 ml/min/1.73m2    Calcium 9.1 8.5 - 10.1 MG/DL   MAGNESIUM    Collection Time: 02/06/21  4:39 AM   Result Value Ref Range    Magnesium 2.2 1.6 - 2.4 mg/dL   CBC W/O DIFF    Collection Time: 02/06/21  4:39 AM   Result Value Ref Range    WBC 11.5 (H) 4.1 - 11.1 K/uL    RBC 3.01 (L) 4.10 - 5.70 M/uL    HGB 8.5 (L) 12.1 - 17.0 g/dL    HCT 26.4 (L) 36.6 - 50.3 %    MCV 87.7 80.0 - 99.0 FL    MCH 28.2 26.0 - 34.0 PG    MCHC 32.2 30.0 - 36.5 g/dL    RDW 13.9 11.5 - 14.5 %    PLATELET 847 280 - 817 K/uL    MPV 10.7 8.9 - 12.9 FL    NRBC 0.0 0  WBC    ABSOLUTE NRBC 0.00 0.00 - 0.01 K/uL

## 2021-02-07 ENCOUNTER — APPOINTMENT (OUTPATIENT)
Dept: GENERAL RADIOLOGY | Age: 81
DRG: 236 | End: 2021-02-07
Attending: PHYSICIAN ASSISTANT
Payer: MEDICARE

## 2021-02-07 LAB
ANION GAP SERPL CALC-SCNC: 7 MMOL/L (ref 5–15)
BUN SERPL-MCNC: 26 MG/DL (ref 6–20)
BUN/CREAT SERPL: 21 (ref 12–20)
CALCIUM SERPL-MCNC: 9.2 MG/DL (ref 8.5–10.1)
CHLORIDE SERPL-SCNC: 103 MMOL/L (ref 97–108)
CO2 SERPL-SCNC: 26 MMOL/L (ref 21–32)
CREAT SERPL-MCNC: 1.26 MG/DL (ref 0.7–1.3)
ERYTHROCYTE [DISTWIDTH] IN BLOOD BY AUTOMATED COUNT: 14 % (ref 11.5–14.5)
GLUCOSE BLD STRIP.AUTO-MCNC: 196 MG/DL (ref 65–100)
GLUCOSE BLD STRIP.AUTO-MCNC: 199 MG/DL (ref 65–100)
GLUCOSE BLD STRIP.AUTO-MCNC: 222 MG/DL (ref 65–100)
GLUCOSE BLD STRIP.AUTO-MCNC: 236 MG/DL (ref 65–100)
GLUCOSE SERPL-MCNC: 190 MG/DL (ref 65–100)
HCT VFR BLD AUTO: 29.9 % (ref 36.6–50.3)
HGB BLD-MCNC: 9.4 G/DL (ref 12.1–17)
MAGNESIUM SERPL-MCNC: 2.1 MG/DL (ref 1.6–2.4)
MCH RBC QN AUTO: 28.1 PG (ref 26–34)
MCHC RBC AUTO-ENTMCNC: 31.4 G/DL (ref 30–36.5)
MCV RBC AUTO: 89.3 FL (ref 80–99)
NRBC # BLD: 0 K/UL (ref 0–0.01)
NRBC BLD-RTO: 0 PER 100 WBC
PLATELET # BLD AUTO: 222 K/UL (ref 150–400)
PMV BLD AUTO: 10.5 FL (ref 8.9–12.9)
POTASSIUM SERPL-SCNC: 4.2 MMOL/L (ref 3.5–5.1)
RBC # BLD AUTO: 3.35 M/UL (ref 4.1–5.7)
SARS-COV-2, COV2: NOT DETECTED
SERVICE CMNT-IMP: ABNORMAL
SODIUM SERPL-SCNC: 136 MMOL/L (ref 136–145)
SPECIMEN SOURCE, FCOV2M: NORMAL
WBC # BLD AUTO: 12.3 K/UL (ref 4.1–11.1)

## 2021-02-07 PROCEDURE — 74011250637 HC RX REV CODE- 250/637: Performed by: PHYSICIAN ASSISTANT

## 2021-02-07 PROCEDURE — 74011000250 HC RX REV CODE- 250: Performed by: PHYSICIAN ASSISTANT

## 2021-02-07 PROCEDURE — 74011250637 HC RX REV CODE- 250/637: Performed by: THORACIC SURGERY (CARDIOTHORACIC VASCULAR SURGERY)

## 2021-02-07 PROCEDURE — 74011250637 HC RX REV CODE- 250/637: Performed by: NURSE PRACTITIONER

## 2021-02-07 PROCEDURE — 83735 ASSAY OF MAGNESIUM: CPT

## 2021-02-07 PROCEDURE — 36415 COLL VENOUS BLD VENIPUNCTURE: CPT

## 2021-02-07 PROCEDURE — 85027 COMPLETE CBC AUTOMATED: CPT

## 2021-02-07 PROCEDURE — 65660000000 HC RM CCU STEPDOWN

## 2021-02-07 PROCEDURE — 97116 GAIT TRAINING THERAPY: CPT

## 2021-02-07 PROCEDURE — 80048 BASIC METABOLIC PNL TOTAL CA: CPT

## 2021-02-07 PROCEDURE — 97110 THERAPEUTIC EXERCISES: CPT

## 2021-02-07 PROCEDURE — 99232 SBSQ HOSP IP/OBS MODERATE 35: CPT | Performed by: INTERNAL MEDICINE

## 2021-02-07 PROCEDURE — 74011636637 HC RX REV CODE- 636/637: Performed by: NURSE PRACTITIONER

## 2021-02-07 PROCEDURE — 82962 GLUCOSE BLOOD TEST: CPT

## 2021-02-07 PROCEDURE — 71045 X-RAY EXAM CHEST 1 VIEW: CPT

## 2021-02-07 RX ADMIN — PANTOPRAZOLE SODIUM 40 MG: 40 TABLET, DELAYED RELEASE ORAL at 09:42

## 2021-02-07 RX ADMIN — FUROSEMIDE 40 MG: 40 TABLET ORAL at 09:47

## 2021-02-07 RX ADMIN — MUPIROCIN: 20 OINTMENT TOPICAL at 09:43

## 2021-02-07 RX ADMIN — DOCUSATE SODIUM - SENNOSIDES 1 TABLET: 50; 8.6 TABLET, FILM COATED ORAL at 17:03

## 2021-02-07 RX ADMIN — MAGNESIUM OXIDE TAB 400 MG (241.3 MG ELEMENTAL MG) 400 MG: 400 (241.3 MG) TAB at 18:00

## 2021-02-07 RX ADMIN — GABAPENTIN 300 MG: 300 CAPSULE ORAL at 17:03

## 2021-02-07 RX ADMIN — CHLORHEXIDINE GLUCONATE 0.12% ORAL RINSE 10 ML: 1.2 LIQUID ORAL at 09:42

## 2021-02-07 RX ADMIN — METOPROLOL TARTRATE 12.5 MG: 25 TABLET, FILM COATED ORAL at 21:41

## 2021-02-07 RX ADMIN — TAMSULOSIN HYDROCHLORIDE 0.4 MG: 0.4 CAPSULE ORAL at 09:42

## 2021-02-07 RX ADMIN — GABAPENTIN 300 MG: 300 CAPSULE ORAL at 09:41

## 2021-02-07 RX ADMIN — INSULIN LISPRO 2 UNITS: 100 INJECTION, SOLUTION INTRAVENOUS; SUBCUTANEOUS at 17:03

## 2021-02-07 RX ADMIN — DOCUSATE SODIUM - SENNOSIDES 1 TABLET: 50; 8.6 TABLET, FILM COATED ORAL at 09:42

## 2021-02-07 RX ADMIN — GLIMEPIRIDE 2 MG: 1 TABLET ORAL at 09:42

## 2021-02-07 RX ADMIN — CHLORHEXIDINE GLUCONATE 0.12% ORAL RINSE 10 ML: 1.2 LIQUID ORAL at 21:40

## 2021-02-07 RX ADMIN — METOPROLOL TARTRATE 12.5 MG: 25 TABLET, FILM COATED ORAL at 09:41

## 2021-02-07 RX ADMIN — GABAPENTIN 300 MG: 300 CAPSULE ORAL at 21:40

## 2021-02-07 RX ADMIN — Medication 10 ML: at 21:42

## 2021-02-07 RX ADMIN — MAGNESIUM OXIDE TAB 400 MG (241.3 MG ELEMENTAL MG) 400 MG: 400 (241.3 MG) TAB at 09:47

## 2021-02-07 RX ADMIN — INSULIN GLARGINE 14 UNITS: 100 INJECTION, SOLUTION SUBCUTANEOUS at 21:41

## 2021-02-07 RX ADMIN — ROSUVASTATIN 20 MG: 20 TABLET, FILM COATED ORAL at 21:40

## 2021-02-07 RX ADMIN — INSULIN LISPRO 1 UNITS: 100 INJECTION, SOLUTION INTRAVENOUS; SUBCUTANEOUS at 21:41

## 2021-02-07 RX ADMIN — Medication 10 ML: at 06:00

## 2021-02-07 RX ADMIN — Medication 10 ML: at 17:03

## 2021-02-07 RX ADMIN — FERROUS SULFATE TAB 325 MG (65 MG ELEMENTAL FE) 325 MG: 325 (65 FE) TAB at 09:41

## 2021-02-07 RX ADMIN — ASPIRIN 81 MG: 81 TABLET, CHEWABLE ORAL at 09:42

## 2021-02-07 NOTE — PROGRESS NOTES
Cardiology Progress Note      2/7/2021 3:49 PM    Admit Date: 2/1/2021    Admit Diagnosis: CAD (coronary artery disease) [I25.10]      Subjective:     Rory Ayon     Visit Vitals  BP (!) 114/54 (BP 1 Location: Right upper arm, BP Patient Position: At rest;Sitting)   Pulse (!) 112   Temp 97.7 °F (36.5 °C)   Resp 16   Ht 6' 1\" (1.854 m)   Wt 212 lb 15.4 oz (96.6 kg)   SpO2 98%   BMI 28.10 kg/m²       Current Facility-Administered Medications   Medication Dose Route Frequency    acetaminophen (TYLENOL) tablet 650 mg  650 mg Oral Q6H PRN    furosemide (LASIX) tablet 40 mg  40 mg Oral DAILY    glimepiride (AMARYL) tablet 2 mg  2 mg Oral ACB    metoprolol tartrate (LOPRESSOR) tablet 12.5 mg  12.5 mg Oral Q12H    insulin glargine (LANTUS) injection 14 Units  14 Units SubCUTAneous QHS    insulin lispro (HUMALOG) injection   SubCUTAneous AC&HS    glucose chewable tablet 16 g  4 Tab Oral PRN    dextrose (D50W) injection syrg 12.5-25 g  12.5-25 g IntraVENous PRN    glucagon (GLUCAGEN) injection 1 mg  1 mg IntraMUSCular PRN    albumin human 5% (BUMINATE) solution 12.5 g  12.5 g IntraVENous PRN    gabapentin (NEURONTIN) capsule 300 mg  300 mg Oral TID    traMADoL (ULTRAM) tablet 50 mg  50 mg Oral Q6H PRN    rosuvastatin (CRESTOR) tablet 20 mg  20 mg Oral QHS    pantoprazole (PROTONIX) tablet 40 mg  40 mg Oral ACB    sodium chloride (NS) flush 5-40 mL  5-40 mL IntraVENous Q8H    sodium chloride (NS) flush 5-40 mL  5-40 mL IntraVENous PRN    ferrous sulfate tablet 325 mg  1 Tab Oral DAILY WITH BREAKFAST    tamsulosin (FLOMAX) capsule 0.4 mg  0.4 mg Oral DAILY    heparin (porcine) 4,000 Units in 0.9% sodium chloride 1,000 mL Irrigation    PRN    oxyCODONE IR (ROXICODONE) tablet 5 mg  5 mg Oral Q4H PRN    oxyCODONE IR (ROXICODONE) tablet 10 mg  10 mg Oral Q4H PRN    naloxone (NARCAN) injection 0.4 mg  0.4 mg IntraVENous PRN    ondansetron (ZOFRAN) injection 4 mg  4 mg IntraVENous Q4H PRN    albuterol (PROVENTIL VENTOLIN) nebulizer solution 2.5 mg  2.5 mg Nebulization Q4H PRN    aspirin chewable tablet 81 mg  81 mg Oral DAILY    chlorhexidine (PERIDEX) 0.12 % mouthwash 10 mL  10 mL Oral Q12H    magnesium oxide (MAG-OX) tablet 400 mg  400 mg Oral BID    bisacodyL (DULCOLAX) suppository 10 mg  10 mg Rectal DAILY PRN    senna-docusate (PERICOLACE) 8.6-50 mg per tablet 1 Tab  1 Tab Oral BID    polyethylene glycol (MIRALAX) packet 17 g  17 g Oral DAILY    glucose chewable tablet 16 g  4 Tab Oral PRN    dextrose (D50W) injection syrg 12.5-25 g  12.5-25 g IntraVENous PRN    glucagon (GLUCAGEN) injection 1 mg  1 mg IntraMUSCular PRN         Objective:      Physical Exam:  Visit Vitals  BP (!) 114/54 (BP 1 Location: Right upper arm, BP Patient Position: At rest;Sitting)   Pulse (!) 112   Temp 97.7 °F (36.5 °C)   Resp 16   Ht 6' 1\" (1.854 m)   Wt 212 lb 15.4 oz (96.6 kg)   SpO2 98%   BMI 28.10 kg/m²     General Appearance:  Well developed, well nourished,alert and oriented x 3, and individual in no acute distress. Ears/Nose/Mouth/Throat:   Hearing grossly normal.         Neck: Supple. Chest:   Lungs clear to auscultation bilaterally. Cardiovascular:  Regular rate and rhythm, S1, S2 normal, no murmur. Abdomen:   Soft, non-tender, bowel sounds are active. Extremities: No edema bilaterally. Skin: Warm and dry.                Data Review:   Labs:    Recent Results (from the past 24 hour(s))   GLUCOSE, POC    Collection Time: 02/06/21  3:52 PM   Result Value Ref Range    Glucose (POC) 245 (H) 65 - 100 mg/dL    Performed by Jeanne Fierro    GLUCOSE, POC    Collection Time: 02/06/21  9:19 PM   Result Value Ref Range    Glucose (POC) 215 (H) 65 - 100 mg/dL    Performed by Jazzy Ball, BASIC    Collection Time: 02/07/21  2:05 AM   Result Value Ref Range    Sodium 136 136 - 145 mmol/L    Potassium 4.2 3.5 - 5.1 mmol/L    Chloride 103 97 - 108 mmol/L    CO2 26 21 - 32 mmol/L    Anion gap 7 5 - 15 mmol/L    Glucose 190 (H) 65 - 100 mg/dL    BUN 26 (H) 6 - 20 MG/DL    Creatinine 1.26 0.70 - 1.30 MG/DL    BUN/Creatinine ratio 21 (H) 12 - 20      GFR est AA >60 >60 ml/min/1.73m2    GFR est non-AA 55 (L) >60 ml/min/1.73m2    Calcium 9.2 8.5 - 10.1 MG/DL   MAGNESIUM    Collection Time: 02/07/21  2:05 AM   Result Value Ref Range    Magnesium 2.1 1.6 - 2.4 mg/dL   CBC W/O DIFF    Collection Time: 02/07/21  2:05 AM   Result Value Ref Range    WBC 12.3 (H) 4.1 - 11.1 K/uL    RBC 3.35 (L) 4.10 - 5.70 M/uL    HGB 9.4 (L) 12.1 - 17.0 g/dL    HCT 29.9 (L) 36.6 - 50.3 %    MCV 89.3 80.0 - 99.0 FL    MCH 28.1 26.0 - 34.0 PG    MCHC 31.4 30.0 - 36.5 g/dL    RDW 14.0 11.5 - 14.5 %    PLATELET 107 574 - 474 K/uL    MPV 10.5 8.9 - 12.9 FL    NRBC 0.0 0  WBC    ABSOLUTE NRBC 0.00 0.00 - 0.01 K/uL   GLUCOSE, POC    Collection Time: 02/07/21  7:49 AM   Result Value Ref Range    Glucose (POC) 196 (H) 65 - 100 mg/dL    Performed by Nica Berg (CON)    GLUCOSE, POC    Collection Time: 02/07/21 12:19 PM   Result Value Ref Range    Glucose (POC) 199 (H) 65 - 100 mg/dL    Performed by [address] [place] [address]        Telemetry: normal sinus rhythm      Assessment:     Hospital Problems  Date Reviewed: 2/2/2021          Codes Class Noted POA    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 Continue metoprolol.     Lis Lopez MD

## 2021-02-07 NOTE — PROGRESS NOTES
Problem: Mobility Impaired (Adult and Pediatric)  Goal: *Acute Goals and Plan of Care (Insert Text)  Description: FUNCTIONAL STATUS PRIOR TO ADMISSION: Patient was independent without use of DME. Patient with history of CVA affecting L hand. HOME SUPPORT PRIOR TO ADMISSION: The patient lived with his wife. Physical Therapy Goals  Initiated 2/3/2021  1. Patient will move from supine to sit and sit to supine , scoot up and down, and roll side to side in bed with independence within 5 days. 2.  Patient will perform sit to/from stand with modified independence within 5 days. 3.  Patient will ambulate 250 feet with least restrictive assistive device and modified independence within 5 days. 4.  Patient will ascend/descend 8 stairs with one sided handrail(s) with modified independence within 5 days. 5.  Patient will perform cardiac exercises per protocol with modified independence within 5 days. 6.  Patient will verbally recall and functionally demonstrate mindful-based movements (\"move in the tube\") principles without cues within 5 days. Outcome: Progressing Towards Goal    PHYSICAL THERAPY TREATMENT  Patient: Mayte Adjutant [de-identified] y.o. male)  Date: 2/7/2021  Diagnosis: CAD (coronary artery disease) [I25.10] <principal problem not specified>  Procedure(s) (LRB):  ON PUMP CORONARY ARTERY BYPASS GRAFT (CABG) x3, EVH, CARDIOPLEGIA, INTRAOPERATIVE CARLOS DONE BY DR. Ronna Conti. EVH DONE BY MARIMAR PIMENTEL. (N/A) 5 Days Post-Op  Precautions: Sternal(move in the tube)  Chart, physical therapy assessment, plan of care and goals were reviewed. ASSESSMENT  Patient continues with skilled PT services and is progressing towards goals. Pt was received sitting up in the chair and cleared by nursing to mobilize. He continues to need assistance with cardiac exercises. Required significant cues for scooting and foot placement in preparation for transfers. Utilized rocking technique and 2 person A to stand.  Able to ambulated forward with RW and chair follow. BPs taken and noted drop with positional changes. He was returned to sitting and feet elevate. Pt wearing GRACE hose. Sitting, feet elevated: 114/59  Sitting feet down: 148/58  Standing pre-activity: 114/57  Sitting post-activity: 92/63  Sittin/63      Patient is not demonstrating understanding of mindful-based movements (\"move in the tube\") principles of keeping UEs proximal to ribcage to prevent lateral pull on the sternum during load-bearing activities with visual, verbal, and manual cues required for compliance. Current Level of Function Impacting Discharge (mobility/balance): mod A x 2    Other factors to consider for discharge: poor retention of safety cues         PLAN :  Patient continues to benefit from skilled intervention to address the above impairments. Continue treatment per established plan of care. to address goals. Recommendation for discharge: (in order for the patient to meet his/her long term goals)  Therapy 3 hours per day 5-7 days per week    This discharge recommendation:  Has not yet been discussed the attending provider and/or case management    IF patient discharges home will need the following DME: to be determined (TBD)       SUBJECTIVE:   Patient stated I can't scoot.     OBJECTIVE DATA SUMMARY:   Patient mobilized on continuous portable monitor/telemetry. Critical Behavior:  Neurologic State: Drowsy  Orientation Level: Disoriented to time, Oriented to person, Oriented to place, Oriented to situation  Cognition: Follows commands  Safety/Judgement: Awareness of environment, Decreased insight into deficits, Decreased awareness of need for safety, Fall prevention    Functional Mobility Training:  Bed Mobility:      Session began and ended in sitting                Transfers:  Sit to Stand:  Moderate assistance;Assist x2  Stand to Sit: Moderate assistance                               Balance:  Sitting: Impaired  Sitting - Static: Good (unsupported)  Sitting - Dynamic: Fair (occasional)  Standing: Impaired  Standing - Static: Fair;Constant support  Standing - Dynamic : Fair;Constant support    Ambulation/Gait Training:  Distance (ft): 15 Feet (ft)  Assistive Device: Gait belt;Walker, rolling(chair follow)  Ambulation - Level of Assistance: Minimal assistance        Gait Abnormalities: Decreased step clearance;Shuffling gait        Base of Support: Narrowed     Speed/Gina: Pace decreased (<100 feet/min); Shuffled  Step Length: Left shortened;Right shortened                  Cardiac diagnosis intervention:  Patient instructed and educated on mindful movement principles based on Move in The Tube concept to include maintaining bilateral elbows close to rib cage when performing any load-bearing activity such as getting in/out of bed, pushing up from a chair, opening a door, or lifting a box. Patient was given a handout with diagrams of each correct/incorrect method of performing each of the above tasks. Therapeutic Exercises:   Patient instructed on the benefits and demonstrated cardiac exercises while sitting with Independent and Minimum assistance. Instructed and indicated understanding on how to progress reps, sets against gravity, pacing through progressive muscle strengthening standing based on surgeon clearance for more weight in prep for basic and instrumental ADLs. Instruction on the use of household items in place of weights as needed.      CARDIAC  EXERCISE   Sets   Reps   Active Active Assist   Passive Self ROM   Comments   Shoulder flexion 1 5 [x]                                            []                                            []                                            []                                               Shoulder abduction 1      5 [x]                                            []                                            []                                            [] Scapular elevation 1 5 [x]                                            []                                            []                                            []                                               Scapular retraction 1 5 [x]                                            []                                            []                                            []                                               Trunk rotation 1 5 [x]                                            []                                            []                                            []                                               Trunk sidebending 1 5 [x]                                            []                                            []                                            []                                                  []                                            []                                            []                                            []                                                   Pain Rating:  No major complaints    Activity Tolerance:   Fair    After treatment patient left in no apparent distress:   Sitting in chair and Call bell within reach    COMMUNICATION/COLLABORATION:   The patients plan of care was discussed with: Registered nurse.      Chi Raya, PT, DPT   Time Calculation: 23 mins

## 2021-02-07 NOTE — PROGRESS NOTES
[facility] FLOOR Progress Note    Admit Date: 2021  POD: 5 Days Post-Op      Procedure:  Procedure(s):  ON PUMP CORONARY ARTERY BYPASS GRAFT (CABG) x3, EVH, CARDIOPLEGIA, INTRAOPERATIVE CARLOS DONE BY DR. Kalyani Van. [address] DONE BY Pete PIMENTEL. Subjective:     No issues overnight     Objective:     Blood pressure 120/62, pulse (!) 116, temperature 98 °F (36.7 °C), resp. rate 16, height 6' 1\" (1.854 m), weight 212 lb 15.4 oz (96.6 kg), SpO2 100 %. Temp (24hrs), Av.5 °F (36.9 °C), Min:97.9 °F (36.6 °C), Max:99.6 °F (37.6 °C)        Oxygen:    CXR    Medications reviewed    Admission Weight: Last Weight   Weight: 206 lb 5.6 oz (93.6 kg) Weight: 212 lb 15.4 oz (96.6 kg)     Intake / Output / Drain:  Current Shift: No intake/output data recorded.   Last 24 hrs.:  1901 -  0700  In: 1440 [P.O.:1440]  Out: 65 [Urine:1700]    EXAM:      Labs:  Recent Results (from the past 24 hour(s))   GLUCOSE, POC    Collection Time: 21  8:21 AM   Result Value Ref Range    Glucose (POC) 191 (H) 65 - 100 mg/dL    Performed by Trevor AGUILERA    GLUCOSE, POC    Collection Time: 21 11:32 AM   Result Value Ref Range    Glucose (POC) 275 (H) 65 - 100 mg/dL    Performed by Stacy Helm    GLUCOSE, POC    Collection Time: 21  3:52 PM   Result Value Ref Range    Glucose (POC) 245 (H) 65 - 100 mg/dL    Performed by Stacy Helm    GLUCOSE, POC    Collection Time: 21  9:19 PM   Result Value Ref Range    Glucose (POC) 215 (H) 65 - 100 mg/dL    Performed by Jazzy Ball, BASIC    Collection Time: 21  2:05 AM   Result Value Ref Range    Sodium 136 136 - 145 mmol/L    Potassium 4.2 3.5 - 5.1 mmol/L    Chloride 103 97 - 108 mmol/L    CO2 26 21 - 32 mmol/L    Anion gap 7 5 - 15 mmol/L    Glucose 190 (H) 65 - 100 mg/dL    BUN 26 (H) 6 - 20 MG/DL    Creatinine 1.26 0.70 - 1.30 MG/DL    BUN/Creatinine ratio 21 (H) 12 - 20      GFR est AA >60 >60 ml/min/1.73m2    GFR est non-AA 55 (L) >60 ml/min/1.73m2    Calcium 9.2 8.5 - 10.1 MG/DL   MAGNESIUM    Collection Time: 02/07/21  2:05 AM   Result Value Ref Range    Magnesium 2.1 1.6 - 2.4 mg/dL   CBC W/O DIFF    Collection Time: 02/07/21  2:05 AM   Result Value Ref Range    WBC 12.3 (H) 4.1 - 11.1 K/uL    RBC 3.35 (L) 4.10 - 5.70 M/uL    HGB 9.4 (L) 12.1 - 17.0 g/dL    HCT 29.9 (L) 36.6 - 50.3 %    MCV 89.3 80.0 - 99.0 FL    MCH 28.1 26.0 - 34.0 PG    MCHC 31.4 30.0 - 36.5 g/dL    RDW 14.0 11.5 - 14.5 %    PLATELET 669 556 - 829 K/uL    MPV 10.5 8.9 - 12.9 FL    NRBC 0.0 0  WBC    ABSOLUTE NRBC 0.00 0.00 - 0.01 K/uL         Signed By: Tenzin Vitale MD

## 2021-02-07 NOTE — PROGRESS NOTES
Patient received belongings back from Security. Patient's cell phone and wedding band. Wedding band was sent home with daughter, cell phone kept at bedside. End of Shift Note    Bedside shift change report given to [employer] (oncoming nurse) by Krista Spence RN (offgoing nurse). Report included the following information SBAR, Kardex, Intake/Output, Recent Results and Cardiac Rhythm ST    Shift worked:  7a-7p     Shift summary and any significant changes:          Concerns for physician to address:       Zone phone for oncoming shift:   [phone]       Activity:  Activity Level: Up with Assistance  Number times ambulated in hallways past shift: 0  Number of times OOB to chair past shift: 1    Cardiac:   Cardiac Monitoring: Yes      Cardiac Rhythm: Sinus tachycardia    Access:   Current line(s): PIV     Genitourinary:   Urinary status: voiding    Respiratory:   O2 Device: Room air  Chronic home O2 use?: NO  Incentive spirometer at bedside: YES  Actual Volume (ml): 750 ml  GI:  Last Bowel Movement Date: 02/07/21  Current diet:  DIET CARDIAC Regular  Passing flatus: YES  Tolerating current diet: YES  % Diet Eaten: 100 %    Pain Management:   Patient states pain is manageable on current regimen: YES    Skin:  Romulo Score: 17  Interventions: increase time out of bed and PT/OT consult    Patient Safety:  Fall Score:  Total Score: 3  Interventions: assistive device (walker, cane, etc), gripper socks and pt to call before getting OOB  High Fall Risk: Yes    Length of Stay:  Expected LOS: 6d 0h  Actual LOS: 6      Krista Spence RN

## 2021-02-08 ENCOUNTER — APPOINTMENT (OUTPATIENT)
Dept: GENERAL RADIOLOGY | Age: 81
DRG: 236 | End: 2021-02-08
Attending: PHYSICIAN ASSISTANT
Payer: MEDICARE

## 2021-02-08 VITALS
HEART RATE: 100 BPM | SYSTOLIC BLOOD PRESSURE: 89 MMHG | OXYGEN SATURATION: 98 % | BODY MASS INDEX: 28.05 KG/M2 | DIASTOLIC BLOOD PRESSURE: 52 MMHG | HEIGHT: 73 IN | TEMPERATURE: 98 F | RESPIRATION RATE: 18 BRPM | WEIGHT: 211.64 LBS

## 2021-02-08 PROBLEM — R00.0 TACHYCARDIA: Status: ACTIVE | Noted: 2021-02-08

## 2021-02-08 LAB
ANION GAP SERPL CALC-SCNC: 6 MMOL/L (ref 5–15)
BUN SERPL-MCNC: 23 MG/DL (ref 6–20)
BUN/CREAT SERPL: 19 (ref 12–20)
CALCIUM SERPL-MCNC: 9.1 MG/DL (ref 8.5–10.1)
CHLORIDE SERPL-SCNC: 103 MMOL/L (ref 97–108)
CO2 SERPL-SCNC: 28 MMOL/L (ref 21–32)
CREAT SERPL-MCNC: 1.19 MG/DL (ref 0.7–1.3)
ERYTHROCYTE [DISTWIDTH] IN BLOOD BY AUTOMATED COUNT: 14 % (ref 11.5–14.5)
GLUCOSE BLD STRIP.AUTO-MCNC: 211 MG/DL (ref 65–100)
GLUCOSE BLD STRIP.AUTO-MCNC: 243 MG/DL (ref 65–100)
GLUCOSE BLD STRIP.AUTO-MCNC: 305 MG/DL (ref 65–100)
GLUCOSE SERPL-MCNC: 202 MG/DL (ref 65–100)
HCT VFR BLD AUTO: 30.3 % (ref 36.6–50.3)
HGB BLD-MCNC: 9.4 G/DL (ref 12.1–17)
MAGNESIUM SERPL-MCNC: 2.2 MG/DL (ref 1.6–2.4)
MCH RBC QN AUTO: 28 PG (ref 26–34)
MCHC RBC AUTO-ENTMCNC: 31 G/DL (ref 30–36.5)
MCV RBC AUTO: 90.2 FL (ref 80–99)
NRBC # BLD: 0 K/UL (ref 0–0.01)
NRBC BLD-RTO: 0 PER 100 WBC
PLATELET # BLD AUTO: 276 K/UL (ref 150–400)
PMV BLD AUTO: 10.2 FL (ref 8.9–12.9)
POTASSIUM SERPL-SCNC: 3.9 MMOL/L (ref 3.5–5.1)
RBC # BLD AUTO: 3.36 M/UL (ref 4.1–5.7)
SERVICE CMNT-IMP: ABNORMAL
SODIUM SERPL-SCNC: 137 MMOL/L (ref 136–145)
WBC # BLD AUTO: 12.3 K/UL (ref 4.1–11.1)

## 2021-02-08 PROCEDURE — 74011250637 HC RX REV CODE- 250/637: Performed by: PHYSICIAN ASSISTANT

## 2021-02-08 PROCEDURE — 74011000250 HC RX REV CODE- 250: Performed by: PHYSICIAN ASSISTANT

## 2021-02-08 PROCEDURE — 71045 X-RAY EXAM CHEST 1 VIEW: CPT

## 2021-02-08 PROCEDURE — 97535 SELF CARE MNGMENT TRAINING: CPT | Performed by: OCCUPATIONAL THERAPIST

## 2021-02-08 PROCEDURE — 74011250637 HC RX REV CODE- 250/637: Performed by: INTERNAL MEDICINE

## 2021-02-08 PROCEDURE — 97530 THERAPEUTIC ACTIVITIES: CPT | Performed by: OCCUPATIONAL THERAPIST

## 2021-02-08 PROCEDURE — 93005 ELECTROCARDIOGRAM TRACING: CPT

## 2021-02-08 PROCEDURE — 74011636637 HC RX REV CODE- 636/637: Performed by: NURSE PRACTITIONER

## 2021-02-08 PROCEDURE — 99232 SBSQ HOSP IP/OBS MODERATE 35: CPT | Performed by: CLINICAL NURSE SPECIALIST

## 2021-02-08 PROCEDURE — 74011250637 HC RX REV CODE- 250/637: Performed by: NURSE PRACTITIONER

## 2021-02-08 PROCEDURE — 80048 BASIC METABOLIC PNL TOTAL CA: CPT

## 2021-02-08 PROCEDURE — 97116 GAIT TRAINING THERAPY: CPT

## 2021-02-08 PROCEDURE — 97530 THERAPEUTIC ACTIVITIES: CPT

## 2021-02-08 PROCEDURE — 82962 GLUCOSE BLOOD TEST: CPT

## 2021-02-08 PROCEDURE — 85027 COMPLETE CBC AUTOMATED: CPT

## 2021-02-08 PROCEDURE — 36415 COLL VENOUS BLD VENIPUNCTURE: CPT

## 2021-02-08 PROCEDURE — 99223 1ST HOSP IP/OBS HIGH 75: CPT | Performed by: INTERNAL MEDICINE

## 2021-02-08 PROCEDURE — 83735 ASSAY OF MAGNESIUM: CPT

## 2021-02-08 PROCEDURE — 99232 SBSQ HOSP IP/OBS MODERATE 35: CPT | Performed by: INTERNAL MEDICINE

## 2021-02-08 PROCEDURE — 74011250637 HC RX REV CODE- 250/637: Performed by: THORACIC SURGERY (CARDIOTHORACIC VASCULAR SURGERY)

## 2021-02-08 PROCEDURE — 74011250636 HC RX REV CODE- 250/636: Performed by: THORACIC SURGERY (CARDIOTHORACIC VASCULAR SURGERY)

## 2021-02-08 RX ORDER — METOPROLOL TARTRATE 25 MG/1
25 TABLET, FILM COATED ORAL EVERY 12 HOURS
Status: DISCONTINUED | OUTPATIENT
Start: 2021-02-08 | End: 2021-02-08 | Stop reason: HOSPADM

## 2021-02-08 RX ORDER — GABAPENTIN 300 MG/1
300 CAPSULE ORAL 3 TIMES DAILY
Qty: 30 CAP | Refills: 0 | Status: SHIPPED
Start: 2021-02-08 | End: 2021-03-10 | Stop reason: ALTCHOICE

## 2021-02-08 RX ORDER — AMIODARONE HYDROCHLORIDE 200 MG/1
200 TABLET ORAL 2 TIMES DAILY
Status: DISCONTINUED | OUTPATIENT
Start: 2021-02-08 | End: 2021-02-08 | Stop reason: HOSPADM

## 2021-02-08 RX ORDER — INSULIN LISPRO 100 [IU]/ML
INJECTION, SOLUTION INTRAVENOUS; SUBCUTANEOUS
Qty: 1 VIAL | Refills: 0 | Status: SHIPPED
Start: 2021-02-08 | End: 2021-03-04 | Stop reason: ALTCHOICE

## 2021-02-08 RX ORDER — FUROSEMIDE 20 MG/1
20 TABLET ORAL DAILY
Status: DISCONTINUED | OUTPATIENT
Start: 2021-02-08 | End: 2021-02-08 | Stop reason: HOSPADM

## 2021-02-08 RX ORDER — GLIMEPIRIDE 1 MG/1
4 TABLET ORAL
Status: DISCONTINUED | OUTPATIENT
Start: 2021-02-09 | End: 2021-02-08 | Stop reason: HOSPADM

## 2021-02-08 RX ORDER — AMOXICILLIN 250 MG
1 CAPSULE ORAL 2 TIMES DAILY
Qty: 60 TAB | Refills: 0 | Status: SHIPPED
Start: 2021-02-08 | End: 2021-03-04 | Stop reason: ALTCHOICE

## 2021-02-08 RX ORDER — FUROSEMIDE 20 MG/1
20 TABLET ORAL DAILY
Qty: 30 TAB | Refills: 1 | Status: SHIPPED
Start: 2021-02-08 | End: 2021-03-05 | Stop reason: SDUPTHER

## 2021-02-08 RX ORDER — AMIODARONE HYDROCHLORIDE 200 MG/1
200 TABLET ORAL 2 TIMES DAILY
Qty: 60 TAB | Refills: 0 | Status: SHIPPED
Start: 2021-02-08 | End: 2021-03-04 | Stop reason: ALTCHOICE

## 2021-02-08 RX ORDER — GLIMEPIRIDE 1 MG/1
2 TABLET ORAL ONCE
Status: COMPLETED | OUTPATIENT
Start: 2021-02-08 | End: 2021-02-08

## 2021-02-08 RX ORDER — METOPROLOL TARTRATE 25 MG/1
25 TABLET, FILM COATED ORAL EVERY 12 HOURS
Qty: 60 TAB | Refills: 0 | Status: SHIPPED
Start: 2021-02-08 | End: 2021-03-10 | Stop reason: SDUPTHER

## 2021-02-08 RX ORDER — POLYETHYLENE GLYCOL 3350 17 G/17G
17 POWDER, FOR SOLUTION ORAL DAILY
Qty: 14 PACKET | Refills: 0 | Status: SHIPPED
Start: 2021-02-09 | End: 2021-03-04 | Stop reason: ALTCHOICE

## 2021-02-08 RX ORDER — ENOXAPARIN SODIUM 100 MG/ML
40 INJECTION SUBCUTANEOUS EVERY 24 HOURS
Status: DISCONTINUED | OUTPATIENT
Start: 2021-02-08 | End: 2021-02-08 | Stop reason: HOSPADM

## 2021-02-08 RX ORDER — TAMSULOSIN HYDROCHLORIDE 0.4 MG/1
0.4 CAPSULE ORAL DAILY
Qty: 30 CAP | Refills: 0 | Status: SHIPPED
Start: 2021-02-09 | End: 2021-03-10 | Stop reason: SDUPTHER

## 2021-02-08 RX ORDER — METFORMIN HYDROCHLORIDE EXTENDED-RELEASE TABLETS 500 MG/1
1000 TABLET, FILM COATED, EXTENDED RELEASE ORAL
Status: DISCONTINUED | OUTPATIENT
Start: 2021-02-08 | End: 2021-02-08 | Stop reason: HOSPADM

## 2021-02-08 RX ORDER — INSULIN GLARGINE 100 [IU]/ML
14 INJECTION, SOLUTION SUBCUTANEOUS DAILY
Qty: 1 VIAL | Refills: 0 | Status: SHIPPED
Start: 2021-02-08 | End: 2021-03-04 | Stop reason: ALTCHOICE

## 2021-02-08 RX ADMIN — DOCUSATE SODIUM - SENNOSIDES 1 TABLET: 50; 8.6 TABLET, FILM COATED ORAL at 09:15

## 2021-02-08 RX ADMIN — ENOXAPARIN SODIUM 40 MG: 40 INJECTION SUBCUTANEOUS at 09:15

## 2021-02-08 RX ADMIN — AMIODARONE HYDROCHLORIDE 200 MG: 200 TABLET ORAL at 10:30

## 2021-02-08 RX ADMIN — METOPROLOL TARTRATE 25 MG: 25 TABLET, FILM COATED ORAL at 09:15

## 2021-02-08 RX ADMIN — POLYETHYLENE GLYCOL 3350 17 G: 17 POWDER, FOR SOLUTION ORAL at 09:14

## 2021-02-08 RX ADMIN — PANTOPRAZOLE SODIUM 40 MG: 40 TABLET, DELAYED RELEASE ORAL at 09:15

## 2021-02-08 RX ADMIN — GLIMEPIRIDE 2 MG: 1 TABLET ORAL at 09:14

## 2021-02-08 RX ADMIN — TAMSULOSIN HYDROCHLORIDE 0.4 MG: 0.4 CAPSULE ORAL at 09:15

## 2021-02-08 RX ADMIN — INSULIN LISPRO 2 UNITS: 100 INJECTION, SOLUTION INTRAVENOUS; SUBCUTANEOUS at 09:15

## 2021-02-08 RX ADMIN — INSULIN LISPRO 4 UNITS: 100 INJECTION, SOLUTION INTRAVENOUS; SUBCUTANEOUS at 12:46

## 2021-02-08 RX ADMIN — MAGNESIUM OXIDE TAB 400 MG (241.3 MG ELEMENTAL MG) 400 MG: 400 (241.3 MG) TAB at 09:15

## 2021-02-08 RX ADMIN — CHLORHEXIDINE GLUCONATE 0.12% ORAL RINSE 10 ML: 1.2 LIQUID ORAL at 09:14

## 2021-02-08 RX ADMIN — GABAPENTIN 300 MG: 300 CAPSULE ORAL at 16:50

## 2021-02-08 RX ADMIN — FUROSEMIDE 20 MG: 20 TABLET ORAL at 09:21

## 2021-02-08 RX ADMIN — ASPIRIN 81 MG: 81 TABLET, CHEWABLE ORAL at 09:15

## 2021-02-08 RX ADMIN — MAGNESIUM OXIDE TAB 400 MG (241.3 MG ELEMENTAL MG) 400 MG: 400 (241.3 MG) TAB at 18:53

## 2021-02-08 RX ADMIN — GABAPENTIN 300 MG: 300 CAPSULE ORAL at 09:15

## 2021-02-08 RX ADMIN — Medication 10 ML: at 06:00

## 2021-02-08 RX ADMIN — INSULIN LISPRO 2 UNITS: 100 INJECTION, SOLUTION INTRAVENOUS; SUBCUTANEOUS at 16:49

## 2021-02-08 RX ADMIN — GLIMEPIRIDE 2 MG: 1 TABLET ORAL at 10:29

## 2021-02-08 RX ADMIN — Medication 10 ML: at 15:47

## 2021-02-08 RX ADMIN — DOCUSATE SODIUM - SENNOSIDES 1 TABLET: 50; 8.6 TABLET, FILM COATED ORAL at 18:53

## 2021-02-08 RX ADMIN — METFORMIN 1000 MG: 500 TABLET, EXTENDED RELEASE ORAL at 16:50

## 2021-02-08 RX ADMIN — AMIODARONE HYDROCHLORIDE 200 MG: 200 TABLET ORAL at 18:53

## 2021-02-08 RX ADMIN — FERROUS SULFATE TAB 325 MG (65 MG ELEMENTAL FE) 325 MG: 325 (65 FE) TAB at 09:15

## 2021-02-08 NOTE — PROGRESS NOTES
[facility]  Progress Note    Admit Date: 2021  POD:  6 Day Post-Op    Procedure:  Procedure(s):  ON PUMP CORONARY ARTERY BYPASS GRAFT (CABG) x3, EVH, CARDIOPLEGIA, INTRAOPERATIVE CARLOS DONE BY DR. Bella Gandhi. [address] DONE BY Cleve PIMENTEL. Subjective/24 Hour Summary:   Pt seen with Dr. Hendrix Res. Afebrile, on RA. Up in chair, eating breakfast. HR . Objective:   Vitals:  Blood pressure (!) 116/56, pulse (!) 111, temperature 98.9 °F (37.2 °C), resp. rate 17, height 6' 1\" (1.854 m), weight 211 lb 10.3 oz (96 kg), SpO2 99 %. Temp (24hrs), Av.1 °F (36.7 °C), Min:97.7 °F (36.5 °C), Max:98.9 °F (37.2 °C)    EKG/Rhythm:  NSR/ST    Oxygen Therapy:  Oxygen Therapy  O2 Sat (%): 99 % (21 07)  Pulse via Oximetry: 112 beats per minute (21 07)  O2 Device: Room air (21)  O2 Flow Rate (L/min): 2 l/min (21 1520)  FIO2 (%): 40 % (21 2200)    CXR:  CXR Results  (Last 48 hours)               21 05  XR CHEST PORT Final result    Impression:  Bibasilar airspace disease with improvement on the right. Narrative:  INDICATION:  postop heart       EXAM: CXR Portable. FINDINGS: Portable chest shows bibasilar airspace disease/atelectasis, improved   on the right, with no new infiltrate, since yesterday. There is no apparent   pneumothorax. Heart size is stable. There is no overt pulmonary edema. 21 05  XR CHEST PORT Final result    Impression:  No significant interval change. Narrative:  Clinical history: postop heart   INDICATION:   postop heart   COMPARISON: 2021       FINDINGS:   AP portable upright view of the chest demonstrates a stable  cardiopericardial   silhouette. Bibasilar atelectasis slightly greater on the left. .Sternotomy. Radiopaque density over the left hemithorax. .There is no pneumothorax. . Patient   is on a cardiac monitor.                   Admission Weight: Last Weight   Weight: 206 lb 5.6 oz (93.6 kg) Weight: 211 lb 10.3 oz (96 kg)     Intake / Output / Drain:  Current Shift:  07 -  1900  In: -   Out: 300 [Urine:300]  Last 24 hrs.:     Intake/Output Summary (Last 24 hours) at 2021 0909  Last data filed at 2021 0728  Gross per 24 hour   Intake    Output 1350 ml   Net -1350 ml       EXAM:  General:   NAD                                                                                          Lungs:   Clear to auscultation bilaterally. Incision:  No erythema, or swelling. Small amount of bloody drainage. Heart:  Regular rate and rhythm, S1, S2 normal, no murmur, click, rub or gallop. Abdomen:   Soft, non-tender. Bowel sounds normal. No masses,  No organomegaly. Extremities:  2+ LE edema. PPP. Neurologic:  Gross motor and sensory apparatus intact. Labs:   Recent Labs     21  0518 21  0205   WBC 12.3* 12.3*   HGB 9.4* 9.4*   HCT 30.3* 29.9*    222     Recent Labs     21  0518 21  0205    136   K 3.9 4.2    103   CO2 28 26   BUN 23* 26*   CREA 1.19 1.26   * 190*   CA 9.1 9.2   MG 2.2 2.1     No results for input(s): AP, TBIL, TP, ALB, GLOB, AML, LPSE in the last 72 hours. No lab exists for component: SGOT, GPT, AMYP  No results for input(s): INR, PTP, APTT, INREXT, INREXT in the last 72 hours. No results for input(s): PHI, PCO2I, PO2I, FIO2I in the last 72 hours. No results for input(s): CPK, CKMB, TROIQ, BNPP in the last 72 hours. Assessment:     Active Problems:    ASHD (arteriosclerotic heart disease) (1/15/2021)      CAD (coronary artery disease) (2021)      S/P CABG x 3 (2021)      Overview: CABG x 3 LIMA to LAD, RSVG to OM, RSVG to PDA      Right Endoscopic Saphenous Vein [place]         Plan/Recommendations/Medical Decision Makin.  CAD s/p CABG: On ASA/Statin, BB  2.  HTN: On BB, HCTZ, aldactone, valsartan preop. Cont BB -increase dose.    3. HLD: on statin  4. DM: on metformin and amaryl PTA, increase amaryl to 4 mg daily and resume metformin, on lantus. 5. LE edema/venous reflux: improved, decrease lasix to 20 mg daily. Cont compression hose  6. Elevated Cr: improved, monitor. 7. Acute post op resp insufficiency: on RA now. TCDB. IS. Progressive ambulation  8. Acute blood loss anemia: monitor HH  9. Urinary retention: Cont flomax, voiding without difficulty  10. Orthostatic hypotension: improved  11. Sinus tach: increase BB, decrease lasix, monitor    Dispo: PT/OT, will need rehab. Ready for dc when rehab bed available.  Covid negative 2/6    Signed By: Jimmie Birch NP

## 2021-02-08 NOTE — PROGRESS NOTES
Problem: Mobility Impaired (Adult and Pediatric)  Goal: *Acute Goals and Plan of Care (Insert Text)  Description: FUNCTIONAL STATUS PRIOR TO ADMISSION: Patient was independent without use of DME. Patient with history of CVA affecting L hand.    HOME SUPPORT PRIOR TO ADMISSION: The patient lived with his wife.    Physical Therapy Goals  Initiated 2/3/2021  1.  Patient will move from supine to sit and sit to supine , scoot up and down, and roll side to side in bed with independence within 5 days.    2.  Patient will perform sit to/from stand with modified independence within 5 days.  3.  Patient will ambulate 250 feet with least restrictive assistive device and modified independence within 5 days.   4.  Patient will ascend/descend 8 stairs with one sided handrail(s) with modified independence within 5 days.  5.  Patient will perform cardiac exercises per protocol with modified independence within 5 days.  6.  Patient will verbally recall and functionally demonstrate mindful-based movements (\"move in the tube\") principles without cues within 5 days.          Outcome: Progressing Towards Goal   PHYSICAL THERAPY TREATMENT  Patient: Rory Ayon (80 y.o. male)  Date: 2/8/2021  Diagnosis: CAD (coronary artery disease) [I25.10] <principal problem not specified>  Procedure(s) (LRB):  ON PUMP CORONARY ARTERY BYPASS GRAFT (CABG) x3, EVH, CARDIOPLEGIA, INTRAOPERATIVE CARLOS DONE BY DR. HUNTER. EVH DONE BY MARIMAR PIMENTEL. (N/A) 6 Days Post-Op  Precautions: Sternal(move in the tube)  Chart, physical therapy assessment, plan of care and goals were reviewed.    ASSESSMENT  Patient continues with skilled PT services and is progressing towards goals. Patient with VSS on RA throughout activity, and negative for orthostatics this date. Patient continues to require increased assistance (mod A x 2) for sit <> stand from chair with RW. He continues to require verbal cues for hand placement with sternal precautions and constant cues for  safety. While ambulating in hallway, patient with increased crouched, flexed trunk posture, worsening with fatigue requiring constant cues and reminders to correct for safety. Patient is not verbalizing and is not demonstrating understanding of mindful-based movements (\"move in the tube\") principles of keeping UEs proximal to ribcage to prevent lateral pull on the sternum during load-bearing activities with visual and verbal cues required for compliance. Current Level of Function Impacting Discharge (mobility/balance): mod A x 2 for transfers, min A for ambulation with RW    Other factors to consider for discharge: fall risk, decreased processing, unsteady gait         PLAN :  Patient continues to benefit from skilled intervention to address the above impairments. Continue treatment per established plan of care. to address goals. Recommendation for discharge: (in order for the patient to meet his/her long term goals)  IP rehab    This discharge recommendation:  Has been made in collaboration with the attending provider and/or case management    IF patient discharges home will need the following DME: to be determined (TBD)       SUBJECTIVE:   Patient stated I probably normally stand up straighter, I don't know. ...    OBJECTIVE DATA SUMMARY:   Patient mobilized on continuous portable monitor/telemetry. Critical Behavior:  Neurologic State: Drowsy  Orientation Level: Disoriented to time, Oriented to person, Oriented to place, Oriented to situation  Cognition: Follows commands  Safety/Judgement: Awareness of environment, Decreased insight into deficits, Decreased awareness of need for safety, Fall prevention    Functional Mobility Training:  Bed Mobility:                      Transfers:  Sit to Stand: Moderate assistance;Assist x2  Stand to Sit: Moderate assistance;Assist x2                               Balance:  Sitting: Impaired; Without support  Sitting - Static: Good (unsupported)  Sitting - Dynamic: Fair (occasional)  Standing: Impaired; With support  Standing - Static: Constant support; Fair  Standing - Dynamic : Fair;Constant support    Ambulation/Gait Training:  Distance (ft): 90 Feet (ft)  Assistive Device: Gait belt;Walker, rolling  Ambulation - Level of Assistance: Minimal assistance;Contact guard assistance        Gait Abnormalities: Decreased step clearance;Trunk sway increased; Shuffling gait(flexed trunk, worsening with fatigue)        Base of Support: Narrowed     Speed/Gina: Pace decreased (<100 feet/min); Shuffled  Step Length: Right shortened;Left shortened               Activity Tolerance:   Fair and SpO2 stable on RA    After treatment patient left in no apparent distress:   Sitting in chair and Call bell within reach    COMMUNICATION/COLLABORATION:   The patients plan of care was discussed with: Occupational therapist, Registered nurse, and Case management.      Cesar Adler PT, DPT   Time Calculation: 29 mins

## 2021-02-08 NOTE — PROGRESS NOTES
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 Discussed ILR with patient. He is interested however with recent CABG, will wait and schedule as an outpatient. I discussed the risks/benefits/alternatives of the procedure with the patient. Risks include (but are not limited to) bleeding, heart block, infection, cva/mi/tamponade/death. The patient understands and agrees to proceed. Will plan to call pt in 2-3 weeks. Thank you for allowing me to participate in this patients care.     Adriane Ribeiro MD, Trace Monsalve

## 2021-02-08 NOTE — DISCHARGE SUMMARY
[facility] Discharge Summary     Patient ID: Kesha Leone  [id number]  [de-identified] y.o.  1940    Admit date: 2/1/2021    Discharge date: 2/8/2021     Admitting Physician: Stella Dias MD     Referring Cardiologist:  Urban Dailey    PCP:  Mary Rojas MD    Admitting Diagnoses: CAD    Discharge Diagnoses:     Hospital Problems  Date Reviewed: 2/2/2021          Codes Class Noted POA    Tachycardia ICD-10-CM: R00.0  ICD-9-CM: 785.0  2/8/2021 Unknown        S/P CABG x 3 ICD-10-CM: Z95.1  ICD-9-CM: V45.81  2/2/2021 Unknown    Overview Signed 2/2/2021  4:15 PM by Redd Son PA-C     CABG x 3 LIMA to LAD, RSVG to OM, RSVG to PDA  Right Endoscopic Saphenous Vein [place]             CAD (coronary artery disease) ICD-10-CM: I25.10  ICD-9-CM: 414.00  2/1/2021 Unknown        ASHD (arteriosclerotic heart disease) ICD-10-CM: I25.10  ICD-9-CM: 414.00  1/15/2021 Yes        Essential hypertension (Chronic) ICD-10-CM: I10  ICD-9-CM: 401.9  12/9/2015 Yes              Discharged Condition: good    Disposition: rehab    Procedures for this admission:  Procedure(s):  ON PUMP CORONARY ARTERY BYPASS GRAFT (CABG) x3, EVH, CARDIOPLEGIA, INTRAOPERATIVE CARLOS DONE BY DR. Glenis Washburn. [facility] DONE BY Zaira PIMENTEL. Discharge Medications:      My Medications      START taking these medications      Instructions Each Dose to Equal Morning Noon Evening Bedtime   amiodarone 200 mg tablet  Commonly known as: CORDARONE    Your last dose was: Your next dose is: Take 1 Tab by mouth two (2) times a day. 200 mg                 gabapentin 300 mg capsule  Commonly known as: NEURONTIN    Your last dose was: Your next dose is: Take 1 Cap by mouth three (3) times daily. Max Daily Amount: 900 mg.   300 mg                 insulin glargine 100 unit/mL injection  Commonly known as: LANTUS    Your last dose was: Your next dose is:         14 Units by SubCUTAneous route daily.    14 Units                 insulin lispro 100 unit/mL injection  Commonly known as: HUMALOG    Your last dose was: Your next dose is:         140-199=0 units            200-249=2 units  250-299=3 units  300-349=4 units  350 or greater = Call MD                  metoprolol tartrate 25 mg tablet  Commonly known as: LOPRESSOR    Your last dose was: Your next dose is: Take 1 Tab by mouth every twelve (12) hours. 25 mg                 polyethylene glycol 17 gram packet  Commonly known as: MIRALAX  Start taking on: February 9, 2021    Your last dose was: Your next dose is: Take 1 Packet by mouth daily. 17 g                 senna-docusate 8.6-50 mg per tablet  Commonly known as: PERICOLACE    Your last dose was: Your next dose is: Take 1 Tab by mouth two (2) times a day. 1 Tab                 tamsulosin 0.4 mg capsule  Commonly known as: FLOMAX  Start taking on: February 9, 2021    Your last dose was: Your next dose is: Take 1 Cap by mouth daily. 0.4 mg                    CHANGE how you take these medications      Instructions Each Dose to Equal Morning Noon Evening Bedtime   furosemide 20 mg tablet  Commonly known as: LASIX  What changed:   · medication strength  · how much to take  · how to take this  · when to take this  · additional instructions    Your last dose was: Your next dose is: Take 1 Tab by mouth daily. 20 mg                    CONTINUE taking these medications      Instructions Each Dose to Equal Morning Noon Evening Bedtime   aspirin delayed-release 81 mg tablet    Your last dose was: Your next dose is: Take 1 Tab by mouth daily. To prevent heart attack and stroke   81 mg                 glimepiride 4 mg tablet  Commonly known as: AMARYL    Your last dose was: Your next dose is:         One daily for diabetes                  metFORMIN 1,000 mg tablet  Commonly known as: GLUCOPHAGE    Your last dose was:      Your next dose is:         TAKE 1 TABLET BY MOUTH TWICE DAILY WITH MEALS                  rosuvastatin 20 mg tablet  Commonly known as: CRESTOR    Your last dose was: Your next dose is:         One daily for cholesterol                     STOP taking these medications    hydrALAZINE 10 mg tablet  Commonly known as: APRESOLINE        hydroCHLOROthiazide 12.5 mg tablet  Commonly known as: HYDRODIURIL        metoprolol succinate 25 mg XL tablet  Commonly known as: TOPROL-XL        piroxicam 10 mg capsule  Commonly known as: FELDENE        sildenafiL (pulmonary hypertension) 20 mg tablet  Commonly known as: REVATIO        spironolactone 25 mg tablet  Commonly known as: ALDACTONE        valsartan 160 mg tablet  Commonly known as: DIOVAN              Where to Get Your Medications      Information on where to get these meds will be given to you by the nurse or doctor. Ask your nurse or doctor about these medications  · amiodarone 200 mg tablet  · furosemide 20 mg tablet  · gabapentin 300 mg capsule  · insulin glargine 100 unit/mL injection  · insulin lispro 100 unit/mL injection  · metoprolol tartrate 25 mg tablet  · polyethylene glycol 17 gram packet  · senna-docusate 8.6-50 mg per tablet  · tamsulosin 0.4 mg capsule               HPI:    Edmund Ayon is a [de-identified] y. o. male who was referred for cardiac evaluation by Dr. Angelica Dias for CAD. He has had SOB with exertion that is better with rest. He is also complaining of LE edema. He denies CP, orthopnea/PND, claudication. He does have hip pain that limits his activity.      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      He is a former smoker, drinks alcohol occasionally. He has a family history of heart disease and stroke. He lives with his wife.         Cardiac Testing     Cardiac catheterization: Diagnostic  Dominance: Right  Left Main   The vessel was visualized by angiography. The vessel is angiographically normal.   Left Anterior Descending   The vessel is moderate in size.  The vessel is tortuous. Prox LAD lesion, 80% stenosed. Mid LAD lesion, 85% stenosed. Dist LAD lesion, 90% stenosed. Left Circumflex   Prox Cx lesion, 80% stenosed. First Obtuse Marginal Branch   1st Mrg lesion, 70% stenosed. Right Coronary Artery   The vessel is moderate in size. The vessel is tortuous. Prox RCA lesion, 90% stenosed. Mid RCA lesion, 80% stenosed.      ECHO:   · LV: Estimated LVEF is 55 - 60%. Normal systolic function (ejection fraction normal) and diastolic function. Small left ventricle. Mild concentric hypertrophy. Wall motion: normal.  · LA: There is a small 10 mm x 6 mm spherical calcified undetermined mass within the left atrial appendage. Consider further imaging with CARLOS and/ or cardiac MRI if clinically indicated. · AV: Mild focal aortic valve leaflet calcification present without reduced excursion. Mild aortic valve regurgitation is present. · MV: Mitral valve non-specific thickening. · TV: Mild tricuspid valve regurgitation is present.      Echo Findings     Left Ventricle Normal systolic function (ejection fraction normal) and diastolic function. Small left ventricle. Mild concentric hypertrophy.  Wall motion: normal. The estimated EF is 55 - 60%. Left Atrium Normal cavity size. There is a small  10 mm x 6 mm spherical calcified undetermined mass within the left atrial appendage. Right Ventricle Normal cavity size and global systolic function. Right Atrium Normal cavity size. Aortic Valve Trileaflet valve structure and no stenosis. There is mild focal leaflet calcification without reduced excursion. Normal aortic leaflet mobility.  Mild aortic valve regurgitation. Mitral Valve No stenosis. Mitral valve non-specific thickening. Trace regurgitation. Tricuspid Valve Normal valve structure and no stenosis. Mild regurgitation. Pulmonic Valve Normal valve structure and no stenosis. Mild regurgitation. Aorta Normal aortic root and ascending aorta.    Pulmonary Artery Pulmonary arterial systolic pressure (PASP) is 32 mmHg. Pulmonary hypertension not suggested by Doppler findings. Pericardium No evidence of pericardial effusion.          Hospital Course: The patient underwent a CABGx3 by Dr. Debi Garcias on 2/2/21 -see op note for details. He was transferred to the ICU in stable condition. He was hemodynamically stable and transferred to stepdown on POD1. After medical optimization and working with PT/OT he is being discharged to rehab. 1. CAD s/p CABG: On ASA/Statin, BB  2.  HTN: On BB, HCTZ, aldactone, valsartan preop. Cont BB -increase dose. 3. HLD: on statin  4. DM: on metformin and amaryl PTA, increase amaryl to 4 mg daily and resume metformin, on lantus. 5. LE edema/venous reflux: improved, decrease lasix to 20 mg daily. Cont compression hose  6. Elevated Cr: improved, monitor. 7. Acute post op resp insufficiency: on RA now. TCDB. IS. Progressive ambulation  8. Acute blood loss anemia: monitor HH  9. Urinary retention: Cont flomax, voiding without difficulty  10. Orthostatic hypotension: improved  11. Sinus tach: increase BB, decrease lasix, monitor    Referral to outpatient cardiac rehab made. Discharge Vital Signs:   Visit Vitals  BP (!) 103/48 (BP 1 Location: Right upper arm, BP Patient Position: Sitting)   Pulse (!) 106   Temp 98.6 °F (37 °C)   Resp 18   Ht 6' 1\" (1.854 m)   Wt 211 lb 10.3 oz (96 kg)   SpO2 98%   BMI 27.92 kg/m²       Labs:   Recent Labs     02/08/21  1148 02/08/21  0518 02/08/21  0518   WBC  --   --  12.3*   HGB  --   --  9.4*   HCT  --   --  30.3*   PLT  --   --  276   NA  --   --  137   K  --   --  3.9   BUN  --   --  23*   CREA  --   --  1.19   GLU  --   --  202*   GLUCPOC 305*   < >  --     < > = values in this interval not displayed. Diagnostics: CXR:   CXR Results  (Last 48 hours)               02/08/21 0523  XR CHEST PORT Final result    Impression:  Bibasilar airspace disease with improvement on the right.            Narrative: INDICATION:  postop heart       EXAM: CXR Portable. FINDINGS: Portable chest shows bibasilar airspace disease/atelectasis, improved   on the right, with no new infiltrate, since yesterday. There is no apparent   pneumothorax. Heart size is stable. There is no overt pulmonary edema. 02/07/21 0529  XR CHEST PORT Final result    Impression:  No significant interval change. Narrative:  Clinical history: postop heart   INDICATION:   postop heart   COMPARISON: 2/6/2021       FINDINGS:   AP portable upright view of the chest demonstrates a stable  cardiopericardial   silhouette. Bibasilar atelectasis slightly greater on the left. .Sternotomy. Radiopaque density over the left hemithorax. .There is no pneumothorax. . Patient   is on a cardiac monitor. Patient Instructions/Follow Up Care:  Discharge instructions were reviewed with the patient and family present. Questions were also answered at this time. Prescriptions and medications were reviewed. The patient has a follow up appointment with the Nurse Practitioner or Physician's Assistant and with Dr. Rachel Ellison on 3/4/21. The patient was also instructed to follow up with his primary care physician as needed. The patient and family were encouraged to call with any questions or concerns.        Signed:  Lemmie Olszewski, NP  2/8/2021  3:17 PM

## 2021-02-08 NOTE — PROGRESS NOTES
Problem: Self Care Deficits Care Plan (Adult)  Goal: *Acute Goals and Plan of Care (Insert Text)  Description:     FUNCTIONAL STATUS PRIOR TO ADMISSION: Patient having difficulty verbalizing prior level of function. HOME SUPPORT: The patient lived with his wife. Occupational Therapy Goals  Initiated 2/3/2021  1. Patient will perform grooming standing at sink with supervision/set-up within 7 day(s). 2.  Patient will perform upper body dressing with minimal assistance/contact guard assist within 7 day(s). 3.  Patient will perform lower body dressing with minimal assistance/contact guard assist within 7 day(s). 4.  Patient will perform toilet transfers with supervision/set-up within 7 day(s). 5.  Patient will perform all aspects of toileting with minimal assistance/contact guard assist within 7 day(s). Outcome: Progressing Towards Goal    OCCUPATIONAL THERAPY TREATMENT  Patient: Chas Shaikh [de-identified] y.o. male)  Date: 2/8/2021  Diagnosis: CAD (coronary artery disease) [I25.10] <principal problem not specified>  Procedure(s) (LRB):  ON PUMP CORONARY ARTERY BYPASS GRAFT (CABG) x3, EVH, CARDIOPLEGIA, INTRAOPERATIVE CARLOS DONE BY DR. Joshua Nugent. EVH DONE BY MARIMAR PIMENTEL. (N/A) 6 Days Post-Op  Precautions: Sternal(move in the tube)  Chart, occupational therapy assessment, plan of care, and goals were reviewed. ASSESSMENT  Patient demonstrating improved attention to task and mental processing for command following, now that his BP is more stable with positional changes. He does demonstrate decreased safety awareness in regards to adherence to his move in the tub precautions during sit to stand transfers. Overall he was CGA to mod A of 2 for functional mobility and he was CGA to min A for standing grooming, UB dressing and toileting. No major LOB during standing ADLs or ambulation, he does lean posteriorly during sit to stand transfers.  At this time he is making slow progress with acute OT and will need SNF rehab after discharge. PLAN :  Patient continues to benefit from skilled intervention to address the above impairments. Continue treatment per established plan of care. to address goals. Recommendation for discharge: (in order for the patient to meet his/her long term goals)  Therapy up to 5 days/week in SNF setting           OBJECTIVE DATA SUMMARY:   Cognitive/Behavioral Status:  Neurologic State: Alert  Orientation Level: Oriented to person;Oriented to place;Oriented to situation  Cognition: Follows commands             Functional Mobility and Transfers for ADLs:  Bed Mobility:   Presented up in chair. Transfers:  Sit to Stand: Moderate assistance;Assist x2  Functional Transfers  Bathroom Mobility: Contact guard assistance(ambulating with a RW)  Toilet Transfer : Moderate assistance  Cues: Tactile cues provided;Verbal cues provided;Visual cues provided       Balance:  Sitting: Impaired; Without support  Sitting - Static: Good (unsupported)  Sitting - Dynamic: Fair (occasional)  Standing: Impaired; With support  Standing - Static: Constant support; Fair  Standing - Dynamic : Fair;Constant support    ADL Intervention:  Grooming  Washing Hands: Contact guard assistance(standing at sink)    Upper Body [address]: Minimum  assistance(Performed seated in chair. )    Toileting  Toileting Assistance: Independent;Minimum assistance  Bladder Hygiene: Contact guard assistance  Bowel Hygiene: Contact guard assistance  Clothing Management: Minimum assistance  Cues: Verbal cues provided    Activity Tolerance:   Fair       After treatment patient left in no apparent distress:   Sitting in chair and Call bell within reach    COMMUNICATION/COLLABORATION:   The patients plan of care was discussed with: Physical Therapist and Registered Nurse    RICKY Miranda/L  Time Calculation: 32 mins

## 2021-02-08 NOTE — DISCHARGE INSTRUCTIONS
[facility]    [address], [address] [facility]  Office- [phone]  Fax- [phone]  _____________________________________________________________  Dr. Cruz Staples Dr. Paullette Colt Dr. Patra Gutta PA-C Coleridge Angelucci NP Lanita Hilda PA-C BJ Brojan PA-C Alex Long PA-C           Name:Rory Ayon     Active Problems:    ASHD (arteriosclerotic heart disease) (1/15/2021)      CAD (coronary artery disease) (2/1/2021)      S/P CABG x 3 (2/2/2021)      Overview: CABG x 3 LIMA to LAD, RSVG to OM, RSVG to PDA      Right Endoscopic Saphenous Vein [place]        Discharge Date: 2/8/2021     Discharge to: Home      INSTRUCTIONS:  NO SMOKING OR TOBACCO PRODUCTS  Follow all the instructions in your discharge book  You may shower. Wash all incisions twice daily with mild soap and water. No lotions, ointments or powder. Wipe incision with Prevantics swab daily for 14 days. Call the office immediately for any redness, swelling, or drainage from your incision. Take your temperature daily and call for a temperature of 101 degrees or higher or for any symptoms that make you think you have and infection. Weigh yourself each morning. Call if you gain more than 5 pounds in 48 hours. Use the incentive spirometer 6-8 times a day-10 breaths each time. Use a pillow or your bear to splint your breastbone when coughing or sneezing. If you feel your breast bone clicking or popping, notify the office immediately. Walk several hundred feet several times daily. DIET  Eat an American Heart Association diet. If you are having trouble with your appetite, eat what you can. Try eating small, frequent meals throughout the day. Kcal ADA diet. Check your blood sugars  And keep a log of your results.     ACTIVITY  NO DRIVING--you will be evaluated to drive at your follow up visit. Increase your activity by walking several times a day. Stay out of bed most of the day. When sitting, keep your legs elevated. You may ride in a car, but you must get out every hour and walk around. If you ride in a car with an airbag that can not be switched off, put the seat ALL the way back or ride in the back seat. Any load bearing activity can be performed as long is it can be performed in the tube. You can reach out of the tube when performing activities that require heavy lifting. Let pain be your guide. Your pain level will keep you from doing anything extreme. FOLLOW UP           1. Your follow up appointment with your surgeon's PA or NP will be on 3/4/21 at 2:30pm. Office is located at [facility], [address] [place] [address], [address].            2. Please call our office at [phone] if you are unable to make this appointment. 3. Your appointment with Dr. Ed Dunn will be on 3/22/21 at 10:00am. [facility], [address] [place] [address]. Office phone [phone]. 4. Consult you primary care physician regarding your influenza & pneumovax vaccines. 5. PLEASE bring your medication bottles to each appointment. 6. Please call Cardiac Rehab at [phone] if you do not already have an appointment. 7. Please sign up for My Chart. CALL [phone] FOR ANY QUESTIONS OR PROBLEMS.     Signature:___________________________________________________

## 2021-02-08 NOTE — PROGRESS NOTES
[facility] Care Coordinator- Met with Jewell Wright,  reviewed plan of care and discussed potential discharge. Reinforced sternal precautions and encouraged continued use of the incentive spirometer. He is able to pull 1000ml. Began discharge teaching and encouraged him to verbalize. Reviewed goals for the day and discussed the  importance of increased activity and continued activity after discharge. Reviewed importance of wearing the red reminder bracelet and when to call MD. Gen Orta on right wrist. Will continue to follow for educational and emotional needs.  Doug Kawasaki, RN

## 2021-02-08 NOTE — PROGRESS NOTES
[place] Plan     *Disposition: [facility]  *Transport at d/c: S  *[facility]  *2nd IMM Letter    Pt is ready for d/c from a  standpoint. RN informed. [facility]:    Accepting physician: Dr. Belinda Moss  RN to call report: [phone]  Pt's room number: 2112    Care Management Interventions  PCP Verified by CM: Yes  Palliative Care Criteria Met (RRAT>21 & CHF Dx)?: No  Mode of Transport at Discharge: S([facility] to transport pt)  Hospital Transport Time of Discharge: [address] (CM Consult): Discharge Planning, Other(Pt is being d/c to [facility])  Discharge Durable Medical Equipment: No  Physical Therapy Consult: Yes  Occupational Therapy Consult: Yes  Speech Therapy Consult: No  Current Support Network: Lives with Spouse, Family Lives Nearby(Spouse is very supportive and involved. Dtr lives closed by and is also supportive)  Confirm Follow Up Transport: Family  The Plan for Transition of Care is Related to the Following Treatment Goals : Safe Discharge  The Patient and/or Patient Representative was Provided with a Choice of Provider and Agrees with the Discharge Plan?: Yes  Name of the Patient Representative Who was Provided with a Choice of Provider and Agrees with the Discharge Plan: Pt and pt's wife  Freedom of Choice List was Provided with Basic Dialogue that Supports the Patient's Individualized Plan of Care/Goals, Treatment Preferences and Shares the Quality Data Associated with the Providers?: Yes  Discharge Location  Discharge Placement: Rehab hospital/unit acute    PAM Ye   Care Manager [facility]  [phone]

## 2021-02-08 NOTE — PROGRESS NOTES
Cardiology Progress Note      2/8/2021 1100 AM     Admit Date: 2/1/2021    Admit Diagnosis: CAD (coronary artery disease) [I25.10]      Subjective:     Heavenly Collier is sitting up in the chair, no cardiac complaints. Continues to have increased .   WBC 12.3, H/H 9.4/30.3, K 3.9, Mg 2.2    Visit Vitals  BP (!) 103/48 (BP 1 Location: Right upper arm, BP Patient Position: Sitting)   Pulse (!) 106   Temp 98.6 °F (37 °C)   Resp 18   Ht 6' 1\" (1.854 m)   Wt 96 kg (211 lb 10.3 oz)   SpO2 98%   BMI 27.92 kg/m²       Current Facility-Administered Medications   Medication Dose Route Frequency    metoprolol tartrate (LOPRESSOR) tablet 25 mg  25 mg Oral Q12H    furosemide (LASIX) tablet 20 mg  20 mg Oral DAILY    enoxaparin (LOVENOX) injection 40 mg  40 mg SubCUTAneous Q24H    [START ON 2/9/2021] glimepiride (AMARYL) tablet 4 mg  4 mg Oral ACB    metFORMIN SR (FORTAMET) 500 mg tablet 1,000 mg  1,000 mg Oral ACB&D    amiodarone (CORDARONE) tablet 200 mg  200 mg Oral BID    acetaminophen (TYLENOL) tablet 650 mg  650 mg Oral Q6H PRN    insulin glargine (LANTUS) injection 14 Units  14 Units SubCUTAneous QHS    insulin lispro (HUMALOG) injection   SubCUTAneous AC&HS    glucose chewable tablet 16 g  4 Tab Oral PRN    dextrose (D50W) injection syrg 12.5-25 g  12.5-25 g IntraVENous PRN    glucagon (GLUCAGEN) injection 1 mg  1 mg IntraMUSCular PRN    albumin human 5% (BUMINATE) solution 12.5 g  12.5 g IntraVENous PRN    gabapentin (NEURONTIN) capsule 300 mg  300 mg Oral TID    traMADoL (ULTRAM) tablet 50 mg  50 mg Oral Q6H PRN    rosuvastatin (CRESTOR) tablet 20 mg  20 mg Oral QHS    pantoprazole (PROTONIX) tablet 40 mg  40 mg Oral ACB    sodium chloride (NS) flush 5-40 mL  5-40 mL IntraVENous Q8H    sodium chloride (NS) flush 5-40 mL  5-40 mL IntraVENous PRN    ferrous sulfate tablet 325 mg  1 Tab Oral DAILY WITH BREAKFAST    tamsulosin (FLOMAX) capsule 0.4 mg  0.4 mg Oral DAILY    oxyCODONE IR (ROXICODONE) tablet 5 mg  5 mg Oral Q4H PRN    oxyCODONE IR (ROXICODONE) tablet 10 mg  10 mg Oral Q4H PRN    naloxone (NARCAN) injection 0.4 mg  0.4 mg IntraVENous PRN    ondansetron (ZOFRAN) injection 4 mg  4 mg IntraVENous Q4H PRN    albuterol (PROVENTIL VENTOLIN) nebulizer solution 2.5 mg  2.5 mg Nebulization Q4H PRN    aspirin chewable tablet 81 mg  81 mg Oral DAILY    chlorhexidine (PERIDEX) 0.12 % mouthwash 10 mL  10 mL Oral Q12H    magnesium oxide (MAG-OX) tablet 400 mg  400 mg Oral BID    bisacodyL (DULCOLAX) suppository 10 mg  10 mg Rectal DAILY PRN    senna-docusate (PERICOLACE) 8.6-50 mg per tablet 1 Tab  1 Tab Oral BID    polyethylene glycol (MIRALAX) packet 17 g  17 g Oral DAILY    dextrose (D50W) injection syrg 12.5-25 g  12.5-25 g IntraVENous PRN         Objective:      Physical Exam:  Visit Vitals  BP (!) 103/48 (BP 1 Location: Right upper arm, BP Patient Position: Sitting)   Pulse (!) 106   Temp 98.6 °F (37 °C)   Resp 18   Ht 6' 1\" (1.854 m)   Wt 96 kg (211 lb 10.3 oz)   SpO2 98%   BMI 27.92 kg/m²     General Appearance:  Well developed, well nourished,alert and oriented x 3, and individual in no acute distress. Ears/Nose/Mouth/Throat:   Hearing grossly normal.         Neck: Supple. Chest:   Lungs clear anterior upper, diminished bilateral posterior    Cardiovascular:  Repid rate and regular rhythm, S1, S2 normal, no murmur. Abdomen:   Soft, non-tender, bowel sounds are active. Extremities: 2+ pitting edema bilateral    Skin: Warm and dry.  Median sternotomy CDI, well-approximated                Data Review:   Labs:    Recent Results (from the past 24 hour(s))   GLUCOSE, POC    Collection Time: 02/07/21 12:19 PM   Result Value Ref Range    Glucose (POC) 199 (H) 65 - 100 mg/dL    Performed by Zeetl, POC    Collection Time: 02/07/21  4:35 PM   Result Value Ref Range    Glucose (POC) 222 (H) 65 - 100 mg/dL    Performed by Zeetl, POC    Collection Time: 02/07/21  9:10 PM   Result Value Ref Range    Glucose (POC) 236 (H) 65 - 100 mg/dL    Performed by Liz Bishop PCT    METABOLIC PANEL, BASIC    Collection Time: 02/08/21  5:18 AM   Result Value Ref Range    Sodium 137 136 - 145 mmol/L    Potassium 3.9 3.5 - 5.1 mmol/L    Chloride 103 97 - 108 mmol/L    CO2 28 21 - 32 mmol/L    Anion gap 6 5 - 15 mmol/L    Glucose 202 (H) 65 - 100 mg/dL    BUN 23 (H) 6 - 20 MG/DL    Creatinine 1.19 0.70 - 1.30 MG/DL    BUN/Creatinine ratio 19 12 - 20      GFR est AA >60 >60 ml/min/1.73m2    GFR est non-AA 59 (L) >60 ml/min/1.73m2    Calcium 9.1 8.5 - 10.1 MG/DL   MAGNESIUM    Collection Time: 02/08/21  5:18 AM   Result Value Ref Range    Magnesium 2.2 1.6 - 2.4 mg/dL   CBC W/O DIFF    Collection Time: 02/08/21  5:18 AM   Result Value Ref Range    WBC 12.3 (H) 4.1 - 11.1 K/uL    RBC 3.36 (L) 4.10 - 5.70 M/uL    HGB 9.4 (L) 12.1 - 17.0 g/dL    HCT 30.3 (L) 36.6 - 50.3 %    MCV 90.2 80.0 - 99.0 FL    MCH 28.0 26.0 - 34.0 PG    MCHC 31.0 30.0 - 36.5 g/dL    RDW 14.0 11.5 - 14.5 %    PLATELET 127 780 - 675 K/uL    MPV 10.2 8.9 - 12.9 FL    NRBC 0.0 0  WBC    ABSOLUTE NRBC 0.00 0.00 - 0.01 K/uL   GLUCOSE, POC    Collection Time: 02/08/21  7:17 AM   Result Value Ref Range    Glucose (POC) 211 (H) 65 - 100 mg/dL    Performed by Stanley Rivera    EKG, 12 LEAD, SUBSEQUENT    Collection Time: 02/08/21  9:51 AM   Result Value Ref Range    Ventricular Rate 110 BPM    Atrial Rate 110 BPM    P-R Interval 172 ms    QRS Duration 108 ms    Q-T Interval 348 ms    QTC Calculation (Bezet) 470 ms    Calculated P Axis 34 degrees    Calculated R Axis 58 degrees    Calculated T Axis -157 degrees    Diagnosis       Sinus tachycardia  ST & T wave abnormality, consider inferior ischemia  Abnormal ECG  When compared with ECG of 03-FEB-2021 04:22,  WI interval has decreased  Vent.  rate has increased BY  57 BPM  Questionable change in QRS axis  ST no longer elevated in Lateral leads  T wave inversion now evident in Lateral leads  QT has lengthened     GLUCOSE, POC    Collection Time: 02/08/21 11:48 AM   Result Value Ref Range    Glucose (POC) 305 (H) 65 - 100 mg/dL    Performed by Shari Landau        Telemetry: normal sinus rhythm/ST  CXray: \"Bibasilar airspace disease with improvement on the right\"      Assessment:     Hospital Problems  Date Reviewed: 2/2/2021          Codes Class Noted POA    Tachycardia ICD-10-CM: R00.0  ICD-9-CM: 785.0  2/8/2021 Unknown        S/P CABG x 3 ICD-10-CM: Z95.1  ICD-9-CM: V45.81  2/2/2021 Unknown    Overview Signed 2/2/2021  4:15 PM by Caesar Mcmillan PA-C     CABG x 3 LIMA to LAD, RSVG to OM, RSVG to PDA  Right Endoscopic Saphenous Vein [place]             CAD (coronary artery disease) ICD-10-CM: I25.10  ICD-9-CM: 414.00  2/1/2021 Unknown        ASHD (arteriosclerotic heart disease) ICD-10-CM: I25.10  ICD-9-CM: 414.00  1/15/2021 Yes        Essential hypertension (Chronic) ICD-10-CM: I10  ICD-9-CM: 401.9  12/9/2015 Yes              Plan:     CAD s/p CABG:   Continue on ASA/Statin, BB    HTN: controlled, borderline low. On BB and lasix    HLD: continue on statin    DM:  Continue  on metformin, amaryl, and lantus. LE edema/venous reflux:   Improved  Continue lasix at decreased dose 20 mg daily. Cont compression hose    Elevated Cr: improved   Monitor BMP, trending down now 1.19     Acute post op resp insufficiency: on RA now  Encourage IS and ambulation  Repeat CXray does not show pulmonary edema, bibasilar airspace disease    Sinus tach:   Continue BB,amiodarone  EP consulted-plans for EKG and amio load noted    Alex Arnold NP   [facility]    2/8/2021         Patient seen, examined by me personally. Plan discussed as detailed. Agree with note as outlined by  NP. I confirm findings in history and physical exam. No additional findings noted. Agree with plan as outlined above.      Jarrett Harvey Mode Carpio MD

## 2021-02-08 NOTE — ROUTINE PROCESS
End of Shift Note Bedside shift change report given to Alan (oncoming nurse) by Lakisha Claire (offgoing nurse). Report included the following information SBAR, Kardex, Intake/Output, MAR, Recent Results, Med Rec Status, Cardiac Rhythm ST and Alarm Parameters Shift worked: 7p-7a Shift summary and any significant changes:  
 Uneventful night. Wire care and daily weight done. In a chair now Concerns for physician to address: None Zone phone for oncoming shift:  [phone] Activity: 
Activity Level: Up with Assistance Number times ambulated in hallways past shift: 0 Number of times OOB to chair past shift: 0 Cardiac:  
Cardiac Monitoring: Yes     
Cardiac Rhythm: Sinus tachycardia Access:  
Current line(s): PIV Genitourinary:  
Urinary status: voiding Respiratory:  
O2 Device: Room air Chronic home O2 use?: NO Incentive spirometer at bedside: YES Actual Volume (ml): 750 ml GI: 
Last Bowel Movement Date: 02/07/21 Current diet:  DIET CARDIAC Regular Passing flatus: YES Tolerating current diet: YES 
% Diet Eaten: 100 % Pain Management:  
Patient states pain is manageable on current regimen: YES Skin: 
Romulo Score: 16 Interventions: increase time out of bed Patient Safety: 
Fall Score: Total Score: 4 Interventions: bed/chair alarm, gripper socks and pt to call before getting OOB High Fall Risk: Yes Length of Stay: 
Expected LOS: 6d 0h 
Actual LOS: 7 Lakisha Claire

## 2021-02-08 NOTE — PROGRESS NOTES
1710 Attempt to call report to [phone] TRANSFER - OUT REPORT:    Verbal report given to [facility]) on [employer]  being transferred to [facility] (unit) for routine progression of care       Report consisted of patients Situation, Background, Assessment and   Recommendations(SBAR). Information from the following report(s) SBAR and Kardex was reviewed with the receiving nurse. Lines:   Peripheral IV 02/01/21 Right Antecubital (Active)   Site Assessment Clean 02/08/21 0728   Phlebitis Assessment 0 02/08/21 0728   Infiltration Assessment 0 02/08/21 0728   Dressing Status Clean, dry, & intact 02/08/21 0728   Dressing Type Transparent 02/08/21 0728   Hub Color/Line Status Green 02/08/21 0400   Action Taken Open ports on tubing capped 02/08/21 0400   Alcohol Cap Used Yes 02/08/21 0400       Peripheral IV 02/03/21 Left Forearm (Active)   Site Assessment Clean 02/08/21 0728   Phlebitis Assessment 0 02/08/21 0728   Infiltration Assessment 0 02/08/21 0728   Dressing Status Clean, dry, & intact 02/08/21 0728   Dressing Type Transparent 02/08/21 0728   Hub Color/Line Status Pink 02/08/21 0400   Action Taken Open ports on tubing capped 02/08/21 0400   Alcohol Cap Used Yes 02/08/21 0400        Opportunity for questions and clarification was provided. Patient transported with:  DAVID    1915 DAVID arrived and transported patient via stretcher. I have reviewed discharge instructions with the caregiver/daugher. The caregiver/daughter verbalized understanding .

## 2021-02-08 NOTE — CONSULTS
[address] [facility], [address] [facility]  [phone]        Date of  Admission: 2/1/2021  2:11 PM     Admission type:Urgent    Consult for: CAD (coronary artery disease) [I25.10]  Consult by: Dr Jaylon Oshea NP     Subjective:     Heavenly Collier is a [de-identified] y.o. male admitted for CAD (coronary artery disease) [I25.10]. PMHx of DM, HTN, hyperlipidemia, progressive FRIAS with severe disease on [facility], s/p CABG x3 2/2/21. Patient complains of  chest pain at sternal incision, aware of elevated HR at at times. He reports prior to CABG, he was short of breath on exertion with elevated HR at rest and with minimal movement. Previous treatment/evaluation includes Coronary Artery Bypass Graft, echocardiogram and cardiac catheterization . Cardiac risk factors: family history, dyslipidemia, diabetes mellitus, sedentary life style, male gender, hypertension.     Patient Active Problem List    Diagnosis Date Noted    S/P CABG x 3 02/02/2021    CAD (coronary artery disease) 02/01/2021    S/P cardiac cath 01/22/2021    ASHD (arteriosclerotic heart disease) 01/15/2021    Type 2 diabetes with nephropathy ([address] 03/12/2018    Bilateral carotid artery stenosis 03/08/2018    Thrombotic stroke involving right middle cerebral artery ([address] 03/08/2018    CVA (cerebral vascular accident) ([address] 03/06/2018    TIA (transient ischemic attack) 03/06/2018    Diabetes mellitus without complication ([address] 87/67/3775    Essential hypertension 12/09/2015    Hypercholesterolemia 11/29/2010    Diabetes ([address] 11/29/2010      Frandy Warner MD  Past Medical History:   Diagnosis Date    Arthritis     Diabetes ([address]     Diabetic neuropathy ([address]     Hypercholesterolemia     Hypertension 11/29/10    Shingles 2010    Thromboembolus [facility])       Social History     Socioeconomic History    Marital status:      Spouse name: Not on file    Number of children: 4    Years of education: Not on file   [facility] Highest education level: Not on file   Tobacco Use    Smoking status: Former Smoker     Packs/day: 0.50     Years: 20.00     Pack years: 10.00     Types: Cigarettes     Quit date: 2000     Years since quittin.1    Smokeless tobacco: Never Used    Tobacco comment: 15 years ago   Substance and Sexual Activity    Alcohol use: Yes     Comment: occ.  Drug use: Not Currently    Sexual activity: Never   Social History Narrative    , 4 children.  Retired from [employer] in  after 25 years     Allergies   Allergen Reactions    Lipitor [Atorvastatin] Diarrhea      Family History   Problem Relation Age of Onset    Stroke Father     Heart Disease Brother     Cancer Brother       Current Facility-Administered Medications   Medication Dose Route Frequency    metoprolol tartrate (LOPRESSOR) tablet 25 mg  25 mg Oral Q12H    furosemide (LASIX) tablet 20 mg  20 mg Oral DAILY    enoxaparin (LOVENOX) injection 40 mg  40 mg SubCUTAneous Q24H    acetaminophen (TYLENOL) tablet 650 mg  650 mg Oral Q6H PRN    glimepiride (AMARYL) tablet 2 mg  2 mg Oral ACB    insulin glargine (LANTUS) injection 14 Units  14 Units SubCUTAneous QHS    insulin lispro (HUMALOG) injection   SubCUTAneous AC&HS    glucose chewable tablet 16 g  4 Tab Oral PRN    dextrose (D50W) injection syrg 12.5-25 g  12.5-25 g IntraVENous PRN    glucagon (GLUCAGEN) injection 1 mg  1 mg IntraMUSCular PRN    albumin human 5% (BUMINATE) solution 12.5 g  12.5 g IntraVENous PRN    gabapentin (NEURONTIN) capsule 300 mg  300 mg Oral TID    traMADoL (ULTRAM) tablet 50 mg  50 mg Oral Q6H PRN    rosuvastatin (CRESTOR) tablet 20 mg  20 mg Oral QHS    pantoprazole (PROTONIX) tablet 40 mg  40 mg Oral ACB    sodium chloride (NS) flush 5-40 mL  5-40 mL IntraVENous Q8H    sodium chloride (NS) flush 5-40 mL  5-40 mL IntraVENous PRN    ferrous sulfate tablet 325 mg  1 Tab Oral DAILY WITH BREAKFAST    tamsulosin (FLOMAX) capsule 0.4 mg 0.4 mg Oral DAILY    oxyCODONE IR (ROXICODONE) tablet 5 mg  5 mg Oral Q4H PRN    oxyCODONE IR (ROXICODONE) tablet 10 mg  10 mg Oral Q4H PRN    naloxone (NARCAN) injection 0.4 mg  0.4 mg IntraVENous PRN    ondansetron (ZOFRAN) injection 4 mg  4 mg IntraVENous Q4H PRN    albuterol (PROVENTIL VENTOLIN) nebulizer solution 2.5 mg  2.5 mg Nebulization Q4H PRN    aspirin chewable tablet 81 mg  81 mg Oral DAILY    chlorhexidine (PERIDEX) 0.12 % mouthwash 10 mL  10 mL Oral Q12H    magnesium oxide (MAG-OX) tablet 400 mg  400 mg Oral BID    bisacodyL (DULCOLAX) suppository 10 mg  10 mg Rectal DAILY PRN    senna-docusate (PERICOLACE) 8.6-50 mg per tablet 1 Tab  1 Tab Oral BID    polyethylene glycol (MIRALAX) packet 17 g  17 g Oral DAILY    dextrose (D50W) injection syrg 12.5-25 g  12.5-25 g IntraVENous PRN         Review of Symptoms:  Constitutional: negative  Eyes: negative  Ears, nose, mouth, throat, and face: negative  Respiratory: dyspnea  Cardiovascular:  Sternal incision discomfort  Gastrointestinal: negative  Genitourinary:negative  Musculoskeletal:negative  Neurological: negative  Endocrine: negative     Subjective:      Visit Vitals  BP (!) 116/56   Pulse (!) 111   Temp 98.9 °F (37.2 °C)   Resp 17   Ht 6' 1\" (1.854 m)   Wt 211 lb 10.3 oz (96 kg)   SpO2 99%   BMI 27.92 kg/m²       Physical:    General: WD, WN. Alert, cooperative, no acute distress  Heart: Regular, S1 WNL and S2 WNL.  No S3 or S4, no m/S3/JVD, no carotid bruits   Lungs: clear   Abdomen: Soft, +BS, NTND   Extremities: LE raquel +DP/PT, no edema   Neurologic: Grossly normal    Data Review:   Recent Labs     02/08/21  0518 02/07/21  0205 02/06/21  0439   WBC 12.3* 12.3* 11.5*   HGB 9.4* 9.4* 8.5*   HCT 30.3* 29.9* 26.4*    222 182     Recent Labs     02/08/21  0518 02/07/21  0205 02/06/21  0439    136 135*   K 3.9 4.2 4.0    103 104   CO2 28 26 26   * 190* 158*   BUN 23* 26* 21*   CREA 1.19 1.26 1.12   CA 9.1 9.2 9.1   MG 2.2 2.1 2.2       No results for input(s): TROIQ, CPK, CKMB in the last 72 hours. Intake/Output Summary (Last 24 hours) at 2/8/2021 0828  Last data filed at 2/8/2021 0728  Gross per 24 hour   Intake    Output 1350 ml   Net -1350 ml        Cardiographics    Telemetry:   ST (vs afl) 114    ECG: SB     Echocardiogram:   · LV: Estimated LVEF is 55 - 60%. Normal systolic function (ejection fraction normal) and diastolic function. Small left ventricle. Mild concentric hypertrophy. Wall motion: normal.  · LA: There is a small 10 mm x 6 mm spherical calcified undetermined mass within the left atrial appendage. Consider further imaging with CARLOS and/ or cardiac MRI if clinically indicated. · AV: Mild focal aortic valve leaflet calcification present without reduced excursion. Mild aortic valve regurgitation is present. · MV: Mitral valve non-specific thickening. · TV: Mild tricuspid valve regurgitation is present. Assessment:           Active Problems:    ASHD (arteriosclerotic heart disease) (1/15/2021)      CAD (coronary artery disease) (2/1/2021)      S/P CABG x 3 (2/2/2021)      Overview: CABG x 3 LIMA to LAD, RSVG to OM, RSVG to PDA      Right Endoscopic Saphenous Vein [place]         Plan:     Dee Fuentes is a pleasant [de-identified]year old male with hx  DM, HTN, hyperlipidemia, CABG x 3 2/2/21. He is on bb with ST vs atrial flutter rhythm. Bps soft. Labs stable post CABG. Will get EKG now and start amiodarone post CABG. May need [address] based on EKG. Thank you for this consult. Venus Pacheco Nine ANP    Patient seen and examined by me with nurse practitioner. I personally performed all components of the history, physical, and medical decision making and agree with the assessment and plan with minor modifications as noted. Sp cabg. Now with tachycardia. Some of the strips look like 2:1 afl with rvr. Will obtain ekg now. Will start amio load. Cont med rx for cad.     Mio Salazar MD, Trace Monsalve

## 2021-02-08 NOTE — PROGRESS NOTES
Spiritual Care Assessment/Progress Note  [facility]      NAME: Rosemary Ayers      MRN: [id number]  AGE: [de-identified] y.o. SEX: male  Oriental orthodox Affiliation: No Confucianism   Language: English     2/8/2021     Total Time (in minutes): 5     Spiritual Assessment begun in [facility] PROGRESSIVE CARE through conversation with:         []Patient        [] Family    [] Friend(s)        Reason for Consult: Initial/Spiritual assessment, patient floor     Spiritual beliefs: (Please include comment if needed)     [] Identifies with a aryan tradition:         [] Supported by a aryan community:            [] Claims no spiritual orientation:           [] Seeking spiritual identity:                [] Adheres to an individual form of spirituality:           [x] Not able to assess:                           Identified resources for coping:      [] Prayer                               [] Music                  [] Guided Imagery     [] Family/friends                 [] Pet visits     [] Devotional reading                         [x] Unknown     [] Other:                                      Interventions offered during this visit: (See comments for more details)    Patient Interventions: Initial visit           Plan of Care:     [] Support spiritual and/or cultural needs    [] Support AMD and/or advance care planning process      [] Support grieving process   [] Coordinate Rites and/or Rituals    [] Coordination with community clergy   [] No spiritual needs identified at this time   [] Detailed Plan of Care below (See Comments)  [] Make referral to Music Therapy  [] Make referral to Pet Therapy     [] Make referral to Addiction services  [] Make referral to [facility]  [] Make referral to Spiritual Care Partner  [] No future visits requested        [x] Follow up upon further referrals     Comments:     Initial Spiritual Care Assessment attempt fr Mr. Ayon in 2267.  Upon arrival, patient was not in the room, walked around the kory with Physical Therapist. Juan José Douglas gave him time and space for his treatment. Unable to assess.       [id number] [facility] Criselda Lewis., M.S., Th.M.  Spiritual Care Provider   Paging Service [phone])

## 2021-02-08 NOTE — CARDIO/PULMONARY
Cardiac Rehab Note: chart review/referral  
 
Consult has been acknowledged CABG 2/2/21 Smoking history assessed. Patient is a former smoker. Smoking Cessation Program link has not been added to the AVS. EF 55-60%  on 1/16/21 per echo CABG educational folder with \"Cardiac Surgery Post-Op Instructions\" book, heart healthy eating, warning signs, heart facts, heart aware, resources, and CABG Surgery booklet to [facility] on 2/8/21 Educated using teach back method. Reviewed the use of bear for sternal support, daily weight and temperature monitoring, showering restrictions, signs and symptoms of infection at surgery sites, daily walking and arm exercises, and use of incentive spirometer. Reviewed risk factors for CAD to include the following: family history, elevated BMI, hyperlipidemia, hypertension, diabetes, stress, and smoking. Discussed Heart Healthy/Low Sodium (less than 2000 mg.) diet. Emphasized the value of cardiac rehab. Discussed Cardiac Rehab Program format, benefits, and encouraged enrollment to assist with risk modification and management. Discharge planning still in process, not sure if [place] or IP Rehab. CP Rehab will continue to monitor progress and follow for scheduling OP CP Rehab. [facility] verbalized understanding with questions answered. CP Rehab will continue to follow for educational needs.

## 2021-02-08 NOTE — DIABETES MGMT
[facility]  [facility] FOR [facility]ABETES HEALTH    CLINICAL NURSE SPECIALIST CONSULT   Follow-up NOTE    Initial Presentation   Gavino Brooks is a [de-identified] y.o. male admitted 2/1/21 with dyspnea with exertion, accompanied by LE edema. .   Cardiac cath:  Prox LAD lesion, 80% stenosed. Mid LAD lesion, 85% stenosed. Dist LAD lesion, 90% stenosed. Left Circumflex   Prox Cx lesion, 80% stenosed. First Obtuse [place]   1st Mrg lesion, 70% stenosed. Right Coronary Artery   The vessel is moderate in size. The vessel is tortuous. Prox RCA lesion, 90% stenosed. Mid RCA lesion, 80% stenosed. Echo:  LV: Estimated LVEF is 55 - 60%. Normal systolic function (ejection fraction normal) and diastolic function. Small left ventricle. Mild concentric hypertrophy. Wall motion: normal.  Carotid doppler: There is mild stenosis in the right & left CCA. HX:   Past Medical History:   Diagnosis Date    Arthritis     Diabetes [facility]     Diabetic neuropathy ([facility]     Hypercholesterolemia     Hypertension 11/29/10    Shingles 2010    Thromboembolus ([facility]    Hip pain    DX: ASHD. TX: 2/2/21 CABG x 3 LIMA to LAD, RSVG to OM, RSVG to PDA. Right Endoscopic Saphenous Vein Chambers    Clinical Course   Current course has been uncomplicated. 2/3/21 02 NC. BG management via Glucostabilizer per protocol. Transferred out of .  2/4/21 Worked with PT and OT. Per CM, the plan is to discharge home with [facility]. C/o of some incisional soreness overnight. Chest tube in place. Pacer with external wires still intact. Low BP this morning after getting up to the chair; getting albumin now. 2/5/21 Alert. Eating well per nursing. CT out. Low BP persists. Albumin +.   2/8/21 uneventful nights over the weekend. Working with therapy well. Bps soft at times. amio PO started this morning. Diabetes    Patient has known Type 2 diabetes, treated with oral agents PTA. Family history negative for diabetes.    Admission  and A1c 7% indicate good diabetes control. Consulted by Provider for advanced diabetes nursing assessment and care, specifically related to   [x] Inpatient management strategy    Diabetes-related medical history  Neurological complications  Peripheral neuropathy  Macrovascular disease  CAD, stroke (2018)    Diabetes medication history  Drug class Currently in use Discontinued Never used   Biguanide Metformin 1000mg twice daily     DDP-4 inhibitor       Sulfonylurea Glimiperide 4mg D     Thiazolidinedione      GLP-1 RA      SGLT-2 inhibitors      Basal insulin      Bolus insulin      Fixed Dose  Combinations        Subjective   I've been eating really well.     Patient reports the following home diabetes self-care practices:  Eating pattern-Wife prepares some meals; daughter comes on weekends to cook. [x] Lunch  Cereal with banana. Eggs & sausage with toast (weekend). [x] Dinner  Beans with ham. Applesauce/banana/peaches. Cabbage. [x] Beverages Water. Physical activity pattern  [x] Non-Aerobic exercise   Monitoring pattern  [x] Breakfast 80-130s  [x] Dinner  110s  Taking medications pattern  [x] Consistent administration  [x] Affordable     Objective   Physical exam  General Alert, oriented and in no acute distress. Sitting up in the chair eating lunch. Vital Signs Afebrile. ST. Hypotensive. Visit Vitals  BP (!) 103/48 (BP 1 Location: Right upper arm, BP Patient Position: Sitting)   Pulse (!) 106   Temp 98.6 °F (37 °C)   Resp 18   Ht 6' 1\" (1.854 m)   Wt 96 kg (211 lb 10.3 oz)   SpO2 98%   BMI 27.92 kg/m²     Laboratory  Tests 2/8 2/5 2/4 2/3 2/2 2/1/21   A1c      7%    162 86 87 138 146   Anion gap 6 8 7 4 7 5   Serum triglycerides         WBC 12.3 8.6 8.1 8.3 9.5 8.5   Serum creatinine 1.19 1.36 1.68 1.42 1.25 1.46   GFR >60 >60 48 58 >60 56   AST  73 46 43 51 29   ALT  14 13 16 18 28   CXR:   There is no pleural effusion. .Left basilar atelectasis and interstitial opacity not changed.     No pneumothorax   2/5/21 CXR: Diminished lung volumes. Pleural and mediastinal drains are stable. Minimal atelectasis. 2/8/21 CXR: Bibasilar airspace disease with improvement on the right. Factors impacting BG management  Factor Dose Comments   Nutrition:  Cardiac regular diet       Great appetite    Drugs:  Blood transfusion(s)   PRBCs 2//2/21   A1cs inaccurate after this date. Pain Scheduled Neurontin  Oxycodone and ultram prn Rated @0. Blood glucose pattern        Assessment and Plan   Nursing Diagnosis Risk for unstable blood glucose pattern   Nursing Intervention Domain 5252 Decision-making Support   Nursing Interventions Examined current inpatient diabetes control   Explored factors facilitating and impeding inpatient management  Explored corrective strategies with patient and responsible inpatient provider      Evaluation   This  male, with Type 2 diabetes, did achieve diabetes control prior to admission (PTA), as evidenced by admission BG of 139 and A1c of 7%. During this hospitalization, the patient has achieved inpatient blood glucose target of 100-180mg/dl via Glucostabilizer. Transitioned off of the insulin infusion 2/4/21. BGs were rising the evening of 2/4/21, probably due to great appetite. A low dose of Lantus insulin was added for the weekend, until glimiperide could be advanced and metformin added back. Note today that Amaryl was increased back to 4mg Daily and Metformin has also been reordered to start this evening. Kidney function remains intact and appetite is very good. -230s with the lunch time BG today of 305. Suspect that he consumed extra CHO during breakfast meal as he does not have a diabetic restriction on his diet. Also suspect that with the addition of the his combination oral therapy like he takes at home the long acting can be titrated off.      Recommendations      Continue amaryl 4mg daily and metformin 1000mg BID since these were his PTA medications     Needs diabetic restriction on his ordered diet     Lantus insulin 14 units D     Use HIGH sensitivity corrective scale     Billing Code(s)   [] 66916 IP subsequent hospital care - 35 minutes [] 76691 Prolonged Services - 65 minutes [] 86822 Prolonged Services - 110 minutes  [x] 15072 IP subsequent hospital care - 25 minutes [] 45295 Prolonged Services - 55 minutes [] 22537 Prolonged Services - 100 minutes  [] 62162 IP subsequent hospital care - 15 minutes [] 52541 Prolonged Services - 45 minutes [] 54538 Prolonged Services - 90 minutes    Before making these care recommendations, I personally reviewed the hospitalization record, including notes, laboratory & diagnostic data and current medications, and examined the patient at the bedside (circumstances permitting) before making care recommendations.      Total minutes: 1910 Severo Sargent MSN, RN, ACCNS-  Diabetes Clinical Nurse Specialist  Program for Diabetes Health  Access via [facility]

## 2021-02-09 LAB
ATRIAL RATE: 110 BPM
CALCULATED P AXIS, ECG09: 34 DEGREES
CALCULATED R AXIS, ECG10: 58 DEGREES
CALCULATED T AXIS, ECG11: -157 DEGREES
DIAGNOSIS, 93000: NORMAL
P-R INTERVAL, ECG05: 172 MS
Q-T INTERVAL, ECG07: 348 MS
QRS DURATION, ECG06: 108 MS
QTC CALCULATION (BEZET), ECG08: 470 MS
VENTRICULAR RATE, ECG03: 110 BPM

## 2021-02-11 ENCOUNTER — TELEPHONE (OUTPATIENT)
Dept: CARDIOLOGY CLINIC | Age: 81
End: 2021-02-11

## 2021-02-11 NOTE — TELEPHONE ENCOUNTER
When someone has a stroke and there is concerns for ongoing heart rhythm issues that can cause another stroke like atrial fib or aflutter a loop recorder which is very small can be placed to record abnormal heart beats.  It helps us prevent a stroke, better manage any heart rhythm issues

## 2021-02-11 NOTE — TELEPHONE ENCOUNTER
Please call patients daughter [facility]) [phone].     Needs additional information about procedure inplant loop recorder what and why is this necessary      Thanks  Ute Elliott

## 2021-02-11 NOTE — TELEPHONE ENCOUNTER
Spoke with Rocio. Advised she was not on patients PHI so I cannot disclose any information regarding patient. Elisabeth Coates, patients wife, is on it, so Rocio asked that I call and speak with her. Rocio does advise Wyijeoma Jaxon may not understand however. So they want to know details about the loop recorder and why it is needed. Please advise.

## 2021-03-03 ENCOUNTER — TELEPHONE (OUTPATIENT)
Dept: FAMILY MEDICINE CLINIC | Age: 81
End: 2021-03-03

## 2021-03-03 NOTE — TELEPHONE ENCOUNTER
Ginger Amato ( occu. ther. ) with [address] [place] [address] would like to get a verbal order to continue working with patient twice a week for three weeks sh can be reached @ [phone]

## 2021-03-04 ENCOUNTER — OFFICE VISIT (OUTPATIENT)
Dept: CARDIOTHORACIC SURGERY | Age: 81
End: 2021-03-04
Payer: MEDICARE

## 2021-03-04 VITALS
OXYGEN SATURATION: 96 % | SYSTOLIC BLOOD PRESSURE: 143 MMHG | RESPIRATION RATE: 14 BRPM | HEART RATE: 65 BPM | TEMPERATURE: 98.7 F | DIASTOLIC BLOOD PRESSURE: 60 MMHG

## 2021-03-04 DIAGNOSIS — Z95.1 S/P CABG X 3: Primary | ICD-10-CM

## 2021-03-04 PROCEDURE — 99024 POSTOP FOLLOW-UP VISIT: CPT | Performed by: NURSE PRACTITIONER

## 2021-03-04 NOTE — PROGRESS NOTES
Patient: Moose Anders   Age: [de-identified] y.o. Patient Care Team:  Noman Ponce MD as PCP - Beena Woodruff MD as PCP - [facility] Provider  Bill Lopez MD (Cardiology)  Marti Martínez MD (Cardiothoracic Surgery)    Diagnosis: The encounter diagnosis was S/P CABG x 3. 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 Simeon Guillaume. Pt reports miinimal activity since being home, stating he walks around the house but is not walking outside yet. Good appetite, issues with diarrhea informed to stop bowel medications. Pt states he is no longer taking insulin, he said he has been off for 3 years. Appears to be a poor historian, unable to tell me clearly if he is currently checking is blood sugars at home. He did say he is taking his orals diabetes medications. As for other medications, pt is not sure what he is taking. His son is here with him and does not know either. I will print new list and have the son call tomorrow to review his medications properly. Denies CP. Mild SOB with exertion. Current Medications:   Current Outpatient Medications   Medication Sig Dispense Refill    furosemide (LASIX) 20 mg tablet Take 1 Tab by mouth daily.  30 Tab 1    gabapentin (NEURONTIN) 300 mg capsule Take 1 Cap by mouth three (3) times daily. Max Daily Amount: 900 mg. 30 Cap 0    metoprolol tartrate (LOPRESSOR) 25 mg tablet Take 1 Tab by mouth every twelve (12) hours. 60 Tab 0    tamsulosin (FLOMAX) 0.4 mg capsule Take 1 Cap by mouth daily. 30 Cap 0    metFORMIN (GLUCOPHAGE) 1,000 mg tablet TAKE 1 TABLET BY MOUTH TWICE DAILY WITH MEALS 180 Tab 3    rosuvastatin (CRESTOR) 20 mg tablet One daily for cholesterol 90 Tab 3    glimepiride (AMARYL) 4 mg tablet One daily for diabetes 90 Tab 3    aspirin delayed-release 81 mg tablet Take 1 Tab by mouth daily. To prevent heart attack and stroke 30 Tab 12       Vitals: Blood pressure (!) 143/60, pulse 65, temperature 98.7 °F (37.1 °C), resp. rate 14, SpO2 96 %. Allergies: is allergic to lipitor [atorvastatin]. Physical Exam:  Wounds: healing    Lungs: Diminished bases to auscultation bilaterally    Heart: regular rate and rhythm, S1, S2 normal, no murmur, click, rub or gallop    Extremities: +3 pitting edema    Assessment/Plan:   1.  CAD s/p CABG: On ASA/Statin, BB  2.  HTN: On BB (HCTZ, aldactone, valsartan PTA) preop. Cont BB   3. HLD: on statin   4. DM: on metformin and amaryl   5. LE edema/venous reflux: lasix to 20 mg daily ordered - unsure if pt is taking. Cont compression hose. 6. Urinary retention: Cont flomax - ordered but unsure if pt is taking    Pt is ready to start cardiac rehab.      Rehab - yes when [facility] finished  Walking: yes with walker  Glucometer: yes

## 2021-03-05 ENCOUNTER — TELEPHONE (OUTPATIENT)
Dept: CARDIOTHORACIC SURGERY | Age: 81
End: 2021-03-05

## 2021-03-05 RX ORDER — LANOLIN ALCOHOL/MO/W.PET/CERES
325 CREAM (GRAM) TOPICAL
COMMUNITY
End: 2022-09-19

## 2021-03-05 RX ORDER — FUROSEMIDE 40 MG/1
40 TABLET ORAL DAILY
Qty: 5 TAB | Refills: 0 | Status: SHIPPED | OUTPATIENT
Start: 2021-03-05 | End: 2021-03-10 | Stop reason: SDUPTHER

## 2021-03-05 RX ORDER — POTASSIUM CHLORIDE 20 MEQ/1
20 TABLET, EXTENDED RELEASE ORAL DAILY
Qty: 5 TAB | Refills: 0 | Status: SHIPPED | OUTPATIENT
Start: 2021-03-05 | End: 2021-03-10 | Stop reason: SDUPTHER

## 2021-03-05 NOTE — PROGRESS NOTES
Spoke with son to confirm med rec. Pt is currently taking:    Iron 325 mg daily  Metoprolol 12.5 mg BID  Gabapentin 400 mg TID  Protonix 40 mg daily  Lasix 20 mg daily  Crestor 20 mg every night  Metformin 500 mg BID  ASA 81 mg daily. Will increase lasix to 40 mg x5 days and add potassium. Also use compression stockings daily. Cont to monitor LE edema.

## 2021-03-10 ENCOUNTER — OFFICE VISIT (OUTPATIENT)
Dept: FAMILY MEDICINE CLINIC | Age: 81
End: 2021-03-10
Payer: MEDICARE

## 2021-03-10 VITALS
SYSTOLIC BLOOD PRESSURE: 110 MMHG | OXYGEN SATURATION: 99 % | HEART RATE: 76 BPM | DIASTOLIC BLOOD PRESSURE: 63 MMHG | RESPIRATION RATE: 16 BRPM | WEIGHT: 212.8 LBS | HEIGHT: 73 IN | BODY MASS INDEX: 28.2 KG/M2 | TEMPERATURE: 98.2 F

## 2021-03-10 DIAGNOSIS — Z00.00 ENCOUNTER FOR MEDICARE ANNUAL WELLNESS EXAM: Primary | ICD-10-CM

## 2021-03-10 DIAGNOSIS — M25.552 CHRONIC LEFT HIP PAIN: ICD-10-CM

## 2021-03-10 DIAGNOSIS — G89.29 CHRONIC LEFT HIP PAIN: ICD-10-CM

## 2021-03-10 DIAGNOSIS — R60.9 EDEMA, UNSPECIFIED TYPE: ICD-10-CM

## 2021-03-10 PROCEDURE — G8427 DOCREV CUR MEDS BY ELIG CLIN: HCPCS | Performed by: FAMILY MEDICINE

## 2021-03-10 PROCEDURE — G8536 NO DOC ELDER MAL SCRN: HCPCS | Performed by: FAMILY MEDICINE

## 2021-03-10 PROCEDURE — G8752 SYS BP LESS 140: HCPCS | Performed by: FAMILY MEDICINE

## 2021-03-10 PROCEDURE — G8419 CALC BMI OUT NRM PARAM NOF/U: HCPCS | Performed by: FAMILY MEDICINE

## 2021-03-10 PROCEDURE — G8754 DIAS BP LESS 90: HCPCS | Performed by: FAMILY MEDICINE

## 2021-03-10 PROCEDURE — 99213 OFFICE O/P EST LOW 20 MIN: CPT | Performed by: FAMILY MEDICINE

## 2021-03-10 PROCEDURE — 1111F DSCHRG MED/CURRENT MED MERGE: CPT | Performed by: FAMILY MEDICINE

## 2021-03-10 PROCEDURE — G8510 SCR DEP NEG, NO PLAN REQD: HCPCS | Performed by: FAMILY MEDICINE

## 2021-03-10 PROCEDURE — G0463 HOSPITAL OUTPT CLINIC VISIT: HCPCS | Performed by: FAMILY MEDICINE

## 2021-03-10 PROCEDURE — 1101F PT FALLS ASSESS-DOCD LE1/YR: CPT | Performed by: FAMILY MEDICINE

## 2021-03-10 RX ORDER — POTASSIUM CHLORIDE 20 MEQ/1
20 TABLET, EXTENDED RELEASE ORAL DAILY
Qty: 90 TAB | Refills: 3 | Status: SHIPPED | OUTPATIENT
Start: 2021-03-10 | End: 2022-09-14

## 2021-03-10 RX ORDER — TAMSULOSIN HYDROCHLORIDE 0.4 MG/1
0.4 CAPSULE ORAL DAILY
Qty: 90 CAP | Refills: 3 | Status: SHIPPED | OUTPATIENT
Start: 2021-03-10 | End: 2022-06-13

## 2021-03-10 RX ORDER — METFORMIN HYDROCHLORIDE 1000 MG/1
TABLET ORAL
Qty: 180 TAB | Refills: 3 | Status: SHIPPED | OUTPATIENT
Start: 2021-03-10 | End: 2021-06-29

## 2021-03-10 RX ORDER — FUROSEMIDE 40 MG/1
40 TABLET ORAL DAILY
Qty: 90 TAB | Refills: 3 | Status: SHIPPED | OUTPATIENT
Start: 2021-03-10 | End: 2021-06-23

## 2021-03-10 RX ORDER — ACETAMINOPHEN AND CODEINE PHOSPHATE 300; 30 MG/1; MG/1
2 TABLET ORAL
Qty: 120 TAB | Refills: 5 | Status: SHIPPED | OUTPATIENT
Start: 2021-03-10 | End: 2021-03-13

## 2021-03-10 RX ORDER — ROSUVASTATIN CALCIUM 20 MG/1
TABLET, COATED ORAL
Qty: 90 TAB | Refills: 3 | Status: SHIPPED | OUTPATIENT
Start: 2021-03-10 | End: 2022-08-01 | Stop reason: SDUPTHER

## 2021-03-10 RX ORDER — METOPROLOL TARTRATE 25 MG/1
25 TABLET, FILM COATED ORAL EVERY 12 HOURS
Qty: 180 TAB | Refills: 3 | Status: SHIPPED | OUTPATIENT
Start: 2021-03-10 | End: 2022-06-13

## 2021-03-10 RX ORDER — GLIMEPIRIDE 4 MG/1
TABLET ORAL
Qty: 90 TAB | Refills: 3 | Status: SHIPPED | OUTPATIENT
Start: 2021-03-10 | End: 2022-06-13

## 2021-03-10 NOTE — PROGRESS NOTES
HISTORY OF PRESENT ILLNESS  Chas Shaikh is a 80 y.o. male. HPI In for medicare wellness exam. Needs blood pressure, cholesterol and diabetes checked also. Had a CABG 3 weeks ago, recovering from this. Still a little sore in chest. Breathing is doing ok unless he walks up steps. Seeing Dr. Doreen Avelar ([address] surgery), Dr. Errol Hamman (cardiology). UTD on immunizations except for COVID. Would want wife and daughter Homero Yuan to be his mPOAs if became incapacitated. Says that they tried ot increase lasix dose at [facility], but kidney tests were worsening, so they cut him back to one a day. Review of Systems   HENT: Negative for hearing loss. Neurological:        No falls. Independent in all adls. NO canes. Memory ok. Psychiatric/Behavioral: Negative for depression. No alcohol       Physical Exam  Vitals signs and nursing note reviewed. Constitutional:       Appearance: He is well-developed. HENT:      Right Ear: External ear normal.      Left Ear: External ear normal.   Neck:      Thyroid: No thyromegaly. Cardiovascular:      Rate and Rhythm: Normal rate and regular rhythm. Heart sounds: Normal heart sounds. Pulmonary:      Effort: Pulmonary effort is normal. No respiratory distress. Breath sounds: Normal breath sounds. No wheezing. Abdominal:      General: Bowel sounds are normal. There is no distension. Palpations: Abdomen is soft. There is no mass. Tenderness: There is no abdominal tenderness. There is no guarding. Musculoskeletal: Normal range of motion. Comments: 2-3 + edema bilaterally   Lymphadenopathy:      Cervical: No cervical adenopathy.          ASSESSMENT and PLAN  Orders Placed This Encounter    furosemide (LASIX) 40 mg tablet    potassium chloride (K-DUR, KLOR-CON) 20 mEq tablet    acetaminophen-codeine (TYLENOL #3) 300-30 mg per tablet    metoprolol tartrate (LOPRESSOR) 25 mg tablet    tamsulosin (FLOMAX) 0.4 mg capsule    metFORMIN (GLUCOPHAGE) 1,000 mg tablet    glimepiride (AMARYL) 4 mg tablet    rosuvastatin (CRESTOR) 20 mg tablet     Diagnoses and all orders for this visit:    1. Encounter for Medicare annual wellness exam    2. Chronic left hip pain  -     acetaminophen-codeine (TYLENOL #3) 300-30 mg per tablet; Take 2 Tabs by mouth every twelve (12) hours as needed for Pain for up to 3 days. Max Daily Amount: 4 Tabs. 3. Edema, unspecified type    Other orders  -     furosemide (LASIX) 40 mg tablet; Take 1 Tab by mouth daily. For fluid  -     potassium chloride (K-DUR, KLOR-CON) 20 mEq tablet; Take 1 Tab by mouth daily. -     metoprolol tartrate (LOPRESSOR) 25 mg tablet; Take 1 Tab by mouth every twelve (12) hours. For heart and blood pressure  -     tamsulosin (FLOMAX) 0.4 mg capsule; Take 1 Cap by mouth daily. To pass urine easier  -     metFORMIN (GLUCOPHAGE) 1,000 mg tablet; TAKE 1 TABLET BY MOUTH TWICE DAILY WITH MEALS for diabetes  -     glimepiride (AMARYL) 4 mg tablet; One daily for diabetes  -     rosuvastatin (CRESTOR) 20 mg tablet; One daily for cholesterol      Follow-up and Dispositions    · Return in about 3 months (around 6/10/2021).

## 2021-03-10 NOTE — PROGRESS NOTES
Chief Complaint   Patient presents with   [facility] Annual Wellness Visit     1. Have you been to the ER, urgent care clinic since your last visit? Hospitalized since your last visit? YES 2/12/21 - [facility]    2. Have you seen or consulted any other health care providers outside of the [address] [place] [address] since your last visit? Include any pap smears or colon screening.  Yes  [facility] - cardiology

## 2021-03-12 ENCOUNTER — IMMUNIZATION (OUTPATIENT)
Dept: INTERNAL MEDICINE CLINIC | Age: 81
End: 2021-03-12
Payer: MEDICARE

## 2021-03-12 DIAGNOSIS — Z23 ENCOUNTER FOR IMMUNIZATION: Primary | ICD-10-CM

## 2021-03-12 PROCEDURE — 91300 COVID-19, MRNA, LNP-S, PF, 30MCG/0.3ML DOSE(PFIZER): CPT | Performed by: FAMILY MEDICINE

## 2021-03-12 PROCEDURE — 0001A COVID-19, MRNA, LNP-S, PF, 30MCG/0.3ML DOSE(PFIZER): CPT | Performed by: FAMILY MEDICINE

## 2021-03-22 ENCOUNTER — OFFICE VISIT (OUTPATIENT)
Dept: CARDIOLOGY CLINIC | Age: 81
End: 2021-03-22
Payer: MEDICARE

## 2021-03-22 VITALS
HEIGHT: 73 IN | BODY MASS INDEX: 27.04 KG/M2 | SYSTOLIC BLOOD PRESSURE: 150 MMHG | DIASTOLIC BLOOD PRESSURE: 76 MMHG | OXYGEN SATURATION: 97 % | HEART RATE: 92 BPM | RESPIRATION RATE: 18 BRPM | WEIGHT: 204 LBS

## 2021-03-22 DIAGNOSIS — E78.00 HYPERCHOLESTEROLEMIA: ICD-10-CM

## 2021-03-22 DIAGNOSIS — I10 ESSENTIAL HYPERTENSION: ICD-10-CM

## 2021-03-22 DIAGNOSIS — I25.10 CORONARY ARTERY DISEASE INVOLVING NATIVE CORONARY ARTERY OF NATIVE HEART WITHOUT ANGINA PECTORIS: Primary | ICD-10-CM

## 2021-03-22 DIAGNOSIS — Z95.1 S/P CABG X 3: ICD-10-CM

## 2021-03-22 PROCEDURE — 93005 ELECTROCARDIOGRAM TRACING: CPT | Performed by: INTERNAL MEDICINE

## 2021-03-22 PROCEDURE — G8419 CALC BMI OUT NRM PARAM NOF/U: HCPCS | Performed by: INTERNAL MEDICINE

## 2021-03-22 PROCEDURE — 93010 ELECTROCARDIOGRAM REPORT: CPT | Performed by: INTERNAL MEDICINE

## 2021-03-22 PROCEDURE — 99214 OFFICE O/P EST MOD 30 MIN: CPT | Performed by: INTERNAL MEDICINE

## 2021-03-22 PROCEDURE — G8432 DEP SCR NOT DOC, RNG: HCPCS | Performed by: INTERNAL MEDICINE

## 2021-03-22 PROCEDURE — G8427 DOCREV CUR MEDS BY ELIG CLIN: HCPCS | Performed by: INTERNAL MEDICINE

## 2021-03-22 PROCEDURE — G8536 NO DOC ELDER MAL SCRN: HCPCS | Performed by: INTERNAL MEDICINE

## 2021-03-22 PROCEDURE — G8753 SYS BP > OR = 140: HCPCS | Performed by: INTERNAL MEDICINE

## 2021-03-22 PROCEDURE — 1101F PT FALLS ASSESS-DOCD LE1/YR: CPT | Performed by: INTERNAL MEDICINE

## 2021-03-22 PROCEDURE — G0463 HOSPITAL OUTPT CLINIC VISIT: HCPCS | Performed by: INTERNAL MEDICINE

## 2021-03-22 PROCEDURE — G8754 DIAS BP LESS 90: HCPCS | Performed by: INTERNAL MEDICINE

## 2021-03-22 NOTE — PROGRESS NOTES
Chief Complaint   Patient presents with   [facility] Follow Up     ED [facility] 2/1-2/8/21 - CABGX3 - Then [facility] til the 24th of Feb     Shortness of Breath     upon exertion      1. Have you been to the ER, urgent care clinic since your last visit? Hospitalized since your last visit? No     2. Have you seen or consulted any other health care providers outside of the [address] [place] [address] since your last visit? Include any pap smears or colon screening.  No

## 2021-03-22 NOTE — LETTER
3/22/2021 Patient: Jamaal Beltre YOB: 1940 Date of Visit: 3/22/2021 Frantz Pink MD 
[address] [place] [address] Via In [employer] Dear Frantz Pink MD, Thank you for referring Mr. Jamaal Beltre to [facility] [place] Andrew Sargent for evaluation. My notes for this consultation are attached. If you have questions, please do not hesitate to call me. I look forward to following your patient along with you. Sincerely, Tomeka Frazier MD

## 2021-03-22 NOTE — PROGRESS NOTES
Subjective/HPI: Chas Shaikh is a 80 y.o. male is here for a hospital f/u admitted 2/1 to 2/8 with CABG x3(LIMA to LAD, RSVG to OM, RSVG to PDA). He has a PMHx of DM, HTN, hyperlipidemia. He was d/c to [facility] for rehab and is now home. His SOB has persisted, but he is building strength again. Having some stinging in the diffuse chest related to his healing from surgery. Denies orthopnea, PND. Edema is improved on Lasix. Denies palpitations, lightheadedness or syncope. PCP Provider  Shahid Byrd MD    Past Medical History:   Diagnosis Date    Arthritis     [facility]     Diabetic neuropathy ([facility]     Hypercholesterolemia     Hypertension 11/29/10    Shingles 2010    Thromboembolus ([facility]         Allergies   Allergen Reactions    Lipitor [Atorvastatin] Diarrhea        Outpatient Encounter Medications as of 3/22/2021   Medication Sig Dispense Refill    furosemide (LASIX) 40 mg tablet Take 1 Tab by mouth daily. For fluid 90 Tab 3    potassium chloride (K-DUR, KLOR-CON) 20 mEq tablet Take 1 Tab by mouth daily. 90 Tab 3    metoprolol tartrate (LOPRESSOR) 25 mg tablet Take 1 Tab by mouth every twelve (12) hours. For heart and blood pressure 180 Tab 3    tamsulosin (FLOMAX) 0.4 mg capsule Take 1 Cap by mouth daily. To pass urine easier 90 Cap 3    metFORMIN (GLUCOPHAGE) 1,000 mg tablet TAKE 1 TABLET BY MOUTH TWICE DAILY WITH MEALS for diabetes 180 Tab 3    glimepiride (AMARYL) 4 mg tablet One daily for diabetes 90 Tab 3    rosuvastatin (CRESTOR) 20 mg tablet One daily for cholesterol 90 Tab 3    celso. stocking,knee,reg,xlrg (Comp Stocking, Knee,Reg, X-Lrg) misc Please provide 2 compression stockings for use daily. 2 Each 1    ferrous sulfate 325 mg (65 mg iron) tablet Take 325 mg by mouth Daily (before breakfast).  aspirin delayed-release 81 mg tablet Take 1 Tab by mouth daily.  To prevent heart attack and stroke 30 Tab 12     No facility-administered encounter medications on file as of 3/22/2021. Review of Symptoms:    Review of Systems   Constitutional: Negative. Negative for chills and fever. HENT: Negative. Negative for hearing loss. Respiratory: Positive for shortness of breath. Negative for cough and hemoptysis. Cardiovascular: Negative. Negative for chest pain, palpitations, orthopnea, claudication, leg swelling and PND. Gastrointestinal: Negative. Negative for nausea and vomiting. Musculoskeletal: Negative for myalgias. Skin: Negative. Negative for rash. Neurological: Negative. Negative for dizziness, loss of consciousness and headaches. Physical Exam:      General: Well developed, in no acute distress, cooperative and alert  HEENT: No carotid bruits, no JVD, trach is midline. Neck Supple, PEERL, EOM intact. Heart:  reg rate and rhythm; normal S1/S2; +PIETER, no gallops or rubs. Respiratory: Clear bilaterally x 4, no wheezing or rales  Abdomen:   Soft, non-tender, no distention, no masses. + BS. Extremities:  Normal cap refill, no cyanosis, atraumatic. No edema. Neuro: A&Ox3, speech clear, gait stable. Skin: Skin color is normal. No rashes or lesions. Non diaphoretic  Vascular: 2+ pulses symmetric in all extremities    Visit Vitals  BP (!) 150/76 (BP 1 Location: Left arm, BP Patient Position: Sitting, BP Cuff Size: Large adult)   Pulse 92   Resp 18   Ht 6' 1\" (1.854 m)   Wt 204 lb (92.5 kg)   SpO2 97%   BMI 26.91 kg/m²       ECG: sinus rhythm    Echo  ECHO:   · LV: Estimated LVEF is 55 - 60%. Normal systolic function (ejection fraction normal) and diastolic function. Small left ventricle. Mild concentric hypertrophy. Wall motion: normal.  · AV: Mild focal aortic valve leaflet calcification present without reduced excursion. Mild aortic valve regurgitation is present. · MV: Mitral valve non-specific thickening.   · TV: Mild tricuspid valve regurgitation is present.            Cardiology Labs:    Lab Results   Component Value Date/Time    Cholesterol, total 122 11/03/2020 02:27 PM    HDL Cholesterol 35 (L) 11/03/2020 02:27 PM    LDL, calculated 61 11/03/2020 02:27 PM    LDL, calculated 74 10/22/2019 04:45 PM    LDL, calculated 75 01/18/2019 11:41 AM    LDL, calculated 66 07/20/2018 01:02 PM    VLDL, calculated 26 11/03/2020 02:27 PM    VLDL, calculated 20 10/22/2019 04:45 PM    CHOL/HDL Ratio 3.4 03/07/2018 02:45 AM       Lab Results   Component Value Date/Time    Hemoglobin A1c 7.0 (H) 02/01/2021 03:27 PM    Hemoglobin A1c (POC) 6.5 10/22/2019 04:39 PM       Lab Results   Component Value Date/Time    Sodium 137 02/08/2021 05:18 AM    Potassium 3.9 02/08/2021 05:18 AM    Chloride 103 02/08/2021 05:18 AM    CO2 28 02/08/2021 05:18 AM    Glucose 202 (H) 02/08/2021 05:18 AM    BUN 23 (H) 02/08/2021 05:18 AM    Creatinine 1.19 02/08/2021 05:18 AM    BUN/Creatinine ratio 19 02/08/2021 05:18 AM    GFR est AA >60 02/08/2021 05:18 AM    GFR est non-AA 59 (L) 02/08/2021 05:18 AM    Calcium 9.1 02/08/2021 05:18 AM    Anion gap 6 02/08/2021 05:18 AM    Bilirubin, total 0.5 02/05/2021 05:44 AM    ALT (SGPT) 14 02/05/2021 05:44 AM    Alk. phosphatase 63 02/05/2021 05:44 AM    Protein, total 6.3 (L) 02/05/2021 05:44 AM    Albumin 3.2 (L) 02/05/2021 05:44 AM    Globulin 3.1 02/05/2021 05:44 AM    A-G Ratio 1.0 (L) 02/05/2021 05:44 AM          Assessment:       ICD-10-CM ICD-9-CM    1. Coronary artery disease involving native coronary artery of native heart without angina pectoris  I25.10 414.01    2. S/P CABG x 3  Z95.1 V45.81 AMB POC EKG ROUTINE W/ 12 LEADS, INTER & REP   3. Essential hypertension  I10 401.9    4. Hypercholesterolemia  E78.00 272.0         Plan:     1. CAD s/p CABG X3  Doing well now home from [facility] rehab. Energy improving, sob stable. EKG SR. He will call back with medications, unsure of what he is taking so we can update his list.    2. Essential hypertension  Mildly elevated but acceptable, will review medications when he calls. 3. Hypercholesterolemia  On statin    4. Episodes of ST vs AFL while inpt. D/C on Amiodarone. EKG SR. Should be completed with regimen. Will verify. F/U in 3 months      Darshan Hutchinson NP       [facility]    3/22/2021         Patient seen, examined by me personally. Plan discussed as detailed. Agree with note as outlined by  NP with modifications as noted. My independent physical exam reveals : Physical Exam   Constitutional: He is oriented to person, place, and time. He appears well-developed and well-nourished. Neck: Neck supple. Cardiovascular: Regular rhythm. Murmur heard. Pulmonary/Chest: Effort normal and breath sounds normal.   Musculoskeletal:         General: Edema present. Neurological: He is alert and oriented to person, place, and time. Psychiatric: He has a normal mood and affect. Nursing note and vitals reviewed. Recovering well from CABG. hwe is home. Sinus rhythm. Encouraged to continue physical activity. He will call back with his current medication list.No additional findings noted. Agree with plan as outlined above with modifications as noted.      Luis Fernando Cannon MD

## 2021-04-02 ENCOUNTER — IMMUNIZATION (OUTPATIENT)
Dept: INTERNAL MEDICINE CLINIC | Age: 81
End: 2021-04-02
Payer: MEDICARE

## 2021-04-02 DIAGNOSIS — Z23 ENCOUNTER FOR IMMUNIZATION: Primary | ICD-10-CM

## 2021-04-02 PROCEDURE — 91300 COVID-19, MRNA, LNP-S, PF, 30MCG/0.3ML DOSE(PFIZER): CPT | Performed by: FAMILY MEDICINE

## 2021-04-02 PROCEDURE — 0002A COVID-19, MRNA, LNP-S, PF, 30MCG/0.3ML DOSE(PFIZER): CPT | Performed by: FAMILY MEDICINE

## 2021-06-17 DIAGNOSIS — G89.29 CHRONIC LEFT HIP PAIN: ICD-10-CM

## 2021-06-17 DIAGNOSIS — M25.552 CHRONIC LEFT HIP PAIN: ICD-10-CM

## 2021-06-17 RX ORDER — ACETAMINOPHEN AND CODEINE PHOSPHATE 300; 30 MG/1; MG/1
2 TABLET ORAL 2 TIMES DAILY
Qty: 120 TABLET | Refills: 5 | Status: SHIPPED | OUTPATIENT
Start: 2021-06-17 | End: 2021-12-23

## 2021-06-23 ENCOUNTER — OFFICE VISIT (OUTPATIENT)
Dept: FAMILY MEDICINE CLINIC | Age: 81
End: 2021-06-23
Payer: MEDICARE

## 2021-06-23 VITALS
OXYGEN SATURATION: 98 % | TEMPERATURE: 98 F | DIASTOLIC BLOOD PRESSURE: 60 MMHG | HEART RATE: 63 BPM | RESPIRATION RATE: 16 BRPM | BODY MASS INDEX: 27.7 KG/M2 | WEIGHT: 209 LBS | HEIGHT: 73 IN | SYSTOLIC BLOOD PRESSURE: 130 MMHG

## 2021-06-23 DIAGNOSIS — R60.0 LOCALIZED EDEMA: ICD-10-CM

## 2021-06-23 DIAGNOSIS — R06.02 SHORTNESS OF BREATH: Primary | ICD-10-CM

## 2021-06-23 DIAGNOSIS — I10 ESSENTIAL HYPERTENSION: ICD-10-CM

## 2021-06-23 DIAGNOSIS — I25.84 CORONARY ARTERY DISEASE DUE TO CALCIFIED CORONARY LESION: ICD-10-CM

## 2021-06-23 DIAGNOSIS — I25.10 CORONARY ARTERY DISEASE DUE TO CALCIFIED CORONARY LESION: ICD-10-CM

## 2021-06-23 DIAGNOSIS — E11.9 DIABETES MELLITUS WITHOUT COMPLICATION (HCC): ICD-10-CM

## 2021-06-23 LAB — HBA1C MFR BLD HPLC: 8.1 %

## 2021-06-23 PROCEDURE — G8754 DIAS BP LESS 90: HCPCS | Performed by: FAMILY MEDICINE

## 2021-06-23 PROCEDURE — 3052F HG A1C>EQUAL 8.0%<EQUAL 9.0%: CPT | Performed by: FAMILY MEDICINE

## 2021-06-23 PROCEDURE — G8419 CALC BMI OUT NRM PARAM NOF/U: HCPCS | Performed by: FAMILY MEDICINE

## 2021-06-23 PROCEDURE — 99213 OFFICE O/P EST LOW 20 MIN: CPT | Performed by: FAMILY MEDICINE

## 2021-06-23 PROCEDURE — 1101F PT FALLS ASSESS-DOCD LE1/YR: CPT | Performed by: FAMILY MEDICINE

## 2021-06-23 PROCEDURE — 83036 HEMOGLOBIN GLYCOSYLATED A1C: CPT | Performed by: FAMILY MEDICINE

## 2021-06-23 PROCEDURE — G0463 HOSPITAL OUTPT CLINIC VISIT: HCPCS | Performed by: FAMILY MEDICINE

## 2021-06-23 PROCEDURE — G8752 SYS BP LESS 140: HCPCS | Performed by: FAMILY MEDICINE

## 2021-06-23 PROCEDURE — G8536 NO DOC ELDER MAL SCRN: HCPCS | Performed by: FAMILY MEDICINE

## 2021-06-23 PROCEDURE — G8510 SCR DEP NEG, NO PLAN REQD: HCPCS | Performed by: FAMILY MEDICINE

## 2021-06-23 PROCEDURE — G8427 DOCREV CUR MEDS BY ELIG CLIN: HCPCS | Performed by: FAMILY MEDICINE

## 2021-06-23 RX ORDER — FUROSEMIDE 40 MG/1
TABLET ORAL
Qty: 120 TABLET | Refills: 3 | Status: SHIPPED | OUTPATIENT
Start: 2021-06-23 | End: 2022-09-14

## 2021-06-23 RX ORDER — POLYETHYLENE GLYCOL 3350 17 G/17G
POWDER, FOR SOLUTION ORAL
Qty: 507 G | Refills: 5 | Status: SHIPPED | OUTPATIENT
Start: 2021-06-23

## 2021-06-23 NOTE — PROGRESS NOTES
Chief Complaint   Patient presents with    Hypertension    Cholesterol Problem    Diabetes    Follow-up     1. Have you been to the ER, urgent care clinic since your last visit? Hospitalized since your last visit? No    2. Have you seen or consulted any other health care providers outside of the [address] [place] [address] since your last visit? Include any pap smears or colon screening.  No

## 2021-06-23 NOTE — PROGRESS NOTES
HISTORY OF PRESENT ILLNESS  Carmelo Dinero is a 80 y.o. male. HPI Pt. Comes in for blood pressure, cholesterol, and diabetes check. Some dyspnea if does some work around the house. Also has problems with constipation. No blood in stools. Has bm every other day. tyl #3 works fairly well for pain. Also gets a crawling sensation on arms and face. ROS    Physical Exam  Vitals and nursing note reviewed. Constitutional:       Appearance: He is well-developed. HENT:      Right Ear: External ear normal.      Left Ear: External ear normal.   Neck:      Thyroid: No thyromegaly. Cardiovascular:      Rate and Rhythm: Normal rate and regular rhythm. Heart sounds: Normal heart sounds. Pulmonary:      Effort: Pulmonary effort is normal. No respiratory distress. Breath sounds: Normal breath sounds. No wheezing. Abdominal:      General: Bowel sounds are normal. There is no distension. Palpations: Abdomen is soft. There is no mass. Tenderness: There is no abdominal tenderness. There is no guarding. Musculoskeletal:         General: Normal range of motion. Comments: 2-3 + edema of lower extremities bilaterally   Lymphadenopathy:      Cervical: No cervical adenopathy. ASSESSMENT and PLAN  Orders Placed This Encounter    XR CHEST PA LAT    CBC WITH AUTOMATED DIFF    METABOLIC PANEL, COMPREHENSIVE    LIPID PANEL    AMB POC HEMOGLOBIN A1C    polyethylene glycol (MIRALAX) 17 gram/dose powder    furosemide (LASIX) 40 mg tablet     Diagnoses and all orders for this visit:    1. Shortness of breath  -     XR CHEST PA LAT; Future    2. Localized edema    3. Essential hypertension  -     CBC WITH AUTOMATED DIFF; Future  -     METABOLIC PANEL, COMPREHENSIVE; Future    4. Diabetes mellitus without complication (HCC)  -     AMB POC HEMOGLOBIN A1C    5. Coronary artery disease due to calcified coronary lesion  -     LIPID PANEL;  Future    Other orders  -     polyethylene glycol (MIRALAX) 17 gram/dose powder; One scoop daily for constipation  -     furosemide (LASIX) 40 mg tablet; 1-2 tabs po daily as needed for swelling in feet. Follow-up and Dispositions    · Return in about 3 months (around 9/23/2021).

## 2021-06-24 LAB
ALBUMIN SERPL-MCNC: 3.8 G/DL (ref 3.5–5)
ALBUMIN/GLOB SERPL: 1.1 {RATIO} (ref 1.1–2.2)
ALP SERPL-CCNC: 95 U/L (ref 45–117)
ALT SERPL-CCNC: 36 U/L (ref 12–78)
ANION GAP SERPL CALC-SCNC: 7 MMOL/L (ref 5–15)
AST SERPL-CCNC: 34 U/L (ref 15–37)
BASOPHILS # BLD: 0 K/UL (ref 0–0.1)
BASOPHILS NFR BLD: 1 % (ref 0–1)
BILIRUB SERPL-MCNC: 0.3 MG/DL (ref 0.2–1)
BUN SERPL-MCNC: 17 MG/DL (ref 6–20)
BUN/CREAT SERPL: 15 (ref 12–20)
CALCIUM SERPL-MCNC: 9.6 MG/DL (ref 8.5–10.1)
CHLORIDE SERPL-SCNC: 105 MMOL/L (ref 97–108)
CHOLEST SERPL-MCNC: 143 MG/DL
CO2 SERPL-SCNC: 27 MMOL/L (ref 21–32)
CREAT SERPL-MCNC: 1.12 MG/DL (ref 0.7–1.3)
DIFFERENTIAL METHOD BLD: ABNORMAL
EOSINOPHIL # BLD: 0.2 K/UL (ref 0–0.4)
EOSINOPHIL NFR BLD: 2 % (ref 0–7)
ERYTHROCYTE [DISTWIDTH] IN BLOOD BY AUTOMATED COUNT: 13.9 % (ref 11.5–14.5)
GLOBULIN SER CALC-MCNC: 3.6 G/DL (ref 2–4)
GLUCOSE SERPL-MCNC: 209 MG/DL (ref 65–100)
HCT VFR BLD AUTO: 37.8 % (ref 36.6–50.3)
HDLC SERPL-MCNC: 46 MG/DL
HDLC SERPL: 3.1 {RATIO} (ref 0–5)
HGB BLD-MCNC: 11.6 G/DL (ref 12.1–17)
IMM GRANULOCYTES # BLD AUTO: 0 K/UL (ref 0–0.04)
IMM GRANULOCYTES NFR BLD AUTO: 0 % (ref 0–0.5)
LDLC SERPL CALC-MCNC: 75.4 MG/DL (ref 0–100)
LYMPHOCYTES # BLD: 2.4 K/UL (ref 0.8–3.5)
LYMPHOCYTES NFR BLD: 32 % (ref 12–49)
MCH RBC QN AUTO: 26.7 PG (ref 26–34)
MCHC RBC AUTO-ENTMCNC: 30.7 G/DL (ref 30–36.5)
MCV RBC AUTO: 86.9 FL (ref 80–99)
MONOCYTES # BLD: 0.6 K/UL (ref 0–1)
MONOCYTES NFR BLD: 8 % (ref 5–13)
NEUTS SEG # BLD: 4.2 K/UL (ref 1.8–8)
NEUTS SEG NFR BLD: 57 % (ref 32–75)
NRBC # BLD: 0 K/UL (ref 0–0.01)
NRBC BLD-RTO: 0 PER 100 WBC
PLATELET # BLD AUTO: 260 K/UL (ref 150–400)
PMV BLD AUTO: 10.4 FL (ref 8.9–12.9)
POTASSIUM SERPL-SCNC: 4.2 MMOL/L (ref 3.5–5.1)
PROT SERPL-MCNC: 7.4 G/DL (ref 6.4–8.2)
RBC # BLD AUTO: 4.35 M/UL (ref 4.1–5.7)
SODIUM SERPL-SCNC: 139 MMOL/L (ref 136–145)
TRIGL SERPL-MCNC: 108 MG/DL (ref ?–150)
VLDLC SERPL CALC-MCNC: 21.6 MG/DL
WBC # BLD AUTO: 7.4 K/UL (ref 4.1–11.1)

## 2021-06-29 RX ORDER — METFORMIN HYDROCHLORIDE 1000 MG/1
TABLET ORAL
Qty: 225 TABLET | Refills: 3 | Status: SHIPPED | OUTPATIENT
Start: 2021-06-29 | End: 2022-09-14 | Stop reason: SDUPTHER

## 2021-08-03 PROBLEM — I25.10 CAD (CORONARY ARTERY DISEASE): Status: RESOLVED | Noted: 2021-02-01 | Resolved: 2021-08-03

## 2021-09-21 NOTE — PROGRESS NOTES
[address] [place] [address], [address] [facility]  [phone]     Subjective:      Cely Campbell is a 80 y.o. male is here for routine f/u. Pmhx CABG x3(LIMA to LAD, RSVG to OM, RSVG to PDA), DM, HTN, and hyperlipidemia. Last OV 3/22/2021. Today he reports that his sob is better. He is able to walk up steps without getting sob. He takes fluid pill once a day, sometimes he holds it because he doesn't want to have to use the bathroom too often. The patient denies chest pain/ shortness of breath, orthopnea, PND, LE edema, palpitations, syncope, or presyncope.        Patient Active Problem List    Diagnosis Date Noted    Tachycardia 02/08/2021    S/P CABG x 3 02/02/2021    S/P cardiac cath 01/22/2021    ASHD (arteriosclerotic heart disease) 01/15/2021    Type 2 diabetes with nephropathy ([address] 03/12/2018    Bilateral carotid artery stenosis 03/08/2018    Thrombotic stroke involving right middle cerebral artery ([address] 03/08/2018    CVA (cerebral vascular accident) ([address] 03/06/2018    TIA (transient ischemic attack) 03/06/2018    Diabetes mellitus without complication ([address] 28/78/0356    Essential hypertension 12/09/2015    Hypercholesterolemia 11/29/2010    Diabetes ([address] 11/29/2010      Jeni Schultz MD  Past Medical History:   Diagnosis Date    Arthritis     Diabetes ([address]     Diabetic neuropathy ([address]     Hypercholesterolemia     Hypertension 11/29/10    Shingles 2010    Thromboembolus [facility])       Past Surgical History:   Procedure Laterality Date    HX CARPAL TUNNEL RELEASE  left    HX HEART CATHETERIZATION  12/2005    Dr Demar Lombardi    HX ORTHOPAEDIC      left arm fracture    FL COLONOSCOPY FLX DX W/COLLJ SPEC WHEN PFRMD  11/12/2012          Allergies   Allergen Reactions    Lipitor [Atorvastatin] Diarrhea      Family History   Problem Relation Age of Onset    Stroke Father     Heart Disease Brother     Cancer Brother       Social History     Socioeconomic History    Marital status:      Spouse name: Not on file    Number of children: 3    Years of education: Not on file    Highest education level: Not on file   Occupational History    Not on file   Tobacco Use    Smoking status: Former Smoker     Packs/day: 0.50     Years: 20.00     Pack years: 10.00     Types: Cigarettes     Quit date: 2000     Years since quittin.7    Smokeless tobacco: Never Used    Tobacco comment: 15 years ago   Vaping Use    Vaping Use: Never used   Substance and Sexual Activity    Alcohol use: Yes     Comment: occ.  Drug use: Not Currently    Sexual activity: Never   Other Topics Concern    Not on file   Social History Narrative    , 4 children. Retired from [employer] in  after 25 years     [facility] Strain:     Difficulty of Paying Living Expenses:    Food Insecurity:     Worried About [facility] in the Last Year:    [address] in the Last Year:    Transportation Needs:     Lack of Transportation (Medical):      Lack of Transportation (Non-Medical):    Physical Activity:     Days of Exercise per Week:     Minutes of Exercise per Session:    Stress:     Feeling of Stress :    Social Connections:     Frequency of Communication with Friends and Family:     Frequency of Social Gatherings with Friends and Family:     Attends Hindu Services:     Active Member of Clubs or Organizations:     Attends Club or Organization Meetings:     Marital Status:    Intimate Partner Violence:     Fear of Current or Ex-Partner:     Emotionally Abused:     Physically Abused:     Sexually Abused:       Current Outpatient Medications   Medication Sig    metFORMIN (GLUCOPHAGE) 1,000 mg tablet TAKE 1 1/2 TABLETS in the am and 1 Tablet in the PM,  for diabetes    polyethylene glycol (MIRALAX) 17 gram/dose powder One scoop daily for constipation    furosemide (LASIX) 40 mg tablet 1-2 tabs po daily as needed for swelling in feet.  potassium chloride (K-DUR, KLOR-CON) 20 mEq tablet Take 1 Tab by mouth daily.  metoprolol tartrate (LOPRESSOR) 25 mg tablet Take 1 Tab by mouth every twelve (12) hours. For heart and blood pressure    tamsulosin (FLOMAX) 0.4 mg capsule Take 1 Cap by mouth daily. To pass urine easier    glimepiride (AMARYL) 4 mg tablet One daily for diabetes    rosuvastatin (CRESTOR) 20 mg tablet One daily for cholesterol    celso. stocking,knee,reg,xlrg (Comp Stocking, Knee,Reg, X-Lrg) misc Please provide 2 compression stockings for use daily.  ferrous sulfate 325 mg (65 mg iron) tablet Take 325 mg by mouth Daily (before breakfast).  aspirin delayed-release 81 mg tablet Take 1 Tab by mouth daily. To prevent heart attack and stroke     No current facility-administered medications for this visit. Review of Symptoms:  11 systems reviewed, negative other than as stated in the HPI    Physical ExamPhysical Exam:    There were no vitals filed for this visit. There is no height or weight on file to calculate BMI. General PE  Gen:  NAD  Mental Status - Alert. General Appearance - Not in acute distress. HEENT:  PERRL, no carotid bruits or JVD  Chest and Lung Exam   Inspection: Accessory muscles - No use of accessory muscles in breathing. Auscultation:   Breath sounds: - Normal.   Cardiovascular   Inspection: Jugular vein - Bilateral - Inspection Normal.   Palpation/Percussion:   Apical Impulse: - Normal.   Auscultation: Rhythm - Regular. Heart Sounds - S1 WNL and S2 WNL. No S3 or S4. Murmurs & Other Heart Sounds: Auscultation of the heart reveals - No Murmurs. Peripheral Vascular   Upper Extremity: Inspection - Bilateral - No Cyanotic nailbeds or Digital clubbing. Lower Extremity:   Palpation: Edema - Bilateral - No edema. Abdomen:   Soft, non-tender, bowel sounds are active.   Neuro: A&O times 3, CN and motor grossly WNL    Labs:   Lab Results   Component Value Date/Time    Cholesterol, total 143 06/23/2021 02:55 PM    Cholesterol, total 122 11/03/2020 02:27 PM    Cholesterol, total 132 10/22/2019 04:45 PM    Cholesterol, total 143 01/18/2019 11:41 AM    Cholesterol, total 130 07/20/2018 01:02 PM    HDL Cholesterol 46 06/23/2021 02:55 PM    HDL Cholesterol 35 (L) 11/03/2020 02:27 PM    HDL Cholesterol 38 (L) 10/22/2019 04:45 PM    HDL Cholesterol 42 01/18/2019 11:41 AM    HDL Cholesterol 39 (L) 07/20/2018 01:02 PM    LDL, calculated 75.4 06/23/2021 02:55 PM    LDL, calculated 61 11/03/2020 02:27 PM    LDL, calculated 74 10/22/2019 04:45 PM    LDL, calculated 75 01/18/2019 11:41 AM    LDL, calculated 66 07/20/2018 01:02 PM    LDL, calculated 57.2 03/07/2018 02:45 AM    Triglyceride 108 06/23/2021 02:55 PM    Triglyceride 148 11/03/2020 02:27 PM    Triglyceride 101 10/22/2019 04:45 PM    Triglyceride 128 01/18/2019 11:41 AM    Triglyceride 124 07/20/2018 01:02 PM    CHOL/HDL Ratio 3.1 06/23/2021 02:55 PM    CHOL/HDL Ratio 3.4 03/07/2018 02:45 AM    CHOL/HDL Ratio 3.9 05/24/2010 12:16 PM    CHOL/HDL Ratio 4.4 05/18/2009 01:06 PM     No results found for: CPK, CPKX, CPX  Lab Results   Component Value Date/Time    Sodium 139 06/23/2021 02:55 PM    Potassium 4.2 06/23/2021 02:55 PM    Chloride 105 06/23/2021 02:55 PM    CO2 27 06/23/2021 02:55 PM    Anion gap 7 06/23/2021 02:55 PM    Glucose 209 (H) 06/23/2021 02:55 PM    BUN 17 06/23/2021 02:55 PM    Creatinine 1.12 06/23/2021 02:55 PM    BUN/Creatinine ratio 15 06/23/2021 02:55 PM    GFR est AA >60 06/23/2021 02:55 PM    GFR est non-AA >60 06/23/2021 02:55 PM    Calcium 9.6 06/23/2021 02:55 PM    Bilirubin, total 0.3 06/23/2021 02:55 PM    Alk.  phosphatase 95 06/23/2021 02:55 PM    Protein, total 7.4 06/23/2021 02:55 PM    Albumin 3.8 06/23/2021 02:55 PM    Globulin 3.6 06/23/2021 02:55 PM    A-G Ratio 1.1 06/23/2021 02:55 PM    ALT (SGPT) 36 06/23/2021 02:55 PM       EKG:           Assessment:     Assessment:        ICD-10-CM ICD-9-CM    1. Coronary artery disease involving native coronary artery of native heart without angina pectoris  I25.10 414.01    2. S/P CABG x 3  Z95.1 V45.81    3. Essential hypertension  I10 401.9    4. Mixed hyperlipidemia  E78.2 272.2        No orders of the defined types were placed in this encounter. Plan:      1. CAD s/p CABG X3  CABG x3(LIMA to LAD, RSVG to OM, RSVG to PDA) on 2/4/2021  EF 50-55 % no significant valvular disease per echo in 2/2021  Stable. Continue ASA, BB, statin     2. Essential hypertension  Controlled with current therapy  Stable kidney fxn Serum Cr 0.99 in 9/2021     3. HLD  6/2021 LDL 75 On Rosuva 20 mg daily Labs and lipids per PCP     4. Episodes of ST vs AFL while inpt. D/C on Amiodarone---completed therapy     5.  DM  On oral agents, managed by PCP      Continue current care and f/u in 6 months with Dr Josesito Ingram, NP

## 2021-09-23 ENCOUNTER — OFFICE VISIT (OUTPATIENT)
Dept: FAMILY MEDICINE CLINIC | Age: 81
End: 2021-09-23
Payer: MEDICARE

## 2021-09-23 VITALS
RESPIRATION RATE: 16 BRPM | HEART RATE: 90 BPM | BODY MASS INDEX: 27.43 KG/M2 | WEIGHT: 207 LBS | DIASTOLIC BLOOD PRESSURE: 74 MMHG | TEMPERATURE: 96.6 F | OXYGEN SATURATION: 97 % | HEIGHT: 73 IN | SYSTOLIC BLOOD PRESSURE: 148 MMHG

## 2021-09-23 DIAGNOSIS — E11.9 CONTROLLED TYPE 2 DIABETES MELLITUS WITHOUT COMPLICATION, WITHOUT LONG-TERM CURRENT USE OF INSULIN (HCC): Primary | ICD-10-CM

## 2021-09-23 DIAGNOSIS — Z23 ENCOUNTER FOR IMMUNIZATION: ICD-10-CM

## 2021-09-23 DIAGNOSIS — E11.22 TYPE 2 DIABETES MELLITUS WITH STAGE 3A CHRONIC KIDNEY DISEASE, WITHOUT LONG-TERM CURRENT USE OF INSULIN (HCC): ICD-10-CM

## 2021-09-23 DIAGNOSIS — Z23 NEEDS FLU SHOT: ICD-10-CM

## 2021-09-23 DIAGNOSIS — N18.31 TYPE 2 DIABETES MELLITUS WITH STAGE 3A CHRONIC KIDNEY DISEASE, WITHOUT LONG-TERM CURRENT USE OF INSULIN (HCC): ICD-10-CM

## 2021-09-23 PROCEDURE — G8419 CALC BMI OUT NRM PARAM NOF/U: HCPCS | Performed by: FAMILY MEDICINE

## 2021-09-23 PROCEDURE — G0463 HOSPITAL OUTPT CLINIC VISIT: HCPCS | Performed by: FAMILY MEDICINE

## 2021-09-23 PROCEDURE — 1101F PT FALLS ASSESS-DOCD LE1/YR: CPT | Performed by: FAMILY MEDICINE

## 2021-09-23 PROCEDURE — 3052F HG A1C>EQUAL 8.0%<EQUAL 9.0%: CPT | Performed by: FAMILY MEDICINE

## 2021-09-23 PROCEDURE — 99213 OFFICE O/P EST LOW 20 MIN: CPT | Performed by: FAMILY MEDICINE

## 2021-09-23 PROCEDURE — G8536 NO DOC ELDER MAL SCRN: HCPCS | Performed by: FAMILY MEDICINE

## 2021-09-23 PROCEDURE — G8753 SYS BP > OR = 140: HCPCS | Performed by: FAMILY MEDICINE

## 2021-09-23 PROCEDURE — G8754 DIAS BP LESS 90: HCPCS | Performed by: FAMILY MEDICINE

## 2021-09-23 PROCEDURE — 90686 IIV4 VACC NO PRSV 0.5 ML IM: CPT | Performed by: FAMILY MEDICINE

## 2021-09-23 PROCEDURE — G8510 SCR DEP NEG, NO PLAN REQD: HCPCS | Performed by: FAMILY MEDICINE

## 2021-09-23 PROCEDURE — G8427 DOCREV CUR MEDS BY ELIG CLIN: HCPCS | Performed by: FAMILY MEDICINE

## 2021-09-23 PROCEDURE — 90694 VACC AIIV4 NO PRSRV 0.5ML IM: CPT | Performed by: FAMILY MEDICINE

## 2021-09-23 PROCEDURE — G0008 ADMIN INFLUENZA VIRUS VAC: HCPCS | Performed by: FAMILY MEDICINE

## 2021-09-23 NOTE — PROGRESS NOTES
Chief Complaint   Patient presents with    Hypertension    Cholesterol Problem    Diabetes    Follow-up     1. Have you been to the ER, urgent care clinic since your last visit? Hospitalized since your last visit? No    2. Have you seen or consulted any other health care providers outside of the [address] [place] [address] since your last visit? Include any pap smears or colon screening. No     Verbal Order received from Dr. Kolton Mclaughlin  for fluad given IM  in left arm.

## 2021-09-23 NOTE — PROGRESS NOTES
HISTORY OF PRESENT ILLNESS  Almita Sigala is a 80 y.o. male. HPI Pt. Comes in for blood pressure, cholesterol, and diabetes check. Has pain in R posterior hip when is out walking. Also present to a lesser degree on the left. Takes tyl #3- some relief. No chest pains. occ mild dyspnea on exertion. No orthopnes. ROS    Physical Exam  Vitals and nursing note reviewed. Constitutional:       Appearance: He is well-developed. HENT:      Right Ear: External ear normal.      Left Ear: External ear normal.   Neck:      Thyroid: No thyromegaly. Cardiovascular:      Rate and Rhythm: Normal rate and regular rhythm. Heart sounds: Normal heart sounds. Pulmonary:      Effort: Pulmonary effort is normal. No respiratory distress. Breath sounds: Normal breath sounds. No wheezing. Abdominal:      General: Bowel sounds are normal. There is no distension. Palpations: Abdomen is soft. There is no mass. Tenderness: There is no abdominal tenderness. There is no guarding. Musculoskeletal:         General: Normal range of motion. Comments: Hips- full rom bilaterally  slr positive bilatterally   Lymphadenopathy:      Cervical: No cervical adenopathy. ASSESSMENT and PLAN  Orders Placed This Encounter    Influenza Vaccine, QUAD, 65 Yrs +  IM  (Fluad 98704 )    METABOLIC PANEL, BASIC    HEMOGLOBIN A1C WITH EAG    Administration fee () for Medicare insured patients     Diagnoses and all orders for this visit:    1. Controlled type 2 diabetes mellitus without complication, without long-term current use of insulin ([facility])  -     METABOLIC PANEL, BASIC; Future  -     HEMOGLOBIN A1C WITH EAG; Future    2. Type 2 diabetes mellitus with stage 3a chronic kidney disease, without long-term current use of insulin ([facility]    3. Encounter for immunization  -     ADMIN INFLUENZA VIRUS VAC    4.  Needs flu shot  -     FLU (FLUAD QUAD INFLUENZA VACCINE,QUAD,ADJUVANTED)      Follow-up and Dispositions · Return in about 4 months (around 1/23/2022).

## 2021-09-23 NOTE — PATIENT INSTRUCTIONS
Vaccine Information Statement    Influenza (Flu) Vaccine (Inactivated or Recombinant): What You Need to Know    Many vaccine information statements are available in Romanian and other languages. See www.immunize.org/vis. Hojas de información sobre vacunas están disponibles en español y en muchos otros idiomas. Visite www.immunize.org/vis. 1. Why get vaccinated? Influenza vaccine can prevent influenza (flu). Flu is a contagious disease that spreads around the [facility] every year, usually between October and May. Anyone can get the flu, but it is more dangerous for some people. Infants and young children, people 72 years and older, pregnant people, and people with certain health conditions or a weakened immune system are at greatest risk of flu complications. Pneumonia, bronchitis, sinus infections, and ear infections are examples of flu-related complications. If you have a medical condition, such as heart disease, cancer, or diabetes, flu can make it worse. Flu can cause fever and chills, sore throat, muscle aches, fatigue, cough, headache, and runny or stuffy nose. Some people may have vomiting and diarrhea, though this is more common in children than adults. In an average year, thousands of people in the [facility] die from flu, and many more are hospitalized. Flu vaccine prevents millions of illnesses and flu-related visits to the doctor each year. 2. Influenza vaccines     CDC recommends everyone 6 months and older get vaccinated every flu season. Children 6 months through 6years of age may need 2 doses during a single flu season. Everyone else needs only 1 dose each flu season. It takes about 2 weeks for protection to develop after vaccination. There are many flu viruses, and they are always changing. Each year a new flu vaccine is made to protect against the influenza viruses believed to be likely to cause disease in the upcoming flu season.  Even when the vaccine doesnt exactly match these viruses, it may still provide some protection. Influenza vaccine does not cause flu. Influenza vaccine may be given at the same time as other vaccines. 3. Talk with your health care provider    Tell your vaccination provider if the person getting the vaccine:   Has had an allergic reaction after a previous dose of influenza vaccine, or has any severe, life-threatening allergies    Has ever had Guillain-Barré Syndrome (also called GBS)    In some cases, your health care provider may decide to postpone influenza vaccination until a future visit. Influenza vaccine can be administered at any time during pregnancy. People who are or will be pregnant during influenza season should receive inactivated influenza vaccine. People with minor illnesses, such as a cold, may be vaccinated. People who are moderately or severely ill should usually wait until they recover before getting influenza vaccine. Your health care provider can give you more information. 4. Risks of a vaccine reaction     Soreness, redness, and swelling where the shot is given, fever, muscle aches, and headache can happen after influenza vaccination.  There may be a very small increased risk of Guillain-Barré Syndrome (GBS) after inactivated influenza vaccine (the flu shot). Gris Maranda children who get the flu shot along with pneumococcal vaccine (PCV13) and/or DTaP vaccine at the same time might be slightly more likely to have a seizure caused by fever. Tell your health care provider if a child who is getting flu vaccine has ever had a seizure. People sometimes faint after medical procedures, including vaccination. Tell your provider if you feel dizzy or have vision changes or ringing in the ears. As with any medicine, there is a very remote chance of a vaccine causing a severe allergic reaction, other serious injury, or death. 5. What if there is a serious problem?     An allergic reaction could occur after the vaccinated person leaves the clinic. If you see signs of a severe allergic reaction (hives, swelling of the face and throat, difficulty breathing, a fast heartbeat, dizziness, or weakness), call 9-1-1 and get the person to the nearest hospital.    For other signs that concern you, call your health care provider. Adverse reactions should be reported to the Vaccine Adverse Event Reporting System (VAERS). Your health care provider will usually file this report, or you can do it yourself. Visit the VAERS website at www.vaers. [facility].gov or call 4-632.470.9264. VAERS is only for reporting reactions, and VAERS staff members do not give medical advice. 6. The National Vaccine Injury Compensation Program    The [facility] Vaccine Injury Compensation Program (VICP) is a federal program that was created to compensate people who may have been injured by certain vaccines. Claims regarding alleged injury or death due to vaccination have a time limit for filing, which may be as short as two years. Visit the VICP website at www.[facility].gov/vaccinecompensation or call 4-911.685.9017 to learn about the program and about filing a claim. 7. How can I learn more?  Ask your health care provider.  Call your local or state health department.  Visit the website of the Food and Drug Administration (FDA) for vaccine package inserts and additional information at www.fda.gov/vaccines-blood-biologics/vaccines.  Contact the Centers for Disease Control and Prevention (CDC):  - Call 7-243.303.5773 (1-800-CDC-INFO) or  - Visit CDCs influenza website at www.cdc.gov/flu. Vaccine Information Statement   Inactivated Influenza Vaccine   8/6/2021  42 FAYE Keating [id number]-19   Department of Health and Human Services  Centers for Disease Control and Prevention    Office Use Only

## 2021-09-24 LAB
ANION GAP SERPL CALC-SCNC: 5 MMOL/L (ref 5–15)
BUN SERPL-MCNC: 8 MG/DL (ref 6–20)
BUN/CREAT SERPL: 8 (ref 12–20)
CALCIUM SERPL-MCNC: 9.6 MG/DL (ref 8.5–10.1)
CHLORIDE SERPL-SCNC: 108 MMOL/L (ref 97–108)
CO2 SERPL-SCNC: 27 MMOL/L (ref 21–32)
CREAT SERPL-MCNC: 0.99 MG/DL (ref 0.7–1.3)
EST. AVERAGE GLUCOSE BLD GHB EST-MCNC: 137 MG/DL
GLUCOSE SERPL-MCNC: 80 MG/DL (ref 65–100)
HBA1C MFR BLD: 6.4 % (ref 4–5.6)
POTASSIUM SERPL-SCNC: 3.8 MMOL/L (ref 3.5–5.1)
SODIUM SERPL-SCNC: 140 MMOL/L (ref 136–145)

## 2021-09-28 ENCOUNTER — OFFICE VISIT (OUTPATIENT)
Dept: CARDIOLOGY CLINIC | Age: 81
End: 2021-09-28
Payer: MEDICARE

## 2021-09-28 VITALS
RESPIRATION RATE: 18 BRPM | DIASTOLIC BLOOD PRESSURE: 72 MMHG | BODY MASS INDEX: 27.55 KG/M2 | OXYGEN SATURATION: 99 % | HEIGHT: 73 IN | SYSTOLIC BLOOD PRESSURE: 142 MMHG | WEIGHT: 207.9 LBS | HEART RATE: 63 BPM

## 2021-09-28 DIAGNOSIS — E78.2 MIXED HYPERLIPIDEMIA: ICD-10-CM

## 2021-09-28 DIAGNOSIS — I25.10 CORONARY ARTERY DISEASE INVOLVING NATIVE CORONARY ARTERY OF NATIVE HEART WITHOUT ANGINA PECTORIS: Primary | ICD-10-CM

## 2021-09-28 DIAGNOSIS — Z95.1 S/P CABG X 3: ICD-10-CM

## 2021-09-28 DIAGNOSIS — I10 ESSENTIAL HYPERTENSION: ICD-10-CM

## 2021-09-28 PROCEDURE — 93010 ELECTROCARDIOGRAM REPORT: CPT | Performed by: NURSE PRACTITIONER

## 2021-09-28 PROCEDURE — 99214 OFFICE O/P EST MOD 30 MIN: CPT | Performed by: NURSE PRACTITIONER

## 2021-09-28 NOTE — PROGRESS NOTES
1. Have you been to the ER, urgent care clinic since your last visit? Hospitalized since your last visit? No    2. Have you seen or consulted any other health care providers outside of the [address] [place] [address] since your last visit? Include any pap smears or colon screening. No    Chief Complaint   Patient presents with    Coronary Artery Disease     6 mo appt. C/O SOB with exertion.

## 2022-02-14 ENCOUNTER — OFFICE VISIT (OUTPATIENT)
Dept: FAMILY MEDICINE CLINIC | Age: 82
End: 2022-02-14
Payer: MEDICARE

## 2022-02-14 VITALS
HEART RATE: 64 BPM | WEIGHT: 211.2 LBS | OXYGEN SATURATION: 100 % | SYSTOLIC BLOOD PRESSURE: 188 MMHG | BODY MASS INDEX: 27.99 KG/M2 | TEMPERATURE: 97.7 F | RESPIRATION RATE: 18 BRPM | DIASTOLIC BLOOD PRESSURE: 77 MMHG | HEIGHT: 73 IN

## 2022-02-14 DIAGNOSIS — N18.31 TYPE 2 DIABETES MELLITUS WITH STAGE 3A CHRONIC KIDNEY DISEASE, WITHOUT LONG-TERM CURRENT USE OF INSULIN (HCC): Primary | ICD-10-CM

## 2022-02-14 DIAGNOSIS — E11.22 TYPE 2 DIABETES MELLITUS WITH STAGE 3A CHRONIC KIDNEY DISEASE, WITHOUT LONG-TERM CURRENT USE OF INSULIN (HCC): Primary | ICD-10-CM

## 2022-02-14 DIAGNOSIS — E78.00 HYPERCHOLESTEROLEMIA: ICD-10-CM

## 2022-02-14 DIAGNOSIS — I10 ESSENTIAL HYPERTENSION: ICD-10-CM

## 2022-02-14 LAB
ALBUMIN SERPL-MCNC: 3.6 G/DL (ref 3.5–5)
ALBUMIN/GLOB SERPL: 0.9 {RATIO} (ref 1.1–2.2)
ALP SERPL-CCNC: 81 U/L (ref 45–117)
ALT SERPL-CCNC: 22 U/L (ref 12–78)
ANION GAP SERPL CALC-SCNC: 7 MMOL/L (ref 5–15)
AST SERPL-CCNC: 23 U/L (ref 15–37)
BASOPHILS # BLD: 0 K/UL (ref 0–0.1)
BASOPHILS NFR BLD: 1 % (ref 0–1)
BILIRUB SERPL-MCNC: 0.3 MG/DL (ref 0.2–1)
BUN SERPL-MCNC: 13 MG/DL (ref 6–20)
BUN/CREAT SERPL: 12 (ref 12–20)
CALCIUM SERPL-MCNC: 9.6 MG/DL (ref 8.5–10.1)
CHLORIDE SERPL-SCNC: 106 MMOL/L (ref 97–108)
CHOLEST SERPL-MCNC: 143 MG/DL
CO2 SERPL-SCNC: 28 MMOL/L (ref 21–32)
CREAT SERPL-MCNC: 1.05 MG/DL (ref 0.7–1.3)
CREAT UR-MCNC: 101 MG/DL
DIFFERENTIAL METHOD BLD: ABNORMAL
EOSINOPHIL # BLD: 0.2 K/UL (ref 0–0.4)
EOSINOPHIL NFR BLD: 2 % (ref 0–7)
ERYTHROCYTE [DISTWIDTH] IN BLOOD BY AUTOMATED COUNT: 13.4 % (ref 11.5–14.5)
EST. AVERAGE GLUCOSE BLD GHB EST-MCNC: 148 MG/DL
GLOBULIN SER CALC-MCNC: 4 G/DL (ref 2–4)
GLUCOSE SERPL-MCNC: 154 MG/DL (ref 65–100)
HBA1C MFR BLD: 6.8 % (ref 4–5.6)
HCT VFR BLD AUTO: 41.8 % (ref 36.6–50.3)
HDLC SERPL-MCNC: 45 MG/DL
HDLC SERPL: 3.2 {RATIO} (ref 0–5)
HGB BLD-MCNC: 12.7 G/DL (ref 12.1–17)
IMM GRANULOCYTES # BLD AUTO: 0 K/UL (ref 0–0.04)
IMM GRANULOCYTES NFR BLD AUTO: 1 % (ref 0–0.5)
LDLC SERPL CALC-MCNC: 79 MG/DL (ref 0–100)
LYMPHOCYTES # BLD: 2.1 K/UL (ref 0.8–3.5)
LYMPHOCYTES NFR BLD: 26 % (ref 12–49)
MCH RBC QN AUTO: 27.6 PG (ref 26–34)
MCHC RBC AUTO-ENTMCNC: 30.4 G/DL (ref 30–36.5)
MCV RBC AUTO: 90.9 FL (ref 80–99)
MICROALBUMIN UR-MCNC: 10.9 MG/DL
MICROALBUMIN/CREAT UR-RTO: 108 MG/G (ref 0–30)
MONOCYTES # BLD: 0.6 K/UL (ref 0–1)
MONOCYTES NFR BLD: 8 % (ref 5–13)
NEUTS SEG # BLD: 4.9 K/UL (ref 1.8–8)
NEUTS SEG NFR BLD: 62 % (ref 32–75)
NRBC # BLD: 0 K/UL (ref 0–0.01)
NRBC BLD-RTO: 0 PER 100 WBC
PLATELET # BLD AUTO: 278 K/UL (ref 150–400)
PMV BLD AUTO: 10.5 FL (ref 8.9–12.9)
POTASSIUM SERPL-SCNC: 4 MMOL/L (ref 3.5–5.1)
PROT SERPL-MCNC: 7.6 G/DL (ref 6.4–8.2)
RBC # BLD AUTO: 4.6 M/UL (ref 4.1–5.7)
SODIUM SERPL-SCNC: 141 MMOL/L (ref 136–145)
TRIGL SERPL-MCNC: 95 MG/DL (ref ?–150)
VLDLC SERPL CALC-MCNC: 19 MG/DL
WBC # BLD AUTO: 7.8 K/UL (ref 4.1–11.1)

## 2022-02-14 PROCEDURE — 99214 OFFICE O/P EST MOD 30 MIN: CPT | Performed by: FAMILY MEDICINE

## 2022-02-14 PROCEDURE — G0463 HOSPITAL OUTPT CLINIC VISIT: HCPCS | Performed by: FAMILY MEDICINE

## 2022-02-14 NOTE — PROGRESS NOTES
HISTORY OF PRESENT ILLNESS  Kamilla Da Silva is a 80 y.o. male. HPI Pt. Comes in for blood pressure, cholesterol, and diabetes check. No complaints of chest pain, shortness of breath, TIAs, claudication or edema. Takes tyl #3 for L hip pain. Past Medical History:   Diagnosis Date    Arthritis     CAD (coronary artery disease)     [facility]     Diabetes ([facility]     Diabetic neuropathy ([facility]     Hypercholesterolemia     Hypertension 11/29/10    Shingles 2010    [facility]     [facility]embolus ([facility]        Social History     Socioeconomic History    Marital status:     Number of children: 4   Tobacco Use    Smoking status: Former Smoker     Packs/day: 0.50     Years: 20.00     Pack years: 10.00     Types: Cigarettes     Quit date: 2000     Years since quittin.1    Smokeless tobacco: Never Used    Tobacco comment: 15 years ago   Vaping Use    Vaping Use: Never used   Substance and Sexual Activity    Alcohol use: Not Currently     Comment: occ.  Drug use: Not Currently    Sexual activity: Never   Social History Narrative    , 4 children. Retired from [employer] in  after 25 years       Current Outpatient Medications   Medication Sig Dispense Refill    metFORMIN (GLUCOPHAGE) 1,000 mg tablet TAKE 1 1/2 TABLETS in the am and 1 Tablet in the PM,  for diabetes 225 Tablet 3    polyethylene glycol (MIRALAX) 17 gram/dose powder One scoop daily for constipation 507 g 5    furosemide (LASIX) 40 mg tablet 1-2 tabs po daily as needed for swelling in feet. 120 Tablet 3    potassium chloride (K-DUR, KLOR-CON) 20 mEq tablet Take 1 Tab by mouth daily. 90 Tab 3    metoprolol tartrate (LOPRESSOR) 25 mg tablet Take 1 Tab by mouth every twelve (12) hours. For heart and blood pressure 180 Tab 3    tamsulosin (FLOMAX) 0.4 mg capsule Take 1 Cap by mouth daily.  To pass urine easier 90 Cap 3    glimepiride (AMARYL) 4 mg tablet One daily for diabetes 90 Tab 3    rosuvastatin (CRESTOR) 20 mg tablet One daily for cholesterol 90 Tab 3    celso. stocking,knee,reg,xlrg (Comp Stocking, Knee,Reg, X-Lrg) misc Please provide 2 compression stockings for use daily. 2 Each 1    ferrous sulfate 325 mg (65 mg iron) tablet Take 325 mg by mouth Daily (before breakfast).  aspirin delayed-release 81 mg tablet Take 1 Tab by mouth daily. To prevent heart attack and stroke 30 Tab 12             ROS    Physical Exam  Vitals and nursing note reviewed. Constitutional:       Appearance: He is well-developed. HENT:      Right Ear: External ear normal.      Left Ear: External ear normal.   Neck:      Thyroid: No thyromegaly. Cardiovascular:      Rate and Rhythm: Normal rate and regular rhythm. Heart sounds: Normal heart sounds. Pulmonary:      Effort: Pulmonary effort is normal. No respiratory distress. Breath sounds: Normal breath sounds. No wheezing. Abdominal:      General: Bowel sounds are normal. There is no distension. Palpations: Abdomen is soft. There is no mass. Tenderness: There is no abdominal tenderness. There is no guarding. Musculoskeletal:         General: Normal range of motion. Lymphadenopathy:      Cervical: No cervical adenopathy. ASSESSMENT and PLAN  Orders Placed This Encounter    METABOLIC PANEL, COMPREHENSIVE    LIPID PANEL    HEMOGLOBIN A1C WITH EAG    CBC WITH AUTOMATED DIFF    MICROALBUMIN, UR, RAND W/ MICROALB/CREAT RATIO     Diagnoses and all orders for this visit:    1. Type 2 diabetes mellitus with stage 3a chronic kidney disease, without long-term current use of insulin (HCC)  -     HEMOGLOBIN A1C WITH EAG; Future  -     MICROALBUMIN, UR, RAND W/ MICROALB/CREAT RATIO; Future    2. Essential hypertension  -     METABOLIC PANEL, COMPREHENSIVE; Future  -     CBC WITH AUTOMATED DIFF; Future    3. Hypercholesterolemia  -     LIPID PANEL;  Future      Follow-up and Dispositions    · Return in about 6 months (around 8/14/2022).

## 2022-02-14 NOTE — PROGRESS NOTES
Identified pt with two pt identifiers(name and ). Reviewed record in preparation for visit and have obtained necessary documentation. All patient medications has been reviewed. Chief Complaint   Patient presents with    Follow-up       3 most recent PHQ Screens 2022   Little interest or pleasure in doing things Not at all   Feeling down, depressed, irritable, or hopeless Not at all   Total Score PHQ 2 0     Abuse Screening Questionnaire 2021   Do you ever feel afraid of your partner? N   Are you in a relationship with someone who physically or mentally threatens you? N   Is it safe for you to go home? Y       Health Maintenance Due   Topic    Shingrix Vaccine Age 50> (1 of 2)    MICROALBUMIN Q1     Eye Exam Retinal or Dilated     COVID-19 Vaccine (3 - Booster for Byrne Peter series)    Foot Exam Q1      Health Maintenance Review: Patient reminded of \"due or due soon\" health maintenance. I have asked the patient to contact his/her primary care provider (PCP) for follow-up on his/her health maintenance. Vitals:    22 0832 22 0839   BP: (!) 160/78 (!) 188/77   Pulse: 64    Resp: 18    Temp: 97.7 °F (36.5 °C)    TempSrc: Oral    SpO2: 100%    Weight: 211 lb 3.2 oz (95.8 kg)    Height: 6' 1\" (1.854 m)        Wt Readings from Last 3 Encounters:   22 211 lb 3.2 oz (95.8 kg)   21 207 lb 14.4 oz (94.3 kg)   21 207 lb (93.9 kg)     Temp Readings from Last 3 Encounters:   22 97.7 °F (36.5 °C) (Oral)   21 (!) 96.6 °F (35.9 °C) (Oral)   21 98 °F (36.7 °C) (Oral)     BP Readings from Last 3 Encounters:   22 (!) 188/77   21 (!) 142/72   21 (!) 148/74     Pulse Readings from Last 3 Encounters:   22 64   21 63   21 90       Coordination of Care Questionnaire:   1) Have you been to an emergency room, urgent care, or hospitalized since your last visit? No      2.  Have seen or consulted any other health care provider since your last visit? no

## 2022-02-21 RX ORDER — LISINOPRIL 10 MG/1
10 TABLET ORAL DAILY
Qty: 90 TABLET | Refills: 3 | Status: ON HOLD | OUTPATIENT
Start: 2022-02-21 | End: 2022-10-10 | Stop reason: SDUPTHER

## 2022-04-07 ENCOUNTER — OFFICE VISIT (OUTPATIENT)
Dept: CARDIOLOGY CLINIC | Age: 82
End: 2022-04-07
Payer: MEDICARE

## 2022-04-07 VITALS
WEIGHT: 209 LBS | HEIGHT: 73 IN | HEART RATE: 88 BPM | SYSTOLIC BLOOD PRESSURE: 130 MMHG | OXYGEN SATURATION: 100 % | RESPIRATION RATE: 18 BRPM | BODY MASS INDEX: 27.7 KG/M2 | DIASTOLIC BLOOD PRESSURE: 70 MMHG

## 2022-04-07 DIAGNOSIS — Z95.1 S/P CABG X 3: ICD-10-CM

## 2022-04-07 DIAGNOSIS — I10 ESSENTIAL HYPERTENSION: ICD-10-CM

## 2022-04-07 DIAGNOSIS — I25.708 CORONARY ARTERY DISEASE INVOLVING CORONARY BYPASS GRAFT OF NATIVE HEART WITH OTHER FORMS OF ANGINA PECTORIS (HCC): Primary | ICD-10-CM

## 2022-04-07 DIAGNOSIS — E78.2 MIXED HYPERLIPIDEMIA: ICD-10-CM

## 2022-04-07 PROCEDURE — 99214 OFFICE O/P EST MOD 30 MIN: CPT | Performed by: INTERNAL MEDICINE

## 2022-04-07 PROCEDURE — 93005 ELECTROCARDIOGRAM TRACING: CPT | Performed by: INTERNAL MEDICINE

## 2022-04-07 PROCEDURE — G0463 HOSPITAL OUTPT CLINIC VISIT: HCPCS | Performed by: INTERNAL MEDICINE

## 2022-04-07 PROCEDURE — 93010 ELECTROCARDIOGRAM REPORT: CPT | Performed by: INTERNAL MEDICINE

## 2022-04-07 NOTE — PROGRESS NOTES
Subjective/HPI: Guille Romero is a 80 y.o. male is here for routine f/u. He has a PMHx of CABG x3(LIMA to LAD, RSVG to OM, RSVG to PDA), DM, HTN, and hyperlipidemia. No specific complaints today. Denies any chest pain. c/o SOB with activity. No rest symptoms. No edema, PND, orthopnea. PCP Provider  Holger Gunn MD    Past Medical History:   Diagnosis Date    Arthritis     CAD (coronary artery disease)     [facility]     Diabetes [facility]     Diabetic neuropathy ([facility]     Hypercholesterolemia     Hypertension 11/29/10    Shingles 2010    Stroke [facility]     [facility]         Allergies   Allergen Reactions    Lipitor [Atorvastatin] Diarrhea        Outpatient Encounter Medications as of 4/7/2022   Medication Sig Dispense Refill    lisinopriL (PRINIVIL, ZESTRIL) 10 mg tablet Take 1 Tablet by mouth daily. 90 Tablet 3    metFORMIN (GLUCOPHAGE) 1,000 mg tablet TAKE 1 1/2 TABLETS in the am and 1 Tablet in the PM,  for diabetes 225 Tablet 3    polyethylene glycol (MIRALAX) 17 gram/dose powder One scoop daily for constipation 507 g 5    furosemide (LASIX) 40 mg tablet 1-2 tabs po daily as needed for swelling in feet. 120 Tablet 3    potassium chloride (K-DUR, KLOR-CON) 20 mEq tablet Take 1 Tab by mouth daily. 90 Tab 3    metoprolol tartrate (LOPRESSOR) 25 mg tablet Take 1 Tab by mouth every twelve (12) hours. For heart and blood pressure 180 Tab 3    tamsulosin (FLOMAX) 0.4 mg capsule Take 1 Cap by mouth daily. To pass urine easier 90 Cap 3    glimepiride (AMARYL) 4 mg tablet One daily for diabetes 90 Tab 3    rosuvastatin (CRESTOR) 20 mg tablet One daily for cholesterol 90 Tab 3    celso. stocking,knee,reg,xlrg (Comp Stocking, Knee,Reg, X-Lrg) misc Please provide 2 compression stockings for use daily. 2 Each 1    ferrous sulfate 325 mg (65 mg iron) tablet Take 325 mg by mouth Daily (before breakfast).       aspirin delayed-release 81 mg tablet Take 1 Tab by mouth daily. To prevent heart attack and stroke 30 Tab 12     No facility-administered encounter medications on file as of 4/7/2022. Review of Symptoms:    Review of Systems   Constitutional: Negative. Negative for chills and fever. HENT: Negative. Negative for hearing loss. Respiratory: Positive for shortness of breath. Negative for cough and hemoptysis. Cardiovascular: Negative. Negative for chest pain, palpitations, orthopnea, claudication, leg swelling and PND. Gastrointestinal: Negative. Negative for nausea and vomiting. Musculoskeletal: Negative for myalgias. Skin: Negative. Negative for rash. Neurological: Negative. Negative for dizziness, loss of consciousness and headaches. Physical Exam:      General: Well developed, in no acute distress, cooperative and alert  HEENT: No carotid bruits, no JVD, trach is midline. Neck Supple, PEERL, EOM intact. Heart:  reg rate and rhythm; normal S1/S2; no murmurs, no gallops or rubs. Respiratory: Clear bilaterally x 4, no wheezing or rales  Abdomen:   Soft, non-tender, no distention, no masses. + BS. Extremities:  Normal cap refill, no cyanosis, atraumatic. No edema. Neuro: A&Ox3, speech clear, gait stable. Skin: Skin color is normal. No rashes or lesions.  Non diaphoretic  Vascular: 2+ pulses symmetric in all extremities    Visit Vitals  /70 (BP 1 Location: Left arm, BP Patient Position: Sitting, BP Cuff Size: Small adult)   Pulse 88   Resp 18   Ht 6' 1\" (1.854 m)   Wt 209 lb (94.8 kg)   SpO2 100%   BMI 27.57 kg/m²       ECG: sinus rythm    Cardiology Labs:    Lab Results   Component Value Date/Time    Cholesterol, total 143 02/14/2022 09:26 AM    HDL Cholesterol 45 02/14/2022 09:26 AM    LDL, calculated 79 02/14/2022 09:26 AM    LDL, calculated 75.4 06/23/2021 02:55 PM    LDL, calculated 61 11/03/2020 02:27 PM    LDL, calculated 74 10/22/2019 04:45 PM    VLDL, calculated 19 02/14/2022 09:26 AM CHOL/HDL Ratio 3.2 02/14/2022 09:26 AM       Lab Results   Component Value Date/Time    Hemoglobin A1c 6.8 (H) 02/14/2022 09:26 AM    Hemoglobin A1c (POC) 8.1 06/23/2021 02:56 PM       Lab Results   Component Value Date/Time    Sodium 141 02/14/2022 09:26 AM    Potassium 4.0 02/14/2022 09:26 AM    Chloride 106 02/14/2022 09:26 AM    CO2 28 02/14/2022 09:26 AM    Glucose 154 (H) 02/14/2022 09:26 AM    BUN 13 02/14/2022 09:26 AM    Creatinine 1.05 02/14/2022 09:26 AM    BUN/Creatinine ratio 12 02/14/2022 09:26 AM    GFR est AA >60 02/14/2022 09:26 AM    GFR est non-AA >60 02/14/2022 09:26 AM    Calcium 9.6 02/14/2022 09:26 AM    Anion gap 7 02/14/2022 09:26 AM    Bilirubin, total 0.3 02/14/2022 09:26 AM    ALT (SGPT) 22 02/14/2022 09:26 AM    Alk. phosphatase 81 02/14/2022 09:26 AM    Protein, total 7.6 02/14/2022 09:26 AM    Albumin 3.6 02/14/2022 09:26 AM    Globulin 4.0 02/14/2022 09:26 AM    A-G Ratio 0.9 (L) 02/14/2022 09:26 AM          Assessment:       ICD-10-CM ICD-9-CM    1. Coronary artery disease involving coronary bypass graft of native heart with other forms of angina pectoris (HCC)  I25.708 414.05 AMB POC EKG ROUTINE W/ 12 LEADS, INTER & REP     413.9 NUCLEAR CARDIAC STRESS TEST   2. Essential hypertension  I10 401.9    3. Mixed hyperlipidemia  E78.2 272.2    4. S/P CABG x 3  Z95.1 V45.81         Plan:     1. Coronary artery disease involving coronary bypass graft of native heart without angina pectoris  New onset SOB, ? Angina . Will evaluate with stress test.  - AMB POC EKG ROUTINE W/ 12 LEADS, INTER & REP    2. Essential hypertension  Well controlled. 3. Mixed hyperlipidemia  On statin therapy. LDL 79 2/22. Discussed diet, exercise. 4. S/P CABG x 3  As noted above.     F/u after stress test.      Hung Rolle MD

## 2022-04-07 NOTE — PROGRESS NOTES
Chief Complaint   Patient presents with    Coronary Artery Disease     6 months follow up- Denies cardiac sx. 1. Have you been to the ER, urgent care clinic since your last visit? Hospitalized since your last visit? No    2. Have you seen or consulted any other health care providers outside of the [address] [place] [address] since your last visit?   No

## 2022-06-13 RX ORDER — METOPROLOL TARTRATE 25 MG/1
TABLET, FILM COATED ORAL
Qty: 180 TABLET | Refills: 3 | Status: SHIPPED | OUTPATIENT
Start: 2022-06-13

## 2022-06-13 RX ORDER — TAMSULOSIN HYDROCHLORIDE 0.4 MG/1
CAPSULE ORAL
Qty: 90 CAPSULE | Refills: 3 | Status: SHIPPED | OUTPATIENT
Start: 2022-06-13

## 2022-06-13 RX ORDER — GLIMEPIRIDE 4 MG/1
TABLET ORAL
Qty: 90 TABLET | Refills: 3 | Status: ON HOLD | OUTPATIENT
Start: 2022-06-13 | End: 2022-09-26 | Stop reason: SDUPTHER

## 2022-08-01 RX ORDER — ROSUVASTATIN CALCIUM 20 MG/1
TABLET, COATED ORAL
Qty: 90 TABLET | Refills: 0 | Status: SHIPPED | OUTPATIENT
Start: 2022-08-01

## 2022-08-01 NOTE — TELEPHONE ENCOUNTER
Last visit 02/14/2022 MD Khris Levy   Next appointment 08/15/2022 MD Khris Levy   Previous refill encounter(s)   03/10/2021 Crestor #[id number] with 3 refills.      For Pharmacy Admin Tracking Only    Intervention Detail: New Rx: 1, reason: Patient Preference  Time Spent (min): 5      Requested Prescriptions     Pending Prescriptions Disp Refills    rosuvastatin (CRESTOR) 20 mg tablet 90 Tablet 0     Sig: One daily for cholesterol

## 2022-08-11 DIAGNOSIS — M25.552 CHRONIC LEFT HIP PAIN: Primary | ICD-10-CM

## 2022-08-11 DIAGNOSIS — G89.29 CHRONIC LEFT HIP PAIN: Primary | ICD-10-CM

## 2022-08-11 RX ORDER — ACETAMINOPHEN AND CODEINE PHOSPHATE 300; 30 MG/1; MG/1
1 TABLET ORAL
Qty: 120 TABLET | Refills: 0 | Status: SHIPPED | OUTPATIENT
Start: 2022-08-11 | End: 2022-08-14

## 2022-09-14 ENCOUNTER — OFFICE VISIT (OUTPATIENT)
Dept: FAMILY MEDICINE CLINIC | Age: 82
End: 2022-09-14
Payer: MEDICARE

## 2022-09-14 VITALS
RESPIRATION RATE: 16 BRPM | SYSTOLIC BLOOD PRESSURE: 117 MMHG | HEART RATE: 86 BPM | HEIGHT: 73 IN | DIASTOLIC BLOOD PRESSURE: 62 MMHG | BODY MASS INDEX: 28.39 KG/M2 | OXYGEN SATURATION: 100 % | TEMPERATURE: 98 F | WEIGHT: 214.2 LBS

## 2022-09-14 DIAGNOSIS — I10 ESSENTIAL HYPERTENSION: ICD-10-CM

## 2022-09-14 DIAGNOSIS — R52 PAIN: ICD-10-CM

## 2022-09-14 DIAGNOSIS — Z23 NEEDS FLU SHOT: ICD-10-CM

## 2022-09-14 DIAGNOSIS — E11.9 CONTROLLED TYPE 2 DIABETES MELLITUS WITHOUT COMPLICATION, WITHOUT LONG-TERM CURRENT USE OF INSULIN (HCC): ICD-10-CM

## 2022-09-14 DIAGNOSIS — R06.02 SHORTNESS OF BREATH: ICD-10-CM

## 2022-09-14 DIAGNOSIS — R53.1 WEAKNESS: ICD-10-CM

## 2022-09-14 DIAGNOSIS — E78.00 HYPERCHOLESTEROLEMIA: ICD-10-CM

## 2022-09-14 DIAGNOSIS — Z00.00 ENCOUNTER FOR MEDICARE ANNUAL WELLNESS EXAM: Primary | ICD-10-CM

## 2022-09-14 DIAGNOSIS — F11.99 OPIOID USE, UNSPECIFIED WITH UNSPECIFIED OPIOID-INDUCED DISORDER (HCC): ICD-10-CM

## 2022-09-14 PROBLEM — E11.22 TYPE 2 DIABETES MELLITUS WITH CHRONIC KIDNEY DISEASE (HCC): Status: ACTIVE | Noted: 2022-09-14

## 2022-09-14 PROCEDURE — G8752 SYS BP LESS 140: HCPCS | Performed by: FAMILY MEDICINE

## 2022-09-14 PROCEDURE — G0439 PPPS, SUBSEQ VISIT: HCPCS | Performed by: FAMILY MEDICINE

## 2022-09-14 PROCEDURE — G8427 DOCREV CUR MEDS BY ELIG CLIN: HCPCS | Performed by: FAMILY MEDICINE

## 2022-09-14 PROCEDURE — G8754 DIAS BP LESS 90: HCPCS | Performed by: FAMILY MEDICINE

## 2022-09-14 PROCEDURE — 99213 OFFICE O/P EST LOW 20 MIN: CPT | Performed by: FAMILY MEDICINE

## 2022-09-14 PROCEDURE — G8417 CALC BMI ABV UP PARAM F/U: HCPCS | Performed by: FAMILY MEDICINE

## 2022-09-14 PROCEDURE — G8510 SCR DEP NEG, NO PLAN REQD: HCPCS | Performed by: FAMILY MEDICINE

## 2022-09-14 PROCEDURE — G0463 HOSPITAL OUTPT CLINIC VISIT: HCPCS | Performed by: FAMILY MEDICINE

## 2022-09-14 PROCEDURE — 1101F PT FALLS ASSESS-DOCD LE1/YR: CPT | Performed by: FAMILY MEDICINE

## 2022-09-14 PROCEDURE — 3044F HG A1C LEVEL LT 7.0%: CPT | Performed by: FAMILY MEDICINE

## 2022-09-14 PROCEDURE — G8536 NO DOC ELDER MAL SCRN: HCPCS | Performed by: FAMILY MEDICINE

## 2022-09-14 PROCEDURE — 1123F ACP DISCUSS/DSCN MKR DOCD: CPT | Performed by: FAMILY MEDICINE

## 2022-09-14 RX ORDER — ACETAMINOPHEN AND CODEINE PHOSPHATE 300; 30 MG/1; MG/1
1 TABLET ORAL
Qty: 120 TABLET | Refills: 3 | Status: SHIPPED | OUTPATIENT
Start: 2022-09-14 | End: 2022-10-14

## 2022-09-14 RX ORDER — FUROSEMIDE 80 MG/1
TABLET ORAL
Qty: 90 TABLET | Refills: 3 | Status: ON HOLD | OUTPATIENT
Start: 2022-09-14 | End: 2022-09-26 | Stop reason: SDUPTHER

## 2022-09-14 RX ORDER — POTASSIUM CHLORIDE 20 MEQ/1
20 TABLET, EXTENDED RELEASE ORAL DAILY
Qty: 90 TABLET | Refills: 3 | Status: SHIPPED | OUTPATIENT
Start: 2022-09-14

## 2022-09-14 RX ORDER — METFORMIN HYDROCHLORIDE 1000 MG/1
TABLET ORAL
Qty: 225 TABLET | Refills: 3 | Status: SHIPPED | OUTPATIENT
Start: 2022-09-14

## 2022-09-14 NOTE — PROGRESS NOTES
HISTORY OF PRESENT ILLNESS  Migdalia Oppenheim is a 80 y.o. male. HPI Needs medicare wellness exam. Has been having some swelling in feet and ankles. Occ mild dyspnea at times. Not seeing any other doctors. Would want wife Sharon Mazariegos to be his mPOA if he became incapacitated. Review of Systems   HENT:  Positive for hearing loss (mild). Neurological:         No falls. Has a cane. Independent in adls. Memory fair. Psychiatric/Behavioral:  Positive for depression (mild at times. ). No alcohol. Physical Exam  Vitals and nursing note reviewed. Constitutional:       Appearance: He is well-developed. HENT:      Right Ear: External ear normal.      Left Ear: External ear normal.   Neck:      Thyroid: No thyromegaly. Cardiovascular:      Rate and Rhythm: Normal rate and regular rhythm. Heart sounds: Normal heart sounds. Pulmonary:      Effort: Pulmonary effort is normal. No respiratory distress. Breath sounds: Normal breath sounds. No wheezing. Abdominal:      General: Bowel sounds are normal. There is no distension. Palpations: Abdomen is soft. There is no mass. Tenderness: There is no abdominal tenderness. There is no guarding. Musculoskeletal:         General: Normal range of motion. Comments: 2-3 + edema to the knees bilaterally   Lymphadenopathy:      Cervical: No cervical adenopathy. ASSESSMENT and PLAN  Orders Placed This Encounter    XR CHEST PA LAT    CBC WITH AUTOMATED DIFF    HEMOGLOBIN A1C WITH EAG    URINALYSIS W/MICROSCOPIC    T4, FREE    TSH 3RD GENERATION    METABOLIC PANEL, COMPREHENSIVE    furosemide (LASIX) 80 mg tablet    potassium chloride (K-DUR, KLOR-CON M20) 20 mEq tablet    acetaminophen-codeine (TYLENOL #3) 300-30 mg per tablet     Diagnoses and all orders for this visit:    1. Encounter for Medicare annual wellness exam    2. Needs flu shot    3. Shortness of breath  -     XR CHEST PA LAT; Future    4.  Controlled type 2 diabetes mellitus without complication, without long-term current use of insulin (HCC)  -     HEMOGLOBIN A1C WITH EAG; Future    5. Hypercholesterolemia    6. Essential hypertension  -     CBC WITH AUTOMATED DIFF; Future  -     URINALYSIS W/MICROSCOPIC; Future  -     METABOLIC PANEL, COMPREHENSIVE; Future    7. Weakness  -     T4, FREE; Future  -     TSH 3RD GENERATION; Future    8. Pain  -     acetaminophen-codeine (TYLENOL #3) 300-30 mg per tablet; Take 1 Tablet by mouth four (4) times daily as needed for Pain for up to 30 days. Max Daily Amount: 4 Tablets. 9. Opioid use, unspecified with unspecified opioid-induced disorder    Other orders  -     furosemide (LASIX) 80 mg tablet; 1-2 tabs po daily as needed for swelling in feet. -     potassium chloride (K-DUR, KLOR-CON M20) 20 mEq tablet; Take 1 Tablet by mouth daily.

## 2022-09-14 NOTE — TELEPHONE ENCOUNTER
Last visit:9/14/22  Next visit: not scheduled  Previous refill 6/29/21(225+3R)    Requested Prescriptions     Pending Prescriptions Disp Refills    metFORMIN (GLUCOPHAGE) 1,000 mg tablet 225 Tablet 3     Sig: TAKE 1 1/2 TABLETS in the am and 1 Tablet in the PM,  for diabetes     For Pharmacy [facility] in place:   Recommendation Provided To:    Intervention Detail: New Rx: 1, reason: Patient Preference  Gap Closed?:   Intervention Accepted By:   Time Spent (min): 5

## 2022-09-14 NOTE — PROGRESS NOTES
Chief Complaint   Patient presents with    Annual Wellness Visit       1. \"Have you been to the ER, urgent care clinic since your last visit? Hospitalized since your last visit? \" No    2. \"Have you seen or consulted any other health care providers outside of the [address] [place] [address] since your last visit? \" Yes Dr. Sae Julian - Cardiology and Dr. Shaggy Narvaez - opthamology      3. For patients aged 39-70: Has the patient had a colonoscopy / FIT/ Cologuard? NA - based on age      If the patient is female:    4. For patients aged 41-77: Has the patient had a mammogram within the past 2 years? NA - based on age or sex      11. For patients aged 21-65: Has the patient had a pap smear?  NA - based on age or sex    Health Maintenance Due   Topic Date Due    Shingrix Vaccine Age 49> (1 of 2) Never done    COVID-19 Vaccine (3 - Booster for Pfizer series) 09/02/2021    Foot Exam Q1  01/25/2022    Medicare Yearly Exam  07/03/2022    A1C test (Diabetic or Prediabetic)  08/14/2022    Flu Vaccine (1) 09/01/2022

## 2022-09-15 LAB
ALBUMIN SERPL-MCNC: 3.7 G/DL (ref 3.5–5)
ALBUMIN/GLOB SERPL: 1 {RATIO} (ref 1.1–2.2)
ALP SERPL-CCNC: 76 U/L (ref 45–117)
ALT SERPL-CCNC: 17 U/L (ref 12–78)
ANION GAP SERPL CALC-SCNC: 5 MMOL/L (ref 5–15)
APPEARANCE UR: CLEAR
AST SERPL-CCNC: 19 U/L (ref 15–37)
BACTERIA URNS QL MICRO: NEGATIVE /HPF
BASOPHILS # BLD: 0 K/UL (ref 0–0.1)
BASOPHILS NFR BLD: 1 % (ref 0–1)
BILIRUB SERPL-MCNC: 0.3 MG/DL (ref 0.2–1)
BILIRUB UR QL: NEGATIVE
BUN SERPL-MCNC: 21 MG/DL (ref 6–20)
BUN/CREAT SERPL: 18 (ref 12–20)
CALCIUM SERPL-MCNC: 10.2 MG/DL (ref 8.5–10.1)
CHLORIDE SERPL-SCNC: 107 MMOL/L (ref 97–108)
CO2 SERPL-SCNC: 30 MMOL/L (ref 21–32)
COLOR UR: ABNORMAL
CREAT SERPL-MCNC: 1.18 MG/DL (ref 0.7–1.3)
DIFFERENTIAL METHOD BLD: NORMAL
EOSINOPHIL # BLD: 0.3 K/UL (ref 0–0.4)
EOSINOPHIL NFR BLD: 3 % (ref 0–7)
EPITH CASTS URNS QL MICRO: ABNORMAL /LPF
ERYTHROCYTE [DISTWIDTH] IN BLOOD BY AUTOMATED COUNT: 13.2 % (ref 11.5–14.5)
EST. AVERAGE GLUCOSE BLD GHB EST-MCNC: 131 MG/DL
GLOBULIN SER CALC-MCNC: 3.7 G/DL (ref 2–4)
GLUCOSE SERPL-MCNC: 110 MG/DL (ref 65–100)
GLUCOSE UR STRIP.AUTO-MCNC: NEGATIVE MG/DL
HBA1C MFR BLD: 6.2 % (ref 4–5.6)
HCT VFR BLD AUTO: 39.7 % (ref 36.6–50.3)
HGB BLD-MCNC: 12.6 G/DL (ref 12.1–17)
HGB UR QL STRIP: NEGATIVE
HYALINE CASTS URNS QL MICRO: ABNORMAL /LPF (ref 0–5)
IMM GRANULOCYTES # BLD AUTO: 0 K/UL (ref 0–0.04)
IMM GRANULOCYTES NFR BLD AUTO: 0 % (ref 0–0.5)
KETONES UR QL STRIP.AUTO: ABNORMAL MG/DL
LEUKOCYTE ESTERASE UR QL STRIP.AUTO: NEGATIVE
LYMPHOCYTES # BLD: 2.5 K/UL (ref 0.8–3.5)
LYMPHOCYTES NFR BLD: 31 % (ref 12–49)
MCH RBC QN AUTO: 28.7 PG (ref 26–34)
MCHC RBC AUTO-ENTMCNC: 31.7 G/DL (ref 30–36.5)
MCV RBC AUTO: 90.4 FL (ref 80–99)
MONOCYTES # BLD: 0.7 K/UL (ref 0–1)
MONOCYTES NFR BLD: 9 % (ref 5–13)
NEUTS SEG # BLD: 4.5 K/UL (ref 1.8–8)
NEUTS SEG NFR BLD: 56 % (ref 32–75)
NITRITE UR QL STRIP.AUTO: NEGATIVE
NRBC # BLD: 0 K/UL (ref 0–0.01)
NRBC BLD-RTO: 0 PER 100 WBC
PH UR STRIP: 5.5 [PH] (ref 5–8)
PLATELET # BLD AUTO: 269 K/UL (ref 150–400)
PMV BLD AUTO: 10.4 FL (ref 8.9–12.9)
POTASSIUM SERPL-SCNC: 4.2 MMOL/L (ref 3.5–5.1)
PROT SERPL-MCNC: 7.4 G/DL (ref 6.4–8.2)
PROT UR STRIP-MCNC: ABNORMAL MG/DL
RBC # BLD AUTO: 4.39 M/UL (ref 4.1–5.7)
RBC #/AREA URNS HPF: ABNORMAL /HPF (ref 0–5)
SODIUM SERPL-SCNC: 142 MMOL/L (ref 136–145)
SP GR UR REFRACTOMETRY: 1.03 (ref 1–1.03)
T4 FREE SERPL-MCNC: 1.3 NG/DL (ref 0.8–1.5)
TSH SERPL DL<=0.05 MIU/L-ACNC: 2.28 UIU/ML (ref 0.36–3.74)
UROBILINOGEN UR QL STRIP.AUTO: 1 EU/DL (ref 0.2–1)
WBC # BLD AUTO: 8 K/UL (ref 4.1–11.1)
WBC URNS QL MICRO: ABNORMAL /HPF (ref 0–4)

## 2022-09-18 ENCOUNTER — APPOINTMENT (OUTPATIENT)
Dept: CT IMAGING | Age: 82
DRG: 291 | End: 2022-09-18
Attending: EMERGENCY MEDICINE
Payer: MEDICARE

## 2022-09-18 ENCOUNTER — HOSPITAL ENCOUNTER (INPATIENT)
Age: 82
LOS: 7 days | Discharge: NURSING, REHAB OR LONG-TERM CARE | DRG: 291 | End: 2022-09-26
Attending: EMERGENCY MEDICINE | Admitting: STUDENT IN AN ORGANIZED HEALTH CARE EDUCATION/TRAINING PROGRAM
Payer: MEDICARE

## 2022-09-18 ENCOUNTER — APPOINTMENT (OUTPATIENT)
Dept: GENERAL RADIOLOGY | Age: 82
DRG: 291 | End: 2022-09-18
Attending: EMERGENCY MEDICINE
Payer: MEDICARE

## 2022-09-18 ENCOUNTER — APPOINTMENT (OUTPATIENT)
Dept: ULTRASOUND IMAGING | Age: 82
DRG: 291 | End: 2022-09-18
Attending: EMERGENCY MEDICINE
Payer: MEDICARE

## 2022-09-18 DIAGNOSIS — R26.2 AMBULATORY DYSFUNCTION: ICD-10-CM

## 2022-09-18 DIAGNOSIS — R77.8 ELEVATED TROPONIN: ICD-10-CM

## 2022-09-18 DIAGNOSIS — U07.1 COVID-19: Primary | ICD-10-CM

## 2022-09-18 DIAGNOSIS — R60.0 BILATERAL LEG EDEMA: ICD-10-CM

## 2022-09-18 DIAGNOSIS — E11.59 TYPE 2 DIABETES MELLITUS WITH OTHER CIRCULATORY COMPLICATION, WITHOUT LONG-TERM CURRENT USE OF INSULIN (HCC): ICD-10-CM

## 2022-09-18 DIAGNOSIS — I50.33 ACUTE ON CHRONIC DIASTOLIC CONGESTIVE HEART FAILURE (HCC): ICD-10-CM

## 2022-09-18 DIAGNOSIS — G62.9 NEUROPATHY: ICD-10-CM

## 2022-09-18 DIAGNOSIS — I25.84 CORONARY ARTERY DISEASE DUE TO CALCIFIED CORONARY LESION: ICD-10-CM

## 2022-09-18 DIAGNOSIS — I25.10 CORONARY ARTERY DISEASE DUE TO CALCIFIED CORONARY LESION: ICD-10-CM

## 2022-09-18 LAB
ALBUMIN SERPL-MCNC: 3.5 G/DL (ref 3.5–5)
ALBUMIN/GLOB SERPL: 0.9 {RATIO} (ref 1.1–2.2)
ALP SERPL-CCNC: 72 U/L (ref 45–117)
ALT SERPL-CCNC: 18 U/L (ref 12–78)
ANION GAP SERPL CALC-SCNC: 8 MMOL/L (ref 5–15)
APPEARANCE UR: CLEAR
AST SERPL-CCNC: 27 U/L (ref 15–37)
BACTERIA URNS QL MICRO: NEGATIVE /HPF
BASOPHILS # BLD: 0.1 K/UL (ref 0–0.1)
BASOPHILS NFR BLD: 1 % (ref 0–1)
BILIRUB SERPL-MCNC: 0.8 MG/DL (ref 0.2–1)
BILIRUB UR QL: NEGATIVE
BNP SERPL-MCNC: 279 PG/ML
BUN SERPL-MCNC: 20 MG/DL (ref 6–20)
BUN/CREAT SERPL: 14 (ref 12–20)
CALCIUM SERPL-MCNC: 9.3 MG/DL (ref 8.5–10.1)
CHLORIDE SERPL-SCNC: 102 MMOL/L (ref 97–108)
CO2 SERPL-SCNC: 28 MMOL/L (ref 21–32)
COLOR UR: ABNORMAL
COVID-19 RAPID TEST, COVR: DETECTED
CREAT SERPL-MCNC: 1.48 MG/DL (ref 0.7–1.3)
DIFFERENTIAL METHOD BLD: ABNORMAL
EOSINOPHIL # BLD: 0 K/UL (ref 0–0.4)
EOSINOPHIL NFR BLD: 0 % (ref 0–7)
EPITH CASTS URNS QL MICRO: ABNORMAL /LPF
ERYTHROCYTE [DISTWIDTH] IN BLOOD BY AUTOMATED COUNT: 13.2 % (ref 11.5–14.5)
FLUAV AG NPH QL IA: NEGATIVE
FLUBV AG NOSE QL IA: NEGATIVE
GLOBULIN SER CALC-MCNC: 3.8 G/DL (ref 2–4)
GLUCOSE SERPL-MCNC: 231 MG/DL (ref 65–100)
GLUCOSE UR STRIP.AUTO-MCNC: NEGATIVE MG/DL
HCT VFR BLD AUTO: 37 % (ref 36.6–50.3)
HGB BLD-MCNC: 11.8 G/DL (ref 12.1–17)
HGB UR QL STRIP: ABNORMAL
HYALINE CASTS URNS QL MICRO: ABNORMAL /LPF (ref 0–2)
IMM GRANULOCYTES # BLD AUTO: 0 K/UL (ref 0–0.04)
IMM GRANULOCYTES NFR BLD AUTO: 0 % (ref 0–0.5)
KETONES UR QL STRIP.AUTO: ABNORMAL MG/DL
LACTATE SERPL-SCNC: 2.1 MMOL/L (ref 0.4–2)
LEUKOCYTE ESTERASE UR QL STRIP.AUTO: NEGATIVE
LYMPHOCYTES # BLD: 0.8 K/UL (ref 0.8–3.5)
LYMPHOCYTES NFR BLD: 9 % (ref 12–49)
MCH RBC QN AUTO: 28.2 PG (ref 26–34)
MCHC RBC AUTO-ENTMCNC: 31.9 G/DL (ref 30–36.5)
MCV RBC AUTO: 88.5 FL (ref 80–99)
MONOCYTES # BLD: 1.4 K/UL (ref 0–1)
MONOCYTES NFR BLD: 16 % (ref 5–13)
NEUTS SEG # BLD: 6.4 K/UL (ref 1.8–8)
NEUTS SEG NFR BLD: 74 % (ref 32–75)
NITRITE UR QL STRIP.AUTO: NEGATIVE
NRBC # BLD: 0 K/UL (ref 0–0.01)
NRBC BLD-RTO: 0 PER 100 WBC
PH UR STRIP: 6 [PH] (ref 5–8)
PLATELET # BLD AUTO: 224 K/UL (ref 150–400)
PMV BLD AUTO: 10 FL (ref 8.9–12.9)
POTASSIUM SERPL-SCNC: 4.5 MMOL/L (ref 3.5–5.1)
PROT SERPL-MCNC: 7.3 G/DL (ref 6.4–8.2)
PROT UR STRIP-MCNC: 30 MG/DL
RBC # BLD AUTO: 4.18 M/UL (ref 4.1–5.7)
RBC #/AREA URNS HPF: ABNORMAL /HPF (ref 0–5)
RBC MORPH BLD: ABNORMAL
SODIUM SERPL-SCNC: 138 MMOL/L (ref 136–145)
SOURCE, COVRS: ABNORMAL
SP GR UR REFRACTOMETRY: 1.02
TROPONIN-HIGH SENSITIVITY: 225 NG/L (ref 0–76)
TROPONIN-HIGH SENSITIVITY: 232 NG/L (ref 0–76)
UA: UC IF INDICATED,UAUC: ABNORMAL
UROBILINOGEN UR QL STRIP.AUTO: 1 EU/DL (ref 0.2–1)
WBC # BLD AUTO: 8.7 K/UL (ref 4.1–11.1)
WBC URNS QL MICRO: ABNORMAL /HPF (ref 0–4)

## 2022-09-18 PROCEDURE — 74011000636 HC RX REV CODE- 636: Performed by: EMERGENCY MEDICINE

## 2022-09-18 PROCEDURE — 83605 ASSAY OF LACTIC ACID: CPT

## 2022-09-18 PROCEDURE — 87040 BLOOD CULTURE FOR BACTERIA: CPT

## 2022-09-18 PROCEDURE — 71045 X-RAY EXAM CHEST 1 VIEW: CPT

## 2022-09-18 PROCEDURE — 93970 EXTREMITY STUDY: CPT

## 2022-09-18 PROCEDURE — 87635 SARS-COV-2 COVID-19 AMP PRB: CPT

## 2022-09-18 PROCEDURE — 36415 COLL VENOUS BLD VENIPUNCTURE: CPT

## 2022-09-18 PROCEDURE — 84484 ASSAY OF TROPONIN QUANT: CPT

## 2022-09-18 PROCEDURE — 80053 COMPREHEN METABOLIC PANEL: CPT

## 2022-09-18 PROCEDURE — 71275 CT ANGIOGRAPHY CHEST: CPT

## 2022-09-18 PROCEDURE — 87804 INFLUENZA ASSAY W/OPTIC: CPT

## 2022-09-18 PROCEDURE — 83880 ASSAY OF NATRIURETIC PEPTIDE: CPT

## 2022-09-18 PROCEDURE — 93005 ELECTROCARDIOGRAM TRACING: CPT

## 2022-09-18 PROCEDURE — 81001 URINALYSIS AUTO W/SCOPE: CPT

## 2022-09-18 PROCEDURE — 85025 COMPLETE CBC W/AUTO DIFF WBC: CPT

## 2022-09-18 PROCEDURE — 99285 EMERGENCY DEPT VISIT HI MDM: CPT

## 2022-09-18 RX ORDER — ONDANSETRON 2 MG/ML
4 INJECTION INTRAMUSCULAR; INTRAVENOUS
Status: DISCONTINUED | OUTPATIENT
Start: 2022-09-18 | End: 2022-09-19 | Stop reason: SDUPTHER

## 2022-09-18 RX ORDER — ACETAMINOPHEN 325 MG/1
650 TABLET ORAL
Status: DISCONTINUED | OUTPATIENT
Start: 2022-09-18 | End: 2022-09-19 | Stop reason: SDUPTHER

## 2022-09-18 RX ADMIN — IOPAMIDOL 100 ML: 755 INJECTION, SOLUTION INTRAVENOUS at 23:34
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
## 2022-09-19 PROBLEM — I50.9 CHF EXACERBATION (HCC): Status: ACTIVE | Noted: 2022-01-01

## 2022-09-19 LAB
APPEARANCE UR: CLEAR
ATRIAL RATE: 107 BPM
BACTERIA URNS QL MICRO: NEGATIVE /HPF
BILIRUB UR QL: NEGATIVE
CALCULATED P AXIS, ECG09: 70 DEGREES
CALCULATED R AXIS, ECG10: 8 DEGREES
CALCULATED T AXIS, ECG11: 106 DEGREES
COLOR UR: ABNORMAL
DIAGNOSIS, 93000: NORMAL
EPITH CASTS URNS QL MICRO: ABNORMAL /LPF
GLUCOSE BLD STRIP.AUTO-MCNC: 151 MG/DL (ref 65–117)
GLUCOSE BLD STRIP.AUTO-MCNC: 207 MG/DL (ref 65–117)
GLUCOSE BLD STRIP.AUTO-MCNC: 227 MG/DL (ref 65–117)
GLUCOSE BLD STRIP.AUTO-MCNC: 95 MG/DL (ref 65–117)
GLUCOSE UR STRIP.AUTO-MCNC: NEGATIVE MG/DL
HGB UR QL STRIP: ABNORMAL
HYALINE CASTS URNS QL MICRO: ABNORMAL /LPF (ref 0–2)
KETONES UR QL STRIP.AUTO: NEGATIVE MG/DL
LACTATE SERPL-SCNC: 1.4 MMOL/L (ref 0.4–2)
LEUKOCYTE ESTERASE UR QL STRIP.AUTO: NEGATIVE
NITRITE UR QL STRIP.AUTO: NEGATIVE
P-R INTERVAL, ECG05: 184 MS
PH UR STRIP: 5.5 [PH] (ref 5–8)
PROT UR STRIP-MCNC: NEGATIVE MG/DL
Q-T INTERVAL, ECG07: 332 MS
QRS DURATION, ECG06: 106 MS
QTC CALCULATION (BEZET), ECG08: 443 MS
RBC #/AREA URNS HPF: ABNORMAL /HPF (ref 0–5)
SERVICE CMNT-IMP: ABNORMAL
SERVICE CMNT-IMP: NORMAL
SP GR UR REFRACTOMETRY: 1.01
TROPONIN-HIGH SENSITIVITY: 250 NG/L (ref 0–76)
TROPONIN-HIGH SENSITIVITY: 265 NG/L (ref 0–76)
UA: UC IF INDICATED,UAUC: ABNORMAL
UROBILINOGEN UR QL STRIP.AUTO: 1 EU/DL (ref 0.2–1)
VENTRICULAR RATE, ECG03: 107 BPM
WBC URNS QL MICRO: ABNORMAL /HPF (ref 0–4)

## 2022-09-19 PROCEDURE — 36415 COLL VENOUS BLD VENIPUNCTURE: CPT

## 2022-09-19 PROCEDURE — 74011636637 HC RX REV CODE- 636/637: Performed by: STUDENT IN AN ORGANIZED HEALTH CARE EDUCATION/TRAINING PROGRAM

## 2022-09-19 PROCEDURE — 80048 BASIC METABOLIC PNL TOTAL CA: CPT

## 2022-09-19 PROCEDURE — 97535 SELF CARE MNGMENT TRAINING: CPT | Performed by: OCCUPATIONAL THERAPIST

## 2022-09-19 PROCEDURE — 82962 GLUCOSE BLOOD TEST: CPT

## 2022-09-19 PROCEDURE — 97530 THERAPEUTIC ACTIVITIES: CPT

## 2022-09-19 PROCEDURE — 84484 ASSAY OF TROPONIN QUANT: CPT

## 2022-09-19 PROCEDURE — 97165 OT EVAL LOW COMPLEX 30 MIN: CPT | Performed by: OCCUPATIONAL THERAPIST

## 2022-09-19 PROCEDURE — 74011250637 HC RX REV CODE- 250/637: Performed by: STUDENT IN AN ORGANIZED HEALTH CARE EDUCATION/TRAINING PROGRAM

## 2022-09-19 PROCEDURE — 83605 ASSAY OF LACTIC ACID: CPT

## 2022-09-19 PROCEDURE — 94762 N-INVAS EAR/PLS OXIMTRY CONT: CPT

## 2022-09-19 PROCEDURE — 74011000250 HC RX REV CODE- 250: Performed by: STUDENT IN AN ORGANIZED HEALTH CARE EDUCATION/TRAINING PROGRAM

## 2022-09-19 PROCEDURE — 99232 SBSQ HOSP IP/OBS MODERATE 35: CPT | Performed by: FAMILY MEDICINE

## 2022-09-19 PROCEDURE — 65270000029 HC RM PRIVATE

## 2022-09-19 PROCEDURE — 81001 URINALYSIS AUTO W/SCOPE: CPT

## 2022-09-19 PROCEDURE — 74011250636 HC RX REV CODE- 250/636: Performed by: FAMILY MEDICINE

## 2022-09-19 PROCEDURE — 97162 PT EVAL MOD COMPLEX 30 MIN: CPT

## 2022-09-19 PROCEDURE — 74011250636 HC RX REV CODE- 250/636: Performed by: STUDENT IN AN ORGANIZED HEALTH CARE EDUCATION/TRAINING PROGRAM

## 2022-09-19 PROCEDURE — 85025 COMPLETE CBC W/AUTO DIFF WBC: CPT

## 2022-09-19 RX ORDER — ACETAMINOPHEN 325 MG/1
650 TABLET ORAL
Status: DISCONTINUED | OUTPATIENT
Start: 2022-09-19 | End: 2022-09-26 | Stop reason: HOSPADM

## 2022-09-19 RX ORDER — ONDANSETRON 2 MG/ML
4 INJECTION INTRAMUSCULAR; INTRAVENOUS
Status: DISCONTINUED | OUTPATIENT
Start: 2022-09-19 | End: 2022-09-26 | Stop reason: HOSPADM

## 2022-09-19 RX ORDER — ACETAMINOPHEN 650 MG/1
650 SUPPOSITORY RECTAL
Status: DISCONTINUED | OUTPATIENT
Start: 2022-09-19 | End: 2022-09-26 | Stop reason: HOSPADM

## 2022-09-19 RX ORDER — INSULIN LISPRO 100 [IU]/ML
INJECTION, SOLUTION INTRAVENOUS; SUBCUTANEOUS
Status: DISCONTINUED | OUTPATIENT
Start: 2022-09-19 | End: 2022-09-26 | Stop reason: HOSPADM

## 2022-09-19 RX ORDER — METOPROLOL TARTRATE 25 MG/1
25 TABLET, FILM COATED ORAL 2 TIMES DAILY
Status: DISCONTINUED | OUTPATIENT
Start: 2022-09-19 | End: 2022-09-26 | Stop reason: HOSPADM

## 2022-09-19 RX ORDER — ASPIRIN 81 MG/1
81 TABLET ORAL DAILY
Status: DISCONTINUED | OUTPATIENT
Start: 2022-09-19 | End: 2022-09-26 | Stop reason: HOSPADM

## 2022-09-19 RX ORDER — POLYETHYLENE GLYCOL 3350 17 G/17G
17 POWDER, FOR SOLUTION ORAL DAILY PRN
Status: DISCONTINUED | OUTPATIENT
Start: 2022-09-19 | End: 2022-09-26 | Stop reason: HOSPADM

## 2022-09-19 RX ORDER — MORPHINE SULFATE 2 MG/ML
2 INJECTION, SOLUTION INTRAMUSCULAR; INTRAVENOUS
Status: DISCONTINUED | OUTPATIENT
Start: 2022-09-19 | End: 2022-09-26 | Stop reason: HOSPADM

## 2022-09-19 RX ORDER — ROSUVASTATIN CALCIUM 20 MG/1
20 TABLET, COATED ORAL
Status: DISCONTINUED | OUTPATIENT
Start: 2022-09-19 | End: 2022-09-26 | Stop reason: HOSPADM

## 2022-09-19 RX ORDER — ONDANSETRON 4 MG/1
4 TABLET, ORALLY DISINTEGRATING ORAL
Status: DISCONTINUED | OUTPATIENT
Start: 2022-09-19 | End: 2022-09-26 | Stop reason: HOSPADM

## 2022-09-19 RX ORDER — MAGNESIUM SULFATE 100 %
4 CRYSTALS MISCELLANEOUS AS NEEDED
Status: DISCONTINUED | OUTPATIENT
Start: 2022-09-19 | End: 2022-09-26 | Stop reason: HOSPADM

## 2022-09-19 RX ORDER — SODIUM CHLORIDE 0.9 % (FLUSH) 0.9 %
5-40 SYRINGE (ML) INJECTION EVERY 8 HOURS
Status: DISCONTINUED | OUTPATIENT
Start: 2022-09-19 | End: 2022-09-26 | Stop reason: HOSPADM

## 2022-09-19 RX ORDER — ENOXAPARIN SODIUM 100 MG/ML
40 INJECTION SUBCUTANEOUS DAILY
Status: DISCONTINUED | OUTPATIENT
Start: 2022-09-19 | End: 2022-09-26 | Stop reason: HOSPADM

## 2022-09-19 RX ORDER — ACETAMINOPHEN AND CODEINE PHOSPHATE 300; 30 MG/1; MG/1
1 TABLET ORAL
Status: DISCONTINUED | OUTPATIENT
Start: 2022-09-19 | End: 2022-09-26 | Stop reason: HOSPADM

## 2022-09-19 RX ORDER — DEXTROSE MONOHYDRATE 100 MG/ML
0-250 INJECTION, SOLUTION INTRAVENOUS AS NEEDED
Status: DISCONTINUED | OUTPATIENT
Start: 2022-09-19 | End: 2022-09-26 | Stop reason: HOSPADM

## 2022-09-19 RX ORDER — FUROSEMIDE 10 MG/ML
40 INJECTION INTRAMUSCULAR; INTRAVENOUS 2 TIMES DAILY
Status: DISCONTINUED | OUTPATIENT
Start: 2022-09-19 | End: 2022-09-26 | Stop reason: HOSPADM

## 2022-09-19 RX ORDER — SODIUM CHLORIDE 0.9 % (FLUSH) 0.9 %
5-40 SYRINGE (ML) INJECTION AS NEEDED
Status: DISCONTINUED | OUTPATIENT
Start: 2022-09-19 | End: 2022-09-26 | Stop reason: HOSPADM

## 2022-09-19 RX ORDER — TAMSULOSIN HYDROCHLORIDE 0.4 MG/1
0.4 CAPSULE ORAL DAILY
Status: DISCONTINUED | OUTPATIENT
Start: 2022-09-19 | End: 2022-09-26 | Stop reason: HOSPADM

## 2022-09-19 RX ADMIN — ENOXAPARIN SODIUM 40 MG: 100 INJECTION SUBCUTANEOUS at 09:20

## 2022-09-19 RX ADMIN — Medication 3 UNITS: at 17:33

## 2022-09-19 RX ADMIN — FUROSEMIDE 40 MG: 10 INJECTION, SOLUTION INTRAMUSCULAR; INTRAVENOUS at 17:33

## 2022-09-19 RX ADMIN — SODIUM CHLORIDE, PRESERVATIVE FREE 10 ML: 5 INJECTION INTRAVENOUS at 13:36

## 2022-09-19 RX ADMIN — SODIUM CHLORIDE, PRESERVATIVE FREE 10 ML: 5 INJECTION INTRAVENOUS at 09:22

## 2022-09-19 RX ADMIN — ROSUVASTATIN 20 MG: 20 TABLET, FILM COATED ORAL at 23:23

## 2022-09-19 RX ADMIN — ACETAMINOPHEN 650 MG: 325 TABLET ORAL at 19:46

## 2022-09-19 RX ADMIN — MORPHINE SULFATE 2 MG: 2 INJECTION, SOLUTION INTRAMUSCULAR; INTRAVENOUS at 13:36

## 2022-09-19 RX ADMIN — Medication 2 UNITS: at 23:27

## 2022-09-19 RX ADMIN — METOPROLOL TARTRATE 25 MG: 25 TABLET, FILM COATED ORAL at 17:33

## 2022-09-19 RX ADMIN — TAMSULOSIN HYDROCHLORIDE 0.4 MG: 0.4 CAPSULE ORAL at 09:20

## 2022-09-19 RX ADMIN — Medication 2 UNITS: at 13:35

## 2022-09-19 RX ADMIN — SODIUM CHLORIDE, PRESERVATIVE FREE 10 ML: 5 INJECTION INTRAVENOUS at 23:27

## 2022-09-19 RX ADMIN — METOPROLOL TARTRATE 25 MG: 25 TABLET, FILM COATED ORAL at 09:21

## 2022-09-19 RX ADMIN — FUROSEMIDE 40 MG: 10 INJECTION, SOLUTION INTRAMUSCULAR; INTRAVENOUS at 04:23

## 2022-09-19 RX ADMIN — ACETAMINOPHEN AND CODEINE PHOSPHATE 1 TABLET: 300; 30 TABLET ORAL at 23:23

## 2022-09-19 RX ADMIN — FUROSEMIDE 40 MG: 10 INJECTION, SOLUTION INTRAMUSCULAR; INTRAVENOUS at 09:20

## 2022-09-19 RX ADMIN — ROSUVASTATIN 20 MG: 20 TABLET, FILM COATED ORAL at 04:24

## 2022-09-19 RX ADMIN — ASPIRIN 81 MG: 81 TABLET, COATED ORAL at 09:21

## 2022-09-19 NOTE — PROGRESS NOTES
Problem: Mobility Impaired (Adult and Pediatric)  Goal: *Acute Goals and Plan of Care (Insert Text)  Description: FUNCTIONAL STATUS PRIOR TO ADMISSION: Patient was modified independent using a single point cane for functional mobility. Patient was independent for basic and instrumental ADLs. HOME SUPPORT PRIOR TO ADMISSION: The patient lived with spouse but did not require assist. Lives in [address] [place] [address] home - bedroom on 3rd floor after 2 flights of stairs. Physical Therapy Goals  Initiated 9/19/2022  1. Patient will move from supine to sit and sit to supine , scoot up and down, and roll side to side in bed with modified independence within 7 day(s). 2.  Patient will transfer from bed to chair and chair to bed with modified independence using the least restrictive device within 7 day(s). 3.  Patient will perform sit to stand with modified independence within 7 day(s). 4.  Patient will ambulate with modified independence for 150 feet with the least restrictive device within 7 day(s). 5.  Patient will ascend/descend 24 stairs with 1 handrail(s) with minimal assistance/contact guard assist within 7 day(s). Outcome: Not Met   PHYSICAL THERAPY EVALUATION  Patient: Kristina Farr (80 y.o. male)  Date: 9/19/2022  Primary Diagnosis: CHF exacerbation ([facility] [I50.9]       Precautions:  Fall (COVID +, droplet +)    ASSESSMENT  Based on the objective data described below, the patient presents with generalized weakness, impaired standing balance, bilateral LE edema and decreased mobility skills below baseline. He was on gurney in ED department when PT arrived and was in NAD. Pleasant and cooperative. Needed mod a for supine to sit and max a to scoot the the edge of bed to get feet on the floor. His bed pad was wet and made scooting more difficult. He needed mod a x 2 to come to standing and needed bilateral UE support to maintain balance. He was able to side step to head of bed with min a x 2.  Returned to supine with moderate assistance after bed pad was replaced. Left patient on jin with all needs met when the session ended. His VSS throughout the session. Vitals:    09/19/22 1230 09/19/22 1315 09/19/22 1434 09/19/22 1441   BP: (!) 175/70 (!) 162/69 (!) 156/65 (!) 149/67   BP 1 Location:   Right upper arm Right upper arm   BP Patient Position:   Supine Sitting   Pulse: 84 83 85 87   Temp:       Resp: 24 25     Height:       Weight:       SpO2: 100% RA 98% % RA 97% RA        Current Level of Function Impacting Discharge (mobility/balance): mod a  to mod a x 2    Functional Outcome Measure: The patient scored 40/100 on the Barthel outcome measure which is indicative of being partially dependent for ADL and mobility skills at this time. Other factors to consider for discharge: modified independent PLOF; lives with spouse; lots of stairs to 3rd floor bedroom; at risk for falls at this time due to weakness and decreased standing balance. Patient will benefit from skilled therapy intervention to address the above noted impairments. PLAN :  Recommendations and Planned Interventions: bed mobility training, transfer training, gait training, therapeutic exercises, neuromuscular re-education, edema management/control, patient and family training/education, and therapeutic activities      Frequency/Duration: Patient will be followed by physical therapy:  4 times a week to address goals. Recommendation for discharge: (in order for the patient to meet his/her long term goals)  To be determined: rehab vs [facility] PT with family assist pending progress. This discharge recommendation:  Has not yet been discussed the attending provider and/or case management    IF patient discharges home will need the following DME: rollator to allow sitting rests as needed due to heart failure. SUBJECTIVE:   Patient stated  My legs have gotten real weak.      OBJECTIVE DATA SUMMARY:   HISTORY:    Past Medical History: Diagnosis Date    Arthritis     CAD (coronary artery disease)     Carotid artery disease (HCC)     CHF exacerbation ([facility] 9/19/2022    Diabetes ([facility]     Diabetic neuropathy ([facility]     Hypercholesterolemia     Hypertension 11/29/10    Shingles 2010    Stroke ([facility]     Thromboembolus [facility])      Past Surgical History:   Procedure Laterality Date    HX CARPAL TUNNEL RELEASE  left    HX CORONARY ARTERY BYPASS GRAFT      HX HEART CATHETERIZATION  12/2005    Dr Kisha Urban      left arm fracture    VT COLONOSCOPY FLX DX W/COLLJ SPEC WHEN PFRMD  11/12/2012            Personal factors and/or comorbidities impacting plan of care: cad, heart failure, dm with neuropathy, old cva     Home Situation  Home Environment: Other (comment) ([facility])  # Steps to Enter: 0  One/Two Story Residence:  (tri level, bedroom on third floor)  Living Alone: No  Support Systems: Spouse/Significant Other (wife and granddaughter checks on pt)  Current DME Used/Available at Home: Cane, straight (shower chair but doesn't use)  Tub or Shower Type: Tub/Shower combination    EXAMINATION/PRESENTATION/DECISION MAKING:   Critical Behavior:  Neurologic State: Alert  Orientation Level: Oriented to person, Oriented to place  Cognition: Follows commands  Safety/Judgement: Fall prevention  Hearing: Auditory  Auditory Impairment: None  Skin:  dry  Edema: moderate bilateral legs  Range Of Motion:  AROM: Generally decreased, functional           PROM: Generally decreased, functional           Strength:    Strength: Generally decreased, functional                    Tone & Sensation:   Tone: Normal              Sensation: Impaired (LE)               Coordination:  Coordination: Generally decreased, functional  Vision:   Acuity: Within Defined Limits  Corrective Lenses: Reading glasses  Functional Mobility:  Bed Mobility:  Rolling: Minimum assistance  Supine to Sit: Moderate assistance (.)  Sit to Supine:  Moderate assistance  Scooting: Maximum assistance  Transfers:  Sit to Stand: Moderate assistance; Additional time;Assist x2  Stand to Sit: Minimum assistance; Additional time        Bed to Chair: Minimum assistance; Additional time;Assist x2 (hand held assist side stepping)              Balance:   Sitting: Intact  Standing: Impaired  Standing - Static: Constant support; Fair  Standing - Dynamic : Constant support; Fair  Ambulation/Gait Training:              Gait Description (WDL):  (In ED, took side steps to get higher in [facility])          Functional Measure:  Barthel Index:    Bathin  Bladder: 5  Bowels: 5  Groomin  Dressin  Feeding: 10  Mobility: 0  Stairs: 0  Toilet Use: 5  Transfer (Bed to Chair and Back): 5  Total: 40/100       The Barthel ADL Index: Guidelines  1. The index should be used as a record of what a patient does, not as a record of what a patient could do. 2. The main aim is to establish degree of independence from any help, physical or verbal, however minor and for whatever reason. 3. The need for supervision renders the patient not independent. 4. A patient's performance should be established using the best available evidence. Asking the patient, friends/relatives and nurses are the usual sources, but direct observation and common sense are also important. However direct testing is not needed. 5. Usually the patient's performance over the preceding 24-48 hours is important, but occasionally longer periods will be relevant. 6. Middle categories imply that the patient supplies over 50 per cent of the effort. 7. Use of aids to be independent is allowed. Score Interpretation (from [address] [facility])    Independent   60-79 Minimally independent   40-59 Partially dependent   20-39 Very dependent   <20 Totally dependent     -Cori Buitrago., Barthel, D.W. (1965). Functional evaluation: the Barthel Index. [facility] ([address] (1997).  The Barthel activities of daily living index: self-reporting versus actual performance in the old (> or = 75 years). Journal of [address] [place] [address] 45(2), [address] [facility], RUCHIF, Khris Giles., Opal Grissom. (1999). Measuring the change in disability after inpatient rehabilitation; comparison of the responsiveness of the Barthel Index and Functional [place] Measure. Journal of Neurology, Neurosurgery, and Psychiatry, 66(4), 244-158. JAXON Molina, JULIA Klein, & Christina Barrera M.A. (2004) Assessment of post-stroke quality of life in cost-effectiveness studies: The usefulness of the Barthel Index and the EuroQoL-5D. Quality of Life Research, 15, 808-25            Physical Therapy Evaluation Charge Determination   History Examination Presentation Decision-Making   HIGH Complexity :3+ comorbidities / personal factors will impact the outcome/ POC  HIGH Complexity : 4+ Standardized tests and measures addressing body structure, function, activity limitation and / or participation in recreation  MEDIUM Complexity : Evolving with changing characteristics  Other outcome measures Barthel  HIGH       Based on the above components, the patient evaluation is determined to be of the following complexity level: MEDIUM    Pain Ratin/10 bilateral legs due to edema    Activity Tolerance:   Fair, SpO2 stable on RA, and requires rest breaks    After treatment patient left in no apparent distress:   Supine on gurney, Heels elevated for pressure relief, and Call bell within reach, 2 rails up. COMMUNICATION/EDUCATION:   The patients plan of care was discussed with: Occupational therapist and Registered nurse. Fall prevention education was provided and the patient/caregiver indicated understanding., Patient/family have participated as able in goal setting and plan of care. , and Patient/family agree to work toward stated goals and plan of care.     Thank you for this referral.  Diamond Escalante, PT   Time Calculation: 22 mins

## 2022-09-19 NOTE — CONSULTS
[facility]  [address]  [address]  [phone]  WWW. [facility]       CARDIOLOGY CONSULTATION       Date of  Admission: 9/18/2022  7:27 PM     Admission type:Emergency   Primary Care Peggy Shen MD     Attending Provider: Rosalba Mcdaniel  Cardiology Provider:     PLEASE NOTE THAT WE CONFIRMED WITH THE REFERRING PHYSICIAN PRIOR TO SEEING THE PATIENT THAT THE PATIENT IS BEING REFERRED FOR [address] EVALUATION AND FOR LONG-TERM ONGOING CARDIAC CARE    CC/REASON FOR CONSULT: CHF      Subjective:     Kristina Farr is a 80 y.o. male admitted for CHF exacerbation [facility] [I50.9]. PMHx of CABG x3(LIMA to LAD, RSVG to OM, RSVG to PDA), DM, HTN, CVA, HFpEF,  and hyperlipidemia who reports he has been having increasing lower extremity edema with difficulty with ambulation secondary to pain and discomfort from his lower extremities. He reports the symptoms have been waxing and waning for months however the pain and intensity of his discomfort have exacerbated prompting his admission to the hospital.  He does report dyspnea on exertion unchanged from his baseline. He denies exertional chest pain. Reports compliance with home medications except intermittently not taking diuretics due to nocturia effect, he denies orthopnea or paroxysmal nocturnal dyspnea but typically sleeps more upright. He denies any excessive sodium or fluid intake.   Patient Active Problem List    Diagnosis Date Noted    CHF exacerbation ([facility] 09/19/2022    Type 2 diabetes mellitus with chronic kidney disease ([facility] 09/14/2022    Opioid use, unspecified with unspecified opioid-induced disorder 09/14/2022    Tachycardia 02/08/2021    S/P CABG x 3 02/02/2021    S/P cardiac cath 01/22/2021    ASHD (arteriosclerotic heart disease) 01/15/2021    Type 2 diabetes with nephropathy ([facility] 03/12/2018    Bilateral carotid artery stenosis 03/08/2018    Thrombotic stroke involving right middle cerebral artery ([facility] 2018    CVA (cerebral vascular accident) ([facility] 2018    TIA (transient ischemic attack) 2018    Diabetes mellitus without complication ([facility]     Essential hypertension 2015    Hypercholesterolemia 2010    Diabetes ([facility] 2010      Chasity Taylor MD  Past Medical History:   Diagnosis Date    Arthritis     CAD (coronary artery disease)     Carotid artery disease ([facility]     CHF exacerbation ([facility] 2022    Diabetes ([facility]     Diabetic neuropathy ([facility]     Hypercholesterolemia     Hypertension 11/29/10    Shingles 2010    Stroke ([facility]     Thromboembolus ([facility]       Social History     Socioeconomic History    Marital status:     Number of children: 4   Tobacco Use    Smoking status: Former     Packs/day: 0.50     Years: 20.00     Pack years: 10.00     Types: Cigarettes     Quit date: 2000     Years since quittin.7    Smokeless tobacco: Never    Tobacco comments:     15 years ago   Vaping Use    Vaping Use: Never used   Substance and Sexual Activity    Alcohol use: Not Currently     Comment: occ. Drug use: Not Currently    Sexual activity: Never   Social History Narrative    , 4 children.  Retired from [employer] in  after 25 years     Allergies   Allergen Reactions    Lipitor [Atorvastatin] Diarrhea      Family History   Problem Relation Age of Onset    Stroke Father     Heart Disease Brother     Cancer Brother       Current Facility-Administered Medications   Medication Dose Route Frequency    acetaminophen-codeine (TYLENOL #3) per tablet 1 Tablet  1 Tablet Oral QID PRN    aspirin delayed-release tablet 81 mg  81 mg Oral DAILY    metoprolol tartrate (LOPRESSOR) tablet 25 mg  25 mg Oral BID    rosuvastatin (CRESTOR) tablet 20 mg  20 mg Oral QHS    tamsulosin (FLOMAX) capsule 0.4 mg  0.4 mg Oral DAILY    sodium chloride (NS) flush 5-40 mL  5-40 mL IntraVENous Q8H    sodium chloride (NS) flush 5-40 mL  5-40 mL IntraVENous PRN    acetaminophen (TYLENOL) tablet 650 mg  650 mg Oral Q6H PRN    Or    acetaminophen (TYLENOL) suppository 650 mg  650 mg Rectal Q6H PRN    polyethylene glycol (MIRALAX) packet 17 g  17 g Oral DAILY PRN    ondansetron (ZOFRAN ODT) tablet 4 mg  4 mg Oral Q8H PRN    Or    ondansetron (ZOFRAN) injection 4 mg  4 mg IntraVENous Q6H PRN    enoxaparin (LOVENOX) injection 40 mg  40 mg SubCUTAneous DAILY    insulin lispro (HUMALOG) injection   SubCUTAneous AC&HS    glucose chewable tablet 16 g  4 Tablet Oral PRN    glucagon (GLUCAGEN) injection 1 mg  1 mg IntraMUSCular PRN    dextrose 10% infusion 0-250 mL  0-250 mL IntraVENous PRN    furosemide (LASIX) injection 40 mg  40 mg IntraVENous BID    morphine injection 2 mg  2 mg IntraVENous Q4H PRN     Current Outpatient Medications   Medication Sig    metFORMIN (GLUCOPHAGE) 1,000 mg tablet TAKE 1 1/2 TABLETS in the am and 1 Tablet in the PM,  for diabetes    furosemide (LASIX) 80 mg tablet 1-2 tabs po daily as needed for swelling in feet. potassium chloride (K-DUR, KLOR-CON M20) 20 mEq tablet Take 1 Tablet by mouth daily. acetaminophen-codeine (TYLENOL #3) 300-30 mg per tablet Take 1 Tablet by mouth four (4) times daily as needed for Pain for up to 30 days. Max Daily Amount: 4 Tablets. rosuvastatin (CRESTOR) 20 mg tablet One daily for cholesterol    glimepiride (AMARYL) 4 mg tablet TAKE 1 TABLET BY MOUTH DAILY FOR DIABETES    metoprolol tartrate (LOPRESSOR) 25 mg tablet TAKE 1 TABLET BY MOUTH EVERY 12 HOURS FOR BLOOD PRESSURE AND HEART    tamsulosin (FLOMAX) 0.4 mg capsule TAKE ONE CAPSULE BY MOUTH EVERY DAY    lisinopriL (PRINIVIL, ZESTRIL) 10 mg tablet Take 1 Tablet by mouth daily. polyethylene glycol (MIRALAX) 17 gram/dose powder One scoop daily for constipation    aspirin delayed-release 81 mg tablet Take 1 Tab by mouth daily.  To prevent heart attack and stroke        Review of Symptoms:     Constitutional: Negative for chills,+ fatigue   HENT: Negative for nosebleeds, difficulty swallowing or tissue swelling   Eyes: Negative for blurred vision, double vision, occular pain  Respiratory: Negative for cough, +shortness of breath ,no  wheezing. Gastrointestinal: Negative for abdominal pain, nausea/vomiting, blood in stool. Cardiovascular: Negative for chest pain, palpitations, orthopnea, + leg swelling no PND. Skin: Negative for rash, persistent diaphoresis, persistent pruritis  Neurological: Negative for dizziness, loss of consciousness, slurred speech, headache. Psychiatric: Negative for significant anxiety, memory disturbance, change in mood. Objective:      Visit Vitals  BP (!) 132/59   Pulse 87   Temp 98.4 °F (36.9 °C)   Resp 25   Ht 6' 1\" (1.854 m)   Wt 97.1 kg (214 lb)   SpO2 98%   BMI 28.23 kg/m²       Physical Exam     General: Well developed, in no acute distress, cooperative and alert  HEENT: No carotid bruits, no JVD, trach is midline. Neck Supple, PERRL, EOM intact. Heart:  Normal S1/S2 negative S3 or S4. Regular, no murmur, gallop or rub. Respiratory: Diminished at bases, no rales  Abdomen:   Soft, non-tender, no masses, bowel sounds are active. Extremities:  2+ LE pitting edema, unable to further assess as in ACEI wraps   Neuro: A&Ox3, speech clear, MAEx4    Skin: Skin color is normal. No rashes or lesions. Non diaphoretic  Vascular: 2+ pulses symmetric in all bilateral radial pulses     Data Review:   Recent Labs     09/18/22 1925   WBC 8.7   HGB 11.8*   HCT 37.0        Recent Labs     09/18/22 1925      K 4.5      CO2 28   *   BUN 20   CREA 1.48*   CA 9.3   ALB 3.5   TBILI 0.8   ALT 18       No results for input(s): TROIQ, CPK, CKMB in the last 72 hours.       Intake/Output Summary (Last 24 hours) at 9/19/2022 1606  Last data filed at 9/19/2022 1315  Gross per 24 hour   Intake --   Output 3300 ml   Net -3300 ml        Cardiographics    Telemetry: NSR   ECG: Sinus tachycardia with LVH   Echocardiogram: pending   CXRAY:No acute process        Assessment:       Active Problems:    CHF exacerbation ([address] (9/19/2022)         Plan:     Acute on chronic diastolic CHF: Pt reports holding diuretics at times due to nocturia, continue diuresis, repeat ECHO. Output 3.3L   Troponin elevation: Flat, [phone], secondary to demand ischemia in setting of decompensated CHF and COVID 19 infection. Will plan for out patient stress test given hx of CAD/CABG. 3.   ASHD: HX CABG, no acute process on EKG, continue ASA/Statin/Beta blocker. Acute on chronic diastolic heart failure, continue IV diuresis, echocardiogram pending. MARIVEL Carney MD    Patient seen and examined by me with nurse practitioner. I personally performed all components of the history, physical, and medical decision making and agree with the assessment and plan with minor modifications as noted. Continue with diuresis. Echo pending.  Cont med rx for cad    Dre Gutierrez MD, Melodie Lambert

## 2022-09-19 NOTE — PROGRESS NOTES
I saw the patient who was admitted by my night colleague this am. I have reviewed the chart and treatment plan and agree with the plan.

## 2022-09-19 NOTE — ED NOTES
Dr Hughes Led in updating patient and family on current results and new order for CTA--plan for admission--at this time

## 2022-09-19 NOTE — ED NOTES
TRANSFER - OUT REPORT:    Verbal report given to Yulia NEUMANN(name) on [employer]  being transferred to CDU(unit) for routine progression of care       Report consisted of patients Situation, Background, Assessment and   Recommendations(SBAR). Information from the following report(s) SBAR, Kardex, ED Summary, and MAR was reviewed with the receiving nurse. Lines:   Peripheral IV 09/18/22 Right;Mid Forearm (Active)   Site Assessment Clean, dry, & intact 09/18/22 2312   Phlebitis Assessment 0 09/18/22 2312   Infiltration Assessment 0 09/18/22 2312   Dressing Status Clean, dry, & intact 09/18/22 2312       Peripheral IV 09/18/22 Left Antecubital (Active)   Site Assessment Clean, dry, & intact 09/18/22 2312   Phlebitis Assessment 0 09/18/22 2312   Infiltration Assessment 0 09/18/22 2312   Dressing Status Clean, dry, & intact 09/18/22 2312        Opportunity for questions and clarification was provided.       Patient transported with:   Xunlei

## 2022-09-19 NOTE — PROGRESS NOTES
Bedside and Verbal shift change report given to [facility] (oncoming nurse) by Lion Ureña (offgoing nurse). Report included the following information SBAR, Kardex, MAR, and Recent Results.

## 2022-09-19 NOTE — PROGRESS NOTES
General Daily Progress Note    Admit Date: 9/18/2022  Hospital day 1    Subjective:     Patient has no complaint of chest pain. Mild dyspnea. Has had progressive sweling in legs despite increasing doses of lasix. Got to where was having difficulty walking due to swelling. .sp cabg in feb. 2021, noted to have problems with venous insufficiency at that time. Medication side effects: none    Current Facility-Administered Medications   Medication Dose Route Frequency    acetaminophen-codeine (TYLENOL #3) per tablet 1 Tablet  1 Tablet Oral QID PRN    aspirin delayed-release tablet 81 mg  81 mg Oral DAILY    metoprolol tartrate (LOPRESSOR) tablet 25 mg  25 mg Oral BID    rosuvastatin (CRESTOR) tablet 20 mg  20 mg Oral QHS    tamsulosin (FLOMAX) capsule 0.4 mg  0.4 mg Oral DAILY    sodium chloride (NS) flush 5-40 mL  5-40 mL IntraVENous Q8H    sodium chloride (NS) flush 5-40 mL  5-40 mL IntraVENous PRN    acetaminophen (TYLENOL) tablet 650 mg  650 mg Oral Q6H PRN    Or    acetaminophen (TYLENOL) suppository 650 mg  650 mg Rectal Q6H PRN    polyethylene glycol (MIRALAX) packet 17 g  17 g Oral DAILY PRN    ondansetron (ZOFRAN ODT) tablet 4 mg  4 mg Oral Q8H PRN    Or    ondansetron (ZOFRAN) injection 4 mg  4 mg IntraVENous Q6H PRN    enoxaparin (LOVENOX) injection 40 mg  40 mg SubCUTAneous DAILY    insulin lispro (HUMALOG) injection   SubCUTAneous AC&HS    glucose chewable tablet 16 g  4 Tablet Oral PRN    glucagon (GLUCAGEN) injection 1 mg  1 mg IntraMUSCular PRN    dextrose 10% infusion 0-250 mL  0-250 mL IntraVENous PRN    furosemide (LASIX) injection 40 mg  40 mg IntraVENous BID    morphine injection 2 mg  2 mg IntraVENous Q4H PRN     Current Outpatient Medications   Medication Sig    metFORMIN (GLUCOPHAGE) 1,000 mg tablet TAKE 1 1/2 TABLETS in the am and 1 Tablet in the PM,  for diabetes    furosemide (LASIX) 80 mg tablet 1-2 tabs po daily as needed for swelling in feet.     potassium chloride (K-DUR, KLOR-CON M20) 20 mEq tablet Take 1 Tablet by mouth daily. acetaminophen-codeine (TYLENOL #3) 300-30 mg per tablet Take 1 Tablet by mouth four (4) times daily as needed for Pain for up to 30 days. Max Daily Amount: 4 Tablets. rosuvastatin (CRESTOR) 20 mg tablet One daily for cholesterol    glimepiride (AMARYL) 4 mg tablet TAKE 1 TABLET BY MOUTH DAILY FOR DIABETES    metoprolol tartrate (LOPRESSOR) 25 mg tablet TAKE 1 TABLET BY MOUTH EVERY 12 HOURS FOR BLOOD PRESSURE AND HEART    tamsulosin (FLOMAX) 0.4 mg capsule TAKE ONE CAPSULE BY MOUTH EVERY DAY    lisinopriL (PRINIVIL, ZESTRIL) 10 mg tablet Take 1 Tablet by mouth daily. polyethylene glycol (MIRALAX) 17 gram/dose powder One scoop daily for constipation    aspirin delayed-release 81 mg tablet Take 1 Tab by mouth daily. To prevent heart attack and stroke        Review of Systems  Pertinent items are noted in HPI. Objective:     Patient Vitals for the past 8 hrs:   BP Temp Pulse Resp SpO2   09/19/22 1315 (!) 162/69 -- 83 25 98 %   09/19/22 1230 (!) 175/70 -- 84 24 100 %   09/19/22 1200 (!) 134/99 -- 97 17 98 %   09/19/22 1130 (!) 158/69 -- 86 -- 98 %   09/19/22 1120 (!) 178/68 98.4 °F (36.9 °C) 86 23 98 %   09/19/22 1117 -- 98.4 °F (36.9 °C) -- -- --   09/19/22 0920 (!) 168/63 -- 82 -- --   09/19/22 0739 (!) 142/90 98.9 °F (37.2 °C) 83 18 95 %   09/19/22 0700 (!) 160/127 -- 83 21 --   09/19/22 0645 (!) 140/62 -- 76 19 97 %   09/19/22 0630 (!) 139/93 -- 85 16 --     09/19 0701 - 09/19 1900  In: -   Out: 1900 [Urine:1900]  09/17 1901 - 09/19 0700  In: -   Out: 1400 [Urine:1400]    Physical Exam: Visit Vitals  BP (!) 162/69   Pulse 83   Temp 98.4 °F (36.9 °C)   Resp 25   Ht 6' 1\" (1.854 m)   Wt 214 lb (97.1 kg)   SpO2 98%   BMI 28.23 kg/m²     Lungs: clear to auscultation bilaterally  Heart: regular rate and rhythm, S1, S2 normal, no murmur, click, rub or gallop  Abdomen: soft, non-tender.  Bowel sounds normal. No masses,  no organomegaly  Extremities: edema 3+ bilaterally ECG: normal sinus rhythm     Data Review   Recent Results (from the past 24 hour(s))   EKG, 12 LEAD, INITIAL    Collection Time: 09/18/22  7:17 PM   Result Value Ref Range    Ventricular Rate 107 BPM    Atrial Rate 107 BPM    P-R Interval 184 ms    QRS Duration 106 ms    Q-T Interval 332 ms    QTC Calculation (Bezet) 443 ms    Calculated P Axis 70 degrees    Calculated R Axis 8 degrees    Calculated T Axis 106 degrees    Diagnosis       Sinus tachycardia  Left ventricular hypertrophy with repolarization abnormality  When compared with ECG of 08-FEB-2021 09:51,  ST no longer elevated in Anterior leads  Nonspecific T wave abnormality has replaced inverted T waves in Inferior   leads     CBC WITH AUTOMATED DIFF    Collection Time: 09/18/22  7:25 PM   Result Value Ref Range    WBC 8.7 4.1 - 11.1 K/uL    RBC 4.18 4.10 - 5.70 M/uL    HGB 11.8 (L) 12.1 - 17.0 g/dL    HCT 37.0 36.6 - 50.3 %    MCV 88.5 80.0 - 99.0 FL    MCH 28.2 26.0 - 34.0 PG    MCHC 31.9 30.0 - 36.5 g/dL    RDW 13.2 11.5 - 14.5 %    PLATELET 573 259 - 848 K/uL    MPV 10.0 8.9 - 12.9 FL    NRBC 0.0 0  WBC    ABSOLUTE NRBC 0.00 0.00 - 0.01 K/uL    NEUTROPHILS 74 32 - 75 %    LYMPHOCYTES 9 (L) 12 - 49 %    MONOCYTES 16 (H) 5 - 13 %    EOSINOPHILS 0 0 - 7 %    BASOPHILS 1 0 - 1 %    IMMATURE GRANULOCYTES 0 0.0 - 0.5 %    ABS. NEUTROPHILS 6.4 1.8 - 8.0 K/UL    ABS. LYMPHOCYTES 0.8 0.8 - 3.5 K/UL    ABS. MONOCYTES 1.4 (H) 0.0 - 1.0 K/UL    ABS. EOSINOPHILS 0.0 0.0 - 0.4 K/UL    ABS. BASOPHILS 0.1 0.0 - 0.1 K/UL    ABS. IMM.  GRANS. 0.0 0.00 - 0.04 K/UL    DF SMEAR SCANNED      RBC COMMENTS NORMOCYTIC, NORMOCHROMIC     METABOLIC PANEL, COMPREHENSIVE    Collection Time: 09/18/22  7:25 PM   Result Value Ref Range    Sodium 138 136 - 145 mmol/L    Potassium 4.5 3.5 - 5.1 mmol/L    Chloride 102 97 - 108 mmol/L    CO2 28 21 - 32 mmol/L    Anion gap 8 5 - 15 mmol/L    Glucose 231 (H) 65 - 100 mg/dL    BUN 20 6 - 20 MG/DL    Creatinine 1.48 (H) 0.70 - 1.30 MG/DL    BUN/Creatinine ratio 14 12 - 20      GFR est AA 55 (L) >60 ml/min/1.73m2    GFR est non-AA 46 (L) >60 ml/min/1.73m2    Calcium 9.3 8.5 - 10.1 MG/DL    Bilirubin, total 0.8 0.2 - 1.0 MG/DL    ALT (SGPT) 18 12 - 78 U/L    AST (SGOT) 27 15 - 37 U/L    Alk.  phosphatase 72 45 - 117 U/L    Protein, total 7.3 6.4 - 8.2 g/dL    Albumin 3.5 3.5 - 5.0 g/dL    Globulin 3.8 2.0 - 4.0 g/dL    A-G Ratio 0.9 (L) 1.1 - 2.2     CULTURE, BLOOD, PAIRED    Collection Time: 09/18/22  8:30 PM    Specimen: Blood   Result Value Ref Range    Special Requests: NO SPECIAL REQUESTS      Culture result: NO GROWTH AFTER 11 HOURS     URINALYSIS W/ REFLEX CULTURE    Collection Time: 09/18/22  8:42 PM    Specimen: Urine   Result Value Ref Range    Color YELLOW/STRAW      Appearance CLEAR CLEAR      Specific gravity 1.023      pH (UA) 6.0 5.0 - 8.0      Protein 30 (A) NEG mg/dL    Glucose Negative NEG mg/dL    Ketone TRACE (A) NEG mg/dL    Bilirubin Negative NEG      Blood SMALL (A) NEG      Urobilinogen 1.0 0.2 - 1.0 EU/dL    Nitrites Negative NEG      Leukocyte Esterase Negative NEG      UA:UC IF INDICATED CULTURE NOT INDICATED BY UA RESULT CNI      WBC 0-4 0 - 4 /hpf    RBC 5-10 0 - 5 /hpf    Epithelial cells FEW FEW /lpf    Bacteria Negative NEG /hpf    Hyaline cast 2-5 0 - 2 /lpf   LACTIC ACID    Collection Time: 09/18/22  8:42 PM   Result Value Ref Range    Lactic acid 2.1 (HH) 0.4 - 2.0 MMOL/L   TROPONIN-HIGH SENSITIVITY    Collection Time: 09/18/22  8:42 PM   Result Value Ref Range    Troponin-High Sensitivity 225 (HH) 0 - 76 ng/L   COVID-19 RAPID TEST    Collection Time: 09/18/22  8:42 PM   Result Value Ref Range    Specimen source Nasopharyngeal      COVID-19 rapid test Detected (A) NOTD     INFLUENZA A+B VIRAL AGS    Collection Time: 09/18/22  8:42 PM   Result Value Ref Range    Influenza A Antigen Negative NEG      Influenza B Antigen Negative NEG     NT-PRO BNP    Collection Time: 09/18/22  8:42 PM   Result Value Ref Range NT pro- <450 PG/ML   TROPONIN-HIGH SENSITIVITY    Collection Time: 09/18/22 10:14 PM   Result Value Ref Range    Troponin-High Sensitivity 232 (HH) 0 - 76 ng/L   LACTIC ACID    Collection Time: 09/19/22  2:19 AM   Result Value Ref Range    Lactic acid 1.4 0.4 - 2.0 MMOL/L   TROPONIN-HIGH SENSITIVITY    Collection Time: 09/19/22  3:39 AM   Result Value Ref Range    Troponin-High Sensitivity 250 (HH) 0 - 76 ng/L   GLUCOSE, POC    Collection Time: 09/19/22  7:41 AM   Result Value Ref Range    Glucose (POC) 95 65 - 117 mg/dL    Performed by Erin Urban RN    TROPONIN-HIGH SENSITIVITY    Collection Time: 09/19/22  7:49 AM   Result Value Ref Range    Troponin-High Sensitivity 265 (HH) 0 - 76 ng/L   GLUCOSE, POC    Collection Time: 09/19/22 11:55 AM   Result Value Ref Range    Glucose (POC) 151 (H) 65 - 117 mg/dL    Performed by Nichol Madsen PCT            Assessment:     Active Problems:    CHF exacerbation ([address] (9/19/2022)        Plan:     Progressive lower extremity edema despite increasing doses of lasix. No real chf on xray, renal function, liver function, thyroid function all basically WNL. Probably venous insufficiency- continue compression dressings for now. Currently on lasix 40 mg iv bid. Elevated troponin- will ask cardiology to see. 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  Diabetes- currently on sliding scale insulin

## 2022-09-19 NOTE — PROGRESS NOTES
1735 1st attempt made to give report  [phone] 2nd attempt made to give report  Pt transported up to floor by transport team

## 2022-09-19 NOTE — H&P
This note is compiled to communicate with the medical care team. It may contain sensitive and protected information. It is not intended to serve as a source of communication with patients/families/friends; that communication occurs at the bedside or via alternative direct communications means (secure messaging, letters, video/telephone, etc.). Hospitalist Admission Note    NAME: Abdirahman Johnson   :  1940   MRN:  [id number]     Date/Time:  2022 2:52 AM    Patient PCP: Kevin Pizarro MD  ______________________________________________________________________  Given the patient's current clinical presentation, I have a high level of concern for decompensation if discharged from the emergency department. Complex decision making was performed, which includes reviewing the patient's available past medical records, laboratory results, and x-ray films. My assessment of this patient's clinical condition and my plan of care is as follows. Subjective:   CHIEF COMPLAINT: BLE swelling and weakness    HISTORY OF PRESENT ILLNESS:     Abdirahman Johnson is a 80 y.o.  male with pertinent past medical history of CAD, HFpEF, non-insulin-dependent diabetes mellitus, BPH, history CVA, essential hypertension who presents with complaints of progressively worsening BLE swelling, generalized weakness, and mild shortness of breath most notable over the past 2 days. Patient now reports legs are so weak and painful that he is unable to walk independently. He reports his PCP put him on 80 mg furosemide 1-2 times per day without improvement in symptoms. He denies fever/chills, sick contacts, cough, shortness of breath, chest pain, palpitations, change in bowel or bladder habits. He has no other complaints or concerns at this time. Patient reports he is vaccinated and boosted x1 for COVID.     In the ED, patient afebrile, hemodynamically stable (hypertensive 160s/60s) saturating upper 90s on room air.  Rapid COVID positive-patient asymptomatic. CXR demonstrates no evidence of acute cardiopulmonary process. CTA chest demonstrates no evidence of acute pulmonary embolus. BLE duplex demonstrates no evidence of DVT. Labs demonstrate: Sodium 138, potassium 4.5, glucose 231, BUN 20, creatinine 1.48 (baseline 1.0-1.2), CBC unremarkable, UA not reflexed to culture (mild protein, trace ketone, small blood), flu A/B-, proBNP 279, high since her troponin to 25 increased to 232, lactic acid 2.1 repeat 1.4. We were asked to admit for work up and evaluation of the above problems. Past Medical History:   Diagnosis Date    Arthritis     CAD (coronary artery disease)     Carotid artery disease ([facility] [address]     CHF exacerbation ([facility] [address] 2022    Diabetes ([address]     Diabetic neuropathy ([address]     Hypercholesterolemia     Hypertension 11/29/10    Shingles     Stroke ([address]     Thromboembolus [facility])         Past Surgical History:   Procedure Laterality Date    HX CARPAL TUNNEL RELEASE  left    HX CORONARY ARTERY BYPASS GRAFT      HX HEART CATHETERIZATION  2005    Dr Ba Hunt      left arm fracture    DE COLONOSCOPY FLX DX W/COLLJ SPEC WHEN PFRMD  2012            Social History     Tobacco Use    Smoking status: Former     Packs/day: 0.50     Years: 20.00     Pack years: 10.00     Types: Cigarettes     Quit date: 2000     Years since quittin.7    Smokeless tobacco: Never    Tobacco comments:     15 years ago   Substance Use Topics    Alcohol use: Not Currently     Comment: occ. Family History   Problem Relation Age of Onset    Stroke Father     Heart Disease Brother     Cancer Brother        Allergies   Allergen Reactions    Lipitor [Atorvastatin] Diarrhea        Prior to Admission medications    Medication Sig Start Date End Date Taking?  Authorizing Provider   metFORMIN (GLUCOPHAGE) 1,000 mg tablet TAKE 1 1/2 TABLETS in the am and 1 Tablet in the PM,  for diabetes 22  Yes Ellis Freedman MD   furosemide (LASIX) 80 mg tablet 1-2 tabs po daily as needed for swelling in feet. 9/14/22  Yes Ellis Freedman MD   potassium chloride (K-DUR, KLOR-CON M20) 20 mEq tablet Take 1 Tablet by mouth daily. 9/14/22  Yes Ellis Freedman MD   acetaminophen-codeine (TYLENOL #3) 300-30 mg per tablet Take 1 Tablet by mouth four (4) times daily as needed for Pain for up to 30 days. Max Daily Amount: 4 Tablets. 9/14/22 10/14/22 Yes Ellis Freedman MD   rosuvastatin (CRESTOR) 20 mg tablet One daily for cholesterol 8/1/22  Yes Ellis Freedman MD   glimepiride (AMARYL) 4 mg tablet TAKE 1 TABLET BY MOUTH DAILY FOR DIABETES 6/13/22  Yes Ellis Freedman MD   metoprolol tartrate (LOPRESSOR) 25 mg tablet TAKE 1 TABLET BY MOUTH EVERY 12 HOURS FOR BLOOD PRESSURE AND HEART 6/13/22  Yes Ellis Freedman MD   tamsulosin (FLOMAX) 0.4 mg capsule TAKE ONE CAPSULE BY MOUTH EVERY DAY 6/13/22  Yes Ellis Freedman MD   lisinopriL (PRINIVIL, ZESTRIL) 10 mg tablet Take 1 Tablet by mouth daily. 2/21/22  Yes Ellis Freedman MD   polyethylene glycol [facility]) 17 gram/dose powder One scoop daily for constipation 6/23/21  Yes Ellis Freedman MD   aspirin delayed-release 81 mg tablet Take 1 Tab by mouth daily.  To prevent heart attack and stroke 6/9/16  Yes Ellis Freedman MD       REVIEW OF SYSTEMS:       Total of 12 systems reviewed as follows:       POSITIVE= Red text   General: Fever, chills, sweats, weakness/malaise, weight loss/gain, loss of appetite    Eyes:   Vision change, eye pain   ENT:    Rhinorrhea, pharyngitis, Hearing loss    Respiratory:   cough, sputum production, SOB, FRIAS, wheezing, pleuritic pain    Cardiology:   chest pain, palpitations, orthopnea, edema, syncope    Gastrointestinal:  abdominal pain , N/V, diarrhea, dysphagia, constipation, bleeding    Genitourinary:  frequency, urgency, dysuria, hematuria, incontinence    Muskuloskeletal:  arthralgia, myalgia, back pain   Hematology:  easy bruising, nose or gum bleeding, lymphadenopathy    Dermatological: rash, ulceration, pruritis, color change / jaundice   Endocrine:  hot flashes or polydipsia    Neurological:  headache, dizziness, confusion, focal weakness, paresthesia, Speech difficulties, memory loss, gait difficulty   Psychological: Feelings of anxiety, depression, agitation       Objective:   VITALS:    Visit Vitals  BP (!) 155/58   Pulse 89   Temp 97.8 °F (36.6 °C)   Resp 22   Ht 6' 1\" (1.854 m)   Wt 97.1 kg (214 lb)   SpO2 98%   BMI 28.23 kg/m²       PHYSICAL EXAM:    General:   Alert, cooperative, no distress, well-appearing elderly -American male        HEENT:  Atraumatic, anicteric sclerae, pink conjunctivae, mucosa moist, dentition fair     Neck:  Supple, symmetrical, trachea midline, no abnormalities on palpation     Lungs:   CTAB. Symmetric expansion. Good aeration. Normal respiratory effort. Chest wall:  No tenderness. No Accessory muscle use. Cardiovascular:   RRR, No murmur/rub/gallop. No JVD. Radial/AT/DP pulse 2+     GI/:   Soft, non-tender. Not distended. Bowel sounds normal. No HSM     Extremities Moderate/severe pitting edema BLE to level of knees. No cyanosis. Capillary refill normal     Skin: No significant lesions noted. Not Jaundiced. No rashes      Neurologic: PERRL. EOMI. No facial asymmetry. No aphasia or slurred speech. Moves all extremities. Sensation to light touch grossly intact BUE/BLE. No overt focal deficits appreciated     Psych:  Alert and oriented X 4. Normal affect.  Good insight     Other:   N/A         LAB DATA REVIEWED:    Recent Results (from the past 24 hour(s))   EKG, 12 LEAD, INITIAL    Collection Time: 09/18/22  7:17 PM   Result Value Ref Range    Ventricular Rate 107 BPM    Atrial Rate 107 BPM    P-R Interval 184 ms    QRS Duration 106 ms    Q-T Interval 332 ms    QTC Calculation (Bezet) 443 ms    Calculated P Axis 70 degrees    Calculated R Axis 8 degrees    Calculated T Axis 106 degrees    Diagnosis       Sinus tachycardia  Left ventricular hypertrophy with repolarization abnormality  When compared with ECG of 08-FEB-2021 09:51,  ST no longer elevated in Anterior leads  Nonspecific T wave abnormality has replaced inverted T waves in Inferior   leads     CBC WITH AUTOMATED DIFF    Collection Time: 09/18/22  7:25 PM   Result Value Ref Range    WBC 8.7 4.1 - 11.1 K/uL    RBC 4.18 4.10 - 5.70 M/uL    HGB 11.8 (L) 12.1 - 17.0 g/dL    HCT 37.0 36.6 - 50.3 %    MCV 88.5 80.0 - 99.0 FL    MCH 28.2 26.0 - 34.0 PG    MCHC 31.9 30.0 - 36.5 g/dL    RDW 13.2 11.5 - 14.5 %    PLATELET 901 161 - 388 K/uL    MPV 10.0 8.9 - 12.9 FL    NRBC 0.0 0  WBC    ABSOLUTE NRBC 0.00 0.00 - 0.01 K/uL    NEUTROPHILS 74 32 - 75 %    LYMPHOCYTES 9 (L) 12 - 49 %    MONOCYTES 16 (H) 5 - 13 %    EOSINOPHILS 0 0 - 7 %    BASOPHILS 1 0 - 1 %    IMMATURE GRANULOCYTES 0 0.0 - 0.5 %    ABS. NEUTROPHILS 6.4 1.8 - 8.0 K/UL    ABS. LYMPHOCYTES 0.8 0.8 - 3.5 K/UL    ABS. MONOCYTES 1.4 (H) 0.0 - 1.0 K/UL    ABS. EOSINOPHILS 0.0 0.0 - 0.4 K/UL    ABS. BASOPHILS 0.1 0.0 - 0.1 K/UL    ABS. IMM. GRANS. 0.0 0.00 - 0.04 K/UL    DF SMEAR SCANNED      RBC COMMENTS NORMOCYTIC, NORMOCHROMIC     METABOLIC PANEL, COMPREHENSIVE    Collection Time: 09/18/22  7:25 PM   Result Value Ref Range    Sodium 138 136 - 145 mmol/L    Potassium 4.5 3.5 - 5.1 mmol/L    Chloride 102 97 - 108 mmol/L    CO2 28 21 - 32 mmol/L    Anion gap 8 5 - 15 mmol/L    Glucose 231 (H) 65 - 100 mg/dL    BUN 20 6 - 20 MG/DL    Creatinine 1.48 (H) 0.70 - 1.30 MG/DL    BUN/Creatinine ratio 14 12 - 20      GFR est AA 55 (L) >60 ml/min/1.73m2    GFR est non-AA 46 (L) >60 ml/min/1.73m2    Calcium 9.3 8.5 - 10.1 MG/DL    Bilirubin, total 0.8 0.2 - 1.0 MG/DL    ALT (SGPT) 18 12 - 78 U/L    AST (SGOT) 27 15 - 37 U/L    Alk.  phosphatase 72 45 - 117 U/L    Protein, total 7.3 6.4 - 8.2 g/dL    Albumin 3.5 3.5 - 5.0 g/dL    Globulin 3.8 2.0 - 4.0 g/dL    A-G Ratio 0.9 (L) 1.1 - 2.2 URINALYSIS W/ REFLEX CULTURE    Collection Time: 09/18/22  8:42 PM    Specimen: Urine   Result Value Ref Range    Color YELLOW/STRAW      Appearance CLEAR CLEAR      Specific gravity 1.023      pH (UA) 6.0 5.0 - 8.0      Protein 30 (A) NEG mg/dL    Glucose Negative NEG mg/dL    Ketone TRACE (A) NEG mg/dL    Bilirubin Negative NEG      Blood SMALL (A) NEG      Urobilinogen 1.0 0.2 - 1.0 EU/dL    Nitrites Negative NEG      Leukocyte Esterase Negative NEG      UA:UC IF INDICATED CULTURE NOT INDICATED BY UA RESULT CNI      WBC 0-4 0 - 4 /hpf    RBC 5-10 0 - 5 /hpf    Epithelial cells FEW FEW /lpf    Bacteria Negative NEG /hpf    Hyaline cast 2-5 0 - 2 /lpf   LACTIC ACID    Collection Time: 09/18/22  8:42 PM   Result Value Ref Range    Lactic acid 2.1 (HH) 0.4 - 2.0 MMOL/L   TROPONIN-HIGH SENSITIVITY    Collection Time: 09/18/22  8:42 PM   Result Value Ref Range    Troponin-High Sensitivity 225 (HH) 0 - 76 ng/L   COVID-19 RAPID TEST    Collection Time: 09/18/22  8:42 PM   Result Value Ref Range    Specimen source Nasopharyngeal      COVID-19 rapid test Detected (A) NOTD     INFLUENZA A+B VIRAL AGS    Collection Time: 09/18/22  8:42 PM   Result Value Ref Range    Influenza A Antigen Negative NEG      Influenza B Antigen Negative NEG     NT-PRO BNP    Collection Time: 09/18/22  8:42 PM   Result Value Ref Range    NT pro- <450 PG/ML   TROPONIN-HIGH SENSITIVITY    Collection Time: 09/18/22 10:14 PM   Result Value Ref Range    Troponin-High Sensitivity 232 (HH) 0 - 76 ng/L   LACTIC ACID    Collection Time: 09/19/22  2:19 AM   Result Value Ref Range    Lactic acid 1.4 0.4 - 2.0 MMOL/L       IMAGING:  CXR-no evidence of acute cardiopulmonary process    Duplex BLE-no evidence of DVT in the right or left lower extremity    CTA chest: No evidence of acute pulmonary embolus      EKG: Sinus tachycardia, rate 107, QTc 43, LVH criteria, no definitive ischemic changes  ______________________________________________________________________    Assessment / Plan:  Bilateral lower extremity swelling-suspect decompensated diastolic heart failure  Elevated troponin  Essential hypertension  History CVA  History CAD status post CABG  Chronic opioid use  Incidental COVID-positive  Non-insulin-dependent diabetes mellitus  BPH    PLAN:    Bilateral lower extremity swelling-suspect decompensated diastolic heart failure  Elevated troponin  Essential hypertension  History CVA  History CAD status post CABG  Admit to telemetry monitoring  Obtain TTE to evaluate cardiac function  Suspect troponin elevated secondary to demand ischemia-we will continue to trend  -If significantly increased on next troponin will initiate heparin drip and consult cardiology  Twice daily Lasix  Ace wraps to bilateral lower extremities  Elevate bilateral lower extremities  Monitor electrolytes closely  -Borderline CHEVY-monitor closely while diuresing, hold PTA lisinopril  Continue PTA: Rosuvastatin, metoprolol, aspirin  Bladder checks to ensure no urinary retention given history BPH    Chronic opioid use  Continue PTA Tylenol 3    Incidental COVID-positive  Patient vaccinated and boosted x1  Currently asymptomatic and saturating well on room air  At present there is no data to support use of steroids in asymptomatic population, will continue to monitor    Non-insulin-dependent diabetes mellitus  Sliding scale insulin-May require basal insulin Will monitor and address as needed    BPH  Continue PTA tamsulosin    Code Status: Full    DVT Prophylaxis: Lovenox  GI Prophylaxis: Not indicated  Diet: Cardiac       Care Plan discussed with:    Comments   Patient x    Family      RN     Care Manager                    Consultant:      _______________________________________________________________________  Baseline Level of Function: Independent    Expected  Disposition:   Home with Family x   HH/PT/OT/RN    SNF/LTC ISAIAS    ________________________________________________________________________  TOTAL TIME:  60 Minutes      Comments    x Reviewed previous records   >50% of visit spent in counseling and coordination of care x Discussion with patient and/or family and questions answered       ________________________________________________________________________  Signed: Gautam Carreon DO  9/19/2022 2:52 AM    Please note that this documentation was completed in part with Dragon dictation software. Occasionally unanticipated grammatical, syntax, homophones, and other interpretive errors are inadvertently transcribed by the computer software. Please excuse and disregard any errors that have escaped final proofreading. If in doubt, please do not hesitate to reach out to myself or the assigned hospitalist via [facility] paging system for clarification.

## 2022-09-19 NOTE — ED NOTES
Report note:    Admit:  Tele  Dx: Covid POS, CHF exacerbation  Dr Regulo Colbert    Here for evaluation of bilateral lower leg swelling--feeling very weak    Full Code    4+ pitting lower leg edema   PVL negative    Out of bed with assist--  Was able to bear weight--not walk--only pivot with assist--    Reg Diet--low fat, low chol, high fiber, 2 Gm NA  VS per routine    #20 LAC  #18 R mid-forearm    Consults for OT/PT/RT--eval and treat    Ace Wraps to lower extremities  Bladder checks  Cardiac monitoring  Elevate bilateral lower extremities  Droplet Precautions  Accuchecks ACHS--with sliding scale    Echo ordered    Repeat trop to be drawn at 0730

## 2022-09-19 NOTE — ED NOTES
Blood drawn and sent for repeat troponin    Awaiting all results and disposition    Patient in no distress

## 2022-09-19 NOTE — PROGRESS NOTES
Attempted to see pt for OT services. Pt is in the process of transferring rooms. Will defer, but continue to follow.

## 2022-09-20 ENCOUNTER — APPOINTMENT (OUTPATIENT)
Dept: GENERAL RADIOLOGY | Age: 82
DRG: 291 | End: 2022-09-20
Attending: FAMILY MEDICINE
Payer: MEDICARE

## 2022-09-20 ENCOUNTER — APPOINTMENT (OUTPATIENT)
Dept: VASCULAR SURGERY | Age: 82
DRG: 291 | End: 2022-09-20
Attending: FAMILY MEDICINE
Payer: MEDICARE

## 2022-09-20 LAB
ANION GAP SERPL CALC-SCNC: 6 MMOL/L (ref 5–15)
BASOPHILS # BLD: 0 K/UL (ref 0–0.1)
BASOPHILS NFR BLD: 1 % (ref 0–1)
BUN SERPL-MCNC: 21 MG/DL (ref 6–20)
BUN/CREAT SERPL: 20 (ref 12–20)
CALCIUM SERPL-MCNC: 9.1 MG/DL (ref 8.5–10.1)
CHLORIDE SERPL-SCNC: 100 MMOL/L (ref 97–108)
CO2 SERPL-SCNC: 31 MMOL/L (ref 21–32)
CREAT SERPL-MCNC: 1.07 MG/DL (ref 0.7–1.3)
DIFFERENTIAL METHOD BLD: ABNORMAL
EOSINOPHIL # BLD: 0.1 K/UL (ref 0–0.4)
EOSINOPHIL NFR BLD: 2 % (ref 0–7)
ERYTHROCYTE [DISTWIDTH] IN BLOOD BY AUTOMATED COUNT: 13.1 % (ref 11.5–14.5)
GLUCOSE BLD STRIP.AUTO-MCNC: 126 MG/DL (ref 65–117)
GLUCOSE BLD STRIP.AUTO-MCNC: 198 MG/DL (ref 65–117)
GLUCOSE BLD STRIP.AUTO-MCNC: 220 MG/DL (ref 65–117)
GLUCOSE BLD STRIP.AUTO-MCNC: 230 MG/DL (ref 65–117)
GLUCOSE SERPL-MCNC: 138 MG/DL (ref 65–100)
HCT VFR BLD AUTO: 36.2 % (ref 36.6–50.3)
HGB BLD-MCNC: 11.9 G/DL (ref 12.1–17)
IMM GRANULOCYTES # BLD AUTO: 0 K/UL (ref 0–0.04)
IMM GRANULOCYTES NFR BLD AUTO: 0 % (ref 0–0.5)
LYMPHOCYTES # BLD: 1.9 K/UL (ref 0.8–3.5)
LYMPHOCYTES NFR BLD: 33 % (ref 12–49)
MCH RBC QN AUTO: 28.4 PG (ref 26–34)
MCHC RBC AUTO-ENTMCNC: 32.9 G/DL (ref 30–36.5)
MCV RBC AUTO: 86.4 FL (ref 80–99)
MONOCYTES # BLD: 0.9 K/UL (ref 0–1)
MONOCYTES NFR BLD: 16 % (ref 5–13)
NEUTS SEG # BLD: 2.8 K/UL (ref 1.8–8)
NEUTS SEG NFR BLD: 48 % (ref 32–75)
NRBC # BLD: 0 K/UL (ref 0–0.01)
NRBC BLD-RTO: 0 PER 100 WBC
PLATELET # BLD AUTO: 222 K/UL (ref 150–400)
PMV BLD AUTO: 10.1 FL (ref 8.9–12.9)
POTASSIUM SERPL-SCNC: 3.4 MMOL/L (ref 3.5–5.1)
RBC # BLD AUTO: 4.19 M/UL (ref 4.1–5.7)
SERVICE CMNT-IMP: ABNORMAL
SODIUM SERPL-SCNC: 137 MMOL/L (ref 136–145)
WBC # BLD AUTO: 5.8 K/UL (ref 4.1–11.1)

## 2022-09-20 PROCEDURE — 99233 SBSQ HOSP IP/OBS HIGH 50: CPT | Performed by: FAMILY MEDICINE

## 2022-09-20 PROCEDURE — 74011250636 HC RX REV CODE- 250/636: Performed by: FAMILY MEDICINE

## 2022-09-20 PROCEDURE — 93922 UPR/L XTREMITY ART 2 LEVELS: CPT

## 2022-09-20 PROCEDURE — 74011250636 HC RX REV CODE- 250/636: Performed by: STUDENT IN AN ORGANIZED HEALTH CARE EDUCATION/TRAINING PROGRAM

## 2022-09-20 PROCEDURE — 74011250637 HC RX REV CODE- 250/637: Performed by: FAMILY MEDICINE

## 2022-09-20 PROCEDURE — 72100 X-RAY EXAM L-S SPINE 2/3 VWS: CPT

## 2022-09-20 PROCEDURE — 65270000029 HC RM PRIVATE

## 2022-09-20 PROCEDURE — 74011250637 HC RX REV CODE- 250/637: Performed by: STUDENT IN AN ORGANIZED HEALTH CARE EDUCATION/TRAINING PROGRAM

## 2022-09-20 PROCEDURE — 82962 GLUCOSE BLOOD TEST: CPT

## 2022-09-20 PROCEDURE — 74011000250 HC RX REV CODE- 250: Performed by: STUDENT IN AN ORGANIZED HEALTH CARE EDUCATION/TRAINING PROGRAM

## 2022-09-20 PROCEDURE — 74011636637 HC RX REV CODE- 636/637: Performed by: STUDENT IN AN ORGANIZED HEALTH CARE EDUCATION/TRAINING PROGRAM

## 2022-09-20 RX ORDER — BALSAM PERU/CASTOR OIL
OINTMENT (GRAM) TOPICAL 2 TIMES DAILY
Status: DISCONTINUED | OUTPATIENT
Start: 2022-09-20 | End: 2022-09-26 | Stop reason: HOSPADM

## 2022-09-20 RX ORDER — AMITRIPTYLINE HYDROCHLORIDE 10 MG/1
10 TABLET, FILM COATED ORAL 2 TIMES DAILY
Status: DISCONTINUED | OUTPATIENT
Start: 2022-09-20 | End: 2022-09-26 | Stop reason: HOSPADM

## 2022-09-20 RX ORDER — POTASSIUM CHLORIDE 750 MG/1
20 TABLET, FILM COATED, EXTENDED RELEASE ORAL 2 TIMES DAILY
Status: DISCONTINUED | OUTPATIENT
Start: 2022-09-20 | End: 2022-09-26 | Stop reason: HOSPADM

## 2022-09-20 RX ADMIN — FUROSEMIDE 40 MG: 10 INJECTION, SOLUTION INTRAMUSCULAR; INTRAVENOUS at 09:59

## 2022-09-20 RX ADMIN — Medication 3 UNITS: at 17:12

## 2022-09-20 RX ADMIN — CASTOR OIL AND BALSAM, PERU: 788; 87 OINTMENT TOPICAL at 12:23

## 2022-09-20 RX ADMIN — FUROSEMIDE 40 MG: 10 INJECTION, SOLUTION INTRAMUSCULAR; INTRAVENOUS at 18:18

## 2022-09-20 RX ADMIN — SODIUM CHLORIDE, PRESERVATIVE FREE 10 ML: 5 INJECTION INTRAVENOUS at 17:14

## 2022-09-20 RX ADMIN — METOPROLOL TARTRATE 25 MG: 25 TABLET, FILM COATED ORAL at 09:59

## 2022-09-20 RX ADMIN — TAMSULOSIN HYDROCHLORIDE 0.4 MG: 0.4 CAPSULE ORAL at 09:57

## 2022-09-20 RX ADMIN — ACETAMINOPHEN AND CODEINE PHOSPHATE 1 TABLET: 300; 30 TABLET ORAL at 06:25

## 2022-09-20 RX ADMIN — POTASSIUM CHLORIDE 20 MEQ: 750 TABLET, FILM COATED, EXTENDED RELEASE ORAL at 17:54

## 2022-09-20 RX ADMIN — ENOXAPARIN SODIUM 40 MG: 100 INJECTION SUBCUTANEOUS at 09:59

## 2022-09-20 RX ADMIN — SODIUM CHLORIDE, PRESERVATIVE FREE 10 ML: 5 INJECTION INTRAVENOUS at 21:35

## 2022-09-20 RX ADMIN — AMITRIPTYLINE HYDROCHLORIDE 10 MG: 10 TABLET, FILM COATED ORAL at 12:17

## 2022-09-20 RX ADMIN — POTASSIUM CHLORIDE 20 MEQ: 750 TABLET, FILM COATED, EXTENDED RELEASE ORAL at 09:58

## 2022-09-20 RX ADMIN — SODIUM CHLORIDE, PRESERVATIVE FREE 10 ML: 5 INJECTION INTRAVENOUS at 05:35

## 2022-09-20 RX ADMIN — SODIUM CHLORIDE, PRESERVATIVE FREE 10 ML: 5 INJECTION INTRAVENOUS at 17:13

## 2022-09-20 RX ADMIN — Medication 3 UNITS: at 12:19

## 2022-09-20 RX ADMIN — METOPROLOL TARTRATE 25 MG: 25 TABLET, FILM COATED ORAL at 18:09

## 2022-09-20 RX ADMIN — MORPHINE SULFATE 2 MG: 2 INJECTION, SOLUTION INTRAMUSCULAR; INTRAVENOUS at 12:07

## 2022-09-20 RX ADMIN — ROSUVASTATIN 20 MG: 20 TABLET, FILM COATED ORAL at 23:46

## 2022-09-20 RX ADMIN — ASPIRIN 81 MG: 81 TABLET, COATED ORAL at 09:56

## 2022-09-20 RX ADMIN — CASTOR OIL AND BALSAM, PERU: 788; 87 OINTMENT TOPICAL at 21:00

## 2022-09-20 RX ADMIN — AMITRIPTYLINE HYDROCHLORIDE 10 MG: 10 TABLET, FILM COATED ORAL at 17:54

## 2022-09-20 NOTE — PROGRESS NOTES
Bedside and Verbal shift change report given to NEL Marshall (oncoming nurse) by [facility] AT ANTONIO GONZALEZ RN (offgoing nurse). Report included the following information SBAR, Kardex, Intake/Output, MAR, and Recent Results.

## 2022-09-20 NOTE — PROGRESS NOTES
Pt asked to remove ace compression bandages from legs for pain relief. Pt reports pain relief from intervention.

## 2022-09-20 NOTE — PROGRESS NOTES
[facility]  [address] [facility] [place] [place] [address] [place] [address]  [phone]  [email or web address]         Cardiology Progress Note      9/20/2022 8:25 AM    Admit Date: 9/18/2022    Admit Diagnosis:   CHF exacerbation ([address] [I50.9]    Interval History/Subjective:     Catrachito Able feels better. Leg swelling has improved.     Visit Vitals  /64   Pulse 72   Temp 98.5 °F (36.9 °C)   Resp 18   Ht 6' 1\" (1.854 m)   Wt 97.1 kg (214 lb)   SpO2 93%   BMI 28.23 kg/m²       Current Facility-Administered Medications   Medication Dose Route Frequency    amitriptyline (ELAVIL) tablet 10 mg  10 mg Oral BID    potassium chloride SR (KLOR-CON 10) tablet 20 mEq  20 mEq Oral BID    balsam peru-castor oiL (VENELEX) ointment   Topical BID    acetaminophen-codeine (TYLENOL #3) per tablet 1 Tablet  1 Tablet Oral QID PRN    aspirin delayed-release tablet 81 mg  81 mg Oral DAILY    metoprolol tartrate (LOPRESSOR) tablet 25 mg  25 mg Oral BID    rosuvastatin (CRESTOR) tablet 20 mg  20 mg Oral QHS    tamsulosin (FLOMAX) capsule 0.4 mg  0.4 mg Oral DAILY    sodium chloride (NS) flush 5-40 mL  5-40 mL IntraVENous Q8H    sodium chloride (NS) flush 5-40 mL  5-40 mL IntraVENous PRN    acetaminophen (TYLENOL) tablet 650 mg  650 mg Oral Q6H PRN    Or    acetaminophen (TYLENOL) suppository 650 mg  650 mg Rectal Q6H PRN    polyethylene glycol (MIRALAX) packet 17 g  17 g Oral DAILY PRN    ondansetron (ZOFRAN ODT) tablet 4 mg  4 mg Oral Q8H PRN    Or    ondansetron (ZOFRAN) injection 4 mg  4 mg IntraVENous Q6H PRN    enoxaparin (LOVENOX) injection 40 mg  40 mg SubCUTAneous DAILY    insulin lispro (HUMALOG) injection   SubCUTAneous AC&HS    glucose chewable tablet 16 g  4 Tablet Oral PRN    glucagon (GLUCAGEN) injection 1 mg  1 mg IntraMUSCular PRN    dextrose 10% infusion 0-250 mL  0-250 mL IntraVENous PRN    furosemide (LASIX) injection 40 mg  40 mg IntraVENous BID    morphine injection 2 mg  2 mg IntraVENous Q4H PRN       Objective:      Physical Exam:  General: Well developed, in no acute distress, cooperative and alert  HEENT: No carotid bruits, PEERL, EOM intact. Heart:  reg rate and rhythm; normal S1/S2; no murmurs  Respiratory: Clear bilaterally x 4, no wheezing or rales  Abdomen:   Soft, non-tender, no distention, no masses. + BS. Extremities:  Normal cap refill, no cyanosis, atraumatic. Bilateral LE edema 1+ around the ankles; both legs wrapped in ACE bandage for compression. Neuro: A&Ox3, speech clear  Skin: Skin color is normal. Non diaphoretic  Vascular: 2+ pulses symmetric in all extremities    Data Review:   Recent Labs     09/19/22 0328 09/18/22 1925   WBC 5.8 8.7   HGB 11.9* 11.8*   HCT 36.2* 37.0    224     Recent Labs     09/19/22 0328 09/18/22 1925    138   K 3.4* 4.5    102   CO2 31 28   * 231*   BUN 21* 20   CREA 1.07 1.48*   CA 9.1 9.3   ALB  --  3.5   TBILI  --  0.8   ALT  --  18       No results for input(s): TROIQ, CPK, CKMB in the last 72 hours. Intake/Output Summary (Last 24 hours) at 9/20/2022 0825  Last data filed at 9/20/2022 8901  Gross per 24 hour   Intake --   Output 1950 ml   Net -1950 ml        Telemetry: sinus rhythm    Echocardiogram: pending    Radiology Results in the last 24 hours:  No results found. Assessment:     Active Problems:    CHF exacerbation ([address] (9/19/2022)        Plan:      Acute on chronic diastolic HF  -4L since admission  Echo pending  Continue IV diuresis BID; monitor daily weights, sodium and fluid restriction    2. Elevated troponins  Flat elevation  Pt was previously scheduled for OP stress test due to FRIAS; will fu as OP for stress test in our office    3. HTN  Continue BP meds    4. HLD  Continue statin therapy    [facility]             Patient seen, examined by me personally. Plan discussed as detailed.  Agree with note as outlined by  NP with modifications as noted. My independent physical exam reveals : Physical Exam  Vitals and nursing note reviewed. Constitutional:       Appearance: He is ill-appearing. Cardiovascular:      Rate and Rhythm: Normal rate and regular rhythm. Pulmonary:      Breath sounds: Normal breath sounds. Neurological:      Mental Status: He is alert. Psychiatric:         Mood and Affect: Mood normal.        No additional findings noted. Agree with plan as outlined above with modifications as noted. Out patient stress test when clinically better.     Karla Ortiz MD

## 2022-09-20 NOTE — PROGRESS NOTES
Bedside and Verbal shift change report given to Scott Melendez (oncoming nurse) by Yisel Caballero (offgoing nurse). Report included the following information SBAR, Kardex, OR Summary, Intake/Output, MAR, and Recent Results.

## 2022-09-20 NOTE — PROGRESS NOTES
General Daily Progress Note    Admit Date: 9/18/2022  Hospital day 3    Subjective:     Patient has no complaint of shortness of breath or chest pain at rest. Swelling in legs is much better, however, continues to have severe burning in feet. Has chronic lower back pain . ..   Medication side effects: none    Current Facility-Administered Medications   Medication Dose Route Frequency    amitriptyline (ELAVIL) tablet 10 mg  10 mg Oral BID    acetaminophen-codeine (TYLENOL #3) per tablet 1 Tablet  1 Tablet Oral QID PRN    aspirin delayed-release tablet 81 mg  81 mg Oral DAILY    metoprolol tartrate (LOPRESSOR) tablet 25 mg  25 mg Oral BID    rosuvastatin (CRESTOR) tablet 20 mg  20 mg Oral QHS    tamsulosin (FLOMAX) capsule 0.4 mg  0.4 mg Oral DAILY    sodium chloride (NS) flush 5-40 mL  5-40 mL IntraVENous Q8H    sodium chloride (NS) flush 5-40 mL  5-40 mL IntraVENous PRN    acetaminophen (TYLENOL) tablet 650 mg  650 mg Oral Q6H PRN    Or    acetaminophen (TYLENOL) suppository 650 mg  650 mg Rectal Q6H PRN    polyethylene glycol (MIRALAX) packet 17 g  17 g Oral DAILY PRN    ondansetron (ZOFRAN ODT) tablet 4 mg  4 mg Oral Q8H PRN    Or    ondansetron (ZOFRAN) injection 4 mg  4 mg IntraVENous Q6H PRN    enoxaparin (LOVENOX) injection 40 mg  40 mg SubCUTAneous DAILY    insulin lispro (HUMALOG) injection   SubCUTAneous AC&HS    glucose chewable tablet 16 g  4 Tablet Oral PRN    glucagon (GLUCAGEN) injection 1 mg  1 mg IntraMUSCular PRN    dextrose 10% infusion 0-250 mL  0-250 mL IntraVENous PRN    furosemide (LASIX) injection 40 mg  40 mg IntraVENous BID    morphine injection 2 mg  2 mg IntraVENous Q4H PRN        Review of Systems  Pertinent items are noted in HPI. Objective:     Patient Vitals for the past 8 hrs:   BP Temp Pulse Resp SpO2   09/19/22 2343 121/64 98.5 °F (36.9 °C) 72 18 93 %     No intake/output data recorded.   09/18 1901 - 09/20 0700  In: -   Out: 9353 [Urine:4150]    Physical Exam: Visit Vitals  BP 121/64   Pulse 72   Temp 98.5 °F (36.9 °C)   Resp 18   Ht 6' 1\" (1.854 m)   Wt 214 lb (97.1 kg)   SpO2 93%   BMI 28.23 kg/m²     Lungs: clear to auscultation bilaterally  Heart: regular rate and rhythm, S1, S2 normal, no murmur, click, rub or gallop  Abdomen: soft, non-tender. Bowel sounds normal. No masses,  no organomegaly  Extremities: edema , 1-2 + bilaterally (much improved from yesterday). No femoral bruits.        ECG: normal sinus rhythm     Data Review   Recent Results (from the past 24 hour(s))   GLUCOSE, POC    Collection Time: 09/19/22  7:41 AM   Result Value Ref Range    Glucose (POC) 95 65 - 117 mg/dL    Performed by Tony Jaquez RN    TROPONIN-HIGH SENSITIVITY    Collection Time: 09/19/22  7:49 AM   Result Value Ref Range    Troponin-High Sensitivity 265 (HH) 0 - 76 ng/L   GLUCOSE, POC    Collection Time: 09/19/22 11:55 AM   Result Value Ref Range    Glucose (POC) 151 (H) 65 - 117 mg/dL    Performed by Ian Oconnor PCT    URINALYSIS W/ REFLEX CULTURE    Collection Time: 09/19/22  1:50 PM    Specimen: Urine   Result Value Ref Range    Color YELLOW/STRAW      Appearance CLEAR CLEAR      Specific gravity 1.011      pH (UA) 5.5 5.0 - 8.0      Protein Negative NEG mg/dL    Glucose Negative NEG mg/dL    Ketone Negative NEG mg/dL    Bilirubin Negative NEG      Blood SMALL (A) NEG      Urobilinogen 1.0 0.2 - 1.0 EU/dL    Nitrites Negative NEG      Leukocyte Esterase Negative NEG      UA:UC IF INDICATED CULTURE NOT INDICATED BY UA RESULT      WBC 0-4 0 - 4 /hpf    RBC 0-5 0 - 5 /hpf    Epithelial cells FEW FEW /lpf    Bacteria Negative NEG /hpf    Hyaline cast 0-2 0 - 2 /lpf   GLUCOSE, POC    Collection Time: 09/19/22  4:57 PM   Result Value Ref Range    Glucose (POC) 227 (H) 65 - 117 mg/dL    Performed by Arnaud Walker    GLUCOSE, POC    Collection Time: 09/19/22 11:26 PM   Result Value Ref Range    Glucose (POC) 207 (H) 65 - 117 mg/dL    Performed by Vee Salinas (ROSEMARY RN)            Assessment: Active Problems:    CHF exacerbation ([facility] (9/19/2022)        Plan:        Progressive lower extremity edema , responding to IV lasix. No real chf on xray, renal function, liver function, thyroid function all basically WNL. Probable component ofvenous insufficiency also. continue compression dressings for now. Currently on lasix 40 mg iv bid. Appreciate MARIVEL Padilla input- sounds like pt wasn't taking him pm lasix. Elevated troponin- will ask cardiology to see. 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 Diabetes- currently on sliding scale insulin  Burning pain in feet and legs-doesn't seem to be better despite marked improvement in edema. probably from diabetic neuropathy- will start amitriptyline. Also consider Lumbar spinal disease (long hx of low back pain), PVD. Will check xrays of lumbar spine and ТАТЬЯНА indexes.    5. Hypokalemia- replace

## 2022-09-21 ENCOUNTER — APPOINTMENT (OUTPATIENT)
Dept: NON INVASIVE DIAGNOSTICS | Age: 82
DRG: 291 | End: 2022-09-21
Attending: STUDENT IN AN ORGANIZED HEALTH CARE EDUCATION/TRAINING PROGRAM
Payer: MEDICARE

## 2022-09-21 ENCOUNTER — APPOINTMENT (OUTPATIENT)
Dept: CT IMAGING | Age: 82
DRG: 291 | End: 2022-09-21
Attending: FAMILY MEDICINE
Payer: MEDICARE

## 2022-09-21 LAB
GLUCOSE BLD STRIP.AUTO-MCNC: 155 MG/DL (ref 65–117)
GLUCOSE BLD STRIP.AUTO-MCNC: 164 MG/DL (ref 65–117)
GLUCOSE BLD STRIP.AUTO-MCNC: 213 MG/DL (ref 65–117)
GLUCOSE BLD STRIP.AUTO-MCNC: 231 MG/DL (ref 65–117)
GLUCOSE BLD STRIP.AUTO-MCNC: 99 MG/DL (ref 65–117)
SERVICE CMNT-IMP: ABNORMAL
SERVICE CMNT-IMP: NORMAL

## 2022-09-21 PROCEDURE — 93306 TTE W/DOPPLER COMPLETE: CPT

## 2022-09-21 PROCEDURE — 97530 THERAPEUTIC ACTIVITIES: CPT

## 2022-09-21 PROCEDURE — 74011636637 HC RX REV CODE- 636/637: Performed by: STUDENT IN AN ORGANIZED HEALTH CARE EDUCATION/TRAINING PROGRAM

## 2022-09-21 PROCEDURE — 74011250636 HC RX REV CODE- 250/636: Performed by: STUDENT IN AN ORGANIZED HEALTH CARE EDUCATION/TRAINING PROGRAM

## 2022-09-21 PROCEDURE — 97116 GAIT TRAINING THERAPY: CPT

## 2022-09-21 PROCEDURE — 97535 SELF CARE MNGMENT TRAINING: CPT

## 2022-09-21 PROCEDURE — 82962 GLUCOSE BLOOD TEST: CPT

## 2022-09-21 PROCEDURE — 74011250637 HC RX REV CODE- 250/637: Performed by: STUDENT IN AN ORGANIZED HEALTH CARE EDUCATION/TRAINING PROGRAM

## 2022-09-21 PROCEDURE — 74011250637 HC RX REV CODE- 250/637: Performed by: FAMILY MEDICINE

## 2022-09-21 PROCEDURE — 70450 CT HEAD/BRAIN W/O DYE: CPT

## 2022-09-21 PROCEDURE — 65270000029 HC RM PRIVATE

## 2022-09-21 PROCEDURE — 99233 SBSQ HOSP IP/OBS HIGH 50: CPT | Performed by: FAMILY MEDICINE

## 2022-09-21 PROCEDURE — 74011000250 HC RX REV CODE- 250: Performed by: STUDENT IN AN ORGANIZED HEALTH CARE EDUCATION/TRAINING PROGRAM

## 2022-09-21 RX ADMIN — ACETAMINOPHEN 650 MG: 325 TABLET ORAL at 10:08

## 2022-09-21 RX ADMIN — POTASSIUM CHLORIDE 20 MEQ: 750 TABLET, FILM COATED, EXTENDED RELEASE ORAL at 10:08

## 2022-09-21 RX ADMIN — Medication 2 UNITS: at 10:03

## 2022-09-21 RX ADMIN — ACETAMINOPHEN 650 MG: 325 TABLET ORAL at 18:31

## 2022-09-21 RX ADMIN — FUROSEMIDE 40 MG: 10 INJECTION, SOLUTION INTRAMUSCULAR; INTRAVENOUS at 11:08

## 2022-09-21 RX ADMIN — SODIUM CHLORIDE, PRESERVATIVE FREE 10 ML: 5 INJECTION INTRAVENOUS at 17:48

## 2022-09-21 RX ADMIN — SODIUM CHLORIDE, PRESERVATIVE FREE 10 ML: 5 INJECTION INTRAVENOUS at 11:09

## 2022-09-21 RX ADMIN — METOPROLOL TARTRATE 25 MG: 25 TABLET, FILM COATED ORAL at 10:10

## 2022-09-21 RX ADMIN — ASPIRIN 81 MG: 81 TABLET, COATED ORAL at 10:08

## 2022-09-21 RX ADMIN — ROSUVASTATIN 20 MG: 20 TABLET, FILM COATED ORAL at 22:33

## 2022-09-21 RX ADMIN — AMITRIPTYLINE HYDROCHLORIDE 10 MG: 10 TABLET, FILM COATED ORAL at 10:08

## 2022-09-21 RX ADMIN — SODIUM CHLORIDE, PRESERVATIVE FREE 10 ML: 5 INJECTION INTRAVENOUS at 22:40

## 2022-09-21 RX ADMIN — SODIUM CHLORIDE, PRESERVATIVE FREE 10 ML: 5 INJECTION INTRAVENOUS at 11:16

## 2022-09-21 RX ADMIN — ENOXAPARIN SODIUM 40 MG: 100 INJECTION SUBCUTANEOUS at 10:05

## 2022-09-21 RX ADMIN — AMITRIPTYLINE HYDROCHLORIDE 10 MG: 10 TABLET, FILM COATED ORAL at 18:23

## 2022-09-21 RX ADMIN — TAMSULOSIN HYDROCHLORIDE 0.4 MG: 0.4 CAPSULE ORAL at 10:09

## 2022-09-21 RX ADMIN — POTASSIUM CHLORIDE 20 MEQ: 750 TABLET, FILM COATED, EXTENDED RELEASE ORAL at 18:23

## 2022-09-21 RX ADMIN — CASTOR OIL AND BALSAM, PERU: 788; 87 OINTMENT TOPICAL at 10:11

## 2022-09-21 RX ADMIN — CASTOR OIL AND BALSAM, PERU: 788; 87 OINTMENT TOPICAL at 22:40

## 2022-09-21 RX ADMIN — Medication 3 UNITS: at 12:53

## 2022-09-21 NOTE — PROGRESS NOTES
Occupational Therapy    Duplicate OT orders received. Patient evaluated by OT on 9/19, recommending SNF at [place]. Will continue to follow at frequency of 3x/week for skilled acute occupational therapy services. Thank you.      MONY Servin, OTR/L

## 2022-09-21 NOTE — PROGRESS NOTES
Problem: Mobility Impaired (Adult and Pediatric)  Goal: *Acute Goals and Plan of Care (Insert Text)  Description: FUNCTIONAL STATUS PRIOR TO ADMISSION: Patient was modified independent using a single point cane for functional mobility. Patient was independent for basic and instrumental ADLs. HOME SUPPORT PRIOR TO ADMISSION: The patient lived with spouse but did not require assist. Lives in [address] [place] [address] home - bedroom on 3rd floor after 2 flights of stairs. Physical Therapy Goals  Initiated 9/19/2022  1. Patient will move from supine to sit and sit to supine , scoot up and down, and roll side to side in bed with modified independence within 7 day(s). 2.  Patient will transfer from bed to chair and chair to bed with modified independence using the least restrictive device within 7 day(s). 3.  Patient will perform sit to stand with modified independence within 7 day(s). 4.  Patient will ambulate with modified independence for 150 feet with the least restrictive device within 7 day(s). 5.  Patient will ascend/descend 24 stairs with 1 handrail(s) with minimal assistance/contact guard assist within 7 day(s). Outcome: Progressing Towards Goal   PHYSICAL THERAPY TREATMENT  Patient: Jewell Wright (80 y.o. male)  Date: 9/21/2022  Diagnosis: [facility] exacerbation ([facility] [I50.9] <principal problem not specified>      Precautions: Fall (COVID +, droplet +)  Chart, physical therapy assessment, plan of care and goals were reviewed. ASSESSMENT  Patient continues with skilled PT services and is progressing towards goals. Pt presents with decreased strength and endurance. Pt with hypotension with activity. Pt preformed bed mobility at min A x2 with additional time. Pt preformed sit to stand transfer at min A/CGA. Pt ambulated 3ft x2 with RW at min A/CGA with cueing for sequencing. Pt with improved mobility tolerance but pt reported having nerve pain in R foot.  Once sitting in recliner pts BP decreased and did not improve. Pt assisted back supine to to decreased BP. Pt with no symptoms of hypotension. Nurse made aware of BP. Vitals:    09/21/22 1213 09/21/22 1403 09/21/22 1405 09/21/22 1406   BP: (!) 113/57 (!) 87/51 (!) 80/46 (!) 97/50   BP 1 Location:  Left upper arm Left upper arm Left upper arm   BP Patient Position:  Sitting Supine Supine  Comment: trendelenburg   Pulse: 62 62 (!) 59 68   Temp: 98.3 °F (36.8 °C)      Resp: 18      Height:       Weight:       SpO2: 94%             Current Level of Function Impacting Discharge (mobility/balance): bed mobility at min A x2, sit to stand transfer at min A/CGA, ambulation at min A with RW     Other factors to consider for discharge: decreased strength, decreased endurance, hypotension          PLAN :  Patient continues to benefit from skilled intervention to address the above impairments. Continue treatment per established plan of care. to address goals. Recommendation for discharge: (in order for the patient to meet his/her long term goals)  Therapy up to 5 days/week in SNF setting    This discharge recommendation:  Has been made in collaboration with the attending provider and/or case management    IF patient discharges home will need the following DME: rolling walker       SUBJECTIVE:   Patient stated  I think I moved a little better.     OBJECTIVE DATA SUMMARY:   Critical Behavior:  Neurologic State: Alert  Orientation Level: Disoriented to time  Cognition: Appropriate decision making, Appropriate for age attention/concentration, Appropriate safety awareness, Follows commands  Safety/Judgement: Fall prevention  Functional Mobility Training:  Bed Mobility:  Rolling: Minimum assistance  Supine to Sit: Minimum assistance;Assist x2  Sit to Supine: Minimum assistance;Assist x2  Scooting: Minimum assistance        Transfers:  Sit to Stand: Minimum assistance;Contact guard assistance (initally min A; progressing to CGA)  Stand to Sit: Contact guard assistance        Bed to Chair: Minimum assistance; Additional time (RW)                    Balance:  Sitting: Impaired  Sitting - Static: Good (unsupported)  Sitting - Dynamic: Fair (occasional)  Standing: Impaired  Standing - Static: Fair;Constant support  Standing - Dynamic : Fair;Constant support  Ambulation/Gait Training:      Pt able to take steps to recliner with RW at min A. Therapeutic Exercises:   Seated  Ankle pumps x10    Pain Rating:  Pt reported nerve pain shooting through RLE     Activity Tolerance:   Fair, requires rest breaks, and signs and symptoms of orthostatic hypotension    After treatment patient left in no apparent distress:   Supine in bed, Call bell within reach, and Side rails x 3    COMMUNICATION/COLLABORATION:   The patients plan of care was discussed with: Occupational therapist and Registered nurse.      Johnny Thao PTA   Time Calculation: 32 mins

## 2022-09-21 NOTE — PROGRESS NOTES
General Daily Progress Note    Admit Date: 9/18/2022  Hospital day 4    Subjective:     Patient has no complaint of chest pains, dyspnea. Remains fairly weak. Swelling in legs is much better. ..   Medication side effects: none    Current Facility-Administered Medications   Medication Dose Route Frequency    amitriptyline (ELAVIL) tablet 10 mg  10 mg Oral BID    potassium chloride SR (KLOR-CON 10) tablet 20 mEq  20 mEq Oral BID    balsam peru-castor oiL (VENELEX) ointment   Topical BID    acetaminophen-codeine (TYLENOL #3) per tablet 1 Tablet  1 Tablet Oral QID PRN    aspirin delayed-release tablet 81 mg  81 mg Oral DAILY    metoprolol tartrate (LOPRESSOR) tablet 25 mg  25 mg Oral BID    rosuvastatin (CRESTOR) tablet 20 mg  20 mg Oral QHS    tamsulosin (FLOMAX) capsule 0.4 mg  0.4 mg Oral DAILY    sodium chloride (NS) flush 5-40 mL  5-40 mL IntraVENous Q8H    sodium chloride (NS) flush 5-40 mL  5-40 mL IntraVENous PRN    acetaminophen (TYLENOL) tablet 650 mg  650 mg Oral Q6H PRN    Or    acetaminophen (TYLENOL) suppository 650 mg  650 mg Rectal Q6H PRN    polyethylene glycol (MIRALAX) packet 17 g  17 g Oral DAILY PRN    ondansetron (ZOFRAN ODT) tablet 4 mg  4 mg Oral Q8H PRN    Or    ondansetron (ZOFRAN) injection 4 mg  4 mg IntraVENous Q6H PRN    enoxaparin (LOVENOX) injection 40 mg  40 mg SubCUTAneous DAILY    insulin lispro (HUMALOG) injection   SubCUTAneous AC&HS    glucose chewable tablet 16 g  4 Tablet Oral PRN    glucagon (GLUCAGEN) injection 1 mg  1 mg IntraMUSCular PRN    dextrose 10% infusion 0-250 mL  0-250 mL IntraVENous PRN    furosemide (LASIX) injection 40 mg  40 mg IntraVENous BID    morphine injection 2 mg  2 mg IntraVENous Q4H PRN        Review of Systems  Pertinent items are noted in HPI. Objective:     Patient Vitals for the past 8 hrs:   BP Temp Pulse Resp SpO2   09/21/22 0300 (!) 146/58 97.6 °F (36.4 °C) 62 18 94 %     No intake/output data recorded.   09/19 1901 - 09/21 0700  In: -   Out: 1400 [Urine:1400]    Physical Exam: Visit Vitals  BP (!) 146/58   Pulse 62   Temp 97.6 °F (36.4 °C)   Resp 18   Ht 6' 1\" (1.854 m)   Wt 198 lb 13.7 oz (90.2 kg)   SpO2 94%   BMI 26.24 kg/m²     Lungs: clear to auscultation bilaterally  Heart: regular rate and rhythm, S1, S2 normal, no murmur, click, rub or gallop  Abdomen: soft, non-tender. Bowel sounds normal. No masses,  no organomegaly  Extremities: edema , 1+ - marked improvement from admission      ECG: normal sinus rhythm     Data Review   Recent Results (from the past 24 hour(s))   GLUCOSE, POC    Collection Time: 09/20/22  9:05 AM   Result Value Ref Range    Glucose (POC) 126 (H) 65 - 117 mg/dL    Performed by Naa Mustache PCT    GLUCOSE, POC    Collection Time: 09/20/22 12:07 PM   Result Value Ref Range    Glucose (POC) 220 (H) 65 - 117 mg/dL    Performed by Chris Pereyra RN    ANKLE BRACHIAL INDEX    Collection Time: 09/20/22  3:51 PM   Result Value Ref Range    Right dorsalis pedis BP 78 mmHg    Left dorsalis pedis BP 60 mmHg    Right arm  mmHg    Left posterior tibial 60 mmHg    Right posterior tibial 88 mmHg    Left ТАТЬЯНА 0.59     Right ТАТЬЯНА 0.86    GLUCOSE, POC    Collection Time: 09/20/22  4:48 PM   Result Value Ref Range    Glucose (POC) 230 (H) 65 - 117 mg/dL    Performed by Naa Mustache PCT    GLUCOSE, POC    Collection Time: 09/20/22 10:52 PM   Result Value Ref Range    Glucose (POC) 198 (H) 65 - 117 mg/dL    Performed by Humera Lanier (TRV RN)            Assessment:     Active Problems:    CHF exacerbation ([address] (9/19/2022)        Plan:     Progressive lower extremity edema , responding to IV lasix. No real chf on xray, renal function, liver function, thyroid function all basically WNL. Probable component of venous insufficiency also. continue compression dressings for now. Currently on lasix 40 mg iv bid. Appreciate NP Valerie input- sounds like pt wasn't taking him pm lasix.  edema has responded well in hospital.   Elevated troponin- will ask cardiology to see. 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 Diabetes- currently on sliding scale insulin  Burning pain in feet and legs-doesn't seem to be better despite marked improvement in edema. probably from diabetic neuropathy- will start amitriptyline. Pain seems to be much better this am. Also consider Lumbar spinal disease (long hx of low back pain), PVD. Will check xrays of lumbar spine and ТАТЬЯНА indexes. 5. Hypokalemia- replace  Disposition- may need snf- pt needed about 2 minutes to come to standing with walker. Then walked to bathroom, voided and stooled at the same time with stool running down to floor. Wife confirms that has been sitting in chair all day, too weak to get up and move around. She will have trouble caring for him in this condition. Continue PT, OT- will need snf unless improves dramatically the next day or two.   7. L hand neglect when using walker- will check ct of head.

## 2022-09-21 NOTE — PROGRESS NOTES
Problem: Self Care Deficits Care Plan (Adult)  Goal: *Acute Goals and Plan of Care (Insert Text)  Description: FUNCTIONAL STATUS PRIOR TO ADMISSION: Patient was modified independent using a single point cane at times for functional mobility. Performed ADLs on his own but struggles with donning socks and shoes    HOME SUPPORT PRIOR TO ADMISSION: The patient lived with wife and granddaughter checks on pt. Occupational Therapy Goals:  Initiated 9/19/2022  1. Patient will perform grooming standing with supervision/set-up within 7 days. 2. Patient will perform lower body dressing with supervision/set-up within 7 days. 3. Patient will perform toileting with supervision/set-up within 7 days. 4. Patient will transfer from toilet with supervision/set-up using the least restrictive device and appropriate durable medical equipment within 7 days. Outcome: Progressing Towards Goal   OCCUPATIONAL THERAPY TREATMENT  Patient: Jamaal Beltre (80 y.o. male)  Date: 9/21/2022  Diagnosis: CHF exacerbation ([facility] [address] [I50.9] <principal problem not specified>      Precautions: Fall (COVID +, droplet +)  Chart, occupational therapy assessment, plan of care, and goals were reviewed. ASSESSMENT  Patient continues with skilled OT services and is progressing towards goals. Session limited by asymptomatic orthostatic hypotension with activity. Patient required increased assist to transition to EOB, bed modified. Able to don socks & pants w/ elastic waist band with up to mod A to manage over hips and RLE. Following patient able to complete brief transfer from EOB <> chair via RW, vitals assessed noted hypotension however patient asymptomatic. Attempted BLE ankle pumps and marches however BP continuing to remain soft. Patient returned to supine in bed, bed alarm activated, call bell within reach all needs met. RN notified of session.                          09/21/22 1403 09/21/22 1405 09/21/22 1406   Vital Signs   Pulse (Heart Rate) 62 (!) 59 68   Heart Rate Source Monitor Monitor  --    BP (!) 87/51 (!) 80/46 (!) 97/50   MAP (Calculated) (!) 63 (!) 57 66   BP 1 Location Left upper arm Left upper arm Left upper arm   BP Patient Position Sitting Supine Supine  (trendelenburg)              Current Level of Function Impacting Discharge (ADLs): up to mod A    Other factors to consider for discharge: below baseline, [place]         PLAN :  Patient continues to benefit from skilled intervention to address the above impairments. Continue treatment per established plan of care to address goals. Recommendation for discharge: (in order for the patient to meet his/her long term goals)  Therapy up to 5 days/week in SNF setting    This discharge recommendation:  Has been made in collaboration with the attending provider and/or case management    IF patient discharges home will need the following DME: walker: rolling       SUBJECTIVE:   Patient stated I dont feel anything at all.     OBJECTIVE DATA SUMMARY:   Cognitive/Behavioral Status:  Neurologic State: Alert  Orientation Level: Disoriented to time                Functional Mobility and Transfers for ADLs:  Bed Mobility:  Rolling: Minimum assistance  Supine to Sit: Minimum assistance;Assist x2  Sit to Supine: Assist x2; Moderate assistance  Scooting: Minimum assistance    Transfers:  Sit to Stand: Minimum assistance;Contact guard assistance (initally min A; progressing to CGA)     Bed to Chair: Minimum assistance; Additional time (RW)    Balance:  Sitting: Impaired  Sitting - Static: Good (unsupported)  Sitting - Dynamic: Fair (occasional)  Standing: Impaired  Standing - Static: Fair;Constant support  Standing - Dynamic : Fair;Constant support    ADL Intervention:     Lower Body Dressing Assistance  Pants With Elastic Waist: Moderate assistance  Socks: Minimum assistance       Pain:  Patient endorsing neuropathy and tenderness in BLEs.      Activity Tolerance:   Fair, requires rest breaks, and signs and symptoms of orthostatic hypotension    After treatment patient left in no apparent distress:   Supine in bed, Call bell within reach, Bed / chair alarm activated, and Side rails x 3    COMMUNICATION/COLLABORATION:   The patients plan of care was discussed with: Physical therapist, Occupational therapist, and Registered nurse.      Cristiano Aguila OT  Time Calculation: 29 mins

## 2022-09-21 NOTE — PROGRESS NOTES
Paged physician on call fro Dr. Raudel Chan office in regards to low blood pressure readings 79/45, 75/45.

## 2022-09-21 NOTE — PROGRESS NOTES
09/21/22 1403 09/21/22 1405 09/21/22 1406   Vital Signs   Pulse (Heart Rate) 62 (!) 59 68   Heart Rate Source Monitor Monitor  --    BP (!) 87/51 (!) 80/46 (!) 97/50   MAP (Calculated) (!) 63 (!) 57 66   BP 1 Location Left upper arm Left upper arm Left upper arm   BP Patient Position Sitting Supine Supine  (trendelenburg)

## 2022-09-21 NOTE — PROGRESS NOTES
Bedside and Verbal shift change report given to Drew Mcdaniel (oncoming nurse) by Valentine Lopes (offgoing nurse). Report included the following information SBAR, Kardex, MAR, and Recent Results.

## 2022-09-21 NOTE — PROGRESS NOTES
[facility]  [address]  [phone]  [email or web address]         Cardiology Progress Note      9/21/2022 8:25 AM    Admit Date: 9/18/2022    Admit Diagnosis:   CHF exacerbation ([address] [I50.9]    Interval History/Subjective:     Tiffani Millan feels better. Leg swelling has improved. Feeling better.     Visit Vitals  BP (!) 127/59   Pulse 87   Temp 97.8 °F (36.6 °C)   Resp 18   Ht 6' 1\" (1.854 m)   Wt 90.2 kg (198 lb 13.7 oz)   SpO2 94%   BMI 26.24 kg/m²       Current Facility-Administered Medications   Medication Dose Route Frequency    amitriptyline (ELAVIL) tablet 10 mg  10 mg Oral BID    potassium chloride SR (KLOR-CON 10) tablet 20 mEq  20 mEq Oral BID    balsam peru-castor oiL (VENELEX) ointment   Topical BID    acetaminophen-codeine (TYLENOL #3) per tablet 1 Tablet  1 Tablet Oral QID PRN    aspirin delayed-release tablet 81 mg  81 mg Oral DAILY    metoprolol tartrate (LOPRESSOR) tablet 25 mg  25 mg Oral BID    rosuvastatin (CRESTOR) tablet 20 mg  20 mg Oral QHS    tamsulosin (FLOMAX) capsule 0.4 mg  0.4 mg Oral DAILY    sodium chloride (NS) flush 5-40 mL  5-40 mL IntraVENous Q8H    sodium chloride (NS) flush 5-40 mL  5-40 mL IntraVENous PRN    acetaminophen (TYLENOL) tablet 650 mg  650 mg Oral Q6H PRN    Or    acetaminophen (TYLENOL) suppository 650 mg  650 mg Rectal Q6H PRN    polyethylene glycol (MIRALAX) packet 17 g  17 g Oral DAILY PRN    ondansetron (ZOFRAN ODT) tablet 4 mg  4 mg Oral Q8H PRN    Or    ondansetron (ZOFRAN) injection 4 mg  4 mg IntraVENous Q6H PRN    enoxaparin (LOVENOX) injection 40 mg  40 mg SubCUTAneous DAILY    insulin lispro (HUMALOG) injection   SubCUTAneous AC&HS    glucose chewable tablet 16 g  4 Tablet Oral PRN    glucagon (GLUCAGEN) injection 1 mg  1 mg IntraMUSCular PRN    dextrose 10% infusion 0-250 mL  0-250 mL IntraVENous PRN    furosemide (LASIX) injection 40 mg  40 mg IntraVENous BID    morphine injection 2 mg  2 mg IntraVENous Q4H PRN       Objective:      Physical Exam:  General: Well developed, in no acute distress, cooperative and alert  HEENT: No carotid bruits, PEERL, EOM intact. Heart:  reg rate and rhythm; normal S1/S2; no murmurs  Respiratory: Clear bilaterally x 4, no wheezing or rales  Abdomen:   Soft, non-tender, no distention, no masses. + BS. Extremities:  Normal cap refill, no cyanosis, atraumatic. Trace ankle edema bilaterally  Neuro: A&Ox3, speech clear  Skin: Skin color is normal. Non diaphoretic  Vascular: 2+ pulses symmetric in all extremities    Data Review:   Recent Labs     09/19/22  0328 09/18/22 1925   WBC 5.8 8.7   HGB 11.9* 11.8*   HCT 36.2* 37.0    224       Recent Labs     09/19/22  0328 09/18/22 1925    138   K 3.4* 4.5    102   CO2 31 28   * 231*   BUN 21* 20   CREA 1.07 1.48*   CA 9.1 9.3   ALB  --  3.5   TBILI  --  0.8   ALT  --  18         No results for input(s): TROIQ, CPK, CKMB in the last 72 hours. Intake/Output Summary (Last 24 hours) at 9/21/2022 1050  Last data filed at 9/20/2022 2200  Gross per 24 hour   Intake --   Output 1000 ml   Net -1000 ml          Telemetry: sinus rhythm    Echocardiogram: pending    Radiology Results in the last 24 hours:  XR SPINE LUMB 2 OR 3 V    Result Date: 9/20/2022  No evidence of lumbar spine fracture or malalignment. Degenerative disc disease at L4-5 and L5-S1. CT HEAD WO CONT    Result Date: 9/21/2022  Chronic white matter disease and areas of remote infarction. No acute process by CT, though if there is high clinical concern for infarction consider MRI. Assessment:     Active Problems:    CHF exacerbation ([address] (9/19/2022)      Plan:      Acute on chronic diastolic HF  -5.1 L since admission  Echo still pending  Continue IV diuresis BID; monitor daily weights, sodium and fluid restriction    2. Elevated troponins  Flat elevation  Plan for OP stress test in our office    3. HTN  Continue BP meds    4. HLD  Continue statin therapy    Christina Colbert, NP      Patient seen, examined by me personally. Plan discussed as detailed. Agree with note as outlined by NP with modifications as noted.     Brent Barry MD

## 2022-09-21 NOTE — PROGRESS NOTES
1800- Primary RN at bedside to administer PT evening meds, PT BP was 75/49, repeat was 78/49 (manual). Called Dr. Adriana Phillips, awaiting for callback. 1815-Called Rapid Response Team. Dr. Zebedee Krabbe at the bedside. Received orders to hold PT lasix, metoprolol, and tylenol w/ codeine from Dr. Adriana Phillips.     Pt stable with no distress, asymptomatic.    1822- PT BP was 92/51    Will continue to monitor

## 2022-09-21 NOTE — PROGRESS NOTES
RAPID RESPONSE TEAM    Overhead Rapid Response paged to room # 2132/01  at  [address]    Reason for Rapid Response: Hypotension    Initial Assessment:   Upon arrival, patient was alert and oriented x 3, able to follow commands, good radial pulses +2 bilaterally. Lung sounds - clear throughout, not in acute distress. Denies CP/SOB, not dizzy or lightheadedness. Patient is conversant and pleasant. Dr. Otis Antoine present, suggested holding BP meds and to give albumin. Dr Raegan Bansal called nursing back and did not want volume resuscitation.      Patient Vitals for the past 4 hrs:   Temp Pulse Resp BP SpO2   09/21/22 1822 -- 67 -- (!) 92/51 --   09/21/22 1815 98 °F (36.7 °C) 64 18 (!) 113/48 98 %   09/21/22 1812 -- -- -- (!) 78/49 --   09/21/22 1725 -- -- -- (!) 97/54 --   09/21/22 1652 98.2 °F (36.8 °C) 60 18 (!) 97/54 94 %          Interventions:   -- No RRT Interventions     Outcome:   -- Patient to remain in 2132/01   -- Monitor BP    Please call with any questions or concerns    Clay Mccarty RN  Rapid Response Team  Ext 1816     Recent Results (from the past 8 hour(s))   GLUCOSE, POC    Collection Time: 09/21/22 12:11 PM   Result Value Ref Range    Glucose (POC) 231 (H) 65 - 117 mg/dL    Performed by [facility] [employer] PCT    GLUCOSE, POC    Collection Time: 09/21/22  4:50 PM   Result Value Ref Range    Glucose (POC) 99 65 - 117 mg/dL    Performed by Lizzy [email or web address]    ECHO ADULT COMPLETE    Collection Time: 09/21/22  5:10 PM   Result Value Ref Range    IVSd 1.8 (A) 0.6 - 1.0 cm    LVIDd 3.1 (A) 4.2 - 5.9 cm    LVIDs 2.3 cm    LVOT Diameter 1.8 cm    LVPWd 1.2 (A) 0.6 - 1.0 cm    LVOT Peak Gradient 4 mmHg    LVOT Mean Gradient 2 mmHg    LVOT SV 52.6 ml    LVOT Peak Velocity 0.9 m/s    LVOT VTI 20.7 cm    LA Diameter 1.8 cm    AV Area by Peak Velocity 2.6 cm2    AV Area by VTI 2.7 cm2    AV Peak Gradient 3 mmHg    AV Mean Gradient 2 mmHg    AV Peak Velocity 0.9 m/s    AV Mean Velocity 0.7 m/s    AV VTI 19.9 cm    MV A Velocity 0.60 m/s    MV E Wave Deceleration Time 300.5 ms    MV E Velocity 0.51 m/s    Pulmonary Artery EDP 4 mmHg    NH Max Velocity 1.0 m/s    TAPSE 1.3 (A) 1.7 cm    TR Peak Gradient 11 mmHg    TR Max Velocity 1.66 m/s    Ascending Aorta 2.7 cm    Aortic Root 3.6 cm    Fractional Shortening 2D 26 28 - 44 %    LVIDd Index 1.45 cm/m2    LVIDs Index 1.07 cm/m2    LV RWT Ratio 0.77     LV Mass 2D 164.7 88 - 224 g    LV Mass 2D Index 76.9 49 - 115 g/m2    MV E/A 0.85     LVOT Stroke Volume Index 24.6 mL/m2    LVOT Area 2.5 cm2    LA Size Index 0.84 cm/m2    LA/AO Root Ratio 0.50     Ao Root Index 1.68 cm/m2    Ascending Aorta Index 1.26 cm/m2    AV Velocity Ratio 1.00     LVOT:AV VTI Index 1.04     LAURA/BSA VTI 1.3 cm2/m2    LAURA/BSA Peak Velocity 1.2 cm2/m2   GLUCOSE, POC    Collection Time: 09/21/22  6:06 PM   Result Value Ref Range    Glucose (POC) 213 (H) 65 - 117 mg/dL    Performed by Faby Craven RN

## 2022-09-22 LAB
ALBUMIN SERPL-MCNC: 3 G/DL (ref 3.5–5)
ALBUMIN/GLOB SERPL: 0.9 {RATIO} (ref 1.1–2.2)
ALP SERPL-CCNC: 59 U/L (ref 45–117)
ALT SERPL-CCNC: 23 U/L (ref 12–78)
ANION GAP SERPL CALC-SCNC: 4 MMOL/L (ref 5–15)
AST SERPL-CCNC: 41 U/L (ref 15–37)
BASOPHILS # BLD: 0 K/UL (ref 0–0.1)
BASOPHILS NFR BLD: 1 % (ref 0–1)
BILIRUB SERPL-MCNC: 0.6 MG/DL (ref 0.2–1)
BUN SERPL-MCNC: 24 MG/DL (ref 6–20)
BUN/CREAT SERPL: 24 (ref 12–20)
CALCIUM SERPL-MCNC: 8.9 MG/DL (ref 8.5–10.1)
CHLORIDE SERPL-SCNC: 101 MMOL/L (ref 97–108)
CO2 SERPL-SCNC: 32 MMOL/L (ref 21–32)
CREAT SERPL-MCNC: 1.02 MG/DL (ref 0.7–1.3)
DIFFERENTIAL METHOD BLD: ABNORMAL
ECHO AO ASC DIAM: 2.7 CM
ECHO AO ASCENDING AORTA INDEX: 1.26 CM/M2
ECHO AO ROOT DIAM: 3.6 CM
ECHO AO ROOT INDEX: 1.68 CM/M2
ECHO AV AREA PEAK VELOCITY: 2.6 CM2
ECHO AV AREA VTI: 2.7 CM2
ECHO AV AREA/BSA PEAK VELOCITY: 1.2 CM2/M2
ECHO AV AREA/BSA VTI: 1.3 CM2/M2
ECHO AV MEAN GRADIENT: 2 MMHG
ECHO AV MEAN VELOCITY: 0.7 M/S
ECHO AV PEAK GRADIENT: 3 MMHG
ECHO AV PEAK VELOCITY: 0.9 M/S
ECHO AV VELOCITY RATIO: 1
ECHO AV VTI: 19.9 CM
ECHO LA DIAMETER INDEX: 0.84 CM/M2
ECHO LA DIAMETER: 1.8 CM
ECHO LA TO AORTIC ROOT RATIO: 0.5
ECHO LV FRACTIONAL SHORTENING: 26 % (ref 28–44)
ECHO LV INTERNAL DIMENSION DIASTOLE INDEX: 1.45 CM/M2
ECHO LV INTERNAL DIMENSION DIASTOLIC: 3.1 CM (ref 4.2–5.9)
ECHO LV INTERNAL DIMENSION SYSTOLIC INDEX: 1.07 CM/M2
ECHO LV INTERNAL DIMENSION SYSTOLIC: 2.3 CM
ECHO LV IVSD: 1.8 CM (ref 0.6–1)
ECHO LV MASS 2D: 164.7 G (ref 88–224)
ECHO LV MASS INDEX 2D: 76.9 G/M2 (ref 49–115)
ECHO LV POSTERIOR WALL DIASTOLIC: 1.2 CM (ref 0.6–1)
ECHO LV RELATIVE WALL THICKNESS RATIO: 0.77
ECHO LVOT AREA: 2.5 CM2
ECHO LVOT AV VTI INDEX: 1.04
ECHO LVOT DIAM: 1.8 CM
ECHO LVOT MEAN GRADIENT: 2 MMHG
ECHO LVOT PEAK GRADIENT: 4 MMHG
ECHO LVOT PEAK VELOCITY: 0.9 M/S
ECHO LVOT STROKE VOLUME INDEX: 24.6 ML/M2
ECHO LVOT SV: 52.6 ML
ECHO LVOT VTI: 20.7 CM
ECHO MV A VELOCITY: 0.6 M/S
ECHO MV E DECELERATION TIME (DT): 300.5 MS
ECHO MV E VELOCITY: 0.51 M/S
ECHO MV E/A RATIO: 0.85
ECHO PULMONARY ARTERY END DIASTOLIC PRESSURE: 4 MMHG
ECHO PV REGURGITANT MAX VELOCITY: 1 M/S
ECHO RV TAPSE: 1.3 CM (ref 1.7–?)
ECHO TV REGURGITANT MAX VELOCITY: 1.66 M/S
ECHO TV REGURGITANT PEAK GRADIENT: 11 MMHG
EOSINOPHIL # BLD: 0.2 K/UL (ref 0–0.4)
EOSINOPHIL NFR BLD: 3 % (ref 0–7)
ERYTHROCYTE [DISTWIDTH] IN BLOOD BY AUTOMATED COUNT: 13.2 % (ref 11.5–14.5)
GLOBULIN SER CALC-MCNC: 3.3 G/DL (ref 2–4)
GLUCOSE BLD STRIP.AUTO-MCNC: 129 MG/DL (ref 65–117)
GLUCOSE BLD STRIP.AUTO-MCNC: 130 MG/DL (ref 65–117)
GLUCOSE BLD STRIP.AUTO-MCNC: 132 MG/DL (ref 65–117)
GLUCOSE BLD STRIP.AUTO-MCNC: 215 MG/DL (ref 65–117)
GLUCOSE SERPL-MCNC: 134 MG/DL (ref 65–100)
HCT VFR BLD AUTO: 34.4 % (ref 36.6–50.3)
HGB BLD-MCNC: 11 G/DL (ref 12.1–17)
IMM GRANULOCYTES # BLD AUTO: 0 K/UL (ref 0–0.04)
IMM GRANULOCYTES NFR BLD AUTO: 0 % (ref 0–0.5)
LYMPHOCYTES # BLD: 2.2 K/UL (ref 0.8–3.5)
LYMPHOCYTES NFR BLD: 42 % (ref 12–49)
MCH RBC QN AUTO: 27.8 PG (ref 26–34)
MCHC RBC AUTO-ENTMCNC: 32 G/DL (ref 30–36.5)
MCV RBC AUTO: 86.9 FL (ref 80–99)
MONOCYTES # BLD: 0.6 K/UL (ref 0–1)
MONOCYTES NFR BLD: 12 % (ref 5–13)
NEUTS SEG # BLD: 2.2 K/UL (ref 1.8–8)
NEUTS SEG NFR BLD: 42 % (ref 32–75)
NRBC # BLD: 0 K/UL (ref 0–0.01)
NRBC BLD-RTO: 0 PER 100 WBC
PLATELET # BLD AUTO: 222 K/UL (ref 150–400)
PMV BLD AUTO: 10 FL (ref 8.9–12.9)
POTASSIUM SERPL-SCNC: 4 MMOL/L (ref 3.5–5.1)
PROT SERPL-MCNC: 6.3 G/DL (ref 6.4–8.2)
RBC # BLD AUTO: 3.96 M/UL (ref 4.1–5.7)
SERVICE CMNT-IMP: ABNORMAL
SODIUM SERPL-SCNC: 137 MMOL/L (ref 136–145)
WBC # BLD AUTO: 5.2 K/UL (ref 4.1–11.1)

## 2022-09-22 PROCEDURE — 82962 GLUCOSE BLOOD TEST: CPT

## 2022-09-22 PROCEDURE — 74011636637 HC RX REV CODE- 636/637: Performed by: STUDENT IN AN ORGANIZED HEALTH CARE EDUCATION/TRAINING PROGRAM

## 2022-09-22 PROCEDURE — 99232 SBSQ HOSP IP/OBS MODERATE 35: CPT | Performed by: FAMILY MEDICINE

## 2022-09-22 PROCEDURE — 74011000250 HC RX REV CODE- 250: Performed by: STUDENT IN AN ORGANIZED HEALTH CARE EDUCATION/TRAINING PROGRAM

## 2022-09-22 PROCEDURE — 36415 COLL VENOUS BLD VENIPUNCTURE: CPT

## 2022-09-22 PROCEDURE — 74011250637 HC RX REV CODE- 250/637: Performed by: FAMILY MEDICINE

## 2022-09-22 PROCEDURE — 80053 COMPREHEN METABOLIC PANEL: CPT

## 2022-09-22 PROCEDURE — 93306 TTE W/DOPPLER COMPLETE: CPT | Performed by: INTERNAL MEDICINE

## 2022-09-22 PROCEDURE — 65270000029 HC RM PRIVATE

## 2022-09-22 PROCEDURE — 74011250637 HC RX REV CODE- 250/637: Performed by: STUDENT IN AN ORGANIZED HEALTH CARE EDUCATION/TRAINING PROGRAM

## 2022-09-22 PROCEDURE — 85025 COMPLETE CBC W/AUTO DIFF WBC: CPT

## 2022-09-22 PROCEDURE — 74011250636 HC RX REV CODE- 250/636: Performed by: STUDENT IN AN ORGANIZED HEALTH CARE EDUCATION/TRAINING PROGRAM

## 2022-09-22 RX ADMIN — ENOXAPARIN SODIUM 40 MG: 100 INJECTION SUBCUTANEOUS at 08:54

## 2022-09-22 RX ADMIN — CASTOR OIL AND BALSAM, PERU: 788; 87 OINTMENT TOPICAL at 08:54

## 2022-09-22 RX ADMIN — POTASSIUM CHLORIDE 20 MEQ: 750 TABLET, FILM COATED, EXTENDED RELEASE ORAL at 08:53

## 2022-09-22 RX ADMIN — CASTOR OIL AND BALSAM, PERU: 788; 87 OINTMENT TOPICAL at 21:00

## 2022-09-22 RX ADMIN — SODIUM CHLORIDE, PRESERVATIVE FREE 10 ML: 5 INJECTION INTRAVENOUS at 22:32

## 2022-09-22 RX ADMIN — AMITRIPTYLINE HYDROCHLORIDE 10 MG: 10 TABLET, FILM COATED ORAL at 08:53

## 2022-09-22 RX ADMIN — Medication 3 UNITS: at 11:52

## 2022-09-22 RX ADMIN — ASPIRIN 81 MG: 81 TABLET, COATED ORAL at 08:54

## 2022-09-22 RX ADMIN — TAMSULOSIN HYDROCHLORIDE 0.4 MG: 0.4 CAPSULE ORAL at 08:54

## 2022-09-22 RX ADMIN — METOPROLOL TARTRATE 25 MG: 25 TABLET, FILM COATED ORAL at 08:54

## 2022-09-22 RX ADMIN — SODIUM CHLORIDE, PRESERVATIVE FREE 10 ML: 5 INJECTION INTRAVENOUS at 05:24

## 2022-09-22 RX ADMIN — ROSUVASTATIN 20 MG: 20 TABLET, FILM COATED ORAL at 21:20

## 2022-09-22 RX ADMIN — SODIUM CHLORIDE, PRESERVATIVE FREE 10 ML: 5 INJECTION INTRAVENOUS at 17:29

## 2022-09-22 RX ADMIN — AMITRIPTYLINE HYDROCHLORIDE 10 MG: 10 TABLET, FILM COATED ORAL at 17:24

## 2022-09-22 RX ADMIN — POTASSIUM CHLORIDE 20 MEQ: 750 TABLET, FILM COATED, EXTENDED RELEASE ORAL at 17:24

## 2022-09-22 NOTE — PROGRESS NOTES
Problem: Falls - Risk of  Goal: *Absence of Falls  Description: Document Abdulkadir Evans Fall Risk and appropriate interventions in the flowsheet. Outcome: Progressing Towards Goal  Note: Fall Risk Interventions:  Mobility Interventions: Bed/chair exit alarm    Mentation Interventions: Bed/chair exit alarm, Adequate sleep, hydration, pain control    Medication Interventions: Patient to call before getting OOB, Bed/chair exit alarm    Elimination Interventions: Patient to call for help with toileting needs    History of Falls Interventions: Bed/chair exit alarm         Problem: Patient Education: Go to Patient Education Activity  Goal: Patient/Family Education  Outcome: Progressing Towards Goal     Problem: Patient Education: Go to Patient Education Activity  Goal: Patient/Family Education  Outcome: Progressing Towards Goal     Problem: Airway Clearance - Ineffective  Goal: Achieve or maintain patent airway  Outcome: Progressing Towards Goal     Problem: Airway Clearance - Ineffective  Goal: Achieve or maintain patent airway  Outcome: Progressing Towards Goal     Problem: Gas Exchange - Impaired  Goal: Absence of hypoxia  Outcome: Progressing Towards Goal  Goal: Promote optimal lung function  Outcome: Progressing Towards Goal     Problem: Gas Exchange - Impaired  Goal: Absence of hypoxia  Outcome: Progressing Towards Goal  Goal: Promote optimal lung function  Outcome: Progressing Towards Goal     Problem: Breathing Pattern - Ineffective  Goal: Ability to achieve and maintain a regular respiratory rate  Outcome: Progressing Towards Goal     Problem:  Body Temperature -  Risk of, Imbalanced  Goal: Ability to maintain a body temperature within defined limits  Outcome: Progressing Towards Goal  Goal: Will regain or maintain usual level of consciousness  Outcome: Progressing Towards Goal  Goal: Complications related to the disease process, condition or treatment will be avoided or minimized  Outcome: Progressing Towards Goal Problem: Isolation Precautions - Risk of Spread of Infection  Goal: Prevent transmission of infectious organism to others  Outcome: Progressing Towards Goal     Problem: Nutrition Deficits  Goal: Optimize nutrtional status  Outcome: Progressing Towards Goal     Problem: Risk for Fluid Volume Deficit  Goal: Maintain normal heart rhythm  Outcome: Progressing Towards Goal  Goal: Maintain absence of muscle cramping  Outcome: Progressing Towards Goal  Goal: Maintain normal serum potassium, sodium, calcium, phosphorus, and pH  Outcome: Progressing Towards Goal     Problem: Loneliness or Risk for Loneliness  Goal: Demonstrate positive use of time alone when socialization is not possible  Outcome: Progressing Towards Goal     Problem: Fatigue  Goal: Verbalize increase energy and improved vitality  Outcome: Progressing Towards Goal

## 2022-09-22 NOTE — PROGRESS NOTES
[facility]  [address] [place] [address], [address] [facility]  [phone]  [email or web address]         Cardiology Progress Note      9/22/2022 8:25 AM    Admit Date: 9/18/2022    Admit Diagnosis:   CHF exacerbation ([address] [I50.9]    Interval History/Subjective:     Radha Conklin has no complaints. Had hypotension last night. BP meds & lasix held. BP remains soft. Asymptomatic.     Visit Vitals  BP (!) 98/56   Pulse (!) 58   Temp 97.6 °F (36.4 °C)   Resp 18   Ht 6' 1\" (1.854 m)   Wt 89.8 kg (198 lb)   SpO2 97%   BMI 26.12 kg/m²       Current Facility-Administered Medications   Medication Dose Route Frequency    amitriptyline (ELAVIL) tablet 10 mg  10 mg Oral BID    potassium chloride SR (KLOR-CON 10) tablet 20 mEq  20 mEq Oral BID    balsam peru-castor oiL (VENELEX) ointment   Topical BID    acetaminophen-codeine (TYLENOL #3) per tablet 1 Tablet  1 Tablet Oral QID PRN    aspirin delayed-release tablet 81 mg  81 mg Oral DAILY    metoprolol tartrate (LOPRESSOR) tablet 25 mg  25 mg Oral BID    rosuvastatin (CRESTOR) tablet 20 mg  20 mg Oral QHS    tamsulosin (FLOMAX) capsule 0.4 mg  0.4 mg Oral DAILY    sodium chloride (NS) flush 5-40 mL  5-40 mL IntraVENous Q8H    sodium chloride (NS) flush 5-40 mL  5-40 mL IntraVENous PRN    acetaminophen (TYLENOL) tablet 650 mg  650 mg Oral Q6H PRN    Or    acetaminophen (TYLENOL) suppository 650 mg  650 mg Rectal Q6H PRN    polyethylene glycol (MIRALAX) packet 17 g  17 g Oral DAILY PRN    ondansetron (ZOFRAN ODT) tablet 4 mg  4 mg Oral Q8H PRN    Or    ondansetron (ZOFRAN) injection 4 mg  4 mg IntraVENous Q6H PRN    enoxaparin (LOVENOX) injection 40 mg  40 mg SubCUTAneous DAILY    insulin lispro (HUMALOG) injection   SubCUTAneous AC&HS    glucose chewable tablet 16 g  4 Tablet Oral PRN    glucagon (GLUCAGEN) injection 1 mg  1 mg IntraMUSCular PRN    dextrose 10% infusion 0-250 mL  0-250 mL IntraVENous PRN    [Held by provider] furosemide (LASIX) injection 40 mg  40 mg IntraVENous BID    morphine injection 2 mg  2 mg IntraVENous Q4H PRN       Objective:      Physical Exam:  General: Well developed, in no acute distress, cooperative and alert  HEENT: No carotid bruits, PEERL, EOM intact. Heart:  reg rate and rhythm; normal S1/S2; no murmurs  Respiratory: Clear bilaterally x 4, no wheezing or rales  Abdomen:   Soft, non-tender, no distention, no masses. + BS. Extremities:  Normal cap refill, no cyanosis, atraumatic. Trace ankle edema bilaterally  Neuro: A&Ox3, speech clear  Skin: Skin color is normal. Non diaphoretic  Vascular: 2+ pulses symmetric in all extremities    Data Review:   Recent Labs     09/22/22  0235   WBC 5.2   HGB 11.0*   HCT 34.4*        Recent Labs     09/22/22  0235      K 4.0      CO2 32   *   BUN 24*   CREA 1.02   CA 8.9   ALB 3.0*   TBILI 0.6   ALT 23       No results for input(s): TROIQ, CPK, CKMB in the last 72 hours. Intake/Output Summary (Last 24 hours) at 9/22/2022 0802  Last data filed at 9/22/2022 0634  Gross per 24 hour   Intake --   Output 1050 ml   Net -1050 ml        Telemetry: sinus rhythm    Echocardiogram (9/21/22): Left Ventricle: Mildly reduced left ventricular systolic function with a visually estimated EF of 45 - 50%. Left ventricle size is normal. Increased wall thickness. Global hypokinesis present. Right Ventricle: Reduced systolic function. Aortic Valve: Mild regurgitation. Radiology Results in the last 24 hours:  CT HEAD WO CONT    Result Date: 9/21/2022  Chronic white matter disease and areas of remote infarction. No acute process by CT, though if there is high clinical concern for infarction consider MRI. Assessment:     Active Problems:    CHF exacerbation ([address] (9/19/2022)      Plan:      Acute on chronic diastolic HF  -6.2 L since admission  Echo normal.  Lasix held due to hypotension last night.   Continue to monitor; would resume at low dose daily if BP allows    2. Elevated troponins  Flat elevation  Plan for OP stress test in our office    3. HTN  Had hypotension last night, BP still soft  Continue to hold anti-hypertensives    4. HLD  Continue statin therapy    No further recommendations from cardiac standpoint. Will sign off. Patient will fu with our office in 1-2 weeks for OP stress test.  Please don't hesitate to call again if needed.     [facility] Centerlorraine Duke, MARIVEL

## 2022-09-22 NOTE — PROGRESS NOTES
End of Shift Note    Bedside shift change report given to Jason Vitale (oncoming nurse) by Dmitri Stringer RN (offgoing nurse). Report included the following information SBAR, Kardex, Intake/Output, MAR, and Recent Results    Shift worked:  Days     Shift summary and any significant changes:     No significant changes with Pt during shift. Concerns for physician to address:  None     Zone phone for oncoming shift:          Activity:  Activity Level: Up with Assistance  Number times ambulated in hallways past shift: 0  Number of times OOB to chair past shift: 0    Cardiac:   Cardiac Monitoring: No      Cardiac Rhythm: Sinus Jimmie    Access:  Current line(s): PIV     Genitourinary:   Urinary status: voiding    Respiratory:   O2 Device: None (Room air)  Chronic home O2 use?: NO  Incentive spirometer at bedside: NO       GI:  Last Bowel Movement Date: 09/22/22  Current diet:  ADULT DIET Regular; Low Fat/Low Chol/High Fiber/2 gm Na  Passing flatus: YES  Tolerating current diet: YES       Pain Management:   Patient states pain is manageable on current regimen: YES    Skin:  Romulo Score: 20  Interventions: float heels, increase time out of bed, and PT/OT consult    Patient Safety:  Fall Score:  Total Score: 3  Interventions: bed/chair alarm, assistive device (walker, cane, etc), gripper socks, and pt to call before getting OOB  High Fall Risk: Yes    Length of Stay:  Expected LOS: 3d 12h  Actual LOS: Karen Pierce RN

## 2022-09-22 NOTE — PROGRESS NOTES
General Daily Progress Note    Admit Date: 9/18/2022  Hospital day 5    Subjective:     Patient has no complaint of shortness of breath at rest. Swelling in legs is much better pt remains not very enthusiastic about snf   Medication side effects: none    Current Facility-Administered Medications   Medication Dose Route Frequency    amitriptyline (ELAVIL) tablet 10 mg  10 mg Oral BID    potassium chloride SR (KLOR-CON 10) tablet 20 mEq  20 mEq Oral BID    balsam peru-castor oiL (VENELEX) ointment   Topical BID    acetaminophen-codeine (TYLENOL #3) per tablet 1 Tablet  1 Tablet Oral QID PRN    aspirin delayed-release tablet 81 mg  81 mg Oral DAILY    metoprolol tartrate (LOPRESSOR) tablet 25 mg  25 mg Oral BID    rosuvastatin (CRESTOR) tablet 20 mg  20 mg Oral QHS    tamsulosin (FLOMAX) capsule 0.4 mg  0.4 mg Oral DAILY    sodium chloride (NS) flush 5-40 mL  5-40 mL IntraVENous Q8H    sodium chloride (NS) flush 5-40 mL  5-40 mL IntraVENous PRN    acetaminophen (TYLENOL) tablet 650 mg  650 mg Oral Q6H PRN    Or    acetaminophen (TYLENOL) suppository 650 mg  650 mg Rectal Q6H PRN    polyethylene glycol (MIRALAX) packet 17 g  17 g Oral DAILY PRN    ondansetron (ZOFRAN ODT) tablet 4 mg  4 mg Oral Q8H PRN    Or    ondansetron (ZOFRAN) injection 4 mg  4 mg IntraVENous Q6H PRN    enoxaparin (LOVENOX) injection 40 mg  40 mg SubCUTAneous DAILY    insulin lispro (HUMALOG) injection   SubCUTAneous AC&HS    glucose chewable tablet 16 g  4 Tablet Oral PRN    glucagon (GLUCAGEN) injection 1 mg  1 mg IntraMUSCular PRN    dextrose 10% infusion 0-250 mL  0-250 mL IntraVENous PRN    [Held by provider] furosemide (LASIX) injection 40 mg  40 mg IntraVENous BID    morphine injection 2 mg  2 mg IntraVENous Q4H PRN        Review of Systems  Pertinent items are noted in HPI.     Objective:     Patient Vitals for the past 8 hrs:   BP Temp Pulse Resp SpO2   09/22/22 0829 (!) 107/59 98.2 °F (36.8 °C) 68 18 100 %     No intake/output data recorded. 09/20 1901 - 09/22 0700  In: -   Out: 1350 [Urine:1350]    Physical Exam: Visit Vitals  BP (!) 107/59   Pulse 68   Temp 98.2 °F (36.8 °C)   Resp 18   Ht 6' 1\" (1.854 m)   Wt 198 lb (89.8 kg)   SpO2 100%   BMI 26.12 kg/m²     Lungs: clear to auscultation bilaterally  Heart: regular rate and rhythm, S1, S2 normal, no murmur, click, rub or gallop  Abdomen: soft, non-tender.  Bowel sounds normal. No masses,  no organomegaly  Extremities: edema 1_ [facility]      ECG: normal sinus rhythm     Data Review   Recent Results (from the past 24 hour(s))   GLUCOSE, POC    Collection Time: 09/21/22  4:50 PM   Result Value Ref Range    Glucose (POC) 99 65 - 117 mg/dL    Performed by Js Denise PCT    ECHO ADULT COMPLETE    Collection Time: 09/21/22  5:10 PM   Result Value Ref Range    IVSd 1.8 (A) 0.6 - 1.0 cm    LVIDd 3.1 (A) 4.2 - 5.9 cm    LVIDs 2.3 cm    LVOT Diameter 1.8 cm    LVPWd 1.2 (A) 0.6 - 1.0 cm    LVOT Peak Gradient 4 mmHg    LVOT Mean Gradient 2 mmHg    LVOT SV 52.6 ml    LVOT Peak Velocity 0.9 m/s    LVOT VTI 20.7 cm    LA Diameter 1.8 cm    AV Area by Peak Velocity 2.6 cm2    AV Area by VTI 2.7 cm2    AV Peak Gradient 3 mmHg    AV Mean Gradient 2 mmHg    AV Peak Velocity 0.9 m/s    AV Mean Velocity 0.7 m/s    AV VTI 19.9 cm    MV A Velocity 0.60 m/s    MV E Wave Deceleration Time 300.5 ms    MV E Velocity 0.51 m/s    Pulmonary Artery EDP 4 mmHg    NE Max Velocity 1.0 m/s    TAPSE 1.3 (A) 1.7 cm    TR Peak Gradient 11 mmHg    TR Max Velocity 1.66 m/s    Ascending Aorta 2.7 cm    Aortic Root 3.6 cm    Fractional Shortening 2D 26 28 - 44 %    LVIDd Index 1.45 cm/m2    LVIDs Index 1.07 cm/m2    LV RWT Ratio 0.77     LV Mass 2D 164.7 88 - 224 g    LV Mass 2D Index 76.9 49 - 115 g/m2    MV E/A 0.85     LVOT Stroke Volume Index 24.6 mL/m2    LVOT Area 2.5 cm2    LA Size Index 0.84 cm/m2    LA/AO Root Ratio 0.50     Ao Root Index 1.68 cm/m2    Ascending Aorta Index 1.26 cm/m2    AV Velocity Ratio 1.00 LVOT:AV VTI Index 1.04     LAURA/BSA VTI 1.3 cm2/m2    LAURA/BSA Peak Velocity 1.2 cm2/m2   GLUCOSE, POC    Collection Time: 09/21/22  6:06 PM   Result Value Ref Range    Glucose (POC) 213 (H) 65 - 117 mg/dL    Performed by Km Hernandez RN    GLUCOSE, POC    Collection Time: 09/21/22 10:46 PM   Result Value Ref Range    Glucose (POC) 164 (H) 65 - 117 mg/dL    Performed by Phyllis Prakash (ROSEMARY RN)    CBC WITH AUTOMATED DIFF    Collection Time: 09/22/22  2:35 AM   Result Value Ref Range    WBC 5.2 4.1 - 11.1 K/uL    RBC 3.96 (L) 4.10 - 5.70 M/uL    HGB 11.0 (L) 12.1 - 17.0 g/dL    HCT 34.4 (L) 36.6 - 50.3 %    MCV 86.9 80.0 - 99.0 FL    MCH 27.8 26.0 - 34.0 PG    MCHC 32.0 30.0 - 36.5 g/dL    RDW 13.2 11.5 - 14.5 %    PLATELET 747 747 - 519 K/uL    MPV 10.0 8.9 - 12.9 FL    NRBC 0.0 0  WBC    ABSOLUTE NRBC 0.00 0.00 - 0.01 K/uL    NEUTROPHILS 42 32 - 75 %    LYMPHOCYTES 42 12 - 49 %    MONOCYTES 12 5 - 13 %    EOSINOPHILS 3 0 - 7 %    BASOPHILS 1 0 - 1 %    IMMATURE GRANULOCYTES 0 0.0 - 0.5 %    ABS. NEUTROPHILS 2.2 1.8 - 8.0 K/UL    ABS. LYMPHOCYTES 2.2 0.8 - 3.5 K/UL    ABS. MONOCYTES 0.6 0.0 - 1.0 K/UL    ABS. EOSINOPHILS 0.2 0.0 - 0.4 K/UL    ABS. BASOPHILS 0.0 0.0 - 0.1 K/UL    ABS. IMM. GRANS. 0.0 0.00 - 0.04 K/UL    DF AUTOMATED     METABOLIC PANEL, COMPREHENSIVE    Collection Time: 09/22/22  2:35 AM   Result Value Ref Range    Sodium 137 136 - 145 mmol/L    Potassium 4.0 3.5 - 5.1 mmol/L    Chloride 101 97 - 108 mmol/L    CO2 32 21 - 32 mmol/L    Anion gap 4 (L) 5 - 15 mmol/L    Glucose 134 (H) 65 - 100 mg/dL    BUN 24 (H) 6 - 20 MG/DL    Creatinine 1.02 0.70 - 1.30 MG/DL    BUN/Creatinine ratio 24 (H) 12 - 20      GFR est AA >60 >60 ml/min/1.73m2    GFR est non-AA >60 >60 ml/min/1.73m2    Calcium 8.9 8.5 - 10.1 MG/DL    Bilirubin, total 0.6 0.2 - 1.0 MG/DL    ALT (SGPT) 23 12 - 78 U/L    AST (SGOT) 41 (H) 15 - 37 U/L    Alk.  phosphatase 59 45 - 117 U/L    Protein, total 6.3 (L) 6.4 - 8.2 g/dL    Albumin 3.0 (L) 3.5 - 5.0 g/dL    Globulin 3.3 2.0 - 4.0 g/dL    A-G Ratio 0.9 (L) 1.1 - 2.2     GLUCOSE, POC    Collection Time: 09/22/22  8:31 AM   Result Value Ref Range    Glucose (POC) 129 (H) 65 - 117 mg/dL    Performed by Last Guide, POC    Collection Time: 09/22/22 11:24 AM   Result Value Ref Range    Glucose (POC) 215 (H) 65 - 117 mg/dL    Performed by Karyle Colder RN            Assessment:     Active Problems:    CHF exacerbation ([facility] [address] (9/19/2022)        Plan:        Progressive lower extremity edema , responding to IV lasix. No real chf on xray, renal function, liver function, thyroid function all basically WNL. Probable component of venous insufficiency also. continue compression dressings for now. Currently on lasix 40 mg iv bid. weightg is down some 12-14 lbs. Appreciate NP Valerie input- sounds like pt wasn't taking him pm lasix. edema has responded well in hospital.   Elevated troponin- will ask cardiology to see. 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 probably from diabetic neuropathy- will start amitriptyline. Pain seems to be much better this am. Also consider Lumbar spinal disease (long hx of low back pain), PVD. Will check xrays of lumbar spine and ТАТЬЯНА indexes. 5. Hypokalemia- replace  Disposition- may need snf- pt needed about 2 minutes to come to standing with walker. Then walked to bathroom, voided and stooled at the same time with stool running down to floor. Wife confirms that has been sitting in chair all day, too weak to get up and move around. She will have trouble caring for him in this condition. Continue PT, OT- will need snf unless improves dramatically the next day or two.   7. 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 Lasix currently on hold.

## 2022-09-23 LAB
GLUCOSE BLD STRIP.AUTO-MCNC: 111 MG/DL (ref 65–117)
GLUCOSE BLD STRIP.AUTO-MCNC: 152 MG/DL (ref 65–117)
GLUCOSE BLD STRIP.AUTO-MCNC: 154 MG/DL (ref 65–117)
GLUCOSE BLD STRIP.AUTO-MCNC: 166 MG/DL (ref 65–117)
LEFT ABI: 0.59
LEFT POSTERIOR TIBIAL: 60 MMHG
RIGHT ABI: 0.86
RIGHT ARM BP: 102 MMHG
RIGHT POSTERIOR TIBIAL: 88 MMHG
SERVICE CMNT-IMP: ABNORMAL
SERVICE CMNT-IMP: NORMAL
VAS LEFT DORSALIS PEDIS BP: 60 MMHG
VAS RIGHT DORSALIS PEDIS BP: 78 MMHG

## 2022-09-23 PROCEDURE — 74011000250 HC RX REV CODE- 250: Performed by: STUDENT IN AN ORGANIZED HEALTH CARE EDUCATION/TRAINING PROGRAM

## 2022-09-23 PROCEDURE — 99232 SBSQ HOSP IP/OBS MODERATE 35: CPT | Performed by: FAMILY MEDICINE

## 2022-09-23 PROCEDURE — 65270000029 HC RM PRIVATE

## 2022-09-23 PROCEDURE — 97530 THERAPEUTIC ACTIVITIES: CPT

## 2022-09-23 PROCEDURE — 74011250636 HC RX REV CODE- 250/636: Performed by: STUDENT IN AN ORGANIZED HEALTH CARE EDUCATION/TRAINING PROGRAM

## 2022-09-23 PROCEDURE — 97530 THERAPEUTIC ACTIVITIES: CPT | Performed by: OCCUPATIONAL THERAPIST

## 2022-09-23 PROCEDURE — 97116 GAIT TRAINING THERAPY: CPT

## 2022-09-23 PROCEDURE — 74011250637 HC RX REV CODE- 250/637: Performed by: STUDENT IN AN ORGANIZED HEALTH CARE EDUCATION/TRAINING PROGRAM

## 2022-09-23 PROCEDURE — 74011250637 HC RX REV CODE- 250/637: Performed by: FAMILY MEDICINE

## 2022-09-23 PROCEDURE — 82962 GLUCOSE BLOOD TEST: CPT

## 2022-09-23 PROCEDURE — 74011636637 HC RX REV CODE- 636/637: Performed by: STUDENT IN AN ORGANIZED HEALTH CARE EDUCATION/TRAINING PROGRAM

## 2022-09-23 RX ORDER — FUROSEMIDE 10 MG/ML
40 INJECTION INTRAMUSCULAR; INTRAVENOUS DAILY
Status: DISCONTINUED | OUTPATIENT
Start: 2022-09-24 | End: 2022-09-26 | Stop reason: HOSPADM

## 2022-09-23 RX ADMIN — METOPROLOL TARTRATE 25 MG: 25 TABLET, FILM COATED ORAL at 17:05

## 2022-09-23 RX ADMIN — SODIUM CHLORIDE, PRESERVATIVE FREE 10 ML: 5 INJECTION INTRAVENOUS at 21:27

## 2022-09-23 RX ADMIN — ASPIRIN 81 MG: 81 TABLET, COATED ORAL at 09:34

## 2022-09-23 RX ADMIN — ROSUVASTATIN 20 MG: 20 TABLET, FILM COATED ORAL at 21:27

## 2022-09-23 RX ADMIN — METOPROLOL TARTRATE 25 MG: 25 TABLET, FILM COATED ORAL at 09:33

## 2022-09-23 RX ADMIN — AMITRIPTYLINE HYDROCHLORIDE 10 MG: 10 TABLET, FILM COATED ORAL at 17:05

## 2022-09-23 RX ADMIN — SODIUM CHLORIDE, PRESERVATIVE FREE 10 ML: 5 INJECTION INTRAVENOUS at 17:06

## 2022-09-23 RX ADMIN — CASTOR OIL AND BALSAM, PERU: 788; 87 OINTMENT TOPICAL at 09:34

## 2022-09-23 RX ADMIN — TAMSULOSIN HYDROCHLORIDE 0.4 MG: 0.4 CAPSULE ORAL at 09:35

## 2022-09-23 RX ADMIN — Medication 2 UNITS: at 17:05

## 2022-09-23 RX ADMIN — Medication 2 UNITS: at 12:35

## 2022-09-23 RX ADMIN — SODIUM CHLORIDE, PRESERVATIVE FREE 10 ML: 5 INJECTION INTRAVENOUS at 07:27

## 2022-09-23 RX ADMIN — AMITRIPTYLINE HYDROCHLORIDE 10 MG: 10 TABLET, FILM COATED ORAL at 09:34

## 2022-09-23 RX ADMIN — POTASSIUM CHLORIDE 20 MEQ: 750 TABLET, FILM COATED, EXTENDED RELEASE ORAL at 17:05

## 2022-09-23 RX ADMIN — ENOXAPARIN SODIUM 40 MG: 100 INJECTION SUBCUTANEOUS at 09:33

## 2022-09-23 RX ADMIN — POTASSIUM CHLORIDE 20 MEQ: 750 TABLET, FILM COATED, EXTENDED RELEASE ORAL at 09:33

## 2022-09-23 RX ADMIN — CASTOR OIL AND BALSAM, PERU: 788; 87 OINTMENT TOPICAL at 21:27

## 2022-09-23 NOTE — PROGRESS NOTES
Transition of Care Plan:    RUR: 8  Disposition:SNF  Follow up appointments: PCP   DME needed: Pt has access to cane and RW, SNF rehab will assess for final DME  Transportation at Discharge: Sana Must or means to access home: Spouse has access to keys         Medicare Letter: Will provide upon d/c   Is patient a [place] and connected with the [place]? NA         If yes, was [employer] transfer form completed and [facility] notified? NA  Caregiver Contact: Avelina Bass- Daughter [phone]  Discharge Caregiver contacted prior to discharge? Yes  Care Conference needed?:    No    Reason for Admission:  CHF                     RUR Score:         8            Plan for utilizing home health:  No        PCP: First and Last name:  Chela Conley MD     Name of Practice: [facility]   Are you a current patient: Yes/No: Yes   Approximate date of last visit: Last week    Can you participate in a virtual visit with your PCP: Yes                    Current Advanced Directive/Advance Care Plan: Full Code      Healthcare Decision Maker:   Click here to complete [address] including selection of the Healthcare Decision Maker Relationship (ie \"Primary\")                             Transition of Care Plan:     SNF    Care Management Interventions  PCP Verified by CM: Yes  Mode of Transport at Discharge: BLS  Transition of Care Consult (CM Consult): SNF, Discharge Planning (pt had previous IPR expriances [facility])  Discharge Durable Medical Equipment: No (pt has acess to cane and RW)  Physical Therapy Consult: Yes  Occupational Therapy Consult: Yes  Support Systems: Spouse/Significant Other, Child(pb) (Pt lives with spouse in a 3 level home has 4 steps at entrance.  pt has bedroom in 1st floor.)  Confirm Follow Up Transport: Family  The Plan for Transition of Care is Related to the Following Treatment Goals : SNF  The Patient and/or Patient Representative was Provided with a Choice of Provider and Agrees with the Discharge Plan?: Yes  Name of the Patient Representative Who was Provided with a Choice of Provider and Agrees with the Discharge Plan: Lori Court [phone] Daughter  Freedom of Choice List was Provided with Basic Dialogue that Supports the Patient's Individualized Plan of Care/Goals, Treatment Preferences and Shares the Quality Data Associated with the Providers?: Yes  Discharge Location  Patient Expects to be Discharged to[de-identified] [address]    CM spoke to pt's daughter Jennifer Mosqueda and completed CM initial assessment. Pt lives with his spouse in a 3 level house has 4 steps at entrance. Pt use cane for his mobility. Pt had previous rehab [facility] at [facility]. SNF list provided Daughter selected [facility] from list. SNF referral send via [address] [place] [address] link. FOC given. Floor Cm will follow up. Advance Care Planning   Healthcare Decision Maker:         Click here to complete [address] including selection of the Healthcare Decision Maker Relationship (ie \"Primary\")  Today we documented Decision Maker(s) consistent with Legal Next of Kin hierarchy.                Sid Zapata MSW     Vmp [phone]

## 2022-09-23 NOTE — PROGRESS NOTES
Problem: Mobility Impaired (Adult and Pediatric)  Goal: *Acute Goals and Plan of Care (Insert Text)  Description: FUNCTIONAL STATUS PRIOR TO ADMISSION: Patient was modified independent using a single point cane for functional mobility. Patient was independent for basic and instrumental ADLs. HOME SUPPORT PRIOR TO ADMISSION: The patient lived with spouse but did not require assist. Lives in [address] [place] [address] home - bedroom on 3rd floor after 2 flights of stairs. Physical Therapy Goals  Initiated 9/19/2022  1. Patient will move from supine to sit and sit to supine , scoot up and down, and roll side to side in bed with modified independence within 7 day(s). 2.  Patient will transfer from bed to chair and chair to bed with modified independence using the least restrictive device within 7 day(s). 3.  Patient will perform sit to stand with modified independence within 7 day(s). 4.  Patient will ambulate with modified independence for 150 feet with the least restrictive device within 7 day(s). 5.  Patient will ascend/descend 24 stairs with 1 handrail(s) with minimal assistance/contact guard assist within 7 day(s). Outcome: Progressing Towards Goal   PHYSICAL THERAPY TREATMENT  Patient: Hieu Mata (80 y.o. male)  Date: 9/23/2022  Diagnosis: CHF exacerbation ([facility] [address] [I50.9] <principal problem not specified>      Precautions: Fall (COVID +, droplet +)  Chart, physical therapy assessment, plan of care and goals were reviewed. ASSESSMENT  Patient continues with skilled PT services and is progressing towards goals. Pt presents with decreased strength and endurance. Pt performed bed mobility at [facility] with additional time. Pt performed sit to stand transfer at min A x2 with cueing for hand placement. Pt ambulated 15ft and 18ft with RW at CGA/min A. Pt requiring cueing to stay within walker and stand upright . Pt able to minimally correct. Pt with improved mobility but still needing assistance for safety.   Pts BP stable with mobility. Current Level of Function Impacting Discharge (mobility/balance): bed mobility at CGA, sit to stand transfer at min A x2, ambulation at min A/CGA with RW     Other factors to consider for discharge: decreased strength, decreased endurance          PLAN :  Patient continues to benefit from skilled intervention to address the above impairments. Continue treatment per established plan of care. to address goals. Recommendation for discharge: (in order for the patient to meet his/her long term goals)  Therapy up to 5 days/week in SNF setting    This discharge recommendation:  Has been made in collaboration with the attending provider and/or case management    IF patient discharges home will need the following DME: rolling walker       SUBJECTIVE:   Patient stated  I feel much better today.     OBJECTIVE DATA SUMMARY:   Critical Behavior:  Neurologic State: Alert, Confused  Orientation Level: Oriented to place, Oriented to person, Disoriented to situation, Disoriented to time  Cognition: Follows commands  Safety/Judgement: Fall prevention  Functional Mobility Training:  Bed Mobility:  Rolling: Contact guard assistance  Supine to Sit: Contact guard assistance; Additional time     Scooting: Contact guard assistance;Stand-by assistance        Transfers:  Sit to Stand: Minimum assistance;Assist x2  Stand to Sit: Contact guard assistance        Bed to Chair: Contact guard assistance; Additional time                    Balance:  Sitting: Intact  Sitting - Static: Good (unsupported)  Sitting - Dynamic: Good (unsupported)  Standing: Impaired  Standing - Static: Fair;Constant support  Standing - Dynamic : Fair;Constant support  Ambulation/Gait Training:  Distance (ft): 33 Feet (ft) (15ft and 18ft)  Assistive Device: Walker, rolling;Gait belt  Ambulation - Level of Assistance: Contact guard assistance;Minimal assistance        Gait Abnormalities: Decreased step clearance        Base of Support: Widened     Speed/Gina: Slow;Shuffled  Step Length: Right shortened;Left shortened        Pain Rating:  Pt with no complaints of pain     Activity Tolerance:   Fair and requires rest breaks    After treatment patient left in no apparent distress:   Sitting in chair and Call bell within reach    COMMUNICATION/COLLABORATION:   The patients plan of care was discussed with: Registered nurse.      Marleny Lee PTA   Time Calculation: 28 mins

## 2022-09-23 NOTE — PROGRESS NOTES
General Daily Progress Note    Admit Date: 9/18/2022  Hospital day 6    Subjective:     Patient has no complaint of shortness of breath at rest. Not happy with going to rehab, but accepts that he needs this. Lives with wife whose health isnt good either. ..   Medication side effects: dizziness/lightheadedness    Current Facility-Administered Medications   Medication Dose Route Frequency    amitriptyline (ELAVIL) tablet 10 mg  10 mg Oral BID    potassium chloride SR (KLOR-CON 10) tablet 20 mEq  20 mEq Oral BID    balsam peru-castor oiL (VENELEX) ointment   Topical BID    acetaminophen-codeine (TYLENOL #3) per tablet 1 Tablet  1 Tablet Oral QID PRN    aspirin delayed-release tablet 81 mg  81 mg Oral DAILY    metoprolol tartrate (LOPRESSOR) tablet 25 mg  25 mg Oral BID    rosuvastatin (CRESTOR) tablet 20 mg  20 mg Oral QHS    tamsulosin (FLOMAX) capsule 0.4 mg  0.4 mg Oral DAILY    sodium chloride (NS) flush 5-40 mL  5-40 mL IntraVENous Q8H    sodium chloride (NS) flush 5-40 mL  5-40 mL IntraVENous PRN    acetaminophen (TYLENOL) tablet 650 mg  650 mg Oral Q6H PRN    Or    acetaminophen (TYLENOL) suppository 650 mg  650 mg Rectal Q6H PRN    polyethylene glycol (MIRALAX) packet 17 g  17 g Oral DAILY PRN    ondansetron (ZOFRAN ODT) tablet 4 mg  4 mg Oral Q8H PRN    Or    ondansetron (ZOFRAN) injection 4 mg  4 mg IntraVENous Q6H PRN    enoxaparin (LOVENOX) injection 40 mg  40 mg SubCUTAneous DAILY    insulin lispro (HUMALOG) injection   SubCUTAneous AC&HS    glucose chewable tablet 16 g  4 Tablet Oral PRN    glucagon (GLUCAGEN) injection 1 mg  1 mg IntraMUSCular PRN    dextrose 10% infusion 0-250 mL  0-250 mL IntraVENous PRN    [Held by provider] furosemide (LASIX) injection 40 mg  40 mg IntraVENous BID    morphine injection 2 mg  2 mg IntraVENous Q4H PRN        Review of Systems  Pertinent items are noted in HPI.     Objective:     Patient Vitals for the past 8 hrs:   BP Temp Pulse Resp SpO2   09/23/22 1101 (!) 118/55 97.5 °F (36.4 °C) 62 16 99 %   09/23/22 0730 (!) 147/69 97.4 °F (36.3 °C) 63 16 99 %     No intake/output data recorded. 09/21 1901 - 09/23 0700  In: -   Out: 1350 [Urine:1350]    Physical Exam: Visit Vitals  BP (!) 118/55   Pulse 62   Temp 97.5 °F (36.4 °C)   Resp 16   Ht 6' 1\" (1.854 m)   Wt 198 lb (89.8 kg)   SpO2 99%   BMI 26.12 kg/m²     Lungs: clear to auscultation bilaterally  Heart: regular rate and rhythm, S1, S2 normal, no murmur, click, rub or gallop  Abdomen: soft, non-tender. Bowel sounds normal. No masses,  no organomegaly  Extremities: edema , tr- bilaterally      ECG: normal sinus rhythm     Data Review   Recent Results (from the past 24 hour(s))   GLUCOSE, POC    Collection Time: 09/22/22  4:28 PM   Result Value Ref Range    Glucose (POC) 130 (H) 65 - 117 mg/dL    Performed by [employer] PCT    GLUCOSE, POC    Collection Time: 09/22/22  8:42 PM   Result Value Ref Range    Glucose (POC) 132 (H) 65 - 117 mg/dL    Performed by James Chi (ROSEMARY RN)    GLUCOSE, POC    Collection Time: 09/23/22  7:42 AM   Result Value Ref Range    Glucose (POC) 111 65 - 117 mg/dL    Performed by [employer] PCT    GLUCOSE, POC    Collection Time: 09/23/22 11:00 AM   Result Value Ref Range    Glucose (POC) 166 (H) 65 - 117 mg/dL    Performed by Brielle Smith RN            Assessment:     Active Problems:    CHF exacerbation ([facility] [address] (9/19/2022)        Plan:           Progressive lower extremity edema , responding to IV lasix. No real chf on xray, renal function, liver function, thyroid function all basically WNL. Probable component of venous insufficiency also. continue compression dressings for now. Currently on lasix 40 mg iv bid. weightg is down some 12-14 lbs. Appreciate NP Valerie input- sounds like pt wasn't taking him pm lasix. edema has responded well in hospital.   Elevated troponin- will ask cardiology to see.  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 probably from diabetic neuropathy- will start amitriptyline. Pain seems to be much better this am. Also consider Lumbar spinal disease (long hx of low back pain), PVD. Will check xrays of lumbar spine and ТАТЬЯНА indexes. 5. Hypokalemia- replace  Disposition- may need snf- pt needed about 2 minutes to come to standing with walker. Then walked to bathroom, voided and stooled at the same time with stool running down to floor. Wife confirms that has been sitting in chair all day, too weak to get up and move around. She will have trouble caring for him in this condition. Continue PT, OT- will need snf unless improves dramatically the next day or two.   7. 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 Will resume lasix at a lower dose.

## 2022-09-23 NOTE — PROGRESS NOTES
Problem: Self Care Deficits Care Plan (Adult)  Goal: *Acute Goals and Plan of Care (Insert Text)  Description: FUNCTIONAL STATUS PRIOR TO ADMISSION: Patient was modified independent using a single point cane at times for functional mobility. Performed ADLs on his own but struggles with donning socks and shoes    HOME SUPPORT PRIOR TO ADMISSION: The patient lived with wife and granddaughter checks on pt. Occupational Therapy Goals:  Initiated 9/19/2022  1. Patient will perform grooming standing with supervision/set-up within 7 days. 2. Patient will perform lower body dressing with supervision/set-up within 7 days. 3. Patient will perform toileting with supervision/set-up within 7 days. 4. Patient will transfer from toilet with supervision/set-up using the least restrictive device and appropriate durable medical equipment within 7 days. Outcome: Progressing Towards Goal   OCCUPATIONAL THERAPY TREATMENT  Patient: Abdirahman Johnson (80 y.o. male)  Date: 9/23/2022  Diagnosis: CHF exacerbation ([facility] [address] [I50.9] <principal problem not specified>      Precautions: Fall (COVID +, droplet +)  Chart, occupational therapy assessment, plan of care, and goals were reviewed. ASSESSMENT  Patient continues with skilled OT services and is progressing towards goals. Pt had been in chair for several hours and did not appear comfortable upon arrival.  Nurse present and had been observing HR decreased while in chair. Assisted pt in returning to bed,(Min A RW)  and once upright, pt's HR into high 50s/low 60s, O2 sats generally in  90s. BP post activity in sitting was 157/73 and pt had no complaints. Pt appeared quite stiff during mobility and spinal extension was decreased demonstrated hip flexion posture using RW. Pt returned to bed with CGA and was made comfortable. Per medical record pt has long history of low back pain. Pt is functioning below his baseline and will benefit from rehab at discharge.   Pt reports that he and his wife are independent at home with self care, and daughter is assisting with meals, etc.       Current Level of Function Impacting Discharge (ADLs): min A for adl mobility,  HR on the low side. Pt is set up for g rooming    Other factors to consider for discharge: Pt reported that he has never learned to read--his wife does. PLAN :  Patient continues to benefit from skilled intervention to address the above impairments. Continue treatment per established plan of care to address goals. Recommend with staff: encourage frequent position changes and participating in self care    Recommend next OT session: toilet transfer/self care    Recommendation for discharge: (in order for the patient to meet his/her long term goals)  To be determined: rehab     This discharge recommendation:  Has not yet been discussed the attending provider and/or case management    IF patient discharges home will need the following DME: tbd in rehab setting       SUBJECTIVE:   Patient stated my daughter helps.     OBJECTIVE DATA SUMMARY:   Cognitive/Behavioral Status:  Neurologic State: Alert  Orientation Level:  (oriented to person, place, month and year)  Cognition: Follows commands  Perception: Appears intact  Perseveration: No perseveration noted  Safety/Judgement: Fall prevention    Functional Mobility and Transfers for ADLs:  Bed Mobility:  Rolling: Contact guard assistance  Supine to Sit: Contact guard assistance; Additional time  Sit to Supine: Minimum assistance  Scooting: Minimum assistance (while supine, stand by if seated)    Transfers:  Sit to Stand: Minimum assistance;Assist x2     Bed to Chair: Minimum assistance; Additional time    Balance:  Sitting: Intact  Sitting - Static: Good (unsupported)  Sitting - Dynamic: Good (unsupported)  Standing: Impaired  Standing - Static: Fair;Constant support  Standing - Dynamic : Fair;Constant support    ADL Intervention:       Grooming  Washing Face: Set-up  Washing Hands: Set-up                             Toileting  Bladder Hygiene:  (using urinal at bedside)    Cognitive Retraining  Safety/Judgement: Fall prevention    Therapeutic Exercises:   Seated EOB performed BUE/trunk exercises incorporating deep breathing--B shoulder flex/ext: 3 reps, B shoulder abd/adduction (impaired sh ROM) 3 reps, trunk twists 3 reps. Pt needing CGA to perform. Pain:  No c/o pain     Activity Tolerance:   Fair, SpO2 stable on RA, and activity limited this date, due to pt fatigue after chair for >2 hours. After treatment patient left in no apparent distress:   Supine in bed, Heels elevated for pressure relief, Call bell within reach, , Side rails x 3, and nurse aware    COMMUNICATION/COLLABORATION:   The patients plan of care was discussed with: Registered nurse.      Eboni Bryant OTR/L  Time Calculation: 29 mins

## 2022-09-24 LAB
BACTERIA SPEC CULT: NORMAL
GLUCOSE BLD STRIP.AUTO-MCNC: 128 MG/DL (ref 65–117)
GLUCOSE BLD STRIP.AUTO-MCNC: 141 MG/DL (ref 65–117)
GLUCOSE BLD STRIP.AUTO-MCNC: 146 MG/DL (ref 65–117)
GLUCOSE BLD STRIP.AUTO-MCNC: 227 MG/DL (ref 65–117)
SERVICE CMNT-IMP: ABNORMAL
SERVICE CMNT-IMP: NORMAL

## 2022-09-24 PROCEDURE — 99233 SBSQ HOSP IP/OBS HIGH 50: CPT | Performed by: INTERNAL MEDICINE

## 2022-09-24 PROCEDURE — 74011250637 HC RX REV CODE- 250/637: Performed by: FAMILY MEDICINE

## 2022-09-24 PROCEDURE — 74011636637 HC RX REV CODE- 636/637: Performed by: STUDENT IN AN ORGANIZED HEALTH CARE EDUCATION/TRAINING PROGRAM

## 2022-09-24 PROCEDURE — 74011250637 HC RX REV CODE- 250/637: Performed by: STUDENT IN AN ORGANIZED HEALTH CARE EDUCATION/TRAINING PROGRAM

## 2022-09-24 PROCEDURE — 74011000250 HC RX REV CODE- 250: Performed by: STUDENT IN AN ORGANIZED HEALTH CARE EDUCATION/TRAINING PROGRAM

## 2022-09-24 PROCEDURE — 74011250636 HC RX REV CODE- 250/636: Performed by: FAMILY MEDICINE

## 2022-09-24 PROCEDURE — 82962 GLUCOSE BLOOD TEST: CPT

## 2022-09-24 PROCEDURE — 65270000029 HC RM PRIVATE

## 2022-09-24 PROCEDURE — 74011250636 HC RX REV CODE- 250/636: Performed by: STUDENT IN AN ORGANIZED HEALTH CARE EDUCATION/TRAINING PROGRAM

## 2022-09-24 RX ADMIN — ROSUVASTATIN 20 MG: 20 TABLET, FILM COATED ORAL at 22:00

## 2022-09-24 RX ADMIN — Medication 3 UNITS: at 12:24

## 2022-09-24 RX ADMIN — AMITRIPTYLINE HYDROCHLORIDE 10 MG: 10 TABLET, FILM COATED ORAL at 09:00

## 2022-09-24 RX ADMIN — METOPROLOL TARTRATE 25 MG: 25 TABLET, FILM COATED ORAL at 09:00

## 2022-09-24 RX ADMIN — ASPIRIN 81 MG: 81 TABLET, COATED ORAL at 09:00

## 2022-09-24 RX ADMIN — ENOXAPARIN SODIUM 40 MG: 100 INJECTION SUBCUTANEOUS at 09:01

## 2022-09-24 RX ADMIN — TAMSULOSIN HYDROCHLORIDE 0.4 MG: 0.4 CAPSULE ORAL at 09:00

## 2022-09-24 RX ADMIN — SODIUM CHLORIDE, PRESERVATIVE FREE 10 ML: 5 INJECTION INTRAVENOUS at 22:00

## 2022-09-24 RX ADMIN — SODIUM CHLORIDE, PRESERVATIVE FREE 10 ML: 5 INJECTION INTRAVENOUS at 05:16

## 2022-09-24 RX ADMIN — CASTOR OIL AND BALSAM, PERU: 788; 87 OINTMENT TOPICAL at 09:01

## 2022-09-24 RX ADMIN — METOPROLOL TARTRATE 25 MG: 25 TABLET, FILM COATED ORAL at 18:24

## 2022-09-24 RX ADMIN — POTASSIUM CHLORIDE 20 MEQ: 750 TABLET, FILM COATED, EXTENDED RELEASE ORAL at 09:00

## 2022-09-24 RX ADMIN — SODIUM CHLORIDE, PRESERVATIVE FREE 10 ML: 5 INJECTION INTRAVENOUS at 14:00

## 2022-09-24 RX ADMIN — POTASSIUM CHLORIDE 20 MEQ: 750 TABLET, FILM COATED, EXTENDED RELEASE ORAL at 18:24

## 2022-09-24 RX ADMIN — AMITRIPTYLINE HYDROCHLORIDE 10 MG: 10 TABLET, FILM COATED ORAL at 18:24

## 2022-09-24 RX ADMIN — FUROSEMIDE 40 MG: 10 INJECTION, SOLUTION INTRAMUSCULAR; INTRAVENOUS at 09:00

## 2022-09-24 RX ADMIN — CASTOR OIL AND BALSAM, PERU: 788; 87 OINTMENT TOPICAL at 22:01

## 2022-09-24 RX ADMIN — Medication 2 UNITS: at 09:22

## 2022-09-24 NOTE — PROGRESS NOTES
INTERNAL MEDICINE PROGRESS NOTE    NAME:  Lincoln Lock   :   1940   MRN:   [id number]     Date/Time:  2022 7:30 AM  Subjective:   History:  Chart reviewed and patient seen and examined and D/W his nurse this AM and all events noted. He has a history of hypertension, ASCVD status post CABG, congestive heart failure, prior CVA, diabetes mellitus, hyperlipidemia, and other medical problems. He has been admitted on this occasion secondary to acute on chronic congestive heart failure with bilateral lower extremity edema. He was also COVID-positive when admitted on . This a.m. he remains very weak and will need rehab on discharge. He currently does have a little cough but notes no shortness of breath, sputum production, palpitations, PND, orthopnea or other cardiac or respiratory complaints. He notes no current GI or  complaints. He has no headaches, dizziness or neurologic complaints except the weakness. There is no change of his chronic arthritic complaints and he has no other complaints on complete review of systems.       Medications reviewed:  Current Facility-Administered Medications   Medication Dose Route Frequency    furosemide (LASIX) injection 40 mg  40 mg IntraVENous DAILY    amitriptyline (ELAVIL) tablet 10 mg  10 mg Oral BID    potassium chloride SR (KLOR-CON 10) tablet 20 mEq  20 mEq Oral BID    balsam peru-castor oiL (VENELEX) ointment   Topical BID    acetaminophen-codeine (TYLENOL #3) per tablet 1 Tablet  1 Tablet Oral QID PRN    aspirin delayed-release tablet 81 mg  81 mg Oral DAILY    metoprolol tartrate (LOPRESSOR) tablet 25 mg  25 mg Oral BID    rosuvastatin (CRESTOR) tablet 20 mg  20 mg Oral QHS    tamsulosin (FLOMAX) capsule 0.4 mg  0.4 mg Oral DAILY    sodium chloride (NS) flush 5-40 mL  5-40 mL IntraVENous Q8H    sodium chloride (NS) flush 5-40 mL  5-40 mL IntraVENous PRN    acetaminophen (TYLENOL) tablet 650 mg  650 mg Oral Q6H PRN    Or    acetaminophen (TYLENOL) suppository 650 mg  650 mg Rectal Q6H PRN    polyethylene glycol (MIRALAX) packet 17 g  17 g Oral DAILY PRN    ondansetron (ZOFRAN ODT) tablet 4 mg  4 mg Oral Q8H PRN    Or    ondansetron (ZOFRAN) injection 4 mg  4 mg IntraVENous Q6H PRN    enoxaparin (LOVENOX) injection 40 mg  40 mg SubCUTAneous DAILY    insulin lispro (HUMALOG) injection   SubCUTAneous AC&HS    glucose chewable tablet 16 g  4 Tablet Oral PRN    glucagon (GLUCAGEN) injection 1 mg  1 mg IntraMUSCular PRN    dextrose 10% infusion 0-250 mL  0-250 mL IntraVENous PRN    [Held by provider] furosemide (LASIX) injection 40 mg  40 mg IntraVENous BID    morphine injection 2 mg  2 mg IntraVENous Q4H PRN        Objective:   Vitals:  Visit Vitals  BP (!) 161/84 (BP 1 Location: Right upper arm, BP Patient Position: Semi fowlers)   Pulse 79   Temp 98 °F (36.7 °C)   Resp 18   Ht 6' 1\" (1.854 m)   Wt 198 lb (89.8 kg)   SpO2 97%   BMI 26.12 kg/m²      O2 Device: None (Room air) Temp (24hrs), Av.5 °F (36.4 °C), Min:97.2 °F (36.2 °C), Max:98 °F (36.7 °C)      Last 24hr Input/Output:    Intake/Output Summary (Last 24 hours) at 2022 0730  Last data filed at 2022 0586  Gross per 24 hour   Intake 120 ml   Output 425 ml   Net -305 ml        PHYSICAL EXAM:  General:     Alert, cooperative, no distress, appears stated age. Head:    Normocephalic, without obvious abnormality, atraumatic. Eyes:    Conjunctivae/corneas clear. PERRLA  Nose:   Nares normal. No drainage or sinus tenderness. Throat:     Lips, mucosa, and tongue normal.  No Thrush  Neck:   Supple, symmetrical,  no adenopathy, thyroid: non tender     no carotid bruit and no JVD. Back:     Symmetric,  No CVA tenderness. Lungs:    Clear to auscultation bilaterally. No Wheezing or Rhonchi. No rales. Heart:    Regular rate and rhythm,  no murmur, rub or gallop. Abdomen:    Soft, non-tender. Not distended. Bowel sounds normal. No masses  Extremities:  Extremities normal, atraumatic, No cyanosis. Tr edema. No clubbing  Lymph nodes:  Cervical, supraclavicular normal.  Neurologic: Generalized decreased strength with mild left hand neglect and slight left-sided weakness from prior CVA, Alert and oriented X 3. Skin:                No rash      Lab Data Reviewed:    Recent Results (from the past 24 hour(s))   GLUCOSE, POC    Collection Time: 09/23/22  7:42 AM   Result Value Ref Range    Glucose (POC) 111 65 - 117 mg/dL    Performed by 15 [id number] PCT    GLUCOSE, POC    Collection Time: 09/23/22 11:00 AM   Result Value Ref Range    Glucose (POC) 166 (H) 65 - 117 mg/dL    Performed by Rogelio Fitzgerald RN    GLUCOSE, POC    Collection Time: 09/23/22  4:41 PM   Result Value Ref Range    Glucose (POC) 152 (H) 65 - 117 mg/dL    Performed by 15 AF83 PCT    GLUCOSE, POC    Collection Time: 09/23/22  9:13 PM   Result Value Ref Range    Glucose (POC) 154 (H) 65 - 117 mg/dL    Performed by Gloria Wheeler (ROSEMARY RN)          Assessment/Plan:     Principal Problem:    CHF exacerbation ([facility] [address] (9/19/2022)    Active Problems:    Essential hypertension (12/9/2015)      CVA (cerebral vascular accident) ([facility] [address] (3/6/2018)      Overview: Residual L sided weakness      ASHD (arteriosclerotic heart disease) (1/15/2021)      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 Continue diuresis with IV Lasix  2. Follow I's and O's and weight  3. Follow renal function-stable 24/1.02  4. Follow blood sugar for diabetes and treat with sliding scale insulin  5. Continue aspirin 81 mg daily and metoprolol with heart disease  6. Continue Crestor for hyperlipidemia  Was having continue Flomax for BPH  7. Amitriptyline started for lower extremity neuropathy felt to be diabetic  8.   Follow-up potassium and replete as needed  9. Continue PT and OT and will need SNF rehab at discharge    40 Minutes spent today in direct care of this high complexity patient with greater than 50% in counseling and coordination of care.     If need to contact me use hospital  [phone], DO NOT USE PERFECT SERVE    ___________________________________________________    Attending Physician: Swati Kimball MD

## 2022-09-24 NOTE — PROGRESS NOTES
End of Shift Note    Bedside shift change report given to NEL Ring (oncoming nurse) by Ricardo Pacheco RN (offgoing nurse). Report included the following information SBAR, Kardex, Intake/Output, MAR, and Recent Results    Shift worked:  Days     Shift summary and any significant changes:     PT HR went to the high 40's when he was sitting in the chair after two hrs then when he was placed back in the bed his HR went back to his baseline. Client seen PT and OT today. He is waiting for SNF placement. Concerns for physician to address:  None     Zone phone for oncoming shift:   [phone]       Activity:  Activity Level: Up with Assistance  Number times ambulated in hallways past shift: 0  Number of times OOB to chair past shift: 0     Cardiac:   Cardiac Monitoring: No      Cardiac Rhythm: Sinus Jimmie     Access:  Current line(s): PIV      Genitourinary:   Urinary status: voiding     Respiratory:   O2 Device: None (Room air)  Chronic home O2 use?: NO  Incentive spirometer at bedside: NO     GI:  Last Bowel Movement Date: 09/22/22  Current diet:  ADULT DIET Regular; Low Fat/Low Chol/High Fiber/2 gm Na  Passing flatus: YES  Tolerating current diet: YES     Pain Management:   Patient states pain is manageable on current regimen: YES     Skin:  Romulo Score: 20  Interventions: float heels, increase time out of bed, and PT/OT consult    Patient Safety:  Fall Score:  Total Score: 3  Interventions: bed/chair alarm, assistive device (walker, cane, etc), gripper socks, and pt to call before getting OOB  High Fall Risk: Yes     Length of Stay:  Expected LOS: 3d 12h  Actual LOS: [address] Reyna Davidson, RN

## 2022-09-24 NOTE — PROGRESS NOTES
Spiritual Care Assessment/Progress Note  [facility]      NAME: Trupti Castillo      MRN: [id number]  AGE: 80 y.o. SEX: male  Rastafari Affiliation: No Mosque   Language: English     9/24/2022     Total Time (in minutes): 13     Spiritual Assessment begun in [facility] 2 MED TELE through conversation with:         []Patient        [] Family    [] Friend(s)        Reason for Consult: Initial/Spiritual assessment, patient floor     Spiritual beliefs: (Please include comment if needed)     [] Identifies with a aryan tradition:         [] Supported by a aryan community:            [] Claims no spiritual orientation:           [] Seeking spiritual identity:                [] Adheres to an individual form of spirituality:           [x] Not able to assess:                           Identified resources for coping:      [] Prayer                               [] Music                  [] Guided Imagery     [] Family/friends                 [] Pet visits     [] Devotional reading                         [x] Unknown     [] Other:                                                Interventions offered during this visit: (See comments for more details)    Patient Interventions: Initial visit           Plan of Care:     [x] Support spiritual and/or cultural needs    [] Support AMD and/or advance care planning process      [] Support grieving process   [] Coordinate Rites and/or Rituals    [] Coordination with community clergy   [] No spiritual needs identified at this time   [] Detailed Plan of Care below (See Comments)  [] Make referral to Music Therapy  [] Make referral to Pet Therapy     [] Make referral to Addiction services  [] Make referral to [facility]  [] Make referral to Spiritual Care Partner  [] No future visits requested        [x] Contact Spiritual Care for further referrals     Comments:  Reviewed chart prior to visit on Med Tele for spiritual assessment.   Patient is on COVID+ isolation precautions;  did not enter room. Telephoned into room to offer support, but patient did not answer telephone. Unable to assess for spiritual needs or concerns at this time.    available upon referral by staff or by patient/family request.       LEOLA Nevarez, [facility], Staff   [facility] Paging Service  [phone])

## 2022-09-25 LAB
ANION GAP SERPL CALC-SCNC: 6 MMOL/L (ref 5–15)
BUN SERPL-MCNC: 20 MG/DL (ref 6–20)
BUN/CREAT SERPL: 20 (ref 12–20)
CALCIUM SERPL-MCNC: 9.5 MG/DL (ref 8.5–10.1)
CHLORIDE SERPL-SCNC: 104 MMOL/L (ref 97–108)
CO2 SERPL-SCNC: 28 MMOL/L (ref 21–32)
CREAT SERPL-MCNC: 0.99 MG/DL (ref 0.7–1.3)
GLUCOSE BLD STRIP.AUTO-MCNC: 137 MG/DL (ref 65–117)
GLUCOSE BLD STRIP.AUTO-MCNC: 139 MG/DL (ref 65–117)
GLUCOSE BLD STRIP.AUTO-MCNC: 149 MG/DL (ref 65–117)
GLUCOSE BLD STRIP.AUTO-MCNC: 187 MG/DL (ref 65–117)
GLUCOSE SERPL-MCNC: 159 MG/DL (ref 65–100)
POTASSIUM SERPL-SCNC: 4.2 MMOL/L (ref 3.5–5.1)
SERVICE CMNT-IMP: ABNORMAL
SODIUM SERPL-SCNC: 138 MMOL/L (ref 136–145)

## 2022-09-25 PROCEDURE — 74011250636 HC RX REV CODE- 250/636: Performed by: STUDENT IN AN ORGANIZED HEALTH CARE EDUCATION/TRAINING PROGRAM

## 2022-09-25 PROCEDURE — 36415 COLL VENOUS BLD VENIPUNCTURE: CPT

## 2022-09-25 PROCEDURE — 80048 BASIC METABOLIC PNL TOTAL CA: CPT

## 2022-09-25 PROCEDURE — 99233 SBSQ HOSP IP/OBS HIGH 50: CPT | Performed by: INTERNAL MEDICINE

## 2022-09-25 PROCEDURE — 74011636637 HC RX REV CODE- 636/637: Performed by: STUDENT IN AN ORGANIZED HEALTH CARE EDUCATION/TRAINING PROGRAM

## 2022-09-25 PROCEDURE — 74011250637 HC RX REV CODE- 250/637: Performed by: FAMILY MEDICINE

## 2022-09-25 PROCEDURE — 74011000250 HC RX REV CODE- 250: Performed by: STUDENT IN AN ORGANIZED HEALTH CARE EDUCATION/TRAINING PROGRAM

## 2022-09-25 PROCEDURE — 74011250637 HC RX REV CODE- 250/637: Performed by: STUDENT IN AN ORGANIZED HEALTH CARE EDUCATION/TRAINING PROGRAM

## 2022-09-25 PROCEDURE — 65270000029 HC RM PRIVATE

## 2022-09-25 PROCEDURE — 82962 GLUCOSE BLOOD TEST: CPT

## 2022-09-25 PROCEDURE — 74011250636 HC RX REV CODE- 250/636: Performed by: FAMILY MEDICINE

## 2022-09-25 RX ADMIN — SODIUM CHLORIDE, PRESERVATIVE FREE 10 ML: 5 INJECTION INTRAVENOUS at 05:10

## 2022-09-25 RX ADMIN — ASPIRIN 81 MG: 81 TABLET, COATED ORAL at 10:40

## 2022-09-25 RX ADMIN — POTASSIUM CHLORIDE 20 MEQ: 750 TABLET, FILM COATED, EXTENDED RELEASE ORAL at 18:19

## 2022-09-25 RX ADMIN — ROSUVASTATIN 20 MG: 20 TABLET, FILM COATED ORAL at 21:20

## 2022-09-25 RX ADMIN — CASTOR OIL AND BALSAM, PERU: 788; 87 OINTMENT TOPICAL at 21:27

## 2022-09-25 RX ADMIN — AMITRIPTYLINE HYDROCHLORIDE 10 MG: 10 TABLET, FILM COATED ORAL at 18:19

## 2022-09-25 RX ADMIN — Medication 2 UNITS: at 17:02

## 2022-09-25 RX ADMIN — AMITRIPTYLINE HYDROCHLORIDE 10 MG: 10 TABLET, FILM COATED ORAL at 10:40

## 2022-09-25 RX ADMIN — POTASSIUM CHLORIDE 20 MEQ: 750 TABLET, FILM COATED, EXTENDED RELEASE ORAL at 10:40

## 2022-09-25 RX ADMIN — CASTOR OIL AND BALSAM, PERU: 788; 87 OINTMENT TOPICAL at 10:40

## 2022-09-25 RX ADMIN — SODIUM CHLORIDE, PRESERVATIVE FREE 10 ML: 5 INJECTION INTRAVENOUS at 21:26

## 2022-09-25 RX ADMIN — METOPROLOL TARTRATE 25 MG: 25 TABLET, FILM COATED ORAL at 10:40

## 2022-09-25 RX ADMIN — ENOXAPARIN SODIUM 40 MG: 100 INJECTION SUBCUTANEOUS at 10:40

## 2022-09-25 RX ADMIN — Medication 2 UNITS: at 12:14

## 2022-09-25 RX ADMIN — METOPROLOL TARTRATE 25 MG: 25 TABLET, FILM COATED ORAL at 18:19

## 2022-09-25 RX ADMIN — TAMSULOSIN HYDROCHLORIDE 0.4 MG: 0.4 CAPSULE ORAL at 10:40

## 2022-09-25 RX ADMIN — SODIUM CHLORIDE, PRESERVATIVE FREE 10 ML: 5 INJECTION INTRAVENOUS at 14:22

## 2022-09-25 RX ADMIN — FUROSEMIDE 40 MG: 10 INJECTION, SOLUTION INTRAMUSCULAR; INTRAVENOUS at 10:40

## 2022-09-25 NOTE — PROGRESS NOTES
INTERNAL MEDICINE PROGRESS NOTE    NAME:  Jamaal Beltre   :   1940   MRN:   [id number]     Date/Time:  2022 10:12 AM  Subjective:   History:  Chart reviewed and patient seen and examined and D/W his nurse this AM and all events noted. He has a history of hypertension, ASCVD status post CABG, congestive heart failure, prior CVA, diabetes mellitus, hyperlipidemia, and other medical problems. He has been admitted on this occasion secondary to acute on chronic congestive heart failure with bilateral lower extremity edema. He was also COVID-positive when admitted on . This AM he remains very weak and will need rehab on discharge. He currently still does have a little cough but notes no shortness of breath, sputum production, palpitations, PND, orthopnea or other cardiac or respiratory complaints. He notes no current GI or  complaints. He has no headaches, dizziness or neurologic complaints except the weakness. There is no change of his chronic arthritic complaints and he has no other complaints on complete review of systems.       Medications reviewed:  Current Facility-Administered Medications   Medication Dose Route Frequency    furosemide (LASIX) injection 40 mg  40 mg IntraVENous DAILY    amitriptyline (ELAVIL) tablet 10 mg  10 mg Oral BID    potassium chloride SR (KLOR-CON 10) tablet 20 mEq  20 mEq Oral BID    balsam peru-castor oiL (VENELEX) ointment   Topical BID    acetaminophen-codeine (TYLENOL #3) per tablet 1 Tablet  1 Tablet Oral QID PRN    aspirin delayed-release tablet 81 mg  81 mg Oral DAILY    metoprolol tartrate (LOPRESSOR) tablet 25 mg  25 mg Oral BID    rosuvastatin (CRESTOR) tablet 20 mg  20 mg Oral QHS    tamsulosin (FLOMAX) capsule 0.4 mg  0.4 mg Oral DAILY    sodium chloride (NS) flush 5-40 mL  5-40 mL IntraVENous Q8H    sodium chloride (NS) flush 5-40 mL  5-40 mL IntraVENous PRN    acetaminophen (TYLENOL) tablet 650 mg  650 mg Oral Q6H PRN    Or    acetaminophen (TYLENOL) suppository 650 mg  650 mg Rectal Q6H PRN    polyethylene glycol (MIRALAX) packet 17 g  17 g Oral DAILY PRN    ondansetron (ZOFRAN ODT) tablet 4 mg  4 mg Oral Q8H PRN    Or    ondansetron (ZOFRAN) injection 4 mg  4 mg IntraVENous Q6H PRN    enoxaparin (LOVENOX) injection 40 mg  40 mg SubCUTAneous DAILY    insulin lispro (HUMALOG) injection   SubCUTAneous AC&HS    glucose chewable tablet 16 g  4 Tablet Oral PRN    glucagon (GLUCAGEN) injection 1 mg  1 mg IntraMUSCular PRN    dextrose 10% infusion 0-250 mL  0-250 mL IntraVENous PRN    [Held by provider] furosemide (LASIX) injection 40 mg  40 mg IntraVENous BID    morphine injection 2 mg  2 mg IntraVENous Q4H PRN        Objective:   Vitals:  Visit Vitals  BP (!) 141/84 (BP 1 Location: Right arm)   Pulse 68   Temp 97.7 °F (36.5 °C)   Resp 16   Ht 6' 1\" (1.854 m)   Wt 194 lb 12.8 oz (88.4 kg)   SpO2 95%   BMI 25.70 kg/m²      O2 Device: None (Room air) Temp (24hrs), Av.7 °F (36.5 °C), Min:97.6 °F (36.4 °C), Max:97.7 °F (36.5 °C)      Last 24hr Input/Output:    Intake/Output Summary (Last 24 hours) at 2022 1145  Last data filed at 2022 0724  Gross per 24 hour   Intake 360 ml   Output 1350 ml   Net -990 ml        PHYSICAL EXAM:  General:     Alert, cooperative, no distress, appears stated age. Head:    Normocephalic, without obvious abnormality, atraumatic. Eyes:    Conjunctivae/corneas clear. PERRLA  Nose:   Nares normal. No drainage or sinus tenderness. Throat:     Lips, mucosa, and tongue normal.  No Thrush  Neck:   Supple, symmetrical,  no adenopathy, thyroid: non tender     no carotid bruit and no JVD. Back:     Symmetric,  No CVA tenderness. Lungs:    Clear to auscultation bilaterally. No Wheezing or Rhonchi. No rales. Heart:    Regular rate and rhythm,  no murmur, rub or gallop. Abdomen:    Soft, non-tender. Not distended. Bowel sounds normal. No masses  Extremities:  Extremities normal, atraumatic, No cyanosis. Tr edema.  No clubbing  Lymph nodes:  Cervical, supraclavicular normal.  Neurologic: Generalized decreased strength with mild left hand neglect and slight left-sided weakness from prior CVA, Alert and oriented X 3.    Skin:                No rash      Lab Data Reviewed:    Recent Results (from the past 24 hour(s))   GLUCOSE, POC    Collection Time: 09/24/22 12:01 PM   Result Value Ref Range    Glucose (POC) 227 (H) 65 - 117 mg/dL    Performed by Brittney Roberts (TRV RN)    GLUCOSE, POC    Collection Time: 09/24/22  4:21 PM   Result Value Ref Range    Glucose (POC) 128 (H) 65 - 117 mg/dL    Performed by Shaheen Benjamin (TRV RN)    GLUCOSE, POC    Collection Time: 09/24/22  8:27 PM   Result Value Ref Range    Glucose (POC) 146 (H) 65 - 117 mg/dL    Performed by Boubacar Chowdhury (TRV RN)    METABOLIC PANEL, BASIC    Collection Time: 09/25/22  3:04 AM   Result Value Ref Range    Sodium 138 136 - 145 mmol/L    Potassium 4.2 3.5 - 5.1 mmol/L    Chloride 104 97 - 108 mmol/L    CO2 28 21 - 32 mmol/L    Anion gap 6 5 - 15 mmol/L    Glucose 159 (H) 65 - 100 mg/dL    BUN 20 6 - 20 MG/DL    Creatinine 0.99 0.70 - 1.30 MG/DL    BUN/Creatinine ratio 20 12 - 20      GFR est AA >60 >60 ml/min/1.73m2    GFR est non-AA >60 >60 ml/min/1.73m2    Calcium 9.5 8.5 - 10.1 MG/DL   GLUCOSE, POC    Collection Time: 09/25/22  7:21 AM   Result Value Ref Range    Glucose (POC) 139 (H) 65 - 117 mg/dL    Performed by Shaheen Benjamin (ROSEMARY RN)    GLUCOSE, POC    Collection Time: 09/25/22 11:39 AM   Result Value Ref Range    Glucose (POC) 187 (H) 65 - 117 mg/dL    Performed by Shaheen Benjamin (IGLESIAV RN)          Assessment/Plan:     Principal Problem:    [facility] (9/19/2022)    Active Problems:    Essential hypertension (12/9/2015)      CVA (cerebral vascular accident) ([facility] [address] (3/6/2018)      Overview: Residual L sided weakness      ASHD (arteriosclerotic heart disease) (1/15/2021)      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 Continue diuresis with IV Lasix  2. Follow I's and O's and weight, 1150 out yesterday  3. Follow renal function - stable 20/0.99 from 24/1.02  4. Follow blood sugar for diabetes and treat with sliding scale insulin, 128 - 227 yesterday  5. Continue aspirin 81 mg daily and metoprolol with heart disease  6. Continue Crestor for hyperlipidemia  Was having continue Flomax for BPH  7. Amitriptyline started for lower extremity neuropathy felt to be diabetic  8. Follow-up potassium is 4.2 so replete as needed  9. Continue PT and OT and will need SNF rehab at discharge  10. Remains on droplet isolation per COVID protocol requiring additional time for patient care as PPE must be used    35 Minutes spent today in direct care of this high complexity patient with greater than 50% in counseling and coordination of care.     If need to contact me use hospital  [phone], DO NOT USE PERFECT SERVE    ___________________________________________________    Attending Physician: Chris Silva MD

## 2022-09-26 LAB
ANION GAP SERPL CALC-SCNC: 4 MMOL/L (ref 5–15)
BUN SERPL-MCNC: 25 MG/DL (ref 6–20)
BUN/CREAT SERPL: 23 (ref 12–20)
CALCIUM SERPL-MCNC: 9.4 MG/DL (ref 8.5–10.1)
CHLORIDE SERPL-SCNC: 105 MMOL/L (ref 97–108)
CO2 SERPL-SCNC: 27 MMOL/L (ref 21–32)
CREAT SERPL-MCNC: 1.11 MG/DL (ref 0.7–1.3)
GLUCOSE BLD STRIP.AUTO-MCNC: 162 MG/DL (ref 65–117)
GLUCOSE BLD STRIP.AUTO-MCNC: 186 MG/DL (ref 65–117)
GLUCOSE BLD STRIP.AUTO-MCNC: 217 MG/DL (ref 65–117)
GLUCOSE SERPL-MCNC: 147 MG/DL (ref 65–100)
POTASSIUM SERPL-SCNC: 4.5 MMOL/L (ref 3.5–5.1)
SERVICE CMNT-IMP: ABNORMAL
SODIUM SERPL-SCNC: 136 MMOL/L (ref 136–145)

## 2022-09-26 PROCEDURE — 74011250637 HC RX REV CODE- 250/637: Performed by: STUDENT IN AN ORGANIZED HEALTH CARE EDUCATION/TRAINING PROGRAM

## 2022-09-26 PROCEDURE — 74011250637 HC RX REV CODE- 250/637: Performed by: FAMILY MEDICINE

## 2022-09-26 PROCEDURE — 82962 GLUCOSE BLOOD TEST: CPT

## 2022-09-26 PROCEDURE — 80048 BASIC METABOLIC PNL TOTAL CA: CPT

## 2022-09-26 PROCEDURE — 99239 HOSP IP/OBS DSCHRG MGMT >30: CPT | Performed by: FAMILY MEDICINE

## 2022-09-26 PROCEDURE — 74011250636 HC RX REV CODE- 250/636: Performed by: FAMILY MEDICINE

## 2022-09-26 PROCEDURE — 74011250636 HC RX REV CODE- 250/636: Performed by: STUDENT IN AN ORGANIZED HEALTH CARE EDUCATION/TRAINING PROGRAM

## 2022-09-26 PROCEDURE — 36415 COLL VENOUS BLD VENIPUNCTURE: CPT

## 2022-09-26 PROCEDURE — 74011000250 HC RX REV CODE- 250: Performed by: STUDENT IN AN ORGANIZED HEALTH CARE EDUCATION/TRAINING PROGRAM

## 2022-09-26 PROCEDURE — 97530 THERAPEUTIC ACTIVITIES: CPT

## 2022-09-26 PROCEDURE — 74011636637 HC RX REV CODE- 636/637: Performed by: STUDENT IN AN ORGANIZED HEALTH CARE EDUCATION/TRAINING PROGRAM

## 2022-09-26 RX ORDER — AMITRIPTYLINE HYDROCHLORIDE 10 MG/1
10 TABLET, FILM COATED ORAL 2 TIMES DAILY
Qty: 60 TABLET | Refills: 2 | Status: SHIPPED
Start: 2022-09-26

## 2022-09-26 RX ORDER — GLIMEPIRIDE 2 MG/1
TABLET ORAL
Qty: 30 TABLET | Refills: 5 | Status: SHIPPED
Start: 2022-09-26

## 2022-09-26 RX ORDER — FUROSEMIDE 80 MG/1
TABLET ORAL
Qty: 90 TABLET | Refills: 3 | Status: ON HOLD
Start: 2022-09-26 | End: 2022-10-10 | Stop reason: SDUPTHER

## 2022-09-26 RX ADMIN — ENOXAPARIN SODIUM 40 MG: 100 INJECTION SUBCUTANEOUS at 09:29

## 2022-09-26 RX ADMIN — TAMSULOSIN HYDROCHLORIDE 0.4 MG: 0.4 CAPSULE ORAL at 09:29

## 2022-09-26 RX ADMIN — ASPIRIN 81 MG: 81 TABLET, COATED ORAL at 09:30

## 2022-09-26 RX ADMIN — Medication 3 UNITS: at 11:52

## 2022-09-26 RX ADMIN — CASTOR OIL AND BALSAM, PERU: 788; 87 OINTMENT TOPICAL at 09:29

## 2022-09-26 RX ADMIN — POTASSIUM CHLORIDE 20 MEQ: 750 TABLET, FILM COATED, EXTENDED RELEASE ORAL at 09:28

## 2022-09-26 RX ADMIN — Medication 2 UNITS: at 09:28

## 2022-09-26 RX ADMIN — FUROSEMIDE 40 MG: 10 INJECTION, SOLUTION INTRAMUSCULAR; INTRAVENOUS at 09:29

## 2022-09-26 RX ADMIN — SODIUM CHLORIDE, PRESERVATIVE FREE 10 ML: 5 INJECTION INTRAVENOUS at 06:12

## 2022-09-26 RX ADMIN — METOPROLOL TARTRATE 25 MG: 25 TABLET, FILM COATED ORAL at 09:29

## 2022-09-26 RX ADMIN — AMITRIPTYLINE HYDROCHLORIDE 10 MG: 10 TABLET, FILM COATED ORAL at 09:29

## 2022-09-26 NOTE — PROGRESS NOTES
Transition of Care Plan: SNF - [facility]     RUR: 12% (low)  Disposition: SNF - [facility]  Follow up appointments: defer to SNF. DME needed: SNF to provide - Pt has access to cane and RW, SNF rehab will assess for final DME  Transportation at Discharge: [facility] at [address], to [facility], [facility] call report to [phone]. [place] or means to access home: Spouse has access to keys        IM Medicare Letter: 2nd IM emailed to daughter at [employer]. Is patient a  and connected with the [place]? NA         If yes, was [place] transfer form completed and VA notified? NA  Caregiver Contact: Bobbi Gamez- Daughter [phone]  Discharge Caregiver contacted prior to discharge? CM contacted daughter. Care Conference needed?:    No    Referral sent again to [facility] with CM callback number. CM spoke with patient's daughter, Jonny houston, and explained that [facility] was not accepting COVID + patients at this time. Jonny houston okay with referrals being sent to [facility] and [facility] made aware that we would check with liaison to determine if facilities are accepting COVID + patients at this time. a liaison notified of new referrals for patient. [facility] liaison said [facility] was more likely available [facility], referral sent through [facility] and liaison notified. Liaison asked for updated notes, therapy notified of this. [facility] has accepted and they are working on bed for him tomorrow. [facility] has bed available for patient. Daughter agreeable, CM unable to reach patient via room phone or phone number listed in chart, RN confirmed patient is not alert and oriented at all times. [facility] liaison notified regarding patient coming over today. [facility] for [facility].  CM contacted Dr. Lamont Ayoub, he is to place discharge orders. [facility] paperwork placed on chart. Daughter provided email address to send paperwork to: [employer].   She was updated regarding 5pm [facility] request and had no further questions or concerns for CM. Transition of Care Plan to SNF/Rehab    Communication to Patient/Family:  Met with patient and family and they are agreeable to the transition plan. The Plan for Transition of Care is related to the following treatment goals: PT/OT    The Patient and/or patient representative was provided with a choice of provider and agrees  with the discharge plan. Yes [x] No []    A Freedom of choice list was provided with basic dialogue that supports the patient's individualized plan of care/goals and shares the quality data associated with the providers. Yes [x] No []    SNF/Rehab Transition:  Patient has been accepted to [facility] SNF/Rehab and meets criteria for admission. Patient will transported by [facility] and expected to leave at 5pm.    Communication to SNF/Rehab:  Bedside RN, Hesham Granger, has been notified to update the transition plan to the facility and call report (phone number). Discharge information has been updated on the AVS. And communicated to facility via [employer], or CC link. Discharge instructions to be sent as paper copy with patient. Nursing Please include all hard scripts for controlled substances, med rec and dc summary, and AVS in packet. Reviewed and confirmed with facility, [facility], can manage the patient care needs for the following:     Kasey Brenner with (X) only those applicable:  Medication:  [x]Medications are available at the facility  []IV Antibiotics    []Controlled Substance - hard copies available sent.   []Weekly Labs    Equipment:  []CPAP/BiPAP  []Wound Vacuum  []Montoya or Urinary Device  []PICC/Central Line  []Nebulizer  []Ventilator    Treatment:  [x]Isolation (for MRSA, VRE, etc.) - COVID +  []Surgical Drain Management  []Tracheostomy Care  []Dressing Changes  []Dialysis with transportation  []PEG Care  []Oxygen  []Daily Weights for Heart Failure    Dietary:  []Any diet limitations  []Tube Feedings   []Total Parenteral Management (TPN)    Financial Resources:  []Medicaid Application Completed    []UAI Completed and copy given to pt/family  and copy given to pt/family  []A screening has previously been completed. []Level II Completed    [] Private pay individual who will not become   financially eligible for Medicaid within 6 months from admission to a [address] [place] [address] facility. [] Individual refused to have screening conducted. []Medicaid Application Completed    []The screening denied because it was determined individual did not need/did not qualify for nursing facility level of care. [] Out of state residents seeking direct admission to a [address] facility. [] Individuals who are inpatients of an out of state hospital, or in state or out of state veterans/ hospital and seek direct admission to a [address] facility  [] Individuals who are pateints or residents of a state owned/operated facility that is licensed by Department of [employer] ([employer]BS) and seek direct admission to [address] [place] [address]  [] A screening not required for enrollment in [facility] services as set out in [address] [place] [address] 16-  [] [facility]) staff shall perform screenings of the [facility] clients. Advanced Care Plan:  []Surrogate Decision Maker of Care  []POA  []Communicated Code Status and copy sent.     Other:              GLENN Arguello, RN    Care Management  [phone]

## 2022-09-26 NOTE — PROGRESS NOTES
Problem: Mobility Impaired (Adult and Pediatric)  Goal: *Acute Goals and Plan of Care (Insert Text)  Description: FUNCTIONAL STATUS PRIOR TO ADMISSION: Patient was modified independent using a single point cane for functional mobility. Patient was independent for basic and instrumental ADLs. HOME SUPPORT PRIOR TO ADMISSION: The patient lived with spouse but did not require assist. Lives in [address] [place] [address] home - bedroom on 3rd floor after 2 flights of stairs. Physical Therapy Goals  Initiated 9/19/2022  1. Patient will move from supine to sit and sit to supine , scoot up and down, and roll side to side in bed with modified independence within 7 day(s). 2.  Patient will transfer from bed to chair and chair to bed with modified independence using the least restrictive device within 7 day(s). 3.  Patient will perform sit to stand with modified independence within 7 day(s). 4.  Patient will ambulate with modified independence for 150 feet with the least restrictive device within 7 day(s). 5.  Patient will ascend/descend 24 stairs with 1 handrail(s) with minimal assistance/contact guard assist within 7 day(s). Outcome: Progressing Towards Goal   PHYSICAL THERAPY TREATMENT  Patient: Marquis Zaragoza (80 y.o. male)  Date: 9/26/2022  Diagnosis: CHF exacerbation ([facility] [I50.9] CHF exacerbation ([facility]      Precautions: Fall (COVID +, droplet +)  Chart, physical therapy assessment, plan of care and goals were reviewed. ASSESSMENT  Patient continues with skilled PT services and is progressing towards goals. Pt with good participation but still with deficits in gait, functional mobility, balance and activity tolerance. He was able to move to the edge of the bed with min A. He shows good sitting balance. He required min A of 1 and CGA of second to stand and CGA to sit. He did show an orthostatic drop in BP with sit/stand and did not recover with any adjust during activity. Pt returned to bed with min A of 1.  Pt stable on room air without SOB. See vitals below:     09/26/22 1406 09/26/22 1408 09/26/22 1411   Vital Signs   Pulse (Heart Rate)  --   --   --    BP (!) 95/52 (!) 114/59 (!) 91/38   MAP (Calculated) 66 77 (!) 56   BP 1 Location Right upper arm Right upper arm Right upper arm   BP 1 Method Automatic Automatic Automatic   BP Patient Position Sitting Sitting Standing      09/26/22 1413 09/26/22 1415   Vital Signs   Pulse (Heart Rate) (!) 125 (!) 106   BP (!) 81/45 (!) 152/76   MAP (Calculated) (!) 57 101   BP 1 Location Right upper arm Right upper arm   BP 1 Method Automatic Automatic   BP Patient Position Sitting Semi fowlers       Current Level of Function Impacting Discharge (mobility/balance): Overall min A of 1 with intermittent need of second person for safety; orthostatic as noted. Pt will benefit from SNF rehab at d/c. Other factors to consider for discharge: below his functional baseline; fall risk; orthostatic         PLAN :  Patient continues to benefit from skilled intervention to address the above impairments. Continue treatment per established plan of care. to address goals. Recommendation for discharge: (in order for the patient to meet his/her long term goals)  Therapy up to 5 days/week in SNF setting    This discharge recommendation:  Has been made in collaboration with the attending provider and/or case management    IF patient discharges home will need the following DME: to be determined (TBD)       SUBJECTIVE:   Patient stated I don't feel dizzy.     OBJECTIVE DATA SUMMARY:   Critical Behavior:  Neurologic State: Alert  Orientation Level: Oriented X4  Cognition: Follows commands  Safety/Judgement: Fall prevention  Functional Mobility Training:  Bed Mobility:  Rolling: Contact guard assistance  Supine to Sit: Minimum assistance  Sit to Supine: Minimum assistance  Scooting: Minimum assistance        Transfers:  Sit to Stand: Minimum assistance;Contact guard assistance;Assist x2  Stand to Sit: Contact guard assistance;Assist x1                             Balance:  Sitting: Intact  Sitting - Static: Good (unsupported)  Sitting - Dynamic: Good (unsupported)  Standing: Impaired; With support  Standing - Static: Fair;Constant support  Standing - Dynamic : Not tested  Ambulation/Gait Training:              Gait Description (WDL):  (Pt orthostatic, did not progress gait)                    Pain Rating:  No c/o    Activity Tolerance:   signs and symptoms of orthostatic hypotension    After treatment patient left in no apparent distress:   Supine in bed, Call bell within reach, Bed / chair alarm activated, and Side rails x 3    COMMUNICATION/COLLABORATION:   The patients plan of care was discussed with: Occupational therapist and Registered nurse.      Lafaye Brittle Doornik, PT   Time Calculation: 23 mins

## 2022-09-26 NOTE — PROGRESS NOTES
I have reviewed discharge instructions with the patients daughter Reina Lucia at bedside. The Reina Lucia verbalized understanding.

## 2022-09-26 NOTE — PROGRESS NOTES
Problem: Falls - Risk of  Goal: *Absence of Falls  Description: Document Tate Jackson Fall Risk and appropriate interventions in the flowsheet.   Outcome: Progressing Towards Goal  Note: Fall Risk Interventions:  Mobility Interventions: Patient to call before getting OOB, Bed/chair exit alarm    Mentation Interventions: Bed/chair exit alarm, Toileting rounds    Medication Interventions: Teach patient to arise slowly, Patient to call before getting OOB, Bed/chair exit alarm    Elimination Interventions: Bed/chair exit alarm    History of Falls Interventions: Bed/chair exit alarm

## 2022-09-26 NOTE — PROGRESS NOTES
General Daily Progress Note    Admit Date: 9/18/2022  Hospital day 9    Subjective:     Patient has no complaint of shortness of breath at rest. Agreeable to going to rehab. No chest pains. Appetite is fair. Medication side effects: none    Current Facility-Administered Medications   Medication Dose Route Frequency    furosemide (LASIX) injection 40 mg  40 mg IntraVENous DAILY    amitriptyline (ELAVIL) tablet 10 mg  10 mg Oral BID    potassium chloride SR (KLOR-CON 10) tablet 20 mEq  20 mEq Oral BID    balsam peru-castor oiL (VENELEX) ointment   Topical BID    acetaminophen-codeine (TYLENOL #3) per tablet 1 Tablet  1 Tablet Oral QID PRN    aspirin delayed-release tablet 81 mg  81 mg Oral DAILY    metoprolol tartrate (LOPRESSOR) tablet 25 mg  25 mg Oral BID    rosuvastatin (CRESTOR) tablet 20 mg  20 mg Oral QHS    tamsulosin (FLOMAX) capsule 0.4 mg  0.4 mg Oral DAILY    sodium chloride (NS) flush 5-40 mL  5-40 mL IntraVENous Q8H    sodium chloride (NS) flush 5-40 mL  5-40 mL IntraVENous PRN    acetaminophen (TYLENOL) tablet 650 mg  650 mg Oral Q6H PRN    Or    acetaminophen (TYLENOL) suppository 650 mg  650 mg Rectal Q6H PRN    polyethylene glycol (MIRALAX) packet 17 g  17 g Oral DAILY PRN    ondansetron (ZOFRAN ODT) tablet 4 mg  4 mg Oral Q8H PRN    Or    ondansetron (ZOFRAN) injection 4 mg  4 mg IntraVENous Q6H PRN    enoxaparin (LOVENOX) injection 40 mg  40 mg SubCUTAneous DAILY    insulin lispro (HUMALOG) injection   SubCUTAneous AC&HS    glucose chewable tablet 16 g  4 Tablet Oral PRN    glucagon (GLUCAGEN) injection 1 mg  1 mg IntraMUSCular PRN    dextrose 10% infusion 0-250 mL  0-250 mL IntraVENous PRN    [Held by provider] furosemide (LASIX) injection 40 mg  40 mg IntraVENous BID    morphine injection 2 mg  2 mg IntraVENous Q4H PRN        Review of Systems  Pertinent items are noted in HPI. Objective:     No data found. No intake/output data recorded.   09/24 1901 - 09/26 0700  In: 180 [P.O.:180]  Out: 1300 [Urine:1300]    Physical Exam: Visit Vitals  /61 (BP 1 Location: Right upper arm, BP Patient Position: At rest)   Pulse (!) 59   Temp 97.6 °F (36.4 °C)   Resp 16   Ht 6' 1\" (1.854 m)   Wt 194 lb 12.8 oz (88.4 kg)   SpO2 95%   BMI 25.70 kg/m²     Lungs: clear to auscultation bilaterally  Heart: regular rate and rhythm, S1, S2 normal, no murmur, click, rub or gallop  Abdomen: soft, non-tender.  Bowel sounds normal. No masses,  no organomegaly  Extremities: edema tr bilaterally      ECG: normal sinus rhythm     Data Review   Recent Results (from the past 24 hour(s))   GLUCOSE, POC    Collection Time: 09/25/22 11:39 AM   Result Value Ref Range    Glucose (POC) 187 (H) 65 - 117 mg/dL    Performed by Ruth Valladares (ROSEMARY RN)    GLUCOSE, POC    Collection Time: 09/25/22  4:29 PM   Result Value Ref Range    Glucose (POC) 149 (H) 65 - 117 mg/dL    Performed by Ruth Valladares (ROSEMARY RN)    GLUCOSE, POC    Collection Time: 09/25/22  7:45 PM   Result Value Ref Range    Glucose (POC) 137 (H) 65 - 117 mg/dL    Performed by Kay Stark (TRMANSOOR RN)    METABOLIC PANEL, BASIC    Collection Time: 09/26/22  4:39 AM   Result Value Ref Range    Sodium 136 136 - 145 mmol/L    Potassium 4.5 3.5 - 5.1 mmol/L    Chloride 105 97 - 108 mmol/L    CO2 27 21 - 32 mmol/L    Anion gap 4 (L) 5 - 15 mmol/L    Glucose 147 (H) 65 - 100 mg/dL    BUN 25 (H) 6 - 20 MG/DL    Creatinine 1.11 0.70 - 1.30 MG/DL    BUN/Creatinine ratio 23 (H) 12 - 20      GFR est AA >60 >60 ml/min/1.73m2    GFR est non-AA >60 >60 ml/min/1.73m2    Calcium 9.4 8.5 - 10.1 MG/DL           Assessment:     Principal Problem:    CHF exacerbation (HCC) (9/19/2022)    Active Problems:    Essential hypertension (12/9/2015)      CVA (cerebral vascular accident) ([facility] (3/6/2018)      Overview: Residual L sided weakness      ASHD (arteriosclerotic heart disease) (1/15/2021)      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 No real chf on xray, renal function, liver function, thyroid function all basically WNL. Probable component of venous insufficiency also. continue compression dressings for now. Currently on lasix 40 mg iv bid. weightg is down some 29 lbs. from admission. Appreciate MARIVEL Padilla input- sounds like pt wasn't taking his pm lasix. edema has responded well in hospital.   Elevated troponin- will ask cardiology to see. 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 probably from diabetic neuropathy- will start amitriptyline. Pain seems to be much better this am. Also consider Lumbar spinal disease (long hx of low back pain), PVD. Will check xrays of lumbar spine and ТАТЬЯНА indexes. 5. Hypokalemia- replace  Disposition- may need snf- pt needed about 2 minutes to come to standing with walker. Then walked to bathroom, voided and stooled at the same time with stool running down to floor. Wife confirms that has been sitting in chair all day, too weak to get up and move around. She will have trouble caring for him in this condition. Continue PT, OT- will need snf unless improves dramatically the next day or two. he is agreeable at this time. Spoke with daughter Sebastien Orantes this am, who is also in agreement. 7. 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 Will resume lasix at a lower dose.  Better this am.

## 2022-09-26 NOTE — PROGRESS NOTES
Attempt made to call report to [facility] at [phone], no one available at the time. 1707-Attempt made to call report to [facility]. Patient going to room 200 per receptionists. Unable to give report to nurse. Phone number in case nurse has questions.

## 2022-09-26 NOTE — PROGRESS NOTES
Problem: Falls - Risk of  Goal: *Absence of Falls  Description: Document Vazquez Cea Fall Risk and appropriate interventions in the flowsheet.   9/26/2022 1704 by Juan Damian, RN  Outcome: Resolved/Met  Note: Fall Risk Interventions:  Mobility Interventions: Patient to call before getting OOB, Bed/chair exit alarm    Mentation Interventions: Bed/chair exit alarm, Toileting rounds    Medication Interventions: Teach patient to arise slowly, Patient to call before getting OOB, Bed/chair exit alarm    Elimination Interventions: Bed/chair exit alarm    History of Falls Interventions: Bed/chair exit alarm      9/26/2022 1127 by Juan Damian RN  Outcome: Progressing Towards Goal  Note: Fall Risk Interventions:  Mobility Interventions: Patient to call before getting OOB, Bed/chair exit alarm    Mentation Interventions: Bed/chair exit alarm, Toileting rounds    Medication Interventions: Teach patient to arise slowly, Patient to call before getting OOB, Bed/chair exit alarm    Elimination Interventions: Bed/chair exit alarm    History of Falls Interventions: Bed/chair exit alarm

## 2022-09-26 NOTE — PROGRESS NOTES
Comprehensive Nutrition Assessment    Type and Reason for Visit: Initial, RD nutrition re-screen/LOS    Nutrition Recommendations/Plan:   4 carb choice/cardiac diet      Nutrition Assessment:    Patient medically noted for CHF and COVID-19. PMH HTN, CVA, CABG, and DM. Chart reviewed for length of stay. Discharge planning underway. Patient unavailable at time of attempted visit. Currently receiving a cardiac diet. Some episodes of hyperglycemia noted; will add consistent carb diet. Weight trending down since admission with diuresis, -8L. Mostly good PO intake per flowsheets. Encourage intake of meals. Patient Vitals for the past 168 hrs:   % Diet Eaten   09/26/22 0929 26 - 50%   09/24/22 1826 76 - 100%   09/24/22 1104 76 - 100%   09/24/22 0909 76 - 100%     Malnutrition Assessment:  Malnutrition Status:  Insufficient data (09/26/22 1432)      Nutrition Related Findings:      BM 9/24  Lasix, Humamlog, lopressor, KCl, Rosuvastatin  Wound Type: Deep tissue injury    Current Nutrition Intake & Therapies:  ADULT DIET Regular; Low Fat/Low Chol/High Fiber/2 gm Na    Anthropometric Measures:  Height: 6' 1\" (185.4 cm)  Ideal Body Weight (IBW): 184 lbs (84 kg)     Current Body Wt:  88.4 kg (194 lb 14.2 oz), 105.9 % IBW. Current BMI (kg/m2): 25.7                          BMI Category: Overweight (BMI 25.0-29. 9)    Estimated Daily Nutrient Needs:  Energy Requirements Based On: Formula  Weight Used for Energy Requirements: Current  Energy (kcal/day): 2124 kcals (BMR 1634 x 1. 3AF)  Weight Used for Protein Requirements: Current  Protein (g/day): 88-97g (1.0-1.1 g/kg bw)  Method Used for Fluid Requirements: Other (comment)  Fluid (ml/day): 1800 mL or per MD    Nutrition Diagnosis:   No nutrition diagnosis at this time      Nutrition Interventions:   Food and/or Nutrient Delivery: Modify current diet  Nutrition Education/Counseling: No recommendations at this time  Coordination of Nutrition Care: Continue to monitor while inpatient       Goals:     Goals: PO intake 75% or greater, by next RD assessment       Nutrition Monitoring and Evaluation:   Behavioral-Environmental Outcomes: None identified  Food/Nutrient Intake Outcomes: Food and nutrient intake  Physical Signs/Symptoms Outcomes: Biochemical data, Weight    Discharge Planning:    Continue current diet    Chantal Boogie RD  Contact: ext [phone]

## 2022-09-26 NOTE — PROGRESS NOTES
Problem: Self Care Deficits Care Plan (Adult)  Goal: *Acute Goals and Plan of Care (Insert Text)  Description: FUNCTIONAL STATUS PRIOR TO ADMISSION: Patient was modified independent using a single point cane at times for functional mobility. Performed ADLs on his own but struggles with donning socks and shoes    HOME SUPPORT PRIOR TO ADMISSION: The patient lived with wife and granddaughter checks on pt. Occupational Therapy Goals:  Initiated 9/19/2022; Weekly Re-assessment 9/26/22  1. Patient will perform grooming standing with supervision/set-up within 7 days.-Continue   2. Patient will perform lower body dressing with supervision/set-up within 7 days.-Continue   3. Patient will perform toileting with supervision/set-up within 7 days.-Continue  4. Patient will transfer from toilet with supervision/set-up using the least restrictive device and appropriate durable medical equipment within 7 days.-Continue     Outcome: Not Progressing Towards Goal    OCCUPATIONAL THERAPY TREATMENT/WEEKLY RE-ASSESSMENT  Patient: Eduardo Suarez (80 y.o. male)  Date: 9/26/2022  Diagnosis: CHF exacerbation ([facility] [address] [I50.9] CHF exacerbation ([facility] [address]      Precautions: Fall (COVID +, droplet +)  Chart, occupational therapy assessment, plan of care, and goals were reviewed. ASSESSMENT  Patient continues with skilled OT services and is not progressing towards goals. Pt continues to demonstrate decreased mobility/balance, strength/endurance and activity tolerance, as well as, orthostatic hypotension which has limited OOB activity. Above listed functional deficits have limited pt's goal progression this reporting period, with all goals continued at their current level. Of note, pt was better able to complete sit to stand this date verses in previous session, standing from EOB with Min Ax1 and CGAx1; however, dynamic challenges deferred secondary to noted orthostatic hypotension (refer to vitals entered in flow sheet during treatment time). Pt highly agreeable to presented challenges and demonstrates good effort, with pt continuing to benefit from skilled OT to address functional deficits during acute hospitalization, with reporting therapist believing pt would benefit form SNF rehab upon discharge. Current Level of Function Impacting Discharge (ADLs): Up to Max A    Other factors to consider for discharge: orthostatic hypotension, Covid+         PLAN :  Goals have been updated based on progression since last assessment. Patient continues to benefit from skilled intervention to address the above impairments. Continue to follow patient 3 times a week to address goals. Recommend with staff: Active ADL engagement    Recommend next OT session: POC progression    Recommendation for discharge: (in order for the patient to meet his/her long term goals)  Therapy up to 5 days/week in SNF setting    This discharge recommendation:  Has been made in collaboration with the attending provider and/or case management    IF patient discharges home will need the following DME: TBD       SUBJECTIVE:   Patient stated I don't feel dizzy at all.  re: noted orthostatic hypotension upon standing    OBJECTIVE DATA SUMMARY:   Cognitive/Behavioral Status:  Neurologic State: Alert  Orientation Level: Oriented X4  Cognition: Follows commands  Perception: Appears intact  Perseveration: No perseveration noted  Safety/Judgement: Fall prevention    Functional Mobility and Transfers for ADLs:  Bed Mobility:  Rolling: Contact guard assistance  Supine to Sit: Minimum assistance  Sit to Supine: Minimum assistance  Scooting: Minimum assistance    Transfers:  Sit to Stand: Minimum assistance;Contact guard assistance;Assist x2    Pt educated on safe transfer techniques, with specific emphasis on proper hand placement to push up from seated surface rather than attempt to pull self up, fully positioning self in-front of desired seated location, feeling chair on back of legs and reaching back with 1-2 UE to slowly lower self to seated position. Balance:  Sitting: Intact  Sitting - Static: Good (unsupported)  Sitting - Dynamic: Good (unsupported)  Standing: Impaired; With support  Standing - Static: Fair;Constant support  Standing - Dynamic : Not tested    ADL Intervention:  Cognitive Retraining  Safety/Judgement: Fall prevention    Pain:  No c/o pain    Activity Tolerance:   Fair, Poor, requires rest breaks, and signs and symptoms of orthostatic hypotension    After treatment patient left in no apparent distress:   Supine in bed, Call bell within reach, Bed / chair alarm activated, and Side rails x 3    COMMUNICATION/COLLABORATION:   The patients plan of care was discussed with: Physical therapist, Registered nurse, and Case management.      Rob Dave OT  Time Calculation: 17 mins

## 2022-09-28 ENCOUNTER — TELEPHONE (OUTPATIENT)
Dept: FAMILY MEDICINE CLINIC | Age: 82
End: 2022-09-28

## 2022-09-28 NOTE — TELEPHONE ENCOUNTER
Patients daughter Beth Townsend would like to speak with you regarding getting [address] for her father.  Please give her a call @ [phone]

## 2022-09-29 NOTE — TELEPHONE ENCOUNTER
Daughter [facility] states patient is telling her that [employer] is not really working with patient and that the hospital liason is in process of trying to get patient transferred to another facility. Call expected to patient daughter today. Daughter feels that dad will need further assistance once he gets home. Daughter acknowledges patient may need to be in rehab for one to two weeks. Will review message with pcp for advisement.

## 2022-10-03 NOTE — DISCHARGE SUMMARY
[address]     Name:  Kelsey Del Rosario  MR#:  [id number]  :  1940  ACCOUNT #:  [de-identified]  ADMIT DATE:  2022  DISCHARGE DATE:  2022    Please note that over 30 minutes was spent in the discharge care in the patient. FINAL DIAGNOSES:  1. Progressive lower extremity edema, secondary to diastolic heart failure. 2.  Elevated troponin. 3.  Diabetes. 4.  Diabetic neuropathy. 5.  Hypokalemia. 6.  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 7.  Mild hypertension. The patient was discharged to skilled nursing facility. MEDICATIONS ON DISCHARGE:  1.  Lasix 80 mg 1 daily. 2.  Amitriptyline 10 mg b.i.d. for painful neuropathy. 3.  Amaryl 2 mg daily. 4.  Metformin 1000 mg one-half in the morning and one in the evening. 5.  Potassium 20 mEq daily. 6.  Tylenol No 3 p.r.n. pain. 7.  Crestor 20 mg daily. 8.  Metoprolol 25 mg every 12 hours. 9.  Flomax 0.4 mg daily. 10.  Zestril 10 mg daily. 11.  Aspirin 81 mg daily. CONSULTATIONS:  Cardiology, Dr. Nena Gonzáles. HISTORY OF PRESENT ILLNESS:  The patient is an 70-year-old [facility] [employer] male with past history of coronary artery disease, heart failure, preserved ejection fraction, non-insulin dependent diabetes, BPH, history of CVA, essential hypertension, came to the emergency room with progressive swelling of both lower extremities. He also had generalized weakness and spent entire day sitting either in the bed or in his chair. There is some mild shortness of breath which had progressed only two days prior to admission. The patient also complained of pain in the leg and overall so weak that he was unable to walk independently. He has been started on Lasix 80 mg one to two times a day, but he has not been taking this regularly. He denied fever, chills, sick contacts, gallstones, chest pain, palpitations. The patient is vaccinated in booster type 1 for COVID. In the emergency room, he is afebrile. Hemodynamically stable. O2 sats in the upper 90s on room air. His rapid COVID test was positive although he was asymptomatic. His chest x-ray showed no evidence of acute cardiopulmonary process. CTA of the chest, no acute pulmonary embolus. Bilateral lower extremity Dopplers were negative for DVT. Sodium was 138, potassium 4.5, glucose 231, BUN 20, creatinine 1.48. CBC was unremarkable. Urinalysis was unremarkable. Sleep test is negative. ProBNP was 279. Lactic acid 2.1, repeat was 1.4. PAST MEDICAL HISTORY:  Significant for coronary artery disease, carotid artery disease, heart failure, diabetes, diabetic neuropathy, hypercholesterolemia, hypertension, shingles, past history of stroke 2010, history of thromboembolism. PAST SURGICAL HISTORY:  Carpal  tunnel release, coronary artery bypass graft,  fracture, colonoscopy. HABITS:  Smoking 10 pack year history smoking. Quite in 2000. Alcohol is negative. ALLERGIES:  LIPITOR CAUSING DIARRHEA. MEDICATION ON ADMISSION:  1. Metformin 1000 mg one-half in the morning, one in the evening. 2.  Furosemide 80 mg 1-2 daily although his compliance has not been good. 3.  Potassium chloride 20 mEq one daily. 4.  Tylenol No 3 p.r.n. pain. 5.  Crestor 20 mg daily. 6.  Amaryl 4 mg daily. 7.  Metoprolol 25 mg b.i.d.  8.  Flomax 0.4 mg daily. 9.  Lisinopril 10 mg daily. 10.  Miralax p.r.n. REVIEW OF SYSTEMS:  Please see HPI. PHYSICAL EXAMINATION:  VITAL SIGNS:  Blood pressure 155/60, pulse 90, temperature 97.8, respiratory rate 22, height 6 feet 1, weight 214. GENERAL:  Alert and cooperative. NECK:  Supple. Symmetrical, trachea is midline. LUNGS:  Clear to auscultation and percussion. CARDIOVASCULAR:  Regular rhythm and rate. No murmurs, thrills, rubs or gallops. No JVD. ABDOMEN:  Soft and nontender. EXTREMITIES:  3 to 4+ pitting edema to the knees bilaterally.   NEUROLOGIC:  Nonfocal.    HOSPITAL COURSE:  The patient was admitted, he was initially treated with IV Lasix. In earlier review it was confirmed talking with the patient sounds like he had not been taking his Lasix. He was aggressively diuresed in the hospital.  His weight, the patient lost 20 pounds from 214 to 194 while in the hospital.  Still had a lot of trouble getting up and walking. He was very weak on his feet, took roughly 2 minutes to stand up on his own, while he was able to walk to the bathroom with a walker. I have seen him one day when he did this, he tried to pass  passed gas, these were ran down his leg and on to the floor. The patient was unaware of this. He lives with his wife who is not in better health than him. It was felt that his current condition could not safely be sent home. He was seen by physical therapy and occupational therapy. We agreed that the patient should go to skilled nursing facility. He was stable and discharged to skilled rehab on 09/26/2022.         Jeanne Tatum MD      [id number]  D:  10/02/2022 7:10  T:  10/03/2022 6:58  JOB #:  [id number]

## 2022-10-04 ENCOUNTER — TELEPHONE (OUTPATIENT)
Dept: FAMILY MEDICINE CLINIC | Age: 82
End: 2022-10-04

## 2022-10-04 NOTE — TELEPHONE ENCOUNTER
Patients daughter, Norma Espana called requesting Dr. Lisette Zhu do a referral to move patient to a better rehab center since his health is declining. She can be reached at [phone].

## 2022-10-05 ENCOUNTER — APPOINTMENT (OUTPATIENT)
Dept: CT IMAGING | Age: 82
DRG: 682 | End: 2022-10-05
Attending: EMERGENCY MEDICINE
Payer: MEDICARE

## 2022-10-05 ENCOUNTER — APPOINTMENT (OUTPATIENT)
Dept: GENERAL RADIOLOGY | Age: 82
DRG: 682 | End: 2022-10-05
Attending: EMERGENCY MEDICINE
Payer: MEDICARE

## 2022-10-05 ENCOUNTER — HOSPITAL ENCOUNTER (INPATIENT)
Age: 82
LOS: 5 days | Discharge: NURSING, REHAB OR LONG-TERM CARE | DRG: 682 | End: 2022-10-10
Attending: EMERGENCY MEDICINE | Admitting: FAMILY MEDICINE
Payer: MEDICARE

## 2022-10-05 DIAGNOSIS — R77.8 TROPONIN I ABOVE REFERENCE RANGE: ICD-10-CM

## 2022-10-05 DIAGNOSIS — E78.00 HYPERCHOLESTEROLEMIA: ICD-10-CM

## 2022-10-05 DIAGNOSIS — I50.9 ACUTE ON CHRONIC CONGESTIVE HEART FAILURE, UNSPECIFIED HEART FAILURE TYPE (HCC): ICD-10-CM

## 2022-10-05 DIAGNOSIS — R41.82 ALTERED MENTAL STATUS, UNSPECIFIED ALTERED MENTAL STATUS TYPE: Primary | ICD-10-CM

## 2022-10-05 DIAGNOSIS — N17.9 AKI (ACUTE KIDNEY INJURY) (HCC): ICD-10-CM

## 2022-10-05 DIAGNOSIS — R77.8 TROPONIN LEVEL ELEVATED: ICD-10-CM

## 2022-10-05 DIAGNOSIS — I63.9 CEREBROVASCULAR ACCIDENT (CVA), UNSPECIFIED MECHANISM (HCC): ICD-10-CM

## 2022-10-05 LAB
ALBUMIN SERPL-MCNC: 3.4 G/DL (ref 3.5–5)
ALBUMIN/GLOB SERPL: 0.6 {RATIO} (ref 1.1–2.2)
ALP SERPL-CCNC: 88 U/L (ref 45–117)
ALT SERPL-CCNC: 22 U/L (ref 12–78)
ANION GAP SERPL CALC-SCNC: 10 MMOL/L (ref 5–15)
ANION GAP SERPL CALC-SCNC: 8 MMOL/L (ref 5–15)
APPEARANCE UR: CLEAR
AST SERPL-CCNC: 27 U/L (ref 15–37)
BACTERIA URNS QL MICRO: NEGATIVE /HPF
BASOPHILS # BLD: 0.1 K/UL (ref 0–0.1)
BASOPHILS NFR BLD: 0 % (ref 0–1)
BILIRUB SERPL-MCNC: 0.4 MG/DL (ref 0.2–1)
BILIRUB UR QL: NEGATIVE
BUN SERPL-MCNC: 60 MG/DL (ref 6–20)
BUN SERPL-MCNC: 64 MG/DL (ref 6–20)
BUN/CREAT SERPL: 29 (ref 12–20)
BUN/CREAT SERPL: 33 (ref 12–20)
CALCIUM SERPL-MCNC: 10.1 MG/DL (ref 8.5–10.1)
CALCIUM SERPL-MCNC: 9.3 MG/DL (ref 8.5–10.1)
CHLORIDE SERPL-SCNC: 93 MMOL/L (ref 97–108)
CHLORIDE SERPL-SCNC: 99 MMOL/L (ref 97–108)
CHOLEST SERPL-MCNC: 173 MG/DL
CO2 SERPL-SCNC: 25 MMOL/L (ref 21–32)
CO2 SERPL-SCNC: 25 MMOL/L (ref 21–32)
COLOR UR: NORMAL
COMMENT, HOLDF: NORMAL
CREAT SERPL-MCNC: 1.81 MG/DL (ref 0.7–1.3)
CREAT SERPL-MCNC: 2.19 MG/DL (ref 0.7–1.3)
DIFFERENTIAL METHOD BLD: ABNORMAL
EOSINOPHIL # BLD: 0 K/UL (ref 0–0.4)
EOSINOPHIL NFR BLD: 0 % (ref 0–7)
EPITH CASTS URNS QL MICRO: NORMAL /LPF
ERYTHROCYTE [DISTWIDTH] IN BLOOD BY AUTOMATED COUNT: 13.2 % (ref 11.5–14.5)
GLOBULIN SER CALC-MCNC: 5.4 G/DL (ref 2–4)
GLUCOSE BLD STRIP.AUTO-MCNC: 104 MG/DL (ref 65–117)
GLUCOSE SERPL-MCNC: 126 MG/DL (ref 65–100)
GLUCOSE SERPL-MCNC: 208 MG/DL (ref 65–100)
GLUCOSE UR STRIP.AUTO-MCNC: NEGATIVE MG/DL
HCT VFR BLD AUTO: 46 % (ref 36.6–50.3)
HDLC SERPL-MCNC: 38 MG/DL
HDLC SERPL: 4.6 {RATIO} (ref 0–5)
HGB BLD-MCNC: 15 G/DL (ref 12.1–17)
HGB UR QL STRIP: NEGATIVE
HYALINE CASTS URNS QL MICRO: NORMAL /LPF (ref 0–5)
IMM GRANULOCYTES # BLD AUTO: 0.1 K/UL (ref 0–0.04)
IMM GRANULOCYTES NFR BLD AUTO: 1 % (ref 0–0.5)
KETONES UR QL STRIP.AUTO: NEGATIVE MG/DL
LDLC SERPL CALC-MCNC: 102.4 MG/DL (ref 0–100)
LEUKOCYTE ESTERASE UR QL STRIP.AUTO: NEGATIVE
LYMPHOCYTES # BLD: 1.8 K/UL (ref 0.8–3.5)
LYMPHOCYTES NFR BLD: 13 % (ref 12–49)
MAGNESIUM SERPL-MCNC: 2.4 MG/DL (ref 1.6–2.4)
MCH RBC QN AUTO: 27.8 PG (ref 26–34)
MCHC RBC AUTO-ENTMCNC: 32.6 G/DL (ref 30–36.5)
MCV RBC AUTO: 85.2 FL (ref 80–99)
MONOCYTES # BLD: 1.1 K/UL (ref 0–1)
MONOCYTES NFR BLD: 8 % (ref 5–13)
NEUTS SEG # BLD: 11 K/UL (ref 1.8–8)
NEUTS SEG NFR BLD: 78 % (ref 32–75)
NITRITE UR QL STRIP.AUTO: NEGATIVE
NRBC # BLD: 0 K/UL (ref 0–0.01)
NRBC BLD-RTO: 0 PER 100 WBC
PH UR STRIP: 5 [PH] (ref 5–8)
PLATELET # BLD AUTO: 388 K/UL (ref 150–400)
PMV BLD AUTO: 10.4 FL (ref 8.9–12.9)
POTASSIUM SERPL-SCNC: 4.3 MMOL/L (ref 3.5–5.1)
POTASSIUM SERPL-SCNC: 6.3 MMOL/L (ref 3.5–5.1)
PROT SERPL-MCNC: 8.8 G/DL (ref 6.4–8.2)
PROT UR STRIP-MCNC: NEGATIVE MG/DL
RBC # BLD AUTO: 5.4 M/UL (ref 4.1–5.7)
RBC #/AREA URNS HPF: NORMAL /HPF (ref 0–5)
SAMPLES BEING HELD,HOLD: NORMAL
SERVICE CMNT-IMP: NORMAL
SODIUM SERPL-SCNC: 128 MMOL/L (ref 136–145)
SODIUM SERPL-SCNC: 132 MMOL/L (ref 136–145)
SP GR UR REFRACTOMETRY: 1.01 (ref 1–1.03)
TRIGL SERPL-MCNC: 163 MG/DL (ref ?–150)
TROPONIN-HIGH SENSITIVITY: 110 NG/L (ref 0–76)
TROPONIN-HIGH SENSITIVITY: 94 NG/L (ref 0–76)
UR CULT HOLD, URHOLD: NORMAL
UROBILINOGEN UR QL STRIP.AUTO: 0.2 EU/DL (ref 0.2–1)
VLDLC SERPL CALC-MCNC: 32.6 MG/DL
WBC # BLD AUTO: 13.9 K/UL (ref 4.1–11.1)
WBC URNS QL MICRO: NORMAL /HPF (ref 0–4)

## 2022-10-05 PROCEDURE — 74011250636 HC RX REV CODE- 250/636: Performed by: NURSE PRACTITIONER

## 2022-10-05 PROCEDURE — 85025 COMPLETE CBC W/AUTO DIFF WBC: CPT

## 2022-10-05 PROCEDURE — 81001 URINALYSIS AUTO W/SCOPE: CPT

## 2022-10-05 PROCEDURE — 70450 CT HEAD/BRAIN W/O DYE: CPT

## 2022-10-05 PROCEDURE — 84484 ASSAY OF TROPONIN QUANT: CPT

## 2022-10-05 PROCEDURE — 65270000029 HC RM PRIVATE

## 2022-10-05 PROCEDURE — 65270000046 HC RM TELEMETRY

## 2022-10-05 PROCEDURE — 74011250636 HC RX REV CODE- 250/636: Performed by: EMERGENCY MEDICINE

## 2022-10-05 PROCEDURE — 83735 ASSAY OF MAGNESIUM: CPT

## 2022-10-05 PROCEDURE — 80053 COMPREHEN METABOLIC PANEL: CPT

## 2022-10-05 PROCEDURE — 36415 COLL VENOUS BLD VENIPUNCTURE: CPT

## 2022-10-05 PROCEDURE — 80061 LIPID PANEL: CPT

## 2022-10-05 PROCEDURE — 82962 GLUCOSE BLOOD TEST: CPT

## 2022-10-05 PROCEDURE — 93005 ELECTROCARDIOGRAM TRACING: CPT

## 2022-10-05 PROCEDURE — 99285 EMERGENCY DEPT VISIT HI MDM: CPT

## 2022-10-05 PROCEDURE — 74011000250 HC RX REV CODE- 250: Performed by: NURSE PRACTITIONER

## 2022-10-05 PROCEDURE — 71045 X-RAY EXAM CHEST 1 VIEW: CPT

## 2022-10-05 RX ORDER — ROSUVASTATIN CALCIUM 10 MG/1
20 TABLET, COATED ORAL
Status: DISCONTINUED | OUTPATIENT
Start: 2022-10-05 | End: 2022-10-10 | Stop reason: HOSPADM

## 2022-10-05 RX ORDER — INSULIN LISPRO 100 [IU]/ML
INJECTION, SOLUTION INTRAVENOUS; SUBCUTANEOUS
Status: DISCONTINUED | OUTPATIENT
Start: 2022-10-05 | End: 2022-10-10 | Stop reason: HOSPADM

## 2022-10-05 RX ORDER — MORPHINE SULFATE 2 MG/ML
1-2 INJECTION, SOLUTION INTRAMUSCULAR; INTRAVENOUS
Status: DISCONTINUED | OUTPATIENT
Start: 2022-10-05 | End: 2022-10-06

## 2022-10-05 RX ORDER — SODIUM CHLORIDE 0.9 % (FLUSH) 0.9 %
5-40 SYRINGE (ML) INJECTION AS NEEDED
Status: DISCONTINUED | OUTPATIENT
Start: 2022-10-05 | End: 2022-10-10 | Stop reason: HOSPADM

## 2022-10-05 RX ORDER — SODIUM CHLORIDE 9 MG/ML
75 INJECTION, SOLUTION INTRAVENOUS CONTINUOUS
Status: DISCONTINUED | OUTPATIENT
Start: 2022-10-05 | End: 2022-10-08

## 2022-10-05 RX ORDER — ASPIRIN 81 MG/1
81 TABLET ORAL DAILY
Status: DISCONTINUED | OUTPATIENT
Start: 2022-10-06 | End: 2022-10-10 | Stop reason: HOSPADM

## 2022-10-05 RX ORDER — MAGNESIUM SULFATE 100 %
4 CRYSTALS MISCELLANEOUS AS NEEDED
Status: DISCONTINUED | OUTPATIENT
Start: 2022-10-05 | End: 2022-10-10 | Stop reason: HOSPADM

## 2022-10-05 RX ORDER — SODIUM CHLORIDE 0.9 % (FLUSH) 0.9 %
5-40 SYRINGE (ML) INJECTION EVERY 8 HOURS
Status: DISCONTINUED | OUTPATIENT
Start: 2022-10-05 | End: 2022-10-10 | Stop reason: HOSPADM

## 2022-10-05 RX ADMIN — SODIUM CHLORIDE 50 ML/HR: 9 INJECTION, SOLUTION INTRAVENOUS at 21:30

## 2022-10-05 RX ADMIN — SODIUM CHLORIDE, PRESERVATIVE FREE 10 ML: 5 INJECTION INTRAVENOUS at 21:31

## 2022-10-05 RX ADMIN — SODIUM CHLORIDE 1000 ML: 9 INJECTION, SOLUTION INTRAVENOUS at 15:26

## 2022-10-05 NOTE — ED PROVIDER NOTES
Abhinav Jackson is an 79 yo M with h/o DM, CAD, CHF, stroke and diabetic neuropathy who presents to the ED by EMS from nursing facility. Patient visited patient today and noted the patient to be altered with slurred speech. Per EMS nursing home staff noted that patient has been declining since his admission there 1 week ago. Past Medical History:   Diagnosis Date    Arthritis     CAD (coronary artery disease)     Carotid artery disease ([address]     CHF exacerbation ([facility] [address] 2022    Diabetes ([facility] [address]     Diabetic neuropathy ([facility] [address]     Hypercholesterolemia     Hypertension 11/29/10    Shingles     Stroke ([facility] [address]     Thromboembolus [facility])        Past Surgical History:   Procedure Laterality Date    HX CARPAL TUNNEL RELEASE  left    HX CORONARY ARTERY BYPASS GRAFT      HX HEART CATHETERIZATION  2005    Dr Guy Red      left arm fracture    NV COLONOSCOPY FLX DX W/COLLJ SPEC WHEN PFRMD  2012              Family History:   Problem Relation Age of Onset    Stroke Father     Heart Disease Brother     Cancer Brother        Social History     Socioeconomic History    Marital status:      Spouse name: Not on file    Number of children: 3    Years of education: Not on file    Highest education level: Not on file   Occupational History    Not on file   Tobacco Use    Smoking status: Former     Packs/day: 0.50     Years: 20.00     Pack years: 10.00     Types: Cigarettes     Quit date: 2000     Years since quittin.7    Smokeless tobacco: Never    Tobacco comments:     15 years ago   Vaping Use    Vaping Use: Never used   Substance and Sexual Activity    Alcohol use: Not Currently     Comment: occ.  Drug use: Not Currently    Sexual activity: Never   Other Topics Concern    Not on file   Social History Narrative    , 4 children.  Retired from [employer] in  after 25 years     Social Determinants of Health     Financial Resource Strain: Not on file   Food Insecurity: Not on file   Transportation Needs: Not on file   Physical Activity: Not on file   Stress: Not on file   Social Connections: Not on file   Intimate Partner Violence: Not on file   Housing Stability: Not on file         ALLERGIES: Lipitor [atorvastatin]    Review of Systems   Constitutional:  Negative for fever. HENT:  Negative for sore throat. Eyes:  Negative for visual disturbance. Respiratory:  Negative for cough. Cardiovascular:  Negative for chest pain. Gastrointestinal:  Negative for abdominal pain. Genitourinary:  Negative for dysuria. Musculoskeletal:  Negative for back pain. Skin:  Negative for rash. Neurological:  Positive for speech difficulty. Negative for headaches. Psychiatric/Behavioral:  Positive for confusion. Vitals:    10/05/22 1258   BP: 95/62   Pulse: (!) 104   Resp: 16   Temp: 97.7 °F (36.5 °C)   SpO2: 99%            Physical Exam  Vitals and nursing note reviewed. Constitutional:       General: He is not in acute distress. Appearance: He is well-developed. HENT:      Head: Normocephalic and atraumatic. Mouth/Throat:      Mouth: Mucous membranes are moist.      Comments: Adentulous  Eyes:      Extraocular Movements: Extraocular movements intact. Conjunctiva/sclera: Conjunctivae normal.   Neck:      Trachea: Phonation normal.   Cardiovascular:      Rate and Rhythm: Normal rate. Pulmonary:      Effort: Pulmonary effort is normal. No respiratory distress. Abdominal:      General: There is no distension. Musculoskeletal:         General: No tenderness. Normal range of motion. Cervical back: Normal range of motion. Skin:     General: Skin is warm and dry. Neurological:      General: No focal deficit present. Mental Status: He is alert. He is not disoriented. Cranial Nerves: No facial asymmetry. Sensory: Sensation is intact. Motor: No abnormal muscle tone or pronator drift.       ED EKG interpretation:  Rhythm: sinus tachycardia; and regular . Rate (approx.): 103; Axis: normal; P wave: normal; QRS interval: normal ; ST/T wave: non-specific changes; Other findings: abnormal ekg. This EKG was interpreted by Valery Thomas MD,ED Provider. [facility]         3:09 PM  Labs reviewed and CMP significant for AKD  Cr 2.1.  K 6.3 but specimen hemolysed. Will order repeat K. Trop 110 however this is improved from 265 on 9/19. Repeat trop ordered. 1 L IVNS ordered. Will consult hospitalist for admission. Perfect [facility] Consult for Admission  3:12 PM    ED Room Number: ER26/26  Patient Name and age:  Juan Obandother 80 y.o.  male  Working Diagnosis:   1. Altered mental status, unspecified altered mental status type    2. CHEVY (acute kidney injury) ([facility] [address]    3. Troponin I above reference range        COVID-19 Suspicion:  no  Sepsis present:  no  Reassessment needed: N/A  Code Status:  Full Code  Readmission: no  Isolation Requirements:  no  Recommended Level of Care:  telemetry  Department:[facility] Adult ED - 21   Other:  Was admitted to  [facility] on 9/26. Family visited patient today. Found patient with slurred speech. Staff note patient has had progressive decline since his admission there. Poor PO intake. CT head with no acute ICH, mass or infarct but right frontoparietal encephalomalacia is stable. Cr 2.19, was 1.11 on 9/26. Trop 110 which is improved from 265 on 9/19.  1 L IVNS ordered. K6.3 but specimen hemolysed,  repeat K ordered.      Procedures
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
## 2022-10-05 NOTE — ED TRIAGE NOTES
Patient arrives to ED via EMS from nursing facility, family visited patient today and noted patient to have slurred speech and altered. According to EMS, staff reports patient has been declining since admission 1 week ago. LKW unknown.

## 2022-10-06 PROBLEM — E43 SEVERE PROTEIN-CALORIE MALNUTRITION (HCC): Status: ACTIVE | Noted: 2022-10-06

## 2022-10-06 LAB
ALBUMIN SERPL-MCNC: 3.1 G/DL (ref 3.5–5)
ALBUMIN/GLOB SERPL: 0.7 {RATIO} (ref 1.1–2.2)
ALP SERPL-CCNC: 76 U/L (ref 45–117)
ALT SERPL-CCNC: 17 U/L (ref 12–78)
ANION GAP SERPL CALC-SCNC: 9 MMOL/L (ref 5–15)
AST SERPL-CCNC: 18 U/L (ref 15–37)
ATRIAL RATE: 103 BPM
BASOPHILS # BLD: 0.1 K/UL (ref 0–0.1)
BASOPHILS NFR BLD: 1 % (ref 0–1)
BILIRUB SERPL-MCNC: 0.3 MG/DL (ref 0.2–1)
BUN SERPL-MCNC: 59 MG/DL (ref 6–20)
BUN/CREAT SERPL: 36 (ref 12–20)
CALCIUM SERPL-MCNC: 9.5 MG/DL (ref 8.5–10.1)
CALCULATED P AXIS, ECG09: 74 DEGREES
CALCULATED R AXIS, ECG10: 63 DEGREES
CALCULATED T AXIS, ECG11: -102 DEGREES
CHLORIDE SERPL-SCNC: 99 MMOL/L (ref 97–108)
CO2 SERPL-SCNC: 26 MMOL/L (ref 21–32)
CREAT SERPL-MCNC: 1.63 MG/DL (ref 0.7–1.3)
DIAGNOSIS, 93000: NORMAL
DIFFERENTIAL METHOD BLD: ABNORMAL
EOSINOPHIL # BLD: 0.1 K/UL (ref 0–0.4)
EOSINOPHIL NFR BLD: 1 % (ref 0–7)
ERYTHROCYTE [DISTWIDTH] IN BLOOD BY AUTOMATED COUNT: 13.1 % (ref 11.5–14.5)
GLOBULIN SER CALC-MCNC: 4.4 G/DL (ref 2–4)
GLUCOSE BLD STRIP.AUTO-MCNC: 104 MG/DL (ref 65–117)
GLUCOSE BLD STRIP.AUTO-MCNC: 106 MG/DL (ref 65–117)
GLUCOSE SERPL-MCNC: 114 MG/DL (ref 65–100)
HCT VFR BLD AUTO: 40.7 % (ref 36.6–50.3)
HGB BLD-MCNC: 13.4 G/DL (ref 12.1–17)
IMM GRANULOCYTES # BLD AUTO: 0.1 K/UL (ref 0–0.04)
IMM GRANULOCYTES NFR BLD AUTO: 1 % (ref 0–0.5)
LYMPHOCYTES # BLD: 2.1 K/UL (ref 0.8–3.5)
LYMPHOCYTES NFR BLD: 20 % (ref 12–49)
MCH RBC QN AUTO: 28.2 PG (ref 26–34)
MCHC RBC AUTO-ENTMCNC: 32.9 G/DL (ref 30–36.5)
MCV RBC AUTO: 85.5 FL (ref 80–99)
MONOCYTES # BLD: 1.3 K/UL (ref 0–1)
MONOCYTES NFR BLD: 12 % (ref 5–13)
NEUTS SEG # BLD: 6.9 K/UL (ref 1.8–8)
NEUTS SEG NFR BLD: 65 % (ref 32–75)
NRBC # BLD: 0 K/UL (ref 0–0.01)
NRBC BLD-RTO: 0 PER 100 WBC
P-R INTERVAL, ECG05: 184 MS
PLATELET # BLD AUTO: 323 K/UL (ref 150–400)
PMV BLD AUTO: 10.6 FL (ref 8.9–12.9)
POTASSIUM SERPL-SCNC: 4.2 MMOL/L (ref 3.5–5.1)
PROT SERPL-MCNC: 7.5 G/DL (ref 6.4–8.2)
Q-T INTERVAL, ECG07: 338 MS
QRS DURATION, ECG06: 122 MS
QTC CALCULATION (BEZET), ECG08: 442 MS
RBC # BLD AUTO: 4.76 M/UL (ref 4.1–5.7)
SERVICE CMNT-IMP: NORMAL
SERVICE CMNT-IMP: NORMAL
SODIUM SERPL-SCNC: 134 MMOL/L (ref 136–145)
VENTRICULAR RATE, ECG03: 103 BPM
WBC # BLD AUTO: 10.5 K/UL (ref 4.1–11.1)

## 2022-10-06 PROCEDURE — 36415 COLL VENOUS BLD VENIPUNCTURE: CPT

## 2022-10-06 PROCEDURE — 97530 THERAPEUTIC ACTIVITIES: CPT

## 2022-10-06 PROCEDURE — 92523 SPEECH SOUND LANG COMPREHEN: CPT

## 2022-10-06 PROCEDURE — 99222 1ST HOSP IP/OBS MODERATE 55: CPT | Performed by: INTERNAL MEDICINE

## 2022-10-06 PROCEDURE — 74011250636 HC RX REV CODE- 250/636: Performed by: PHYSICIAN ASSISTANT

## 2022-10-06 PROCEDURE — 97165 OT EVAL LOW COMPLEX 30 MIN: CPT

## 2022-10-06 PROCEDURE — 97535 SELF CARE MNGMENT TRAINING: CPT

## 2022-10-06 PROCEDURE — 92610 EVALUATE SWALLOWING FUNCTION: CPT

## 2022-10-06 PROCEDURE — 65270000029 HC RM PRIVATE

## 2022-10-06 PROCEDURE — 99221 1ST HOSP IP/OBS SF/LOW 40: CPT | Performed by: NURSE PRACTITIONER

## 2022-10-06 PROCEDURE — 74011250637 HC RX REV CODE- 250/637: Performed by: PHYSICIAN ASSISTANT

## 2022-10-06 PROCEDURE — 74011250637 HC RX REV CODE- 250/637: Performed by: NURSE PRACTITIONER

## 2022-10-06 PROCEDURE — 82962 GLUCOSE BLOOD TEST: CPT

## 2022-10-06 PROCEDURE — 80053 COMPREHEN METABOLIC PANEL: CPT

## 2022-10-06 PROCEDURE — 85025 COMPLETE CBC W/AUTO DIFF WBC: CPT

## 2022-10-06 PROCEDURE — 65270000046 HC RM TELEMETRY

## 2022-10-06 PROCEDURE — 97161 PT EVAL LOW COMPLEX 20 MIN: CPT

## 2022-10-06 RX ORDER — ONDANSETRON 4 MG/1
4 TABLET, ORALLY DISINTEGRATING ORAL
Status: DISCONTINUED | OUTPATIENT
Start: 2022-10-06 | End: 2022-10-10 | Stop reason: HOSPADM

## 2022-10-06 RX ORDER — ACETAMINOPHEN 325 MG/1
650 TABLET ORAL
Status: DISCONTINUED | OUTPATIENT
Start: 2022-10-06 | End: 2022-10-10 | Stop reason: HOSPADM

## 2022-10-06 RX ORDER — ONDANSETRON 2 MG/ML
4 INJECTION INTRAMUSCULAR; INTRAVENOUS
Status: DISCONTINUED | OUTPATIENT
Start: 2022-10-06 | End: 2022-10-10 | Stop reason: HOSPADM

## 2022-10-06 RX ORDER — HEPARIN SODIUM 5000 [USP'U]/ML
5000 INJECTION, SOLUTION INTRAVENOUS; SUBCUTANEOUS EVERY 8 HOURS
Status: DISCONTINUED | OUTPATIENT
Start: 2022-10-06 | End: 2022-10-10 | Stop reason: HOSPADM

## 2022-10-06 RX ORDER — METOPROLOL TARTRATE 25 MG/1
25 TABLET, FILM COATED ORAL EVERY 12 HOURS
Status: DISCONTINUED | OUTPATIENT
Start: 2022-10-06 | End: 2022-10-10 | Stop reason: HOSPADM

## 2022-10-06 RX ORDER — TAMSULOSIN HYDROCHLORIDE 0.4 MG/1
0.4 CAPSULE ORAL DAILY
Status: DISCONTINUED | OUTPATIENT
Start: 2022-10-06 | End: 2022-10-10 | Stop reason: HOSPADM

## 2022-10-06 RX ORDER — LANOLIN ALCOHOL/MO/W.PET/CERES
3 CREAM (GRAM) TOPICAL
Status: DISCONTINUED | OUTPATIENT
Start: 2022-10-06 | End: 2022-10-10 | Stop reason: HOSPADM

## 2022-10-06 RX ORDER — AMITRIPTYLINE HYDROCHLORIDE 10 MG/1
10 TABLET, FILM COATED ORAL 2 TIMES DAILY
Status: DISCONTINUED | OUTPATIENT
Start: 2022-10-06 | End: 2022-10-10 | Stop reason: HOSPADM

## 2022-10-06 RX ADMIN — ASPIRIN 81 MG: 81 TABLET, COATED ORAL at 14:37

## 2022-10-06 RX ADMIN — METOPROLOL TARTRATE 25 MG: 25 TABLET, FILM COATED ORAL at 14:37

## 2022-10-06 RX ADMIN — ROSUVASTATIN CALCIUM 20 MG: 10 TABLET, COATED ORAL at 23:45

## 2022-10-06 RX ADMIN — METOPROLOL TARTRATE 25 MG: 25 TABLET, FILM COATED ORAL at 23:42

## 2022-10-06 RX ADMIN — HEPARIN SODIUM 5000 UNITS: 5000 INJECTION INTRAVENOUS; SUBCUTANEOUS at 23:42

## 2022-10-06 RX ADMIN — TAMSULOSIN HYDROCHLORIDE 0.4 MG: 0.4 CAPSULE ORAL at 14:37

## 2022-10-06 RX ADMIN — SODIUM CHLORIDE 75 ML/HR: 9 INJECTION, SOLUTION INTRAVENOUS at 14:37
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
## 2022-10-06 NOTE — PROGRESS NOTES
Pt was seen by cardiology. Pt does have speech difficuties but was able to tell me he had no chest pain at this time. He had recent echo last month with EF 45=50% and global hypok. HS troponin is mildly elvated but less than last month. Stress test planned as outpatient  by Dr. Sampson Corbett practice but no testing this admission needed. Full consult note to follow.

## 2022-10-06 NOTE — WOUND CARE
WOCN Note:     New consult placed for assessment of sacrum, buttocks and right foot wounds. Chart reviewed. Assessed in room 575. Admitted DX:  AMS  Past Medical History:   Diagnosis Date    Arthritis     CAD (coronary artery disease)     Carotid artery disease ([facility]     CHF exacerbation ([facility] 9/19/2022    Diabetes ([facility]     Diabetic neuropathy [facility]     Hypercholesterolemia     Hypertension 11/29/10    Shingles 2010    Stroke ([facility]     Thromboembolus [facility])      Assessment:   Patient is alert, communicative and requires assistance with repositioning. Bed: Stirling Ultracold(Global Cooling) gel  Patient reports no pain. Heels offloaded with pillows. 1. POA Left coccyx/buttocks, Pressure injury Stage 2/MASD:  0.5 x 0.5 x 0.1 cm; 100% pink/red; surrounded by hyperpigmented skin that appears chronic. Applied sacral foam to sacrum and zinc cream to coccyx and buttocks. 2.  Right great toe tip, Deep tissue pressure injury:  1 x 1.3 x 0 cm; 100% burgundy. 3.  Right heel, deep tissue pressure injury:  4 x 5 x 0 cm; 50% non blanching purple 50% non blanching red. Applied Foam dressing. 4.  Right lateral foot, deep tissue pressure injury:  2 x 3 x 0 cm;  90% non blanching purple 10% dark ring. Applied foam dressing. Wound, Pressure Prevention & Skin Care Recommendations:    Minimize layers of linen/pads under patient to optimize support surface. 2.  Turn/reposition approximately every 2 hours and offload heels. 3.  Manage moisture/ Keep skin folds clean and dry/minimize brief usage. 4.  Specialty bed: Stirling Ultracold(Global Cooling) with air module  5. Sacrum:  Protect with foam dressing; change every 3 days and as needed. 6.  Right heel, right lateral foot and Right great toe:  Venelex tid. 7.  Buttocks/coccyx:  Apply Zinc cream every 8 hours and as needed. Discussed above plan with Mehnaz Avilez.     Transition of Care:   Plan to follow as needed while admitted to hospital.    Clive Kehr, BSN RN [facility] Inpatient Wound Care  Available on [facility]  Office [phone]

## 2022-10-06 NOTE — PROGRESS NOTES
Transition Of Care: SNF referral pending. The patient is a readmission and was recently discharge to [facility] from [facility] on 9/22/22. The patient and family plan for him to go to SNF for rehab services. BLS to transport when stable for discharge. The patient was admitted from [facility] and the daughter, Jennie Shone does not want her father to return there. Linda Gutierrez Gabo:([phone], email: [email or web address]). CM emailed Linda Gutierrez a SNF list for more choices. RUR: 17%    The was admitted from [facility] and the family do not want him to return there. Hospitalist, speech, wound care, PT/OT following. The patient and family plan for him to go to SNF. Medicare pt has received, and reviewed the IM letter informing them of their right to appeal the discharge. Signed copied has been placed on pt bedside chart. CM spoke with the patient's daughter Linda Gutierrez on the phone and she instructed CM to leave letter bedside with the patient. Readmission Assessment  Number of days since last admission?: 8-30 days  Previous disposition: SNF  Who is being interviewed?:  (Daughter: Linda Gutierrez [phone])  What was the patient's/caregiver's perception as to why they think they needed to return back to the hospital?: Other (Comment) (Per the daughter.  Her father's condition was deteriorating.)  Did you visit your Primary Care Physician after you left the hospital, before you returned this time?: No  Why weren't you able to visit your PCP?: Other (Comment) (Appointment was pending.)  Who advised the patient to return to the hospital?: Self-referral, Other (Comment) (Family requested facility to act.)  Does the patient report anything that got in the way of taking their medications?: No  In our efforts to provide the best possible care to you and others like you, can you think of anything that we could have done to help you after you left the hospital the first time, so that you might not have needed to return so soon?: Improved written discharge instructions, Other (Comment)     Care Management Interventions  PCP Verified by CM: Yes  Palliative Care Criteria Met (RRAT>21 & CHF Dx)?: No  Mode of Transport at Discharge: Other (see comment)  Transition of Care Consult (CM Consult): Discharge Planning  MyChart Signup: No  Discharge Durable Medical Equipment: No  Health Maintenance Reviewed: Yes  Physical Therapy Consult: Yes  Occupational Therapy Consult: Yes  Speech Therapy Consult: No  Support Systems: Child(pb)  Confirm Follow Up Transport: Other (see comment) (BLS to transport.)  The Plan for Transition of Care is Related to the Following Treatment Goals : The patient plans to go to SNF. The Patient and/or Patient Representative was Provided with a Choice of Provider and Agrees with the Discharge Plan?: Yes  Freedom of Choice List was Provided with Basic Dialogue that Supports the Patient's Individualized Plan of Care/Goals, Treatment Preferences and Shares the Quality Data Associated with the Providers?: Yes  [place] Resource Information Provided?: No  Discharge Location  Patient Expects to be Discharged to[de-identified] Skilled nursing facility     Reason for Admission:   AMS                    RUR Score:   17%               PCP: First and Last name:   Rocky Sow MD     Name of Practice:    Are you a current patient: Yes/No: Y   Approximate date of last visit: Sept, 2022   Can you participate in a virtual visit if needed: Y    Do you (patient/family) have any concerns for transition/discharge? The family requesting the patient go to a different SNF and not return to [facility]. Plan for utilizing home health:   No indications at this time.      Current Advanced Directive/Advance Care Plan:  Full Code      Healthcare Decision Maker:   Click here to complete [address] including selection of the Healthcare Decision Maker Relationship (ie \"Primary\")              Transition of Care Plan:       The patient is confused and CM called and spoke with the patient's daughter Tom Salvador. The patient was recently discharged from [id number] and went to [facility]. The patient's daughter Jamie Robison does not want her father to return to [facility] [place] and requests a referral be sent to [facility]. CM sent referral and awaiting for more choices from Jamie Robison. Jamie Robison stated that prior to [facility] [place], the patient was living with his wife, Janeth Caballero independently using a cane, no longer driving and family was around assisting with transporting for needs. Jamie Robison states she and the family are talking about future long term care plans. CM gave [facility] and [facility] literature for future assistance needs. CM following for discharge needs.     Devaughn Hamilton RN/CRM

## 2022-10-06 NOTE — PROGRESS NOTES
Problem: Self Care Deficits Care Plan (Adult)  Goal: *Acute Goals and Plan of Care (Insert Text)  Description: FUNCTIONAL STATUS PRIOR TO ADMISSION: Pt admitted from SNF, with reports of gradual decline for the week he was there, with chart indicating prior to SNF stay pt was [facility] at [facility], living with wife and daughter. HOME SUPPORT: The patient lived with wife and daughter, with reports of [facility] [place] at [facility]. Occupational Therapy Goals  Initiated 10/6/2022  1. Patient will perform EOB self-feeding with independence within 7 day(s). 2.  Patient will perform EOB grooming with supervision/set-up within 7 day(s). 3.  Patient will perform EOB bathing with minimal assistance within 7 day(s). 4.  Patient will perform EOB/RW lower body dressing with minimal assistance within 7 day(s). 5.  Patient will perform RW toilet transfers with contact guard assist within 7 day(s). 6.  Patient will perform all aspects of RW toileting with minimal assistance within 7 day(s). Outcome: Not Met    OCCUPATIONAL THERAPY EVALUATION  Patient: Leyda Mcdowell (80 y.o. male)  Date: 10/6/2022  Primary Diagnosis: AMS (altered mental status) [R41.82]       Precautions: Falls       ASSESSMENT  Based on the objective data described below, the patient presents with rigidity, decreased strength/endurance, decreased mobility/balance, decreased activity tolerance, decreased full body reaching, forward flexion in standing and sacral/B heel/great toe wounds, all of which limit pt's ability to complete self-care routine at level congruent with PLOF. Currently, pt is Min A for self-feeding/seated grooming, Mod A for UB dressing, Max A for bathing/LE dressing/toileting. Pt noted with good participation; however, strong rigidity noted during bed mobility, with Mod A to come to EOB. Pt with good Min A sit to stand; however, forward flexion noted, requiring cues for trunk elongation.   Of note, HR reading 131 s/p completion of transfer training; RN aware and following. Pt benefits from skilled OT to address functional deficits during acute hospitalization, with reporting therapist believing pt would benefit from SNF rehab upon discharge. Current Level of Function Impacting Discharge (ADLs/self-care): Min A for self-feeding/seated grooming, Mod A for UB dressing, Max A for bathing/LE dressing/toileting    Functional Outcome Measure: The patient scored 25/100 on the Barthel Index outcome measure. Other factors to consider for discharge: wounds, below baseline     Patient will benefit from skilled therapy intervention to address the above noted impairments. PLAN :  Recommendations and Planned Interventions: self care training, functional mobility training, therapeutic exercise, balance training, therapeutic activities, endurance activities, and patient education    Frequency/Duration: Patient will be followed by occupational therapy 5 times a week to address goals. Recommendation for discharge: (in order for the patient to meet his/her long term goals)  Therapy up to 5 days/week in SNF setting    This discharge recommendation:  Has been made in collaboration with the attending provider and/or case management    IF patient discharges home will need the following DME: TBD       SUBJECTIVE:   Patient stated I live in a townhouse.     OBJECTIVE DATA SUMMARY:   HISTORY:   Past Medical History:   Diagnosis Date    Arthritis     CAD (coronary artery disease)     Carotid artery disease [address] [address]     CHF exacerbation ([facility] [address] 9/19/2022    Diabetes ([facility] [address]     Diabetic neuropathy ([facility] [address]     Hypercholesterolemia     Hypertension 11/29/10    Shingles 2010    Stroke ([facility] [address]     Thromboembolus [facility])      Past Surgical History:   Procedure Laterality Date    HX CARPAL TUNNEL RELEASE  left    HX CORONARY ARTERY BYPASS GRAFT      HX HEART CATHETERIZATION  12/2005    Dr Vinay Lopez      left arm fracture    GA COLONOSCOPY FLX DX W/COLLJ [facility] INPATIENT REHABILITATION WHEN PFRMD  11/12/2012            Expanded or extensive additional review of patient history:     Home Situation  Home Environment:  ([facility])  # Steps to Enter: 4  Rails to Enter: Yes  Hand Rails : Right  One/Two Story Residence: Two story  Living Alone: No  Support Systems: Spouse/Significant Other, Child(pb)  Patient Expects to be Discharged to[de-identified] Skilled nursing facility  Current DME Used/Available at Home: Cane, straight    Hand dominance: Right    EXAMINATION OF PERFORMANCE DEFICITS:  Cognitive/Behavioral Status:  Neurologic State: Alert  Orientation Level: Oriented to person;Oriented to time  Cognition: Follows commands  Perception: Cues to maintain midline in standing (to increase trunk elongation)  Perseveration: No perseveration noted  Safety/Judgement: Fall prevention    Hearing: Auditory  Auditory Impairment: None    Range of Motion:  AROM: Generally decreased, functional  PROM: Generally decreased, functional                      Strength:  Strength: Generally decreased, functional                Coordination:  Coordination: Generally decreased, functional  Fine Motor Skills-Upper: Left Intact; Right Intact    Gross Motor Skills-Upper: Left Intact; Right Intact    Tone & Sensation:  Tone: Abnormal  Sensation: Intact                      Balance:  Sitting: Impaired; With support  Sitting - Static: Good (unsupported)  Sitting - Dynamic: Fair (occasional)  Standing: Impaired; With support  Standing - Static: Constant support;Good  Standing - Dynamic : Constant support; Fair    Functional Mobility and Transfers for ADLs:  Bed Mobility:  Rolling: Moderate assistance  Supine to Sit: Moderate assistance; Additional time  Scooting:  Moderate assistance (to scoot to EOB)    Transfers:  Sit to Stand: Minimum assistance;Assist x1  Stand to Sit: Minimum assistance;Assist x1  Bed to Chair: Minimum assistance;Assist x1    ADL Assessment:  Feeding: Minimum assistance    Oral Facial Hygiene/Grooming: Minimum assistance    Bathing: Maximum assistance    Type of Bath: Chlorhexidine (CHG)    Upper Body Dressing: Moderate assistance    Lower Body Dressing: Maximum assistance    Toileting: Maximum assistance    ADL Intervention and task modifications:  Patient instructed and indicated understanding the benefits of maintaining activity tolerance, functional mobility, and independence with self care tasks during acute stay  to ensure safe return home and to baseline. Encouraged patient to increase frequency and duration OOB, be out of bed for all meals, perform daily ADLs (as approved by RN/MD regarding bathing etc), and performing functional mobility to/from [facility]. Pt educated on safe transfer techniques, with specific emphasis on proper hand placement to push up from seated surface rather than attempt to pull self up, fully positioning self in-front of desired seated location, feeling chair on back of legs and reaching back with 1-2 UE to slowly lower self to seated position. Cognitive Retraining  Safety/Judgement: Fall prevention    Functional Measure:    Barthel Index:  Bathin  Bladder: 5  Bowels: 5  Groomin  Dressin  Feedin  Mobility: 0  Stairs: 0  Toilet Use: 5  Transfer (Bed to Chair and Back): 10  Total: 25/100      The Barthel ADL Index: Guidelines  1. The index should be used as a record of what a patient does, not as a record of what a patient could do. 2. The main aim is to establish degree of independence from any help, physical or verbal, however minor and for whatever reason. 3. The need for supervision renders the patient not independent. 4. A patient's performance should be established using the best available evidence. Asking the patient, friends/relatives and nurses are the usual sources, but direct observation and common sense are also important. However direct testing is not needed.   5. Usually the patient's performance over the preceding 24-48 hours is important, but occasionally longer periods will be relevant. 6. Middle categories imply that the patient supplies over 50 per cent of the effort. 7. Use of aids to be independent is allowed. Score Interpretation (from [address])    Independent   60-79 Minimally independent   40-59 Partially dependent   20-39 Very dependent   <20 Totally dependent     -Cori Buitrago., Barthel, D.W. (1965). Functional evaluation: the Barthel Index. [address] ([address] (1997). The Barthel activities of daily living index: self-reporting versus actual performance in the old (> or = 75 years). Journal of [address] [place] [address] 45(7), [facility], JRUCHIF, Rodo Adler., [facility]. (1999). Measuring the change in disability after inpatient rehabilitation; comparison of the responsiveness of the Barthel Index and Functional [place] Measure. Journal of Neurology, Neurosurgery, and Psychiatry, 66(4), 572-980. Abdoul Bonilla, N.[place].A, JULIA Klein, & Nehal Carlson MLinA. (2004) Assessment of post-stroke quality of life in cost-effectiveness studies: The usefulness of the Barthel Index and the EuroQoL-5D.  Quality of Life Research, 15, 175-78        Occupational Therapy Evaluation Charge Determination   History Examination Decision-Making   LOW Complexity : Brief history review  HIGH Complexity : 5 or more performance deficits relating to physical, cognitive , or psychosocial skils that result in activity limitations and / or participation restrictions LOW Complexity : No comorbidities that affect functional and no verbal or physical assistance needed to complete eval tasks       Based on the above components, the patient evaluation is determined to be of the following complexity level: LOW   Pain Rating:  No c/o pain    Activity Tolerance:   Fair and requires rest breaks    After treatment patient left in no apparent distress:    Sitting in chair, Call bell within reach, and Bed / chair alarm activated    COMMUNICATION/EDUCATION:   The patients plan of care was discussed with: Physical therapist, Registered nurse, and Case management. Home safety education was provided and the patient/caregiver indicated understanding., Patient/family have participated as able in goal setting and plan of care. , and Patient/family agree to work toward stated goals and plan of care. This patients plan of care is appropriate for delegation to [facility].     Thank you for this referral.  Lala Champagne OT  Time Calculation: 24 mins

## 2022-10-06 NOTE — CONSULTS
NEUROLOGY CONSULT NOTE    Name Teresa Banks Age 80 y.o. MRN [id number]  1940     Consulting Physician: Noemi Castellanos MD      Chief Complaint:  Slurred speech/aphasia     Assessment:     Active Problems:    AMS (altered mental status) (10/5/2022)      Severe protein-calorie malnutrition ([address] (10/6/2022)      Patient is an 80year-old male with history of CAD s/p CABG 6888, HTN, systolic HF, BPH, NIDDM, and CVA who presents from SNF with concern for failure-to-thrive, weakness, and slurred speech. CT head (10/5/22) showed old R parieto-occipital stroke  Patient currently with intact receptive and expressive language. Stuttering which is baseline but no dysarthria. Recommendations:   1.) Concern for dysarthria and aphasia:  - No signs of aphasia on my exam  - Does not seem true dysarthria from Neuro cause. Patient has stuttering and lack of dentition and noted fatigue impacting speech clarity with documented report that speech \"comes and goes\". He is able to repeat appropriate, clear speech when asked to repeat phrases. - Given above exam and findings, likely multifactorial from possible dehydration due to diuresis given BMP on presentation  - ammonia, TSH and vitamin B12 pending  - His LDL was increased (from 2022) 79 to 102 on Crestor 20 mg. Would recommend recheck level before increasing dose. - continue aspirin 81 mg daily  - I discussed with patient the signs of acute stroke and when to present to the ED. He verbalized understanding. Neurology has no further recommendations and will sign-off. He should get his LDL repeated and increase Crestor as outpatient. History of Present Illness:      Patient is an 80year-old male with history of CAD s/p CABG 4716, HTN, systolic HF, BPH, NIDDM, and CVA  who presents from SNF with concern for slurred speech noted by family yesterday when the family went to visit him. Patient was noted to have normal speech the evening of 10/4.  Per documentation, the patient had report of difficulty swallowing the night prior to presentation. In the ED, he ws noted to have elevated WBC 13.9, BUN 69, and Na 128. Of note, he was recently admitted to ED [facility] 9/18-9/26/22 after complaint of BLE progressive edema, generalized weakness and mild SOB after not taking his home Lasix and he was found to be COVID-19+. We are asked to consult on the patient for new onset possible aphasia and slurred speech. Allergies   Allergen Reactions    Lipitor [Atorvastatin] Diarrhea        Prior to Admission medications    Medication Sig Start Date End Date Taking? Authorizing Provider   furosemide (LASIX) 80 mg tablet 1 tab po daily  for swelling in feet. 9/26/22   Byron Bourgeois MD   amitriptyline (ELAVIL) 10 mg tablet Take 1 Tablet by mouth two (2) times a day. For pain in legs. 9/26/22   Byron Bourgeois MD   glimepiride (AMARYL) 2 mg tablet TAKE 1 TABLET BY MOUTH DAILY FOR DIABETES 9/26/22   Byron Bourgeois MD   metFORMIN (GLUCOPHAGE) 1,000 mg tablet TAKE 1 1/2 TABLETS in the am and 1 Tablet in the PM,  for diabetes 9/14/22   Byron Bourgeois MD   potassium chloride (K-DUR, KLOR-CON M20) 20 mEq tablet Take 1 Tablet by mouth daily. 9/14/22   Byron Bourgeois MD   acetaminophen-codeine (TYLENOL #3) 300-30 mg per tablet Take 1 Tablet by mouth four (4) times daily as needed for Pain for up to 30 days. Max Daily Amount: 4 Tablets. 9/14/22 10/14/22  Byron Bourgeois MD   rosuvastatin (CRESTOR) 20 mg tablet One daily for cholesterol 8/1/22   Byron Bourgeois MD   metoprolol tartrate (LOPRESSOR) 25 mg tablet TAKE 1 TABLET BY MOUTH EVERY 12 HOURS FOR BLOOD PRESSURE AND HEART 6/13/22   Byron Bourgeois MD   tamsulosin [facility]) 0.4 mg capsule TAKE ONE CAPSULE BY MOUTH EVERY DAY 6/13/22   Byron Bourgeois MD   lisinopriL (PRINIVIL, ZESTRIL) 10 mg tablet Take 1 Tablet by mouth daily.  2/21/22   Byron Bourgeois MD   polyethylene glycol [facility]) 17 gram/dose powder One scoop daily for constipation 21   Tamanna Du MD   aspirin delayed-release 81 mg tablet Take 1 Tab by mouth daily. To prevent heart attack and stroke 16   Tamanna Du MD       Past Medical History:   Diagnosis Date    Arthritis     CAD (coronary artery disease)     Carotid artery disease ([facility]     CHF exacerbation ([facility] 2022    Diabetes ([facility]     Diabetic neuropathy ([facility]     Hypercholesterolemia     Hypertension 11/29/10    Shingles 2010    Stroke ([facility]     Thromboembolus [facility])         Past Surgical History:   Procedure Laterality Date    HX CARPAL TUNNEL RELEASE  left    HX CORONARY ARTERY BYPASS GRAFT      HX HEART CATHETERIZATION  2005    Dr Tylor Singletary      left arm fracture    NM COLONOSCOPY FLX DX W/COLLJ SPEC WHEN PFRMD  2012             Social History     Tobacco Use    Smoking status: Former     Packs/day: 0.50     Years: 20.00     Pack years: 10.00     Types: Cigarettes     Quit date: 2000     Years since quittin.7    Smokeless tobacco: Never    Tobacco comments:     15 years ago   Substance Use Topics    Alcohol use: Not Currently     Comment: occ. Family History   Problem Relation Age of Onset    Stroke Father     Heart Disease Brother     Cancer Brother         Review of Systems:   Comprehensive review of systems performed and negative except for as listed above. Exam:     Visit Vitals  /71 (BP 1 Location: Left upper arm, BP Patient Position: At rest)   Pulse (!) 118   Temp 98 °F (36.7 °C)   Resp 16   Ht 6' 1\" (1.854 m)   SpO2 98%   BMI 25.70 kg/m²        General: Well developed, well nourished. Patient in no apparent distress   Head: Normocephalic, atraumatic, anicteric sclera   Lungs:  Clear to auscultation bilaterally, No wheezes or rubs   Cardiac: Regular rate and rhythm with no murmurs. Abd: Bowel sounds were audible.  No tenderness on palpation   Ext: No pedal edema   Skin: No overt signs of rash     Neurological Exam:  Mental Status: Alert and oriented to person place and time   Speech: Fluent, stuttering present. Cranial Nerves:   Intact visual fields. Facial sensation is normal. Slight decreased nasolabial fold on left. Palate is midline. Normal sternocleidomastoid strength. Tongue is midline. Hearing is intact bilaterally. Eyes: PERRL, EOM's full, no nystagmus, no ptosis. Motor: Follows commands RUE 4+/5, LUE 4/5 BLE 4/5 (in chair)    Reflexes:   Plantar response is downgoing b/l. Sensory:   Sensation intact to light touch bilat throughout   Gait:  Not assessed. Tremor:   No tremor noted. Cerebellar:  Mild ataxia LUE FTN           Imaging  CT Results (maximum last 3): Results from [place] encounter on 10/05/22    CT HEAD WO CONT    Narrative  EXAM: CT HEAD WO CONT    INDICATION: altered mental status    COMPARISON: CT 9/21/2022. CONTRAST: None. TECHNIQUE: Unenhanced CT of the head was performed using 5 mm images. Brain and  bone windows were generated. Coronal and sagittal reformats. CT dose reduction  was achieved through use of a standardized protocol tailored for this  examination and automatic exposure control for dose modulation. FINDINGS: There is no acute intracranial hemorrhage, mass, mass effect or  herniation. Chronic right frontoparietal encephalomalacia redemonstrated without  significant interval change. Ventricles and sulci show no significant change in  proportionate and symmetric prominence. There is no significant change in  pattern of periventricular white matter hypodensity. The gray-white matter  differentiation is well-preserved. Atherosclerotic calcifications are seen  within the carotid siphons and vertebral arteries. The mastoid air cells are  well pneumatized. The visualized paranasal sinuses are normal.    Impression  No acute intracranial hemorrhage, mass or infarct identified.  No  significant change in right frontoparietal encephalomalacia or pattern of  atrophy and chronic white matter change most compatible with small vessel  ischemic and/or senescent change. Intracranial atherosclerosis. Results from [place] encounter on 09/18/22    CT HEAD WO CONT    Narrative  INDICATION: decline in function, l hand clumsiness    EXAM:  HEAD CT WITHOUT CONTRAST    COMPARISON: March 6, 2018    TECHNIQUE:  Routine noncontrast axial head CT was performed. Sagittal and  coronal reconstructions were generated. CT dose reduction was achieved through use of a standardized protocol tailored  for this examination and automatic exposure control for dose modulation. FINDINGS:    Ventricles: Moderate ventriculomegaly likely related to underlying atrophy. No  midline shift. Intracranial Hemorrhage: None. Brain Parenchyma/Brainstem: Areas of chronic infarction in the right parietal  lobe and right frontal operculum. Chronic periventricular white matter  hypodensity. Basal Cisterns: Normal.  Paranasal Sinuses: Visualized sinuses are clear. Additional Comments: N/A. Impression  Chronic white matter disease and areas of remote infarction. No acute process by  CT, though if there is high clinical concern for infarction consider MRI. MRI Results (maximum last 3): Results from [place] encounter on 03/06/18    MRI BRAIN WO CONT    Narrative  INDICATION:  Expressive aphasia. Left hand clumsiness. MRI of the brain is performed without contrast.    Right cerebral cortex shows diffusion restriction compatible with acute  ischemia/infarct. This primarily involves the parietal lobe but with small foci  in the frontal, temporal and occipital lobes. This is superimposed on right parietal chronic infarct with associated punctate  foci of hemosiderin. There is no acute hemorrhage and no mass effect/shift. Areas of T2 hyperintensity, and a right lacune, in the bilateral deep cerebral  white matter are compatible with chronic microvascular ischemia.     There is no mass, hydrocephalus or extra-axial fluid collection. Impression  IMPRESSION:  1. Acute right cerebral infarct, primarily parietal lobe. 2. No acute bleed or shift. 3. Chronic right parietal infarct with punctate foci of hemosiderin. 4. Chronic deep white matter microangiopathy.       Lab Review  Lab Results   Component Value Date/Time    WBC 10.5 10/06/2022 05:25 AM    HCT 40.7 10/06/2022 05:25 AM    HGB 13.4 10/06/2022 05:25 AM    PLATELET 069 34/24/8757 05:25 AM     Lab Results   Component Value Date/Time    Sodium 134 (L) 10/06/2022 05:25 AM    Potassium 4.2 10/06/2022 05:25 AM    Chloride 99 10/06/2022 05:25 AM    CO2 26 10/06/2022 05:25 AM    Glucose 114 (H) 10/06/2022 05:25 AM    BUN 59 (H) 10/06/2022 05:25 AM    Creatinine 1.63 (H) 10/06/2022 05:25 AM    Calcium 9.5 10/06/2022 05:25 AM         Signed:  GABBY Sharif

## 2022-10-06 NOTE — CONSULTS
Comprehensive Nutrition Assessment    Type and Reason for Visit: Initial, Consult    Nutrition Recommendations/Plan:   Diet per SLP. Would not add any further restrictions on top of diet texture given pts poor intake and recent severe wt loss. Added ensure enlive and magic cup TID, extra seasoning packets to trays. Pt lost his sense of taste from recent COVID and subsequently lost interest in eating. Encouraged family visitation, and for family to bring pts favorite foods. Malnutrition Assessment:  Malnutrition Status:  Severe malnutrition (10/06/22 1144)    Context:  Acute illness     Findings of the 6 clinical characteristics of malnutrition:   Energy Intake:  50% or less of est energy requirements for 5 or more days  Weight Loss:  Greater than 2% over 1 week     Body Fat Loss:  Unable to assess,     Muscle Mass Loss:  Unable to assess,    Fluid Accumulation:  No significant fluid accumulation,     Strength:  Not performed        Nutrition Assessment:    Past Medical History:   Diagnosis Date    Arthritis     CAD (coronary artery disease)     Carotid artery disease ([facility]     CHF exacerbation ([facility] 9/19/2022    Diabetes ([facility]     Diabetic neuropathy ([facility]     Hypercholesterolemia     Hypertension 11/29/10    Shingles 2010    Stroke ([facility]     Thromboembolus [facility])      Consult appreciated. 80 y.o. male admitted from SNF with aphasia, AMS, FTT, CHEVY, pressure sores. He has reportedly had poor po intake pta, per PA note did not eat breakfast today. FTT/AMS possibly caused by overdiuresis causing hypovolemia and toxic metabolic encephalopathy. SLP consulted re reported problems swallowing pta. Per wt history in EMR- pt appears to have lost 20# x 2 weeks which is significant. UBW appears to be ~211#, current wt 194#. Attempted to call pt via telephone but was unable to connect.  Spoke with pts son Eh Berumen who stated pt had COVID in the last month and subsequently lost his sense of taste, and so lost the desire to eat. He said that the patient would eat some fast food items that Jose Miguel/his sisters would bring into the SNF. He did say pt has a taste for sweet things- would likely accept chocolate ensure, magic cup; he also likes eggs and cold cereal. Was agreeable to sending up extra S&P/seasoning packets on meal trays so pt can season his food for increased taste acuity. I encouraged Jose Miguel/siblings to bring in pts favorite foods, and how social eating may help improve his dad's intake. Na 134, trending up with IVF. Recent Labs     10/06/22  0525 10/05/22  1747 10/05/22  1355   * 126* 208*   BUN 59* 60* 64*   CREA 1.63* 1.81* 2.19*   * 132* 128*   K 4.2 4.3 6.3*   CL 99 99 93*   CO2 26 25 25   CA 9.5 9.3 10.1   MG  --   --  2.4       Lab Results   Component Value Date/Time    Hemoglobin A1c 6.2 (H) 09/14/2022 04:44 PM    Hemoglobin A1c 6.8 (H) 02/14/2022 09:26 AM    Hemoglobin A1c 6.4 (H) 09/23/2021 02:54 PM    Hemoglobin A1c (POC) 8.1 06/23/2021 02:56 PM         Nutritionally Significant Medications:  Scheduled: humalog, lopressor, flomax, IV NaCl      Estimated Daily Nutrient Needs:  Energy Requirements Based On: Kcal/kg  Weight Used for Energy Requirements: Current  Energy (kcal/day): 2200  Weight Used for Protein Requirements: Current  Protein (g/day): 106     Fluid (ml/day): 1 ml/kcal    Nutrition Related Findings:   Edema: No data recorded    Last BM:  (pta),      Wounds: Wound consult pending, Pressure injury (sacrum)      Current Nutrition Therapies:  Diet: regular  Supplements: none  Meal intake: No data found. Supplement intake: No data found. Nutrition Support: none      Anthropometric Measures:  Height: 6' 1\" (185.4 cm)  Ideal Body Weight (IBW): 184 lbs (84 kg)     Current Body Wt:  88 kg (194 lb), 105.4 % IBW. Not specified  Current BMI (kg/m2): 25.6  Usual Body Weight: 95.7 kg (211 lb)  % Weight Change (Calculated): -8.1  Weight Adjustment: No adjustment     BMI Category:  Overweight (BMI 25.0-29. 9)    Wt Readings from Last 10 Encounters:   09/25/22 88.4 kg (194 lb 12.8 oz)   09/14/22 97.2 kg (214 lb 3.2 oz)   04/07/22 94.8 kg (209 lb)   02/14/22 95.8 kg (211 lb 3.2 oz)   09/28/21 94.3 kg (207 lb 14.4 oz)   09/23/21 93.9 kg (207 lb)   06/23/21 94.8 kg (209 lb)   03/22/21 92.5 kg (204 lb)   03/10/21 96.5 kg (212 lb 12.8 oz)   02/08/21 96 kg (211 lb 10.3 oz)           Nutrition Diagnosis:   Severe malnutrition related to inadequate protein-energy intake as evidenced by Criteria as identified in malnutrition assessment  Inadequate oral intake related to other (specify) (loss of taste/smell) as evidenced by other (specify) (recent COVID19 diagnosis)  Inadequate oral intake related to biting/chewing (masticatory) difficulty, swallowing difficulty as evidenced by other (specify) (SLP chris pending)    Nutrition Interventions:   Food and/or Nutrient Delivery: Continue current diet, Start oral nutrition supplement  Nutrition Education/Counseling: No recommendations at this time  Coordination of Nutrition Care: Continue to monitor while inpatient, Swallow evaluation, Feeding assistance/environmental change  Plan of Care discussed with: son    Goals:     Goals: PO intake 50% or greater, by next RD assessment       Nutrition Monitoring and Evaluation:   Behavioral-Environmental Outcomes: None identified  Food/Nutrient Intake Outcomes: Food and nutrient intake, Supplement intake, Diet advancement/tolerance  Physical Signs/Symptoms Outcomes: Biochemical data, Chewing or swallowing, Hemodynamic status, Meal time behavior, Nutrition focused physical findings, Weight, Skin    Discharge Planning:    Continue oral nutrition supplement    Wilson Brock RD  Available via [employer]

## 2022-10-06 NOTE — PROGRESS NOTES
Has a final transfer review been performed? Yes    Reason for Admission: AMS  Patient comes from: [address] [place] [address] Status: Alert and oriented and Lethargic  ADL:total assistance  Ambulation:wheelchair bound  Pertinent Info/Safety Concerns: Patient lethargic, speech slurred per family worse than normal.   Patient does answer questions, but is a little hard to understand. No skin break down. Incontinent Diapers. COVID Status: Not Indicated  MEWS Score: 0  Vitals:    10/05/22 1258 10/05/22 1945   BP: 95/62 104/67   Pulse: (!) 104 97   Resp: 16 14   Temp: 97.7 °F (36.5 °C) 97.4 °F (36.3 °C)   SpO2: 99% 95%     Lines:   Peripheral IV 10/05/22 Right Forearm (Active)      Transport mode:ED stretcher    Rory Ayon  being transferred to [facility](unit) for routine progression of care     \"Notification of etransfer note given to 5S staff.

## 2022-10-06 NOTE — PROGRESS NOTES
Patient/family seen: YES       Informed patient/family of BPCI-A Bundle Program. Also advised of potential outreach by Care Transitions Team.    Bundle Payment Care Improvement Beneficiary Letter Delivered to Beneficiary or Representative:YES.  Date BCPI -A was given:10/06/22

## 2022-10-06 NOTE — PROGRESS NOTES
Problem: Mobility Impaired (Adult and Pediatric)  Goal: *Acute Goals and Plan of Care (Insert Text)  Description: FUNCTIONAL STATUS PRIOR TO ADMISSION: Per case management note - from daughter prior to recent hospitalization at [facility] pt was living with wife, indep with cane. Pt was discharged to [facility] for rehab - stayed x 1 week and per family have been declining since in rehab. Unsure whether pt was OOB during brief rehab stay. Per EMS report - pt brought to hospital secondary to increase in slurred speech and weakness. HOME SUPPORT PRIOR TO ADMISSION: The patient lived with wife. Family still interested in  pursuing rehab at different facility. Physical Therapy Goals  Initiated 10/6/2022  1. Patient will move from supine to sit and sit to supine , scoot up and down, and roll side to side in bed with minimal assistance/contact guard assist within 7 day(s). 2.  Patient will transfer from bed to chair and chair to bed with supervision/set-up using the least restrictive device within 7 day(s). 3.  Patient will perform sit to stand with supervision/set-up within 7 day(s). 4.  Patient will ambulate with contact guard assist for > 50 feet with the least restrictive device within 7 day(s). 5.  Patient will ascend/descend 4 stairs with 2 handrail(s) with minimal contact guard assist within 7 day(s). PHYSICAL THERAPY EVALUATION  Patient: Ron Alicea (80 y.o. male)  Date: 10/6/2022  Primary Diagnosis: AMS (altered mental status) [R41.82]       Precautions:   Fall    ASSESSMENT  Based on the objective data described below, the patient presents from SNF with increased weakness and slurred speech. CT head negative for any acute changes - pt with hx of CVA. Speech eval found functional swallow limited by dentition status (pt not wearing dentures) and felt slurred speech not of neurological origin but of general weakness.   Pt found resting in bed after skin inspection and appeared stiff and rigid when trying to assist with bed mobility. Once sitting pt demonstrated independent sitting balance and min assist for sit to stand and short distance amb with RW. Patient was able to stand briefly without support of RW - with flexed forward head posture. Pt with significant recent decline in functional mobility - previously indep with cane. Recommend SNF for rehab - pt with potential to increased independence for functional mobility and decrease fall risk. Pt is on cardiac monitor - and at end of treatment - HR noted to be 131 - RN aware. Current Level of Function Impacting Discharge (mobility/balance): Mod assist x 1 bed mobility; min assist for transfers/amb with RW ; decreased activity tolerance    Functional Outcome Measure: The patient scored 25/100 on the Barthel Index outcome measure . Other factors to consider for discharge:      Patient will benefit from skilled therapy intervention to address the above noted impairments. PLAN :  Recommendations and Planned Interventions: bed mobility training, transfer training, gait training, therapeutic exercises, patient and family training/education, and therapeutic activities      Frequency/Duration: Patient will be followed by physical therapy:  5 times a week to address goals. Recommendation for discharge: (in order for the patient to meet his/her long term goals)  Therapy up to 5 days/week in SNF setting    This discharge recommendation:  Has been made in collaboration with the attending provider and/or case management    IF patient discharges home will need the following DME: to be determined (TBD)         SUBJECTIVE:   Patient stated I would like to sit up in a chair.     OBJECTIVE DATA SUMMARY:   HISTORY:    Past Medical History:   Diagnosis Date    Arthritis     CAD (coronary artery disease)     Carotid artery disease (HCC)     CHF exacerbation ([facility] 9/19/2022    Di[facility]     Diabetic neuropathy (HCC)     Hypercholesterolemia Hypertension 11/29/10    Shingles 2010    Stroke ([address]     Thromboembolus [facility])      Past Surgical History:   Procedure Laterality Date    HX CARPAL TUNNEL RELEASE  left    HX CORONARY ARTERY BYPASS GRAFT      HX HEART CATHETERIZATION  12/2005    Dr Erica Mena ORTHOPAEDIC      left arm fracture    AR COLONOSCOPY FLX DX W/COLLJ [facility] INPATIENT REHABILITATION WHEN PFRMD  11/12/2012            Personal factors and/or comorbidities impacting plan of care: as above     Home Situation  Home Environment:  ([facility])  # Steps to Enter: 4  Rails to Enter: Yes  Hand Rails : Right  One/Two Story Residence: Two story  Living Alone: No  Support Systems: Spouse/Significant Other, Child(pb)  Patient Expects to be Discharged to[de-identified] Skilled nursing facility  Current DME Used/Available at Home: [address], [facility]    EXAMINATION/PRESENTATION/DECISION MAKING:   Critical Behavior:  Neurologic State: Alert  Orientation Level: Oriented to person, Oriented to time  Cognition: Follows commands  Safety/Judgement: Fall prevention  Hearing: Auditory  Auditory Impairment: None  Skin:  right heel deep pressure skin injury - Float heels at all times  Range Of Motion:  AROM: Generally decreased, functional           PROM: Generally decreased, functional           Strength:    Strength: Generally decreased, functional                    Tone & Sensation:   Tone: Abnormal (? stiffness related to prolonged immobilization - per chart pt was bed ridden at [facility])              Sensation: Intact               Coordination:  Coordination: Generally decreased, functional  Functional Mobility:  Bed Mobility:  Rolling: Moderate assistance  Supine to Sit: Moderate assistance; Additional time     Scooting: Moderate assistance (to scoot to EOB)  Transfers:  Sit to Stand: Minimum assistance;Assist x1  Stand to Sit: Minimum assistance;Assist x1        Bed to Chair: Minimum assistance;Assist x1              Balance:   Sitting: Impaired; With support  Sitting - Static: Good (unsupported)  Sitting - Dynamic: Fair (occasional)  Standing: Impaired; With support  Standing - Static: Constant support;Good  Standing - Dynamic : Constant support; Fair  Ambulation/Gait Training:  Distance (ft): 2 Feet (ft) (forward then backward)     Ambulation - Level of Assistance: Contact guard assistance;Minimal assistance;Assist x1;Additional time; Adaptive equipment (RW)        Gait Abnormalities: Decreased step clearance (stooped forward head posture)        Base of Support: Narrowed (flexed at hips)     Speed/Gina: Slow  Step Length: Right shortened;Left shortened  Swing Pattern: Left asymmetrical;Right asymmetrical     Functional Measure:  Barthel Index:    Bathin  Bladder: 5  Bowels: 5  Groomin  Dressin  Feedin  Mobility: 0  Stairs: 0  Toilet Use: 5  Transfer (Bed to Chair and Back): 10  Total: 25/100       The Barthel ADL Index: Guidelines  1. The index should be used as a record of what a patient does, not as a record of what a patient could do. 2. The main aim is to establish degree of independence from any help, physical or verbal, however minor and for whatever reason. 3. The need for supervision renders the patient not independent. 4. A patient's performance should be established using the best available evidence. Asking the patient, friends/relatives and nurses are the usual sources, but direct observation and common sense are also important. However direct testing is not needed. 5. Usually the patient's performance over the preceding 24-48 hours is important, but occasionally longer periods will be relevant. 6. Middle categories imply that the patient supplies over 50 per cent of the effort. 7. Use of aids to be independent is allowed. Score Interpretation (from [address] [place] [address])    Independent   60-79 Minimally independent   40-59 Partially dependent   20-39 Very dependent   <20 Totally dependent     -Cori Buitrago., Barthel, D.W. (1965).  Functional evaluation: the Barthel Index. [facility] ([address] (1997). The Barthel activities of daily living index: self-reporting versus actual performance in the old (> or = 75 years). Journal of [address] [place] [address] 45(7), [facility], P[facility], [facility]., Darion Vigil. (1999). Measuring the change in disability after inpatient rehabilitation; comparison of the responsiveness of the Barthel Index and Functional Whiting Measure. Journal of Neurology, Neurosurgery, and Psychiatry, 66(4), 880-284. TheJAXON Ramirez, JULIA Klein, & Darlene Kelley M.A. (2004) Assessment of post-stroke quality of life in cost-effectiveness studies: The usefulness of the Barthel Index and the EuroQoL-5D. Quality of Life Research, 15, 991-72           Physical Therapy Evaluation Charge Determination   History Examination Presentation Decision-Making   LOW Complexity : Zero comorbidities / personal factors that will impact the outcome / POC LOW Complexity : 1-2 Standardized tests and measures addressing body structure, function, activity limitation and / or participation in recreation  LOW Complexity : Stable, uncomplicated  LOW Complexity : FOTO score of       Based on the above components, the patient evaluation is determined to be of the following complexity level: LOW     Activity Tolerance:   Good for mobilizing OOB - requiring only min assist - up in bedside chair    After treatment patient left in no apparent distress:   Sitting in chair, Call bell within reach, and Bed / chair alarm activated    COMMUNICATION/EDUCATION:   The patients plan of care was discussed with: Registered nurse. Fall prevention education was provided and the patient/caregiver indicated understanding., Patient/family have participated as able in goal setting and plan of care. , and Patient/family agree to work toward stated goals and plan of care.   Pt educated on fall prevention and safety in hospital.   Instructed to utilize call button and wait for assistance prior to getting OOB.     Thank you for this referral.  Thresa Lundborg, PT   Time Calculation: 30 mins

## 2022-10-07 LAB
ALBUMIN SERPL-MCNC: 2.9 G/DL (ref 3.5–5)
ALBUMIN/GLOB SERPL: 0.6 {RATIO} (ref 1.1–2.2)
ALP SERPL-CCNC: 76 U/L (ref 45–117)
ALT SERPL-CCNC: 18 U/L (ref 12–78)
ANION GAP SERPL CALC-SCNC: 7 MMOL/L (ref 5–15)
AST SERPL-CCNC: 22 U/L (ref 15–37)
BASOPHILS # BLD: 0.1 K/UL (ref 0–0.1)
BASOPHILS NFR BLD: 1 % (ref 0–1)
BILIRUB SERPL-MCNC: 0.2 MG/DL (ref 0.2–1)
BUN SERPL-MCNC: 46 MG/DL (ref 6–20)
BUN/CREAT SERPL: 32 (ref 12–20)
CALCIUM SERPL-MCNC: 9.5 MG/DL (ref 8.5–10.1)
CHLORIDE SERPL-SCNC: 101 MMOL/L (ref 97–108)
CHOLEST SERPL-MCNC: 129 MG/DL
CO2 SERPL-SCNC: 26 MMOL/L (ref 21–32)
CREAT SERPL-MCNC: 1.42 MG/DL (ref 0.7–1.3)
DIFFERENTIAL METHOD BLD: ABNORMAL
EOSINOPHIL # BLD: 0.1 K/UL (ref 0–0.4)
EOSINOPHIL NFR BLD: 1 % (ref 0–7)
ERYTHROCYTE [DISTWIDTH] IN BLOOD BY AUTOMATED COUNT: 13.2 % (ref 11.5–14.5)
GLOBULIN SER CALC-MCNC: 4.5 G/DL (ref 2–4)
GLUCOSE SERPL-MCNC: 139 MG/DL (ref 65–100)
HCT VFR BLD AUTO: 38.9 % (ref 36.6–50.3)
HDLC SERPL-MCNC: 33 MG/DL
HDLC SERPL: 3.9 {RATIO} (ref 0–5)
HGB BLD-MCNC: 12.6 G/DL (ref 12.1–17)
IMM GRANULOCYTES # BLD AUTO: 0.1 K/UL (ref 0–0.04)
IMM GRANULOCYTES NFR BLD AUTO: 1 % (ref 0–0.5)
LDLC SERPL CALC-MCNC: 74.4 MG/DL (ref 0–100)
LYMPHOCYTES # BLD: 2.4 K/UL (ref 0.8–3.5)
LYMPHOCYTES NFR BLD: 22 % (ref 12–49)
MAGNESIUM SERPL-MCNC: 2 MG/DL (ref 1.6–2.4)
MCH RBC QN AUTO: 27.8 PG (ref 26–34)
MCHC RBC AUTO-ENTMCNC: 32.4 G/DL (ref 30–36.5)
MCV RBC AUTO: 85.9 FL (ref 80–99)
MONOCYTES # BLD: 1.3 K/UL (ref 0–1)
MONOCYTES NFR BLD: 12 % (ref 5–13)
NEUTS SEG # BLD: 6.9 K/UL (ref 1.8–8)
NEUTS SEG NFR BLD: 63 % (ref 32–75)
NRBC # BLD: 0 K/UL (ref 0–0.01)
NRBC BLD-RTO: 0 PER 100 WBC
PLATELET # BLD AUTO: 291 K/UL (ref 150–400)
PMV BLD AUTO: 10.4 FL (ref 8.9–12.9)
POTASSIUM SERPL-SCNC: 4.2 MMOL/L (ref 3.5–5.1)
PROT SERPL-MCNC: 7.4 G/DL (ref 6.4–8.2)
RBC # BLD AUTO: 4.53 M/UL (ref 4.1–5.7)
SODIUM SERPL-SCNC: 134 MMOL/L (ref 136–145)
TRIGL SERPL-MCNC: 108 MG/DL (ref ?–150)
VLDLC SERPL CALC-MCNC: 21.6 MG/DL
WBC # BLD AUTO: 10.8 K/UL (ref 4.1–11.1)

## 2022-10-07 PROCEDURE — 65270000046 HC RM TELEMETRY

## 2022-10-07 PROCEDURE — 80053 COMPREHEN METABOLIC PANEL: CPT

## 2022-10-07 PROCEDURE — 97535 SELF CARE MNGMENT TRAINING: CPT | Performed by: OCCUPATIONAL THERAPIST

## 2022-10-07 PROCEDURE — 97116 GAIT TRAINING THERAPY: CPT

## 2022-10-07 PROCEDURE — 74011250636 HC RX REV CODE- 250/636: Performed by: PHYSICIAN ASSISTANT

## 2022-10-07 PROCEDURE — 74011250637 HC RX REV CODE- 250/637: Performed by: PHYSICIAN ASSISTANT

## 2022-10-07 PROCEDURE — 85025 COMPLETE CBC W/AUTO DIFF WBC: CPT

## 2022-10-07 PROCEDURE — 80061 LIPID PANEL: CPT

## 2022-10-07 PROCEDURE — 97530 THERAPEUTIC ACTIVITIES: CPT | Performed by: OCCUPATIONAL THERAPIST

## 2022-10-07 PROCEDURE — 74011000250 HC RX REV CODE- 250: Performed by: NURSE PRACTITIONER

## 2022-10-07 PROCEDURE — 74011250637 HC RX REV CODE- 250/637: Performed by: NURSE PRACTITIONER

## 2022-10-07 PROCEDURE — 36415 COLL VENOUS BLD VENIPUNCTURE: CPT

## 2022-10-07 PROCEDURE — 83735 ASSAY OF MAGNESIUM: CPT

## 2022-10-07 RX ADMIN — SODIUM CHLORIDE, PRESERVATIVE FREE 10 ML: 5 INJECTION INTRAVENOUS at 22:00

## 2022-10-07 RX ADMIN — AMITRIPTYLINE HYDROCHLORIDE 10 MG: 10 TABLET, FILM COATED ORAL at 19:33

## 2022-10-07 RX ADMIN — HEPARIN SODIUM 5000 UNITS: 5000 INJECTION INTRAVENOUS; SUBCUTANEOUS at 19:33

## 2022-10-07 RX ADMIN — METOPROLOL TARTRATE 25 MG: 25 TABLET, FILM COATED ORAL at 09:00

## 2022-10-07 RX ADMIN — AMITRIPTYLINE HYDROCHLORIDE 10 MG: 10 TABLET, FILM COATED ORAL at 09:00

## 2022-10-07 RX ADMIN — ASPIRIN 81 MG: 81 TABLET, COATED ORAL at 09:00

## 2022-10-07 RX ADMIN — TAMSULOSIN HYDROCHLORIDE 0.4 MG: 0.4 CAPSULE ORAL at 09:00

## 2022-10-07 RX ADMIN — HEPARIN SODIUM 5000 UNITS: 5000 INJECTION INTRAVENOUS; SUBCUTANEOUS at 06:46

## 2022-10-07 RX ADMIN — METOPROLOL TARTRATE 25 MG: 25 TABLET, FILM COATED ORAL at 22:00

## 2022-10-07 RX ADMIN — ACETAMINOPHEN 650 MG: 325 TABLET ORAL at 23:59

## 2022-10-07 NOTE — PROGRESS NOTES
Problem: Self Care Deficits Care Plan (Adult)  Goal: *Acute Goals and Plan of Care (Insert Text)  Description: FUNCTIONAL STATUS PRIOR TO ADMISSION: Pt admitted from SNF, with reports of gradual decline for the week he was there, with chart indicating prior to SNF stay pt was Mod Indep at [facility], living with wife and daughter. HOME SUPPORT: The patient lived with wife and daughter, with reports of Mod Spencer at [facility]. Occupational Therapy Goals  Initiated 10/6/2022  1. Patient will perform EOB self-feeding with independence within 7 day(s). 2.  Patient will perform EOB grooming with supervision/set-up within 7 day(s). 3.  Patient will perform EOB bathing with minimal assistance within 7 day(s). 4.  Patient will perform EOB/RW lower body dressing with minimal assistance within 7 day(s). 5.  Patient will perform RW toilet transfers with contact guard assist within 7 day(s). 6.  Patient will perform all aspects of RW toileting with minimal assistance within 7 day(s). Outcome: Progressing Towards Goal    OCCUPATIONAL THERAPY TREATMENT  Patient: Av Rojas (80 y.o. male)  Date: 10/7/2022  Diagnosis: AMS (altered mental status) [R41.82] <principal problem not specified>      Precautions: Fall  Chart, occupational therapy assessment, plan of care, and goals were reviewed. ASSESSMENT  Patient continues with skilled OT services and is progressing towards goals. Patient with significant improvement this date, demonstrated decreased confusion, good participation and following all commands. Supportive son present throughout and confirmed patient's independence prior to having COVID and going to a SNF where he became weaker. Noted to be tachy to 130's with sit to stand transfers.       Current Level of Function Impacting Discharge (ADLs): min to mod assist sit to stand, feeding with set-up, LE dressing max assist     Other factors to consider for discharge: per family he did not get out of bed at [facility] PLAN :  Patient continues to benefit from skilled intervention to address the above impairments. Continue treatment per established plan of care to address goals. Recommend with staff: up in chair for meals, bedside commode for toileting    Recommend next OT session: LE ADL's, functional transfers    Recommendation for discharge: (in order for the patient to meet his/her long term goals)  Rehab    This discharge recommendation:  Has been made in collaboration with the attending provider and/or case management    IF patient discharges home will need the following DME: none       SUBJECTIVE:   Patient stated I thought I was having a stroke.  stated when asked about recent events    OBJECTIVE DATA SUMMARY:   Cognitive/Behavioral Status:  Neurologic State: Alert  Orientation Level: Oriented to person;Oriented to place;Oriented to situation  Cognition: Follows commands; Appropriate for age attention/concentration  Perception: Appears intact  Perseveration: No perseveration noted  Safety/Judgement: Awareness of environment; Fall prevention;Decreased insight into deficits    Functional Mobility and Transfers for ADLs:  Bed Mobility:  Rolling:  (NT)  Supine to Sit: Minimum assistance; Additional time;Bed Modified (HOB elevated, rail used, OOB on R)  Scooting: Contact guard assistance; Additional time    Transfers:  Sit to Stand: Minimum assistance; Additional time (from chair, initially instructed to raise hips from chair, isolating UE's for increased strengthening, completed x's 2 reps with rest following   Instructed on positioning at edge of chair and heels behind knees for sit to stand, completed from chair with min to mod assist of 1 (low surface), completed 2 x's noted in static standing to have flexed posture and max cues to facilitate extension, increased shortness of breath noted and return to sitting, heart rate in 130's with activity, oxygen 100% on room air          Balance:  Sitting: Impaired; Without support  Sitting - Static: Good (unsupported)  Sitting - Dynamic: Fair (occasional)  Standing: Impaired; With support  Standing - Static: Fair;Constant support  Standing - Dynamic : Fair;Constant support    ADL Intervention:  Feeding  Feeding Assistance: Set-up; Supervision  Container Management: Set-up  Food to Mouth: Modified independent  Drink to Mouth: Modified independent        Lower Body Dressing Assistance  Dressing Assistance: Maximum assistance  Socks: Maximum assistance; Compensatory technique training  Leg Crossed Method Used: Yes  Position Performed: Seated in chair  Instructed in supine and in chair to cross LE's to stretch at hip to increase ability to perform LE dressing, completed on right and left with supervision, demonstrated increased ability to cross right LE and limited ability to reach distal foot, instructed to increase performance to increase flexibility. Patients son present also       Cognitive Retraining  Safety/Judgement: Awareness of environment; Fall prevention;Decreased insight into deficits        Pain:  No complaint    Activity Tolerance:   Good    After treatment patient left in no apparent distress:   Sitting in chair, Call bell within reach, Bed / chair alarm activated, and Caregiver / family present    COMMUNICATION/COLLABORATION:   The patients plan of care was discussed with: Physical therapist, Registered nurse, and Case management.      RICKY Gray/L  Time Calculation: 31 mins

## 2022-10-07 NOTE — PROGRESS NOTES
Transition Of Care: Update 2:26pm: [facility] and bed is availability is pending. 11:54am: Pending referral with [facility] SNF. Pending more SNF choices with daughter, Faraz Sánchez. BLS to transport. RUR: 18%    The was admitted from [facility] and the family do not want him to return there. Hospitalist, speech, wound care, PT/OT following. The patient and family plan for him to go to SNF. CM following for discharge needs.     Hayley Alfonso RN/CRM
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
## 2022-10-07 NOTE — ACP (ADVANCE CARE PLANNING)
Advance Care Planning     Advance Care Planning (ACP) Physician/NP/PA Conversation      Date of Conversation: 10/5/2022  Conducted with: Patient with Decision Making Capacity and Healthcare Decision Maker: Next of Kin by law (only applies in absence of a Healthcare Power of  or Legal Guardian)    Healthcare Decision Maker:   No healthcare decision makers have been documented. Click here to complete [address] including selection of the Healthcare Decision Maker Relationship (ie \"Primary\")  Today we discussed [address]. The patient is considering options. Care Preferences:    Hospitalization: \"If your health worsens and it becomes clear that your chance of recovery is unlikely, what would be your preference regarding hospitalization? \"  The patient would prefer hospitalization. Ventilation: \"If you were unable to breathe on your own and your chance of recovery was unlikely, what would be your preference about the use of a ventilator (breathing machine) if it was available to you? \"   The patient would desire the use of a ventilator. Resuscitation: \"In the event your heart stopped as a result of an underlying serious health condition, would you want attempts to be made to restart your heart, or would you prefer a natural death? \"   Yes, attempt to resuscitate.     Additional topics discussed: treatment goals, artificial nutrition, ventilation preferences, hospitalization preferences, and resuscitation preferences    Conversation Outcomes / Follow-Up Plan:   ACP complete - no further action today  Reviewed DNR/DNI and patient elects Full Code (Attempt Resuscitation)     Length of Voluntary ACP Conversation in minutes:  20 minutes    Susanne Karimi PA-C
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
## 2022-10-07 NOTE — PROGRESS NOTES
Problem: Mobility Impaired (Adult and Pediatric)  Goal: *Acute Goals and Plan of Care (Insert Text)  Description: FUNCTIONAL STATUS PRIOR TO ADMISSION: Per case management note - from daughter prior to recent hospitalization at [facility] pt was living with wife, indep with cane. Pt was discharged to [facility] for rehab - stayed x 1 week and per family have been declining since in rehab. Unsure whether pt was OOB during brief rehab stay. Per EMS report - pt brought to hospital secondary to increase in slurred speech and weakness. HOME SUPPORT PRIOR TO ADMISSION: The patient lived with wife. Family still interested in  pursuing rehab at different facility. Physical Therapy Goals  Initiated 10/6/2022  1. Patient will move from supine to sit and sit to supine , scoot up and down, and roll side to side in bed with minimal assistance/contact guard assist within 7 day(s). 2.  Patient will transfer from bed to chair and chair to bed with supervision/set-up using the least restrictive device within 7 day(s). 3.  Patient will perform sit to stand with supervision/set-up within 7 day(s). 4.  Patient will ambulate with contact guard assist for > 50 feet with the least restrictive device within 7 day(s). 5.  Patient will ascend/descend 4 stairs with 2 handrail(s) with minimal contact guard assist within 7 day(s). Outcome: Progressing Towards Goal   PHYSICAL THERAPY TREATMENT  Patient: Hannah Qureshi (80 y.o. male)  Date: 10/7/2022  Diagnosis: AMS (altered mental status) [R41.82] <principal problem not specified>      Precautions: Fall  Chart, physical therapy assessment, plan of care and goals were reviewed. ASSESSMENT  Pt seen following RN clearance. Student RN present and helpful. Patient continues with skilled PT services and is progressing towards goals.  Pt pleasant and agreeable to bed mobility, transfers, brief gait training, and OOB to chair for breakfast. Pt remains difficult to understand due to lack of dentition, reports he has a family member who may be able to bring his dentures in. .. unsure of accuracy of this historian. He indicates pain in BLEs and dressings noted for B heels/feet. Pt with NAD during session events on RA: he endorses SOB previously but denies this issue this morning. SpO2 99% on RA sitting, prior to activity (/69 HR 94). Pt demonstrating significant progress today and may be appropriate for more intensive 3 hrs of therapy per day at D/C. Will continue to follow on Monday if pt remains over the weekend. Recommend: OOB to chair for all meals and BSC vs walking to bathroom with assist at RW/gait belt for toileting     Next session: bed mobility, transfers, gait training to doorway as appropriate and OOB to chair; LE therex    Current Level of Function Impacting Discharge (mobility/balance): upwards of Geoff with DME and surface height modifications     Other factors to consider for discharge: pt from SNF and may benefit from IPR at this time for optimal recovery (pt living with wife and indep prior to previous admission and decline noted at SNF x1 week)         PLAN :  Patient continues to benefit from skilled intervention to address the above impairments. Continue treatment per established plan of care. to address goals. Recommendation for discharge: (in order for the patient to meet his/her long term goals)  IPR for optimal recovery as pt was indep prior to previous admission (decline noted at [facility])    This discharge recommendation:  Has been made in collaboration with the attending provider and/or case management    IF patient discharges home will need the following DME: TBD: pt will benefit from RW, BSC (depending on BR distance), hospital bed (if BR is on second floor), and [facility] pending stair training       SUBJECTIVE:   Patient stated I wish I had a long sleeve shirt. .. Pt provided blankets for shoulders and lap.     OBJECTIVE DATA SUMMARY:   Critical Behavior:  Neurologic State: Alert  Orientation Level: Oriented to person, Oriented to time  Cognition: Memory loss, Follows commands  Safety/Judgement: Fall prevention  Functional Mobility Training:  Bed Mobility:  Rolling:  (NT)  Supine to Sit: Minimum assistance; Additional time;Bed Modified (HOB elevated, rail used, OOB on R)     Scooting: Contact guard assistance; Additional time        Transfers:  Sit to Stand: Minimum assistance; Additional time (EOB elevated for ease; multiple failed attempts with allowance for self correction)  Stand to Sit: Contact guard assistance; Additional time (cues for alignment with RW backing to chair andn reach back BUEs for controlled descent)                             Balance:  Sitting: Impaired; Without support  Sitting - Static: Good (unsupported)  Sitting - Dynamic: Fair (occasional)  Standing: Impaired; With support  Standing - Static: Fair;Constant support  Standing - Dynamic : Fair;Constant support  Ambulation/Gait Training:  Distance (ft): 5 Feet (ft) (x4 +3 for steps EOB and fwd/back in front of chair)  Assistive Device: Walker, rolling;Gait belt  Ambulation - Level of Assistance: Contact guard assistance;Minimal assistance; Additional time (increased assistance for backward steps with RW-slight LOB)        Gait Abnormalities: Decreased step clearance;Shuffling gait (flexed trunk/LE  posture)        Base of Support: Center of gravity altered (flexed trunk and LEs- cues for postural extension)     Speed/Gina: Slow;Pace decreased (<100 feet/min)  Step Length: Right shortened;Left shortened  Swing Pattern: Right symmetrical;Left symmetrical      Therapeutic Exercises:   APs encouraged for DVT prevention and wound healing   Pain Rating:  NT    Activity Tolerance:   Fair    After treatment patient left in no apparent distress:   Sitting in chair, Call bell within reach, Bed / chair alarm activated, and student RN present for assist    COMMUNICATION/COLLABORATION:   The patients plan of care was discussed with: Registered nurse.      Jayesh Shaw   Time Calculation: 20 mins
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
## 2022-10-08 VITALS
BODY MASS INDEX: 25.82 KG/M2 | HEART RATE: 102 BPM | OXYGEN SATURATION: 99 % | RESPIRATION RATE: 17 BRPM | DIASTOLIC BLOOD PRESSURE: 78 MMHG | TEMPERATURE: 97.7 F | HEIGHT: 73 IN | WEIGHT: 194.8 LBS | SYSTOLIC BLOOD PRESSURE: 110 MMHG

## 2022-10-08 LAB
ALBUMIN SERPL-MCNC: 2.8 G/DL (ref 3.5–5)
ALBUMIN/GLOB SERPL: 0.6 {RATIO} (ref 1.1–2.2)
ALP SERPL-CCNC: 75 U/L (ref 45–117)
ALT SERPL-CCNC: 26 U/L (ref 12–78)
ANION GAP SERPL CALC-SCNC: 8 MMOL/L (ref 5–15)
AST SERPL-CCNC: 25 U/L (ref 15–37)
BASOPHILS # BLD: 0.1 K/UL (ref 0–0.1)
BASOPHILS NFR BLD: 1 % (ref 0–1)
BILIRUB SERPL-MCNC: 0.2 MG/DL (ref 0.2–1)
BUN SERPL-MCNC: 34 MG/DL (ref 6–20)
BUN/CREAT SERPL: 27 (ref 12–20)
CALCIUM SERPL-MCNC: 9.3 MG/DL (ref 8.5–10.1)
CHLORIDE SERPL-SCNC: 101 MMOL/L (ref 97–108)
CO2 SERPL-SCNC: 26 MMOL/L (ref 21–32)
CREAT SERPL-MCNC: 1.27 MG/DL (ref 0.7–1.3)
DIFFERENTIAL METHOD BLD: ABNORMAL
EOSINOPHIL # BLD: 0.1 K/UL (ref 0–0.4)
EOSINOPHIL NFR BLD: 1 % (ref 0–7)
ERYTHROCYTE [DISTWIDTH] IN BLOOD BY AUTOMATED COUNT: 12.8 % (ref 11.5–14.5)
GLOBULIN SER CALC-MCNC: 4.4 G/DL (ref 2–4)
GLUCOSE BLD STRIP.AUTO-MCNC: 147 MG/DL (ref 65–117)
GLUCOSE BLD STRIP.AUTO-MCNC: 189 MG/DL (ref 65–117)
GLUCOSE BLD STRIP.AUTO-MCNC: 218 MG/DL (ref 65–117)
GLUCOSE SERPL-MCNC: 140 MG/DL (ref 65–100)
HCT VFR BLD AUTO: 39 % (ref 36.6–50.3)
HGB BLD-MCNC: 12.8 G/DL (ref 12.1–17)
IMM GRANULOCYTES # BLD AUTO: 0.1 K/UL (ref 0–0.04)
IMM GRANULOCYTES NFR BLD AUTO: 1 % (ref 0–0.5)
LYMPHOCYTES # BLD: 2.8 K/UL (ref 0.8–3.5)
LYMPHOCYTES NFR BLD: 29 % (ref 12–49)
MAGNESIUM SERPL-MCNC: 2 MG/DL (ref 1.6–2.4)
MCH RBC QN AUTO: 28.1 PG (ref 26–34)
MCHC RBC AUTO-ENTMCNC: 32.8 G/DL (ref 30–36.5)
MCV RBC AUTO: 85.7 FL (ref 80–99)
MONOCYTES # BLD: 1.4 K/UL (ref 0–1)
MONOCYTES NFR BLD: 14 % (ref 5–13)
NEUTS SEG # BLD: 5.2 K/UL (ref 1.8–8)
NEUTS SEG NFR BLD: 54 % (ref 32–75)
NRBC # BLD: 0 K/UL (ref 0–0.01)
NRBC BLD-RTO: 0 PER 100 WBC
PLATELET # BLD AUTO: 257 K/UL (ref 150–400)
PMV BLD AUTO: 10.1 FL (ref 8.9–12.9)
POTASSIUM SERPL-SCNC: 3.9 MMOL/L (ref 3.5–5.1)
PROT SERPL-MCNC: 7.2 G/DL (ref 6.4–8.2)
RBC # BLD AUTO: 4.55 M/UL (ref 4.1–5.7)
SERVICE CMNT-IMP: ABNORMAL
SODIUM SERPL-SCNC: 135 MMOL/L (ref 136–145)
WBC # BLD AUTO: 9.6 K/UL (ref 4.1–11.1)

## 2022-10-08 PROCEDURE — 36415 COLL VENOUS BLD VENIPUNCTURE: CPT

## 2022-10-08 PROCEDURE — 74011250637 HC RX REV CODE- 250/637: Performed by: PHYSICIAN ASSISTANT

## 2022-10-08 PROCEDURE — 74011250637 HC RX REV CODE- 250/637: Performed by: NURSE PRACTITIONER

## 2022-10-08 PROCEDURE — 74011000250 HC RX REV CODE- 250: Performed by: NURSE PRACTITIONER

## 2022-10-08 PROCEDURE — 80053 COMPREHEN METABOLIC PANEL: CPT

## 2022-10-08 PROCEDURE — 82962 GLUCOSE BLOOD TEST: CPT

## 2022-10-08 PROCEDURE — 74011250636 HC RX REV CODE- 250/636: Performed by: PHYSICIAN ASSISTANT

## 2022-10-08 PROCEDURE — 85025 COMPLETE CBC W/AUTO DIFF WBC: CPT

## 2022-10-08 PROCEDURE — 83735 ASSAY OF MAGNESIUM: CPT

## 2022-10-08 PROCEDURE — 65270000046 HC RM TELEMETRY

## 2022-10-08 RX ADMIN — ROSUVASTATIN CALCIUM 20 MG: 10 TABLET, COATED ORAL at 21:53

## 2022-10-08 RX ADMIN — ASPIRIN 81 MG: 81 TABLET, COATED ORAL at 10:40

## 2022-10-08 RX ADMIN — ACETAMINOPHEN 650 MG: 325 TABLET ORAL at 21:53

## 2022-10-08 RX ADMIN — SODIUM CHLORIDE, PRESERVATIVE FREE 10 ML: 5 INJECTION INTRAVENOUS at 05:36

## 2022-10-08 RX ADMIN — AMITRIPTYLINE HYDROCHLORIDE 10 MG: 10 TABLET, FILM COATED ORAL at 10:40

## 2022-10-08 RX ADMIN — METOPROLOL TARTRATE 25 MG: 25 TABLET, FILM COATED ORAL at 21:52

## 2022-10-08 RX ADMIN — HEPARIN SODIUM 5000 UNITS: 5000 INJECTION INTRAVENOUS; SUBCUTANEOUS at 11:00

## 2022-10-08 RX ADMIN — TAMSULOSIN HYDROCHLORIDE 0.4 MG: 0.4 CAPSULE ORAL at 10:40

## 2022-10-08 RX ADMIN — HEPARIN SODIUM 5000 UNITS: 5000 INJECTION INTRAVENOUS; SUBCUTANEOUS at 19:00

## 2022-10-08 RX ADMIN — AMITRIPTYLINE HYDROCHLORIDE 10 MG: 10 TABLET, FILM COATED ORAL at 19:15

## 2022-10-08 RX ADMIN — HEPARIN SODIUM 5000 UNITS: 5000 INJECTION INTRAVENOUS; SUBCUTANEOUS at 03:25

## 2022-10-08 RX ADMIN — SODIUM CHLORIDE, PRESERVATIVE FREE 10 ML: 5 INJECTION INTRAVENOUS at 14:00
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
## 2022-10-08 NOTE — PROGRESS NOTES
Transition of Care Plan  RUR- 16%   DISPOSITION: The disposition plan is SNF- [facility]  Per conversation with Har[facility]-admissions liaison 009.2023; he reported that he won't be able to take the pt until Monday. As they do not have any available male beds.    F/U with PCP/Specialist    Transport: [facility]   Barrier: Awaiting Bed availability         CM: [address]. MSW,   [phone]

## 2022-10-09 LAB
ANION GAP SERPL CALC-SCNC: 6 MMOL/L (ref 5–15)
BASOPHILS # BLD: 0.1 K/UL (ref 0–0.1)
BASOPHILS NFR BLD: 1 % (ref 0–1)
BUN SERPL-MCNC: 23 MG/DL (ref 6–20)
BUN/CREAT SERPL: 19 (ref 12–20)
CALCIUM SERPL-MCNC: 9.1 MG/DL (ref 8.5–10.1)
CHLORIDE SERPL-SCNC: 103 MMOL/L (ref 97–108)
CO2 SERPL-SCNC: 27 MMOL/L (ref 21–32)
CREAT SERPL-MCNC: 1.23 MG/DL (ref 0.7–1.3)
DIFFERENTIAL METHOD BLD: ABNORMAL
EOSINOPHIL # BLD: 0.2 K/UL (ref 0–0.4)
EOSINOPHIL NFR BLD: 2 % (ref 0–7)
ERYTHROCYTE [DISTWIDTH] IN BLOOD BY AUTOMATED COUNT: 12.8 % (ref 11.5–14.5)
GLUCOSE BLD STRIP.AUTO-MCNC: 233 MG/DL (ref 65–117)
GLUCOSE BLD STRIP.AUTO-MCNC: 264 MG/DL (ref 65–117)
GLUCOSE SERPL-MCNC: 192 MG/DL (ref 65–100)
HCT VFR BLD AUTO: 34.7 % (ref 36.6–50.3)
HGB BLD-MCNC: 11.5 G/DL (ref 12.1–17)
IMM GRANULOCYTES # BLD AUTO: 0 K/UL (ref 0–0.04)
IMM GRANULOCYTES NFR BLD AUTO: 1 % (ref 0–0.5)
LYMPHOCYTES # BLD: 2.5 K/UL (ref 0.8–3.5)
LYMPHOCYTES NFR BLD: 32 % (ref 12–49)
MAGNESIUM SERPL-MCNC: 1.9 MG/DL (ref 1.6–2.4)
MCH RBC QN AUTO: 28.5 PG (ref 26–34)
MCHC RBC AUTO-ENTMCNC: 33.1 G/DL (ref 30–36.5)
MCV RBC AUTO: 86.1 FL (ref 80–99)
MONOCYTES # BLD: 0.9 K/UL (ref 0–1)
MONOCYTES NFR BLD: 11 % (ref 5–13)
NEUTS SEG # BLD: 4.2 K/UL (ref 1.8–8)
NEUTS SEG NFR BLD: 53 % (ref 32–75)
NRBC # BLD: 0 K/UL (ref 0–0.01)
NRBC BLD-RTO: 0 PER 100 WBC
PLATELET # BLD AUTO: 290 K/UL (ref 150–400)
PMV BLD AUTO: 10.5 FL (ref 8.9–12.9)
POTASSIUM SERPL-SCNC: 3.9 MMOL/L (ref 3.5–5.1)
RBC # BLD AUTO: 4.03 M/UL (ref 4.1–5.7)
SERVICE CMNT-IMP: ABNORMAL
SERVICE CMNT-IMP: ABNORMAL
SODIUM SERPL-SCNC: 136 MMOL/L (ref 136–145)
WBC # BLD AUTO: 7.8 K/UL (ref 4.1–11.1)

## 2022-10-09 PROCEDURE — 74011000250 HC RX REV CODE- 250: Performed by: NURSE PRACTITIONER

## 2022-10-09 PROCEDURE — 82962 GLUCOSE BLOOD TEST: CPT

## 2022-10-09 PROCEDURE — 85025 COMPLETE CBC W/AUTO DIFF WBC: CPT

## 2022-10-09 PROCEDURE — 74011250636 HC RX REV CODE- 250/636: Performed by: PHYSICIAN ASSISTANT

## 2022-10-09 PROCEDURE — 83735 ASSAY OF MAGNESIUM: CPT

## 2022-10-09 PROCEDURE — 36415 COLL VENOUS BLD VENIPUNCTURE: CPT

## 2022-10-09 PROCEDURE — 80048 BASIC METABOLIC PNL TOTAL CA: CPT

## 2022-10-09 PROCEDURE — 74011250637 HC RX REV CODE- 250/637: Performed by: PHYSICIAN ASSISTANT

## 2022-10-09 PROCEDURE — 65270000046 HC RM TELEMETRY

## 2022-10-09 PROCEDURE — 74011250637 HC RX REV CODE- 250/637: Performed by: NURSE PRACTITIONER

## 2022-10-09 RX ADMIN — TAMSULOSIN HYDROCHLORIDE 0.4 MG: 0.4 CAPSULE ORAL at 11:01

## 2022-10-09 RX ADMIN — METOPROLOL TARTRATE 25 MG: 25 TABLET, FILM COATED ORAL at 11:01

## 2022-10-09 RX ADMIN — HEPARIN SODIUM 5000 UNITS: 5000 INJECTION INTRAVENOUS; SUBCUTANEOUS at 19:40

## 2022-10-09 RX ADMIN — HEPARIN SODIUM 5000 UNITS: 5000 INJECTION INTRAVENOUS; SUBCUTANEOUS at 03:38

## 2022-10-09 RX ADMIN — SODIUM CHLORIDE, PRESERVATIVE FREE 10 ML: 5 INJECTION INTRAVENOUS at 05:35

## 2022-10-09 RX ADMIN — SODIUM CHLORIDE, PRESERVATIVE FREE 10 ML: 5 INJECTION INTRAVENOUS at 00:00

## 2022-10-09 RX ADMIN — AMITRIPTYLINE HYDROCHLORIDE 10 MG: 10 TABLET, FILM COATED ORAL at 19:40

## 2022-10-09 RX ADMIN — ASPIRIN 81 MG: 81 TABLET, COATED ORAL at 11:01

## 2022-10-09 RX ADMIN — AMITRIPTYLINE HYDROCHLORIDE 10 MG: 10 TABLET, FILM COATED ORAL at 11:01

## 2022-10-09 RX ADMIN — SODIUM CHLORIDE, PRESERVATIVE FREE 10 ML: 5 INJECTION INTRAVENOUS at 19:41

## 2022-10-09 RX ADMIN — HEPARIN SODIUM 5000 UNITS: 5000 INJECTION INTRAVENOUS; SUBCUTANEOUS at 11:02

## 2022-10-09 RX ADMIN — ROSUVASTATIN CALCIUM 20 MG: 10 TABLET, COATED ORAL at 22:21

## 2022-10-09 RX ADMIN — METOPROLOL TARTRATE 25 MG: 25 TABLET, FILM COATED ORAL at 21:31

## 2022-10-09 RX ADMIN — ACETAMINOPHEN 650 MG: 325 TABLET ORAL at 22:10
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
## 2022-10-10 VITALS
HEIGHT: 73 IN | TEMPERATURE: 97.7 F | BODY MASS INDEX: 25.1 KG/M2 | WEIGHT: 189.4 LBS | DIASTOLIC BLOOD PRESSURE: 62 MMHG | SYSTOLIC BLOOD PRESSURE: 96 MMHG | OXYGEN SATURATION: 98 % | HEART RATE: 91 BPM | RESPIRATION RATE: 18 BRPM

## 2022-10-10 LAB
ANION GAP SERPL CALC-SCNC: 6 MMOL/L (ref 5–15)
BASOPHILS # BLD: 0.1 K/UL (ref 0–0.1)
BASOPHILS NFR BLD: 1 % (ref 0–1)
BUN SERPL-MCNC: 17 MG/DL (ref 6–20)
BUN/CREAT SERPL: 14 (ref 12–20)
CALCIUM SERPL-MCNC: 9 MG/DL (ref 8.5–10.1)
CHLORIDE SERPL-SCNC: 104 MMOL/L (ref 97–108)
CO2 SERPL-SCNC: 27 MMOL/L (ref 21–32)
COVID-19 RAPID TEST, COVR: DETECTED
CREAT SERPL-MCNC: 1.21 MG/DL (ref 0.7–1.3)
DIFFERENTIAL METHOD BLD: ABNORMAL
EOSINOPHIL # BLD: 0.2 K/UL (ref 0–0.4)
EOSINOPHIL NFR BLD: 2 % (ref 0–7)
ERYTHROCYTE [DISTWIDTH] IN BLOOD BY AUTOMATED COUNT: 12.9 % (ref 11.5–14.5)
GLUCOSE BLD STRIP.AUTO-MCNC: 237 MG/DL (ref 65–117)
GLUCOSE SERPL-MCNC: 187 MG/DL (ref 65–100)
HCT VFR BLD AUTO: 35.6 % (ref 36.6–50.3)
HGB BLD-MCNC: 11.6 G/DL (ref 12.1–17)
IMM GRANULOCYTES # BLD AUTO: 0 K/UL (ref 0–0.04)
IMM GRANULOCYTES NFR BLD AUTO: 1 % (ref 0–0.5)
LYMPHOCYTES # BLD: 2.6 K/UL (ref 0.8–3.5)
LYMPHOCYTES NFR BLD: 31 % (ref 12–49)
MAGNESIUM SERPL-MCNC: 1.9 MG/DL (ref 1.6–2.4)
MCH RBC QN AUTO: 28.3 PG (ref 26–34)
MCHC RBC AUTO-ENTMCNC: 32.6 G/DL (ref 30–36.5)
MCV RBC AUTO: 86.8 FL (ref 80–99)
MONOCYTES # BLD: 0.9 K/UL (ref 0–1)
MONOCYTES NFR BLD: 11 % (ref 5–13)
NEUTS SEG # BLD: 4.8 K/UL (ref 1.8–8)
NEUTS SEG NFR BLD: 54 % (ref 32–75)
NRBC # BLD: 0 K/UL (ref 0–0.01)
NRBC BLD-RTO: 0 PER 100 WBC
PLATELET # BLD AUTO: 277 K/UL (ref 150–400)
PMV BLD AUTO: 10 FL (ref 8.9–12.9)
POTASSIUM SERPL-SCNC: 3.7 MMOL/L (ref 3.5–5.1)
RBC # BLD AUTO: 4.1 M/UL (ref 4.1–5.7)
SERVICE CMNT-IMP: ABNORMAL
SODIUM SERPL-SCNC: 137 MMOL/L (ref 136–145)
SOURCE, COVRS: ABNORMAL
WBC # BLD AUTO: 8.6 K/UL (ref 4.1–11.1)

## 2022-10-10 PROCEDURE — 85025 COMPLETE CBC W/AUTO DIFF WBC: CPT

## 2022-10-10 PROCEDURE — 87635 SARS-COV-2 COVID-19 AMP PRB: CPT

## 2022-10-10 PROCEDURE — 74011000250 HC RX REV CODE- 250: Performed by: NURSE PRACTITIONER

## 2022-10-10 PROCEDURE — 83735 ASSAY OF MAGNESIUM: CPT

## 2022-10-10 PROCEDURE — 74011250636 HC RX REV CODE- 250/636: Performed by: PHYSICIAN ASSISTANT

## 2022-10-10 PROCEDURE — 82962 GLUCOSE BLOOD TEST: CPT

## 2022-10-10 PROCEDURE — 36415 COLL VENOUS BLD VENIPUNCTURE: CPT

## 2022-10-10 PROCEDURE — 80048 BASIC METABOLIC PNL TOTAL CA: CPT

## 2022-10-10 PROCEDURE — 74011250637 HC RX REV CODE- 250/637: Performed by: PHYSICIAN ASSISTANT

## 2022-10-10 PROCEDURE — 74011250637 HC RX REV CODE- 250/637: Performed by: NURSE PRACTITIONER

## 2022-10-10 RX ORDER — FUROSEMIDE 20 MG/1
TABLET ORAL
Qty: 30 TABLET | Refills: 0 | Status: SHIPPED | OUTPATIENT
Start: 2022-10-10

## 2022-10-10 RX ORDER — LISINOPRIL 5 MG/1
5 TABLET ORAL DAILY
Qty: 30 TABLET | Refills: 0 | Status: SHIPPED | OUTPATIENT
Start: 2022-10-10

## 2022-10-10 RX ADMIN — HEPARIN SODIUM 5000 UNITS: 5000 INJECTION INTRAVENOUS; SUBCUTANEOUS at 03:30

## 2022-10-10 RX ADMIN — Medication 3 MG: at 01:11

## 2022-10-10 RX ADMIN — AMITRIPTYLINE HYDROCHLORIDE 10 MG: 10 TABLET, FILM COATED ORAL at 09:40

## 2022-10-10 RX ADMIN — SODIUM CHLORIDE, PRESERVATIVE FREE 10 ML: 5 INJECTION INTRAVENOUS at 00:00

## 2022-10-10 RX ADMIN — HEPARIN SODIUM 5000 UNITS: 5000 INJECTION INTRAVENOUS; SUBCUTANEOUS at 11:49

## 2022-10-10 RX ADMIN — METOPROLOL TARTRATE 25 MG: 25 TABLET, FILM COATED ORAL at 09:40

## 2022-10-10 RX ADMIN — ASPIRIN 81 MG: 81 TABLET, COATED ORAL at 09:40

## 2022-10-10 RX ADMIN — TAMSULOSIN HYDROCHLORIDE 0.4 MG: 0.4 CAPSULE ORAL at 09:40
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
## 2022-10-10 NOTE — PROGRESS NOTES
TRANSFER - OUT REPORT:    Verbal report given to [facility]) on [employer]  being transferred to [facility](unit) for routine post - op       Report consisted of patients Situation, Background, Assessment and   Recommendations(SBAR). Information from the following report(s) SBAR, Kardex, and Intake/Output was reviewed with the receiving nurse. Lines:   Peripheral IV 10/05/22 Right Forearm (Active)   Site Assessment Clean, dry, & intact 10/10/22 0807   Phlebitis Assessment 0 10/10/22 0807   Infiltration Assessment 0 10/10/22 0807   Dressing Status Clean, dry, & intact 10/10/22 0807   Dressing Type Transparent 10/10/22 0807   Hub Color/Line Status Capped 10/10/22 0582        Opportunity for questions and clarification was provided.       Patient transported with:

## 2022-10-10 NOTE — PROGRESS NOTES
Hospitalist Significant Event    Event: I called the daughter, Francisca Swenson, and updated her regarding the positive COVID-19 PCR. We reviewed he is not displaying any signs of COVID-19 infection and his O2 sats are stable. Discussed signs and symptoms to monitor for at rehab and reasons to re-seek care. She is comfortable with him leaving.     Min Hall PA-C

## 2022-10-10 NOTE — PROGRESS NOTES
Transition Of Care: Update 10:59am: COVID rapid result is positive. CM alerted hospitalist NP and called the liaison, Esau Price at [facility] and they can still take the patient today. CM called and notified the patient's daughter, Marleni Lira. 10:11am: The patient is discharging to [facility] at 1pm with [facility] transport. RN to call report to: [phone]. RUR: 14%    CM spoke with Zi Johnson ([phone]) at [facility] and he has accepted the patient today. CM called and spoke with the patient's daughter Juanpablo [phone]) and she agrees with plan. Medicare pt has received, reviewed, and signed 2nd IM letter informing them of their right to appeal the discharge. Signed copied has been placed on pt bedside chart. CM instructed by the patient's daughterNicolas acknowledgment of receiving the letter via phone and instructed to leave copy with the patient bedside. Transition of Care Plan to SNF/Rehab    SNF/Rehab Transition:  Patient has been accepted to [facility] and meets criteria for admission. Patient will transported by [facility] and expected to leave at 1pm.    Communication to Patient/Family:  Met with patient and daughter, Marleni Lira (identified care giver) and they are agreeable to the transition plan. Communication to SNF/Rehab:  Bedside RN, Clemente Molina, has been notified to update the transition plan to the facility and call report (phone number [phone]). Discharge information has been updated on the AVS.       Nursing Please include all hard scripts for controlled substances, med rec and dc summary, and AVS in packet.      Reviewed and confirmed with facility, Octavio Cole can manage the patient care needs for the following:     Reviewed and confirmed with facility, Octavio Cole can manage the patient care needs for the following:     Ubaldo Artis with (X) only those applicable:    Medication:  []  Medications will be available at the facility  []  IV Antibiotics  []  Controlled Substance - hard copy to be sent with patient   []  Weekly Labs   Documents:  [x] Hard RX  [x] MAR  [x] Kardex  [x] AVS  [x]Transfer Summary  [x]Discharge   Equipment:  []  CPAP/BiPAP  []  Wound Vacuum  []  Montoya or Urinary Device  []  PICC/Central Line  []  Nebulizer  []  Ventilator   Treatment:  []Isolation (for MRSA, VRE, etc.)  []Surgical Drain Management  []Tracheostomy Care  []Dressing Changes  []Dialysis with transportation and chair time. []PEG Care  []Oxygen  []Daily Weights for Heart Failure   Dietary:  []Any diet limitations  []Tube Feedings   []Total Parenteral Management (TPN)   Eligible for Medicaid Long Term Services and Supports  Yes:  [] Eligible for medical assistance or will become eligible within 180 days and UAI completed. [] Provider/Patient and/or support system has requested screening. [] UAI copy provided to patient or responsible party. [] UAI unavailable at discharge will send once processed to SNF provider. [] UAI unavailable at discharged mailed to patient  No:   [] Private pay and is not financially eligible for Medicaid within the next 180 days. [] Reside out-of-state. [] A residents of a state owned/operated facility that is licensed  by [facility] and Developmental Services or [facility]  [] Enrollment in [facility] hospice services  []  Medical [address]  [x] Patient /Family declines to have screening completed or provide financial information for screening     Financial Resources:  Medicaid    [] Initiated and application pending   [] Full coverage     Advanced Care Plan:  []Surrogate Decision Maker of Care  []POA  []Communicated Code Status (DDNR\", \"Full\")    Other      CM following following for discharge needs.     Valentino Fortune RN/CRM

## 2022-10-11 ENCOUNTER — PATIENT OUTREACH (OUTPATIENT)
Dept: CASE MANAGEMENT | Age: 82
End: 2022-10-11

## 2022-10-18 ENCOUNTER — PATIENT OUTREACH (OUTPATIENT)
Dept: CASE MANAGEMENT | Age: 82
End: 2022-10-18

## 2022-10-19 ENCOUNTER — PATIENT OUTREACH (OUTPATIENT)
Dept: CASE MANAGEMENT | Age: 82
End: 2022-10-19

## 2022-10-19 NOTE — PROGRESS NOTES
Post Acute Facility Update     *The information contained in this note was received during a weekly care coordination call with the post acute facility*    Current Facility: [address] (SNF)  Anticipated Discharge Date: 4 weeks, date TBD  Facility Nursing Update: no current updates  Facility Social Work Update:  Lives w/ wife, will return to home  Bundle Program Status: none  Upper Extremity Assistance: Contact Guard Assist - hands on patient for balance   Lower Extremity Assistance: Maximum Assistance  Bed Mobility: Moderate Assistance   Transfers: Minimal Assistance   Ambulation: Contact Guard Assist - hands on patient for balance   How far (in feet) is the patient ambulating?  75  Device Used: walker  Barriers to Discharge: TBD    Fam ELIZABETH, RN  [employer]

## 2022-10-20 NOTE — ROUTINE PROCESS
End of Shift Note Bedside shift change report given to Shelby  (oncoming nurse) by Osmar Polanco (offgoing nurse). Report included the following information SBAR, Kardex, Intake/Output, MAR, Recent Results, Med Rec Status, Cardiac Rhythm ST and Alarm Parameters Shift worked: 7p-7a Shift summary and any significant changes:  
  Uneventful night Concerns for physician to address:  None Zone phone for oncoming shift:  [phone] Activity: 
Activity Level: Up with Assistance Number times ambulated in hallways past shift: 0 Number of times OOB to chair past shift: 0 Cardiac:  
Cardiac Monitoring: Yes     
Cardiac Rhythm: Sinus tachycardia Access:  
Current line(s): PIV Genitourinary:  
Urinary status: voiding Respiratory:  
O2 Device: Room air Chronic home O2 use?: NO Incentive spirometer at bedside: YES Actual Volume (ml): 750 ml GI: 
Last Bowel Movement Date: 02/06/21 Current diet:  DIET CARDIAC Regular Passing flatus: YES Tolerating current diet: YES 
% Diet Eaten: 100 % Pain Management:  
Patient states pain is manageable on current regimen: YES Skin: 
Romulo Score: 16 Interventions: float heels and increase time out of bed Patient Safety: 
Fall Score: Total Score: 4 Interventions: bed/chair alarm, gripper socks and pt to call before getting OOB High Fall Risk: Yes Length of Stay: 
Expected LOS: 6d 0h 
Actual LOS: 6 Osmar Polanco see chief complaint quote

## 2022-10-26 ENCOUNTER — PATIENT OUTREACH (OUTPATIENT)
Dept: CASE MANAGEMENT | Age: 82
End: 2022-10-26

## 2022-10-26 NOTE — PROGRESS NOTES
Post Acute Facility Update     *The information contained in this note was received during a weekly care coordination call with the post acute facility*    Current Facility: [address] (SNF)  Anticipated Discharge Date: 3 weeks, date TBD    Facility Nursing Update: no current updates  Facility Social Work Update: will d/c home with wife  Bundle Program Status: none  Upper Extremity Assistance: Minimal Assistance   Lower Extremity Assistance: Maximum Assistance  Bed Mobility: Moderate Assistance   Transfers: Contact Guard Assist - hands on patient for balance   Ambulation: Contact Guard Assist - hands on patient for balance   How far (in feet) is the patient ambulating?  100  Device Used: walker  Barriers to Discharge: JENNIFER Olson   BSN, RN  [employer] [phone]

## 2022-11-01 ENCOUNTER — PATIENT OUTREACH (OUTPATIENT)
Dept: CASE MANAGEMENT | Age: 82
End: 2022-11-01

## 2022-11-02 ENCOUNTER — PATIENT OUTREACH (OUTPATIENT)
Dept: CASE MANAGEMENT | Age: 82
End: 2022-11-02

## 2022-11-03 NOTE — PROGRESS NOTES
Post Acute Facility Update     *The information contained in this note was received during a weekly care coordination call with the post acute facility*    Current Facility: [address] (SNF)  Anticipated Discharge Date: TBD    Facility Nursing Update: Medically stable   Facility Social Work Update: Plan to return home with his spouse; discharge date; TBD  Bundle Program Status: none  Upper Extremity Assistance: Min/Mod Assistance  Lower Extremity Assistance: Maximum Assistance  Bed Mobility: Moderate Assistance   Transfers: Contact Guard Assist - hands on patient for balance   Ambulation: Contact Guard Assist - hands on patient for balance   How far (in feet) is the patient ambulating?  75ft  Device Used: Walker   Barriers to Discharge: TBD  Other: Working on stairs with therapy team     Kavitha Chawla, [facility] Team

## 2022-11-08 ENCOUNTER — PATIENT OUTREACH (OUTPATIENT)
Dept: CASE MANAGEMENT | Age: 82
End: 2022-11-08

## 2022-11-08 RX ORDER — ROSUVASTATIN CALCIUM 20 MG/1
TABLET, COATED ORAL
Qty: 90 TABLET | Refills: 0 | Status: SHIPPED | OUTPATIENT
Start: 2022-11-08

## 2022-11-08 NOTE — PROGRESS NOTES
Patient remains at SNF for rehab, patient has not return to ED/Hosp in the last 30 days. Will sign off at this time.

## 2022-11-09 ENCOUNTER — TELEPHONE (OUTPATIENT)
Dept: FAMILY MEDICINE CLINIC | Age: 82
End: 2022-11-09

## 2022-11-09 ENCOUNTER — HOME HEALTH ADMISSION (OUTPATIENT)
Dept: HOME HEALTH SERVICES | Facility: HOME HEALTH | Age: 82
End: 2022-11-09
Payer: MEDICARE

## 2022-11-09 ENCOUNTER — PATIENT OUTREACH (OUTPATIENT)
Dept: CASE MANAGEMENT | Age: 82
End: 2022-11-09

## 2022-11-09 RX ORDER — FUROSEMIDE 20 MG/1
TABLET ORAL
Qty: 30 TABLET | Refills: 1 | Status: SHIPPED | OUTPATIENT
Start: 2022-11-09 | End: 2022-11-10 | Stop reason: SDUPTHER

## 2022-11-09 RX ORDER — LISINOPRIL 5 MG/1
5 TABLET ORAL DAILY
Qty: 30 TABLET | Refills: 1 | Status: SHIPPED | OUTPATIENT
Start: 2022-11-09 | End: 2022-11-10 | Stop reason: SDUPTHER

## 2022-11-09 RX ORDER — AMITRIPTYLINE HYDROCHLORIDE 10 MG/1
10 TABLET, FILM COATED ORAL 2 TIMES DAILY
Qty: 60 TABLET | Refills: 2 | Status: SHIPPED | OUTPATIENT
Start: 2022-11-09

## 2022-11-09 NOTE — PROGRESS NOTES
Post Acute Facility Update     *The information contained in this note was received during a weekly care coordination call with the post acute facility*    Current Facility: [address]  (SNF)  Anticipated Discharge Date: 11/12/22    Facility Nursing Update: no current updates  Facility Social Work Update:  lives w/ wife will return  Bundle Program Status: none  Upper Extremity Assistance: Stand by Assist - Presence and Cueing  Lower Extremity Assistance: Maximum Assistance  Bed Mobility: Stand by Assist - Presence and Cueing  Transfers: Contact Guard Assist - hands on patient for balance   Ambulation: Contact Guard Assist - hands on patient for balance   How far (in feet) is the patient ambulating?  70  Device Used: walker  Barriers to Discharge: JENNIFER Solorzano   BSN, RN  [employer]

## 2022-11-09 NOTE — TELEPHONE ENCOUNTER
Debby with [facility] states that patient will be discharged from hospital today and wants to know will  follow patient,Debby can be reached @ [phone]

## 2022-11-09 NOTE — TELEPHONE ENCOUNTER
Patients daughter Moshe Elias would like to speak with you regarding her father being discharge form [facility].   Please give her a call @ [phone]

## 2022-11-10 RX ORDER — LISINOPRIL 5 MG/1
5 TABLET ORAL DAILY
Qty: 90 TABLET | Refills: 0 | Status: SHIPPED | OUTPATIENT
Start: 2022-11-10

## 2022-11-10 RX ORDER — FUROSEMIDE 20 MG/1
20 TABLET ORAL DAILY
Qty: 90 TABLET | Refills: 0 | Status: SHIPPED | OUTPATIENT
Start: 2022-11-10

## 2022-11-10 NOTE — TELEPHONE ENCOUNTER
Per pharmacy, patient is requesting a 90 day supply. Requested Prescriptions     Pending Prescriptions Disp Refills    furosemide (LASIX) 20 mg tablet 90 Tablet 0     Sig: Take 1 Tablet by mouth daily. for swelling in feet. Indications: fluid in the lungs due to chronic heart failure    lisinopriL (PRINIVIL, ZESTRIL) 5 mg tablet 90 Tablet 0     Sig: Take 1 Tablet by mouth daily. Indications: chronic heart failure           For Pharmacy Admin Tracking Only    CPA in place:   Recommendation Provided To:    Intervention Detail: New Rx: 2, reason: Improve Adherence  Gap Closed?:   Intervention Accepted By:   Time Spent (min): 5

## 2022-11-14 ENCOUNTER — HOME CARE VISIT (OUTPATIENT)
Dept: SCHEDULING | Facility: HOME HEALTH | Age: 82
End: 2022-11-14
Payer: MEDICARE

## 2022-11-14 PROCEDURE — 400018 HH-NO PAY CLAIM PROCEDURE

## 2022-11-14 PROCEDURE — 3331090002 HH PPS REVENUE DEBIT

## 2022-11-14 PROCEDURE — G0299 HHS/HOSPICE OF RN EA 15 MIN: HCPCS

## 2022-11-14 PROCEDURE — 400013 HH SOC

## 2022-11-14 PROCEDURE — 3331090001 HH PPS REVENUE CREDIT

## 2022-11-15 ENCOUNTER — HOME CARE VISIT (OUTPATIENT)
Dept: SCHEDULING | Facility: HOME HEALTH | Age: 82
End: 2022-11-15
Payer: MEDICARE

## 2022-11-15 PROCEDURE — 3331090002 HH PPS REVENUE DEBIT

## 2022-11-15 PROCEDURE — 3331090001 HH PPS REVENUE CREDIT

## 2022-11-15 PROCEDURE — G0152 HHCP-SERV OF OT,EA 15 MIN: HCPCS

## 2022-11-16 ENCOUNTER — PATIENT OUTREACH (OUTPATIENT)
Dept: CASE MANAGEMENT | Age: 82
End: 2022-11-16

## 2022-11-16 ENCOUNTER — HOME CARE VISIT (OUTPATIENT)
Dept: SCHEDULING | Facility: HOME HEALTH | Age: 82
End: 2022-11-16
Payer: MEDICARE

## 2022-11-16 VITALS
SYSTOLIC BLOOD PRESSURE: 110 MMHG | HEART RATE: 96 BPM | RESPIRATION RATE: 16 BRPM | DIASTOLIC BLOOD PRESSURE: 60 MMHG | OXYGEN SATURATION: 100 % | TEMPERATURE: 97.5 F

## 2022-11-16 VITALS
SYSTOLIC BLOOD PRESSURE: 110 MMHG | HEART RATE: 94 BPM | DIASTOLIC BLOOD PRESSURE: 78 MMHG | TEMPERATURE: 97.2 F | OXYGEN SATURATION: 95 % | RESPIRATION RATE: 18 BRPM

## 2022-11-16 VITALS
SYSTOLIC BLOOD PRESSURE: 138 MMHG | HEART RATE: 100 BPM | TEMPERATURE: 97.1 F | OXYGEN SATURATION: 99 % | DIASTOLIC BLOOD PRESSURE: 76 MMHG | RESPIRATION RATE: 17 BRPM

## 2022-11-16 PROCEDURE — 3331090002 HH PPS REVENUE DEBIT

## 2022-11-16 PROCEDURE — G0151 HHCP-SERV OF PT,EA 15 MIN: HCPCS

## 2022-11-16 PROCEDURE — G0156 HHCP-SVS OF AIDE,EA 15 MIN: HCPCS

## 2022-11-16 PROCEDURE — 3331090001 HH PPS REVENUE CREDIT

## 2022-11-16 PROCEDURE — G0299 HHS/HOSPICE OF RN EA 15 MIN: HCPCS

## 2022-11-16 NOTE — PROGRESS NOTES
[address] [place] [address] Dr Discharge Note    *The information contained in this note was received during a weekly care coordination call with the post acute facility*      Patient was discharged from [address] [place] [address] ([facility]) on 11/12/2022 to Home.     PCP: MD DARSHAN Pierre appointment:   Future Appointments   Date Time Provider Negar Mckeon   11/16/2022 10:00 AM Haskins [facility]   11/16/2022  1:30 PM Nellie Arellano [facility]   11/16/2022  2:00 PM Ele Pittman RN [place] [address] [facility]   11/17/2022 To Be Determined Letha Branch, OT [place] [address] [facility]   11/18/2022 To Be Determined Ele Pittman RN [address] [facility]   11/18/2022 To Be Determined [place] [address] [facility]   11/21/2022 To Be Determined Letha Branch, OT [place] [address] [facility]   11/21/2022 To Be Determined Lucas Sandovalt [place] [address] [facility]   11/21/2022 To Be Determined Tildon Bodily, LPN [facility]   11/22/2022 To Be Determined Sara Pasha [place] [address] [facility]   11/23/2022 To Be Determined Letha Branch, OT [facility]   11/23/2022 To Be Determined Tildon Bodily, LPN [facility]   11/24/2022 To Be Determined Sara Pasha [facility]   11/25/2022 To Be Determined Katelyn Miller RN [place] [address] [facility]   11/28/2022 To Be Determined Letha Branch, OT [place] [address]   11/28/2022 To Be Determined Sara Pasha [place] [address] [facility]   11/28/2022 To Be Determined Tildon Bodily, LPN [facility]   11/30/2022 To Be Determined Letha Branch, OT [facility]   11/30/2022 To Be Determined Ele Pittman RN [place] [address] [facility]   12/1/2022 To Be Determined Sara Pasha [place] [address]   12/2/2022 To Be Determined Katelyn Miller RN [facility]   12/6/2022 To Be Determined Letha Maxim, OT [facility] [place] [address]   12/8/2022 To Be Determined Letha Maxim, OT [facility]   12/14/2022  2:20 PM Pam Stoner MD [facility] Home Health/Outpatient orders at discharge: home health care  [address]: [place] ordered at discharge:  None  [address] received prior to discharge: [place] Team will sign off at this time. Medications were not reconciled and general patient assessment was not completed during this skilled nursing facility outreach.      ALVINO Cheung  [facility] Team

## 2022-11-17 ENCOUNTER — HOME CARE VISIT (OUTPATIENT)
Dept: SCHEDULING | Facility: HOME HEALTH | Age: 82
End: 2022-11-17
Payer: MEDICARE

## 2022-11-17 VITALS
RESPIRATION RATE: 17 BRPM | SYSTOLIC BLOOD PRESSURE: 122 MMHG | OXYGEN SATURATION: 98 % | DIASTOLIC BLOOD PRESSURE: 70 MMHG | HEART RATE: 98 BPM

## 2022-11-17 PROCEDURE — 3331090002 HH PPS REVENUE DEBIT

## 2022-11-17 PROCEDURE — 3331090001 HH PPS REVENUE CREDIT

## 2022-11-17 PROCEDURE — G0152 HHCP-SERV OF OT,EA 15 MIN: HCPCS

## 2022-11-18 ENCOUNTER — HOME CARE VISIT (OUTPATIENT)
Dept: SCHEDULING | Facility: HOME HEALTH | Age: 82
End: 2022-11-18
Payer: MEDICARE

## 2022-11-18 VITALS
RESPIRATION RATE: 17 BRPM | DIASTOLIC BLOOD PRESSURE: 72 MMHG | SYSTOLIC BLOOD PRESSURE: 126 MMHG | HEART RATE: 67 BPM | OXYGEN SATURATION: 95 % | TEMPERATURE: 98.2 F

## 2022-11-18 PROCEDURE — G0156 HHCP-SVS OF AIDE,EA 15 MIN: HCPCS

## 2022-11-18 PROCEDURE — 3331090002 HH PPS REVENUE DEBIT

## 2022-11-18 PROCEDURE — G0299 HHS/HOSPICE OF RN EA 15 MIN: HCPCS

## 2022-11-18 PROCEDURE — 3331090001 HH PPS REVENUE CREDIT

## 2022-11-19 PROCEDURE — 3331090001 HH PPS REVENUE CREDIT

## 2022-11-19 PROCEDURE — 3331090002 HH PPS REVENUE DEBIT

## 2022-11-20 ENCOUNTER — HOME CARE VISIT (OUTPATIENT)
Dept: HOME HEALTH SERVICES | Facility: HOME HEALTH | Age: 82
End: 2022-11-20
Payer: MEDICARE

## 2022-11-20 ENCOUNTER — HOME CARE VISIT (OUTPATIENT)
Dept: SCHEDULING | Facility: HOME HEALTH | Age: 82
End: 2022-11-20
Payer: MEDICARE

## 2022-11-20 VITALS
SYSTOLIC BLOOD PRESSURE: 110 MMHG | DIASTOLIC BLOOD PRESSURE: 65 MMHG | RESPIRATION RATE: 18 BRPM | HEART RATE: 100 BPM | OXYGEN SATURATION: 96 % | TEMPERATURE: 97 F

## 2022-11-20 PROCEDURE — 3331090001 HH PPS REVENUE CREDIT

## 2022-11-20 PROCEDURE — 3331090002 HH PPS REVENUE DEBIT

## 2022-11-20 PROCEDURE — G0299 HHS/HOSPICE OF RN EA 15 MIN: HCPCS

## 2022-11-21 ENCOUNTER — HOME CARE VISIT (OUTPATIENT)
Dept: SCHEDULING | Facility: HOME HEALTH | Age: 82
End: 2022-11-21
Payer: MEDICARE

## 2022-11-21 ENCOUNTER — TELEPHONE (OUTPATIENT)
Dept: FAMILY MEDICINE CLINIC | Age: 82
End: 2022-11-21

## 2022-11-21 VITALS
SYSTOLIC BLOOD PRESSURE: 100 MMHG | DIASTOLIC BLOOD PRESSURE: 60 MMHG | OXYGEN SATURATION: 96 % | HEART RATE: 103 BPM | RESPIRATION RATE: 17 BRPM

## 2022-11-21 PROCEDURE — 3331090002 HH PPS REVENUE DEBIT

## 2022-11-21 PROCEDURE — G0152 HHCP-SERV OF OT,EA 15 MIN: HCPCS

## 2022-11-21 PROCEDURE — G0155 HHCP-SVS OF CSW,EA 15 MIN: HCPCS

## 2022-11-21 PROCEDURE — 3331090001 HH PPS REVENUE CREDIT

## 2022-11-22 ENCOUNTER — HOME CARE VISIT (OUTPATIENT)
Dept: SCHEDULING | Facility: HOME HEALTH | Age: 82
End: 2022-11-22
Payer: MEDICARE

## 2022-11-22 PROCEDURE — G0156 HHCP-SVS OF AIDE,EA 15 MIN: HCPCS

## 2022-11-22 PROCEDURE — 3331090001 HH PPS REVENUE CREDIT

## 2022-11-22 PROCEDURE — 3331090002 HH PPS REVENUE DEBIT

## 2022-11-23 ENCOUNTER — HOME CARE VISIT (OUTPATIENT)
Dept: SCHEDULING | Facility: HOME HEALTH | Age: 82
End: 2022-11-23
Payer: MEDICARE

## 2022-11-23 ENCOUNTER — HOME CARE VISIT (OUTPATIENT)
Dept: HOME HEALTH SERVICES | Facility: HOME HEALTH | Age: 82
End: 2022-11-23
Payer: MEDICARE

## 2022-11-23 VITALS
SYSTOLIC BLOOD PRESSURE: 105 MMHG | HEART RATE: 87 BPM | OXYGEN SATURATION: 100 % | TEMPERATURE: 97.9 F | DIASTOLIC BLOOD PRESSURE: 58 MMHG | RESPIRATION RATE: 17 BRPM

## 2022-11-23 PROCEDURE — 3331090001 HH PPS REVENUE CREDIT

## 2022-11-23 PROCEDURE — 3331090002 HH PPS REVENUE DEBIT

## 2022-11-23 PROCEDURE — G0152 HHCP-SERV OF OT,EA 15 MIN: HCPCS

## 2022-11-24 PROCEDURE — 3331090001 HH PPS REVENUE CREDIT

## 2022-11-24 PROCEDURE — 3331090002 HH PPS REVENUE DEBIT

## 2022-11-25 ENCOUNTER — HOME CARE VISIT (OUTPATIENT)
Dept: HOME HEALTH SERVICES | Facility: HOME HEALTH | Age: 82
End: 2022-11-25
Payer: MEDICARE

## 2022-11-25 ENCOUNTER — HOME CARE VISIT (OUTPATIENT)
Dept: SCHEDULING | Facility: HOME HEALTH | Age: 82
End: 2022-11-25
Payer: MEDICARE

## 2022-11-25 VITALS
DIASTOLIC BLOOD PRESSURE: 60 MMHG | SYSTOLIC BLOOD PRESSURE: 134 MMHG | OXYGEN SATURATION: 95 % | HEART RATE: 95 BPM | TEMPERATURE: 98 F

## 2022-11-25 VITALS
OXYGEN SATURATION: 92 % | SYSTOLIC BLOOD PRESSURE: 108 MMHG | DIASTOLIC BLOOD PRESSURE: 76 MMHG | TEMPERATURE: 97.3 F | HEART RATE: 80 BPM

## 2022-11-25 PROCEDURE — G0299 HHS/HOSPICE OF RN EA 15 MIN: HCPCS

## 2022-11-25 PROCEDURE — 3331090001 HH PPS REVENUE CREDIT

## 2022-11-25 PROCEDURE — 3331090002 HH PPS REVENUE DEBIT

## 2022-11-25 PROCEDURE — G0151 HHCP-SERV OF PT,EA 15 MIN: HCPCS

## 2022-11-26 PROCEDURE — 3331090002 HH PPS REVENUE DEBIT

## 2022-11-26 PROCEDURE — 3331090001 HH PPS REVENUE CREDIT

## 2022-11-27 PROCEDURE — 3331090002 HH PPS REVENUE DEBIT

## 2022-11-27 PROCEDURE — 3331090001 HH PPS REVENUE CREDIT

## 2022-11-28 ENCOUNTER — HOME CARE VISIT (OUTPATIENT)
Dept: SCHEDULING | Facility: HOME HEALTH | Age: 82
End: 2022-11-28
Payer: MEDICARE

## 2022-11-28 VITALS
RESPIRATION RATE: 17 BRPM | OXYGEN SATURATION: 100 % | DIASTOLIC BLOOD PRESSURE: 70 MMHG | SYSTOLIC BLOOD PRESSURE: 110 MMHG | TEMPERATURE: 97.1 F | HEART RATE: 73 BPM

## 2022-11-28 VITALS
TEMPERATURE: 97.8 F | SYSTOLIC BLOOD PRESSURE: 104 MMHG | OXYGEN SATURATION: 97 % | HEART RATE: 66 BPM | DIASTOLIC BLOOD PRESSURE: 62 MMHG | RESPIRATION RATE: 14 BRPM

## 2022-11-28 PROCEDURE — G0300 HHS/HOSPICE OF LPN EA 15 MIN: HCPCS

## 2022-11-28 PROCEDURE — 3331090001 HH PPS REVENUE CREDIT

## 2022-11-28 PROCEDURE — 3331090002 HH PPS REVENUE DEBIT

## 2022-11-28 PROCEDURE — G0152 HHCP-SERV OF OT,EA 15 MIN: HCPCS

## 2022-11-29 ENCOUNTER — TELEPHONE (OUTPATIENT)
Dept: FAMILY MEDICINE CLINIC | Age: 82
End: 2022-11-29

## 2022-11-29 ENCOUNTER — HOME CARE VISIT (OUTPATIENT)
Dept: SCHEDULING | Facility: HOME HEALTH | Age: 82
End: 2022-11-29
Payer: MEDICARE

## 2022-11-29 VITALS
TEMPERATURE: 97.3 F | OXYGEN SATURATION: 99 % | HEART RATE: 83 BPM | DIASTOLIC BLOOD PRESSURE: 50 MMHG | RESPIRATION RATE: 16 BRPM | SYSTOLIC BLOOD PRESSURE: 80 MMHG

## 2022-11-29 PROCEDURE — 3331090002 HH PPS REVENUE DEBIT

## 2022-11-29 PROCEDURE — 3331090001 HH PPS REVENUE CREDIT

## 2022-11-29 PROCEDURE — G0151 HHCP-SERV OF PT,EA 15 MIN: HCPCS

## 2022-11-29 NOTE — TELEPHONE ENCOUNTER
[facility] states that pts blood pressure was very low today.  Standing 90/48  Sitting 90/50  Call back nbr [phone]

## 2022-11-30 ENCOUNTER — HOME CARE VISIT (OUTPATIENT)
Dept: SCHEDULING | Facility: HOME HEALTH | Age: 82
End: 2022-11-30
Payer: MEDICARE

## 2022-11-30 VITALS
DIASTOLIC BLOOD PRESSURE: 60 MMHG | OXYGEN SATURATION: 95 % | SYSTOLIC BLOOD PRESSURE: 100 MMHG | RESPIRATION RATE: 16 BRPM | HEART RATE: 115 BPM

## 2022-11-30 VITALS
RESPIRATION RATE: 15 BRPM | DIASTOLIC BLOOD PRESSURE: 50 MMHG | TEMPERATURE: 97.1 F | HEART RATE: 93 BPM | SYSTOLIC BLOOD PRESSURE: 100 MMHG | OXYGEN SATURATION: 98 %

## 2022-11-30 VITALS
SYSTOLIC BLOOD PRESSURE: 110 MMHG | HEART RATE: 85 BPM | RESPIRATION RATE: 18 BRPM | TEMPERATURE: 97.3 F | OXYGEN SATURATION: 99 % | DIASTOLIC BLOOD PRESSURE: 58 MMHG

## 2022-11-30 PROCEDURE — G0299 HHS/HOSPICE OF RN EA 15 MIN: HCPCS

## 2022-11-30 PROCEDURE — 3331090001 HH PPS REVENUE CREDIT

## 2022-11-30 PROCEDURE — 3331090002 HH PPS REVENUE DEBIT

## 2022-11-30 PROCEDURE — G0152 HHCP-SERV OF OT,EA 15 MIN: HCPCS

## 2022-12-01 ENCOUNTER — HOME CARE VISIT (OUTPATIENT)
Dept: SCHEDULING | Facility: HOME HEALTH | Age: 82
End: 2022-12-01
Payer: MEDICARE

## 2022-12-01 VITALS
HEART RATE: 10 BPM | SYSTOLIC BLOOD PRESSURE: 108 MMHG | OXYGEN SATURATION: 99 % | DIASTOLIC BLOOD PRESSURE: 62 MMHG | RESPIRATION RATE: 16 BRPM | TEMPERATURE: 97.5 F

## 2022-12-01 PROCEDURE — 3331090002 HH PPS REVENUE DEBIT

## 2022-12-01 PROCEDURE — G0151 HHCP-SERV OF PT,EA 15 MIN: HCPCS

## 2022-12-01 PROCEDURE — 3331090001 HH PPS REVENUE CREDIT

## 2022-12-02 ENCOUNTER — HOME CARE VISIT (OUTPATIENT)
Dept: SCHEDULING | Facility: HOME HEALTH | Age: 82
End: 2022-12-02
Payer: MEDICARE

## 2022-12-02 VITALS
HEART RATE: 81 BPM | DIASTOLIC BLOOD PRESSURE: 62 MMHG | RESPIRATION RATE: 16 BRPM | TEMPERATURE: 97.6 F | SYSTOLIC BLOOD PRESSURE: 106 MMHG | OXYGEN SATURATION: 100 %

## 2022-12-02 PROCEDURE — G0300 HHS/HOSPICE OF LPN EA 15 MIN: HCPCS

## 2022-12-05 ENCOUNTER — HOME CARE VISIT (OUTPATIENT)
Dept: SCHEDULING | Facility: HOME HEALTH | Age: 82
End: 2022-12-05
Payer: MEDICARE

## 2022-12-05 VITALS
SYSTOLIC BLOOD PRESSURE: 110 MMHG | RESPIRATION RATE: 16 BRPM | TEMPERATURE: 97.5 F | HEART RATE: 74 BPM | DIASTOLIC BLOOD PRESSURE: 62 MMHG

## 2022-12-05 PROCEDURE — G0300 HHS/HOSPICE OF LPN EA 15 MIN: HCPCS

## 2022-12-06 ENCOUNTER — HOME CARE VISIT (OUTPATIENT)
Dept: SCHEDULING | Facility: HOME HEALTH | Age: 82
End: 2022-12-06
Payer: MEDICARE

## 2022-12-06 VITALS
SYSTOLIC BLOOD PRESSURE: 100 MMHG | OXYGEN SATURATION: 100 % | DIASTOLIC BLOOD PRESSURE: 60 MMHG | RESPIRATION RATE: 17 BRPM | TEMPERATURE: 96 F | HEART RATE: 86 BPM

## 2022-12-06 VITALS
RESPIRATION RATE: 16 BRPM | OXYGEN SATURATION: 98 % | HEART RATE: 72 BPM | SYSTOLIC BLOOD PRESSURE: 110 MMHG | TEMPERATURE: 97.4 F | DIASTOLIC BLOOD PRESSURE: 50 MMHG

## 2022-12-06 PROCEDURE — G0151 HHCP-SERV OF PT,EA 15 MIN: HCPCS

## 2022-12-06 PROCEDURE — G0152 HHCP-SERV OF OT,EA 15 MIN: HCPCS

## 2022-12-06 NOTE — TELEPHONE ENCOUNTER
[facility], [address] [place] [address]  Phone:  [phone]        Fax:  [phone]    Patient:  Mayte Vargas  YOB: 1940    Incoming call from [place], [place] with [address]. She is requesting [facility] services for Mayte Vargas. Preliminary Intake Note:     Address:   [facility], [address]    Does patient live within 15 miles of [address] [place] [address] office at address listed above?      yes       Distance from [address] [place] [address] office/Travel Time:   11.2 miles/ 16 minutes  Region:   [facility] Information:   Medicare A&B with [address]    Does patient qualify based on address and insurance? yes    Date of Referral:  11/21/22    Reason for Referral:  homebound    Diagnosis:   HTN, CAD s/p CABG 15/7897, systolic HF (TTE 7/03/65 with EF of 45-50%), Hx of CVA with residual L sided weakness, BPH                                                                                                                                                         Last PCP visit:   9/14/22  Dr. Donaldo Pritchard    Last Hospitalization:   10/5/22-10/10/22  [facility] [place] (Admitted from [facility] for FTT/weakness, slurred speech. Discharged to [address] [place] [address] on 10/10/22.   Discharged from [address] [place] [address] to home on 11/12/22 9/18/22-9/26/22  ED [facility] (CHF exacerbation, discharged to [facility]) Nursing Home/Rehab stay in the past year? Yes, [facility] [address] [place] [address]    Primary Caregiver:   Chikis Evangelista  Relation to Patient:   daughter  Contact Information:   [phone]    Records Needed:       Current Controlled Substances:   none per     This nurse explained that the [address] [place] [address] team discusses new referrals at our weekly IDG meetings. The next meeting is scheduled for 12/6/22. This nurse will present the referral to the [address] [place] [address] team on 12/6/22. Acceptance into the [facility] practice is determined by the [address] [place] [address] providers. We are currently booking new patient start of care visits for mid-late January 2023. This nurse or SW will call Janene Centeno back to notify her of the decision.

## 2022-12-07 ENCOUNTER — HOME CARE VISIT (OUTPATIENT)
Dept: SCHEDULING | Facility: HOME HEALTH | Age: 82
End: 2022-12-07
Payer: MEDICARE

## 2022-12-07 PROCEDURE — G0300 HHS/HOSPICE OF LPN EA 15 MIN: HCPCS

## 2022-12-08 ENCOUNTER — HOME CARE VISIT (OUTPATIENT)
Dept: SCHEDULING | Facility: HOME HEALTH | Age: 82
End: 2022-12-08
Payer: MEDICARE

## 2022-12-08 ENCOUNTER — HOME CARE VISIT (OUTPATIENT)
Dept: HOME HEALTH SERVICES | Facility: HOME HEALTH | Age: 82
End: 2022-12-08
Payer: MEDICARE

## 2022-12-08 VITALS
HEART RATE: 100 BPM | TEMPERATURE: 97.1 F | RESPIRATION RATE: 16 BRPM | DIASTOLIC BLOOD PRESSURE: 65 MMHG | SYSTOLIC BLOOD PRESSURE: 110 MMHG | OXYGEN SATURATION: 97 %

## 2022-12-08 VITALS
RESPIRATION RATE: 17 BRPM | DIASTOLIC BLOOD PRESSURE: 60 MMHG | TEMPERATURE: 96.8 F | HEART RATE: 96 BPM | SYSTOLIC BLOOD PRESSURE: 110 MMHG | OXYGEN SATURATION: 98 %

## 2022-12-08 PROCEDURE — G0152 HHCP-SERV OF OT,EA 15 MIN: HCPCS

## 2022-12-08 PROCEDURE — G0151 HHCP-SERV OF PT,EA 15 MIN: HCPCS

## 2022-12-09 ENCOUNTER — HOME CARE VISIT (OUTPATIENT)
Dept: SCHEDULING | Facility: HOME HEALTH | Age: 82
End: 2022-12-09
Payer: MEDICARE

## 2022-12-09 VITALS
RESPIRATION RATE: 18 BRPM | TEMPERATURE: 97 F | HEART RATE: 95 BPM | SYSTOLIC BLOOD PRESSURE: 130 MMHG | OXYGEN SATURATION: 99 % | DIASTOLIC BLOOD PRESSURE: 60 MMHG

## 2022-12-09 PROCEDURE — G0299 HHS/HOSPICE OF RN EA 15 MIN: HCPCS

## 2022-12-11 VITALS — TEMPERATURE: 97.8 F | SYSTOLIC BLOOD PRESSURE: 112 MMHG | DIASTOLIC BLOOD PRESSURE: 62 MMHG | RESPIRATION RATE: 16 BRPM

## 2022-12-12 ENCOUNTER — HOME CARE VISIT (OUTPATIENT)
Dept: SCHEDULING | Facility: HOME HEALTH | Age: 82
End: 2022-12-12
Payer: MEDICARE

## 2022-12-12 ENCOUNTER — TELEPHONE (OUTPATIENT)
Dept: FAMILY MEDICINE CLINIC | Age: 82
End: 2022-12-12

## 2022-12-12 ENCOUNTER — HOME CARE VISIT (OUTPATIENT)
Dept: HOME HEALTH SERVICES | Facility: HOME HEALTH | Age: 82
End: 2022-12-12
Payer: MEDICARE

## 2022-12-12 NOTE — TELEPHONE ENCOUNTER
Patients daughter, eMna Riley (no up to date permission to release information to verify) called requesting a call back due to her having some questions about her father she needs to ask Dr. Otis [place]. She can be reached at [phone].

## 2022-12-12 NOTE — TELEPHONE ENCOUNTER
Daughter notified that she is not on patient's HIPPA list and will notify her mom that Dr. Jorge Alberto Parker will be calling this evening.

## 2022-12-13 ENCOUNTER — TELEPHONE (OUTPATIENT)
Dept: FAMILY MEDICINE CLINIC | Age: 82
End: 2022-12-13

## 2022-12-13 ENCOUNTER — HOME CARE VISIT (OUTPATIENT)
Dept: SCHEDULING | Facility: HOME HEALTH | Age: 82
End: 2022-12-13
Payer: MEDICARE

## 2022-12-13 VITALS
SYSTOLIC BLOOD PRESSURE: 115 MMHG | OXYGEN SATURATION: 99 % | HEART RATE: 108 BPM | RESPIRATION RATE: 16 BRPM | DIASTOLIC BLOOD PRESSURE: 60 MMHG | TEMPERATURE: 97.5 F

## 2022-12-13 PROCEDURE — G0151 HHCP-SERV OF PT,EA 15 MIN: HCPCS

## 2022-12-13 NOTE — TELEPHONE ENCOUNTER
LCSW called pt's dtr, Deliciakay Dubon, to inform that [facility] has accepted her father and [facility] visit is scheduled for 1/26/23 with [facility] NP [facility] Ismael. No answer. LCSW left brief voicemail, provided contact information, and encouraged a call back at her convenience.      ALVINO Rucker, [place]    [address]  (v) [phone]

## 2022-12-13 NOTE — TELEPHONE ENCOUNTER
Judith Coleman / [facility] ENOC IGLESIAS called wanting to inform Dr. Neno Pop that the patient had two falls over the weekend. No injuries were reported. She can be reached at [phone].

## 2022-12-14 ENCOUNTER — HOME CARE VISIT (OUTPATIENT)
Dept: SCHEDULING | Facility: HOME HEALTH | Age: 82
End: 2022-12-14
Payer: MEDICARE

## 2022-12-14 VITALS
TEMPERATURE: 97 F | HEART RATE: 75 BPM | RESPIRATION RATE: 18 BRPM | SYSTOLIC BLOOD PRESSURE: 102 MMHG | DIASTOLIC BLOOD PRESSURE: 60 MMHG | OXYGEN SATURATION: 100 %

## 2022-12-14 VITALS
SYSTOLIC BLOOD PRESSURE: 102 MMHG | OXYGEN SATURATION: 100 % | TEMPERATURE: 97 F | HEART RATE: 75 BPM | DIASTOLIC BLOOD PRESSURE: 60 MMHG

## 2022-12-14 PROCEDURE — G0158 HHC OT ASSISTANT EA 15: HCPCS

## 2022-12-14 PROCEDURE — G0299 HHS/HOSPICE OF RN EA 15 MIN: HCPCS

## 2022-12-15 ENCOUNTER — HOME CARE VISIT (OUTPATIENT)
Dept: SCHEDULING | Facility: HOME HEALTH | Age: 82
End: 2022-12-15
Payer: MEDICARE

## 2022-12-15 ENCOUNTER — TELEPHONE (OUTPATIENT)
Dept: FAMILY MEDICINE CLINIC | Age: 82
End: 2022-12-15

## 2022-12-15 VITALS
SYSTOLIC BLOOD PRESSURE: 118 MMHG | HEART RATE: 144 BPM | OXYGEN SATURATION: 99 % | DIASTOLIC BLOOD PRESSURE: 65 MMHG | RESPIRATION RATE: 16 BRPM | TEMPERATURE: 97.6 F

## 2022-12-15 PROCEDURE — G0151 HHCP-SERV OF PT,EA 15 MIN: HCPCS

## 2022-12-15 RX ORDER — METOPROLOL TARTRATE 25 MG/1
TABLET, FILM COATED ORAL
Qty: 270 TABLET | Refills: 3 | Status: SHIPPED | OUTPATIENT
Start: 2022-12-15

## 2022-12-15 NOTE — PROGRESS NOTES
Pc with PT. Having problems with tachycardia, both at rest and worse with exertion.  Will increase metoprolol 25 mg bid to 37.5 mg bid

## 2022-12-15 NOTE — TELEPHONE ENCOUNTER
Manoj Marsh ( P.T )with [facility] would like for   to know patient heart rate while resting was 114 and walking 144 please advise,Mili can be reached @ [phone]

## 2022-12-16 ENCOUNTER — TELEPHONE (OUTPATIENT)
Dept: FAMILY MEDICINE CLINIC | Age: 82
End: 2022-12-16

## 2022-12-16 ENCOUNTER — HOME CARE VISIT (OUTPATIENT)
Dept: SCHEDULING | Facility: HOME HEALTH | Age: 82
End: 2022-12-16
Payer: MEDICARE

## 2022-12-16 VITALS
HEART RATE: 81 BPM | OXYGEN SATURATION: 99 % | TEMPERATURE: 97 F | DIASTOLIC BLOOD PRESSURE: 60 MMHG | SYSTOLIC BLOOD PRESSURE: 110 MMHG | RESPIRATION RATE: 18 BRPM

## 2022-12-16 PROCEDURE — G0299 HHS/HOSPICE OF RN EA 15 MIN: HCPCS

## 2022-12-16 NOTE — TELEPHONE ENCOUNTER
LCSW called and spoke with pt's dtr, Symone Montelongo.  Initial in-home visit for completion of [facility] intake paperwork and psychosocial assessment scheduled for Wednesday 1/25/23 @ ALVINO Blackmon, [facility] Worker  [place] Based [address] [place] [address]  [phone]

## 2022-12-19 ENCOUNTER — HOME CARE VISIT (OUTPATIENT)
Dept: SCHEDULING | Facility: HOME HEALTH | Age: 82
End: 2022-12-19
Payer: MEDICARE

## 2022-12-19 VITALS
HEART RATE: 61 BPM | SYSTOLIC BLOOD PRESSURE: 114 MMHG | RESPIRATION RATE: 18 BRPM | OXYGEN SATURATION: 99 % | DIASTOLIC BLOOD PRESSURE: 63 MMHG | TEMPERATURE: 97 F

## 2022-12-19 PROCEDURE — G0299 HHS/HOSPICE OF RN EA 15 MIN: HCPCS

## 2022-12-20 ENCOUNTER — HOME CARE VISIT (OUTPATIENT)
Dept: SCHEDULING | Facility: HOME HEALTH | Age: 82
End: 2022-12-20
Payer: MEDICARE

## 2022-12-20 ENCOUNTER — HOME CARE VISIT (OUTPATIENT)
Dept: HOME HEALTH SERVICES | Facility: HOME HEALTH | Age: 82
End: 2022-12-20
Payer: MEDICARE

## 2022-12-20 VITALS
DIASTOLIC BLOOD PRESSURE: 60 MMHG | HEART RATE: 86 BPM | SYSTOLIC BLOOD PRESSURE: 100 MMHG | RESPIRATION RATE: 16 BRPM | TEMPERATURE: 97.4 F | OXYGEN SATURATION: 99 %

## 2022-12-20 PROCEDURE — G0151 HHCP-SERV OF PT,EA 15 MIN: HCPCS

## 2022-12-21 ENCOUNTER — HOME CARE VISIT (OUTPATIENT)
Dept: SCHEDULING | Facility: HOME HEALTH | Age: 82
End: 2022-12-21
Payer: MEDICARE

## 2022-12-21 ENCOUNTER — HOME CARE VISIT (OUTPATIENT)
Dept: HOME HEALTH SERVICES | Facility: HOME HEALTH | Age: 82
End: 2022-12-21
Payer: MEDICARE

## 2022-12-21 VITALS
RESPIRATION RATE: 18 BRPM | DIASTOLIC BLOOD PRESSURE: 60 MMHG | OXYGEN SATURATION: 99 % | SYSTOLIC BLOOD PRESSURE: 102 MMHG | HEART RATE: 99 BPM | TEMPERATURE: 97.2 F

## 2022-12-21 PROCEDURE — G0299 HHS/HOSPICE OF RN EA 15 MIN: HCPCS

## 2022-12-22 ENCOUNTER — HOME CARE VISIT (OUTPATIENT)
Dept: HOME HEALTH SERVICES | Facility: HOME HEALTH | Age: 82
End: 2022-12-22
Payer: MEDICARE

## 2022-12-22 ENCOUNTER — TELEPHONE (OUTPATIENT)
Dept: FAMILY MEDICINE CLINIC | Age: 82
End: 2022-12-22

## 2022-12-22 ENCOUNTER — HOME CARE VISIT (OUTPATIENT)
Dept: SCHEDULING | Facility: HOME HEALTH | Age: 82
End: 2022-12-22
Payer: MEDICARE

## 2022-12-22 VITALS
OXYGEN SATURATION: 97 % | SYSTOLIC BLOOD PRESSURE: 92 MMHG | DIASTOLIC BLOOD PRESSURE: 58 MMHG | HEART RATE: 116 BPM | TEMPERATURE: 98.2 F

## 2022-12-22 PROCEDURE — G0158 HHC OT ASSISTANT EA 15: HCPCS

## 2022-12-22 NOTE — TELEPHONE ENCOUNTER
[address] w/ CHIKI DIMAS [facility] called stating when she checked the patients BP and pulse this morning his BP was lower than normal and his pulse was elevated. BP was 84/58 and pulse was 116. She requested a call back and can be reached at [phone].

## 2022-12-22 NOTE — PROGRESS NOTES
Pc with visiting nurse. Bp was low 84/56 and pulse was 116. To dc lisinopril.  Was in hospital in October with diastolic heart failure, (nl EF)

## 2022-12-23 ENCOUNTER — HOME CARE VISIT (OUTPATIENT)
Dept: HOME HEALTH SERVICES | Facility: HOME HEALTH | Age: 82
End: 2022-12-23
Payer: MEDICARE

## 2022-12-27 ENCOUNTER — HOME CARE VISIT (OUTPATIENT)
Dept: SCHEDULING | Facility: HOME HEALTH | Age: 82
End: 2022-12-27
Payer: MEDICARE

## 2022-12-27 VITALS
TEMPERATURE: 97 F | OXYGEN SATURATION: 99 % | RESPIRATION RATE: 18 BRPM | DIASTOLIC BLOOD PRESSURE: 60 MMHG | SYSTOLIC BLOOD PRESSURE: 122 MMHG | HEART RATE: 86 BPM

## 2022-12-27 PROCEDURE — G0299 HHS/HOSPICE OF RN EA 15 MIN: HCPCS

## 2022-12-28 ENCOUNTER — HOME CARE VISIT (OUTPATIENT)
Dept: HOME HEALTH SERVICES | Facility: HOME HEALTH | Age: 82
End: 2022-12-28
Payer: MEDICARE

## 2022-12-28 ENCOUNTER — HOME CARE VISIT (OUTPATIENT)
Dept: SCHEDULING | Facility: HOME HEALTH | Age: 82
End: 2022-12-28
Payer: MEDICARE

## 2022-12-28 VITALS
DIASTOLIC BLOOD PRESSURE: 60 MMHG | SYSTOLIC BLOOD PRESSURE: 100 MMHG | HEART RATE: 116 BPM | OXYGEN SATURATION: 98 % | TEMPERATURE: 98.3 F

## 2022-12-28 PROCEDURE — G0158 HHC OT ASSISTANT EA 15: HCPCS

## 2022-12-29 ENCOUNTER — HOME CARE VISIT (OUTPATIENT)
Dept: HOME HEALTH SERVICES | Facility: HOME HEALTH | Age: 82
End: 2022-12-29
Payer: MEDICARE

## 2022-12-29 ENCOUNTER — HOME CARE VISIT (OUTPATIENT)
Dept: SCHEDULING | Facility: HOME HEALTH | Age: 82
End: 2022-12-29
Payer: MEDICARE

## 2023-01-24 ENCOUNTER — TELEPHONE (OUTPATIENT)
Dept: FAMILY MEDICINE CLINIC | Age: 83
End: 2023-01-24

## 2023-01-24 NOTE — TELEPHONE ENCOUNTER
LCSW called pt's dtr, Ulices Oconnor, to confirm initial psychosocial assessment scheduled for tomorrow, Weds 1/25. She stated that this still works for her father and mother, and they are looking forward to meeting LCSW tomorrow, and to meeting NP on Thursday. She states that her father is able to sign paperwork for himself, and her mother will be at the house as well.     ALVINO Olivia, [facility]    [address]  (o) [phone]

## 2023-01-25 NOTE — TELEPHONE ENCOUNTER
LCSW called pt's dtr to inform that the previously confirmed in-home visit for [facility] paperwork completion was going to need to be rescheduled due to LCSW taking unforeseen PTO tomorrow, 1/25. LCSW informed Beth Townsend that her father will still be seen by [facility] provider for [facility] on 1/26, but remainder of paperwork would need to be completed at a later date. She expressed that an in-home visit on Monday 1/30 @ 1pm would work for her parents.  LCSW to visit on 1/30/23 @ 1pm.    Danitza Starks, MSW, [address] Based [address] [place] [address]  (y) [phone]

## 2023-01-26 ENCOUNTER — HOME VISIT (OUTPATIENT)
Dept: FAMILY MEDICINE CLINIC | Age: 83
End: 2023-01-26
Payer: MEDICARE

## 2023-01-26 ENCOUNTER — DOCUMENTATION ONLY (OUTPATIENT)
Dept: FAMILY MEDICINE CLINIC | Age: 83
End: 2023-01-26

## 2023-01-26 VITALS
SYSTOLIC BLOOD PRESSURE: 136 MMHG | OXYGEN SATURATION: 100 % | RESPIRATION RATE: 18 BRPM | TEMPERATURE: 97.3 F | DIASTOLIC BLOOD PRESSURE: 78 MMHG | HEART RATE: 106 BPM

## 2023-01-26 DIAGNOSIS — I10 ESSENTIAL HYPERTENSION: ICD-10-CM

## 2023-01-26 DIAGNOSIS — E11.59 TYPE 2 DIABETES MELLITUS WITH OTHER CIRCULATORY COMPLICATIONS (HCC): ICD-10-CM

## 2023-01-26 DIAGNOSIS — Z86.73 HISTORY OF CVA (CEREBROVASCULAR ACCIDENT): ICD-10-CM

## 2023-01-26 DIAGNOSIS — S91.301A NON-HEALING WOUND OF RIGHT HEEL: ICD-10-CM

## 2023-01-26 DIAGNOSIS — I50.20 HFREF (HEART FAILURE WITH REDUCED EJECTION FRACTION) (HCC): Primary | ICD-10-CM

## 2023-01-26 DIAGNOSIS — I73.9 PAD (PERIPHERAL ARTERY DISEASE) (HCC): ICD-10-CM

## 2023-01-26 DIAGNOSIS — I25.708 CORONARY ARTERY DISEASE INVOLVING CORONARY BYPASS GRAFT OF NATIVE HEART WITH OTHER FORMS OF ANGINA PECTORIS (HCC): ICD-10-CM

## 2023-01-26 DIAGNOSIS — E78.00 HYPERCHOLESTEROLEMIA: ICD-10-CM

## 2023-01-26 DIAGNOSIS — N40.0 BENIGN PROSTATIC HYPERPLASIA WITHOUT LOWER URINARY TRACT SYMPTOMS: ICD-10-CM

## 2023-01-26 DIAGNOSIS — I50.20 HFREF (HEART FAILURE WITH REDUCED EJECTION FRACTION) (HCC): ICD-10-CM

## 2023-01-26 DIAGNOSIS — N18.31 TYPE 2 DIABETES MELLITUS WITH STAGE 3A CHRONIC KIDNEY DISEASE, WITHOUT LONG-TERM CURRENT USE OF INSULIN (HCC): ICD-10-CM

## 2023-01-26 DIAGNOSIS — N18.30 STAGE 3 CHRONIC KIDNEY DISEASE, UNSPECIFIED WHETHER STAGE 3A OR 3B CKD (HCC): ICD-10-CM

## 2023-01-26 DIAGNOSIS — E44.0 MODERATE PROTEIN-CALORIE MALNUTRITION (HCC): ICD-10-CM

## 2023-01-26 DIAGNOSIS — E11.22 TYPE 2 DIABETES MELLITUS WITH STAGE 3A CHRONIC KIDNEY DISEASE, WITHOUT LONG-TERM CURRENT USE OF INSULIN (HCC): ICD-10-CM

## 2023-01-26 DIAGNOSIS — S91.002D WOUND OF LEFT ANKLE, SUBSEQUENT ENCOUNTER: ICD-10-CM

## 2023-01-26 PROBLEM — F11.99 OPIOID USE, UNSPECIFIED WITH UNSPECIFIED OPIOID-INDUCED DISORDER (HCC): Status: RESOLVED | Noted: 2022-01-01 | Resolved: 2023-01-01

## 2023-01-26 PROBLEM — I50.9 CHF EXACERBATION (HCC): Status: RESOLVED | Noted: 2022-09-19 | Resolved: 2023-01-26

## 2023-01-26 LAB
ALBUMIN SERPL-MCNC: 3.7 G/DL (ref 3.5–5)
ALBUMIN/GLOB SERPL: 0.8 (ref 1.1–2.2)
ALP SERPL-CCNC: 74 U/L (ref 45–117)
ALT SERPL-CCNC: 18 U/L (ref 12–78)
ANION GAP SERPL CALC-SCNC: 6 MMOL/L (ref 5–15)
AST SERPL-CCNC: 23 U/L (ref 15–37)
BASOPHILS # BLD: 0.1 K/UL (ref 0–0.1)
BASOPHILS NFR BLD: 1 % (ref 0–1)
BILIRUB SERPL-MCNC: 0.5 MG/DL (ref 0.2–1)
BUN SERPL-MCNC: 30 MG/DL (ref 6–20)
BUN/CREAT SERPL: 19 (ref 12–20)
CALCIUM SERPL-MCNC: 10 MG/DL (ref 8.5–10.1)
CHLORIDE SERPL-SCNC: 94 MMOL/L (ref 97–108)
CHOLEST SERPL-MCNC: 163 MG/DL
CO2 SERPL-SCNC: 32 MMOL/L (ref 21–32)
COMMENT, HOLDF: NORMAL
CREAT SERPL-MCNC: 1.54 MG/DL (ref 0.7–1.3)
DIFFERENTIAL METHOD BLD: NORMAL
EOSINOPHIL # BLD: 0.1 K/UL (ref 0–0.4)
EOSINOPHIL NFR BLD: 1 % (ref 0–7)
ERYTHROCYTE [DISTWIDTH] IN BLOOD BY AUTOMATED COUNT: 13.1 % (ref 11.5–14.5)
EST. AVERAGE GLUCOSE BLD GHB EST-MCNC: 143 MG/DL
GLOBULIN SER CALC-MCNC: 4.4 G/DL (ref 2–4)
GLUCOSE SERPL-MCNC: 141 MG/DL (ref 65–100)
HBA1C MFR BLD: 6.6 % (ref 4–5.6)
HCT VFR BLD AUTO: 40.4 % (ref 36.6–50.3)
HDLC SERPL-MCNC: 39 MG/DL
HDLC SERPL: 4.2 (ref 0–5)
HGB BLD-MCNC: 12.5 G/DL (ref 12.1–17)
IMM GRANULOCYTES # BLD AUTO: 0 K/UL (ref 0–0.04)
IMM GRANULOCYTES NFR BLD AUTO: 0 % (ref 0–0.5)
LDLC SERPL CALC-MCNC: 94.8 MG/DL (ref 0–100)
LYMPHOCYTES # BLD: 2.1 K/UL (ref 0.8–3.5)
LYMPHOCYTES NFR BLD: 23 % (ref 12–49)
MCH RBC QN AUTO: 26.9 PG (ref 26–34)
MCHC RBC AUTO-ENTMCNC: 30.9 G/DL (ref 30–36.5)
MCV RBC AUTO: 87.1 FL (ref 80–99)
MONOCYTES # BLD: 0.9 K/UL (ref 0–1)
MONOCYTES NFR BLD: 9 % (ref 5–13)
NEUTS SEG # BLD: 6.2 K/UL (ref 1.8–8)
NEUTS SEG NFR BLD: 66 % (ref 32–75)
NRBC # BLD: 0 K/UL (ref 0–0.01)
NRBC BLD-RTO: 0 PER 100 WBC
PLATELET # BLD AUTO: 281 K/UL (ref 150–400)
PMV BLD AUTO: 10.6 FL (ref 8.9–12.9)
POTASSIUM SERPL-SCNC: 3.9 MMOL/L (ref 3.5–5.1)
PROT SERPL-MCNC: 8.1 G/DL (ref 6.4–8.2)
RBC # BLD AUTO: 4.64 M/UL (ref 4.1–5.7)
SAMPLES BEING HELD,HOLD: NORMAL
SODIUM SERPL-SCNC: 132 MMOL/L (ref 136–145)
TRIGL SERPL-MCNC: 146 MG/DL (ref ?–150)
VLDLC SERPL CALC-MCNC: 29.2 MG/DL
WBC # BLD AUTO: 9.4 K/UL (ref 4.1–11.1)

## 2023-01-26 PROCEDURE — 3044F HG A1C LEVEL LT 7.0%: CPT | Performed by: NURSE PRACTITIONER

## 2023-01-26 PROCEDURE — 99345 HOME/RES VST NEW HIGH MDM 75: CPT | Performed by: NURSE PRACTITIONER

## 2023-01-26 PROCEDURE — 3078F DIAST BP <80 MM HG: CPT | Performed by: NURSE PRACTITIONER

## 2023-01-26 PROCEDURE — 3075F SYST BP GE 130 - 139MM HG: CPT | Performed by: NURSE PRACTITIONER

## 2023-01-26 PROCEDURE — 1123F ACP DISCUSS/DSCN MKR DOCD: CPT | Performed by: NURSE PRACTITIONER

## 2023-01-26 RX ORDER — POTASSIUM CHLORIDE 20 MEQ/1
20 TABLET, EXTENDED RELEASE ORAL DAILY
Qty: 90 TABLET | Refills: 1 | Status: SHIPPED | OUTPATIENT
Start: 2023-01-26

## 2023-01-26 RX ORDER — AMITRIPTYLINE HYDROCHLORIDE 10 MG/1
10 TABLET, FILM COATED ORAL 2 TIMES DAILY
Qty: 180 TABLET | Refills: 1 | Status: SHIPPED | OUTPATIENT
Start: 2023-01-26

## 2023-01-26 RX ORDER — METOPROLOL TARTRATE 37.5 MG/1
1 TABLET, FILM COATED ORAL 2 TIMES DAILY
Qty: 180 TABLET | Refills: 1 | Status: SHIPPED | OUTPATIENT
Start: 2023-01-26

## 2023-01-26 RX ORDER — METFORMIN HYDROCHLORIDE 1000 MG/1
TABLET ORAL
Qty: 225 TABLET | Refills: 1 | Status: SHIPPED | OUTPATIENT
Start: 2023-01-26 | End: 2023-01-27

## 2023-01-26 RX ORDER — TAMSULOSIN HYDROCHLORIDE 0.4 MG/1
0.4 CAPSULE ORAL DAILY
Qty: 90 CAPSULE | Refills: 1 | Status: SHIPPED | OUTPATIENT
Start: 2023-01-26

## 2023-01-26 RX ORDER — ROSUVASTATIN CALCIUM 20 MG/1
TABLET, COATED ORAL
Qty: 90 TABLET | Refills: 1 | Status: SHIPPED | OUTPATIENT
Start: 2023-01-26

## 2023-01-26 RX ORDER — METOPROLOL TARTRATE 25 MG/1
TABLET, FILM COATED ORAL
Qty: 270 TABLET | Refills: 1 | Status: SHIPPED | OUTPATIENT
Start: 2023-01-26 | End: 2023-01-26
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
## 2023-01-26 NOTE — PROGRESS NOTES
Results discussed with daughter, Iam Higgins, via phone after visit today (see visit note for further details)

## 2023-01-26 NOTE — PROGRESS NOTES
Joint in home visit with Fransisco Buckley NP to establish PCP relationship. Met with patient and wife Nancie Patton. S - No I am not having any pain today so far. I often have pain in my feet. O - Patient alert and oriented x 4. Speech very soft spoken, almost whispery. Appetite poor. Wife reports that he is only eating about one meal daily. States he has not had a BM for 1 week. Advised to take Miralax BID until bowels are moving then can decrease to PRN and restart taking Colace 2 tabs daily every day. Discussed that he must increase his oral liquids in order for Colace to soften his stools. Discussed that with constipation that affects his appetite and makes him feel full all the time. Venipuncture x 1 in right antecubital vein, pt tolerated well. Blood samples obtained for CMP, CBC, lipid panel and A1c, transported to [facility] lab for analysis. Right great toe wound, right heel wound, left top of foot wound. All have been open since time of discharge from rehab. Reviewed S/S of infection, redness, increased tenderness, swelling and creamy drainage. Wife indicated that she was using vaseline on top of foot wound and it had creamy drainage at that time. She has stopped the vaseline and wound appeared dry at time of this visit. A - /78 right upper arm P 106 R 18  O2 sat 100%    P - Will make [facility] referral to AT [address] so that they can coordinate / consult with wound care physician. Will refer to [facility] for bilat foot and ankle x-rays to rule out osteomyelitis.

## 2023-01-27 DIAGNOSIS — S91.301A NON-HEALING WOUND OF RIGHT HEEL: ICD-10-CM

## 2023-01-27 DIAGNOSIS — S91.001D WOUND OF RIGHT ANKLE, SUBSEQUENT ENCOUNTER: Primary | ICD-10-CM

## 2023-01-27 DIAGNOSIS — I73.9 PAD (PERIPHERAL ARTERY DISEASE) (HCC): ICD-10-CM

## 2023-01-27 RX ORDER — METFORMIN HYDROCHLORIDE 1000 MG/1
1000 TABLET ORAL 2 TIMES DAILY WITH MEALS
COMMUNITY

## 2023-01-27 RX ORDER — FUROSEMIDE 80 MG/1
40 TABLET ORAL DAILY
COMMUNITY

## 2023-01-30 ENCOUNTER — TELEPHONE (OUTPATIENT)
Dept: FAMILY MEDICINE CLINIC | Age: 83
End: 2023-01-30

## 2023-01-30 NOTE — TELEPHONE ENCOUNTER
Telephone call to [facility] and AT [address] [place] [address] Drive to cancel services for patient after learning of his death on Saturday 1/28/23.

## 2023-01-30 NOTE — TELEPHONE ENCOUNTER
LCSW rec'd call from pt' dtr, Linda Gutierrez, to inform that her father passed away on Saturday. LCSW was scheduled to visit with pt today. She was appropriately tearful, called to ensure that LCSW knew that no visit was necessary today. LCSW offered condolences on behalf of [facility] team and offered bereavement resources if needed.  LCSW relayed this message to [facility] team.     Kj Rivera, MSW, [address]  [phone] [id number]

## 2023-02-01 DIAGNOSIS — S91.002D WOUND OF LEFT ANKLE, SUBSEQUENT ENCOUNTER: ICD-10-CM

## 2023-02-01 DIAGNOSIS — S91.301A NON-HEALING WOUND OF RIGHT HEEL: ICD-10-CM

## (undated) DEVICE — AVID DUAL STAGE VENOUS DRAINAGE CANNULA: Brand: AVID DUAL STAGE VENOUS DRAINAGE CANNULA

## (undated) DEVICE — RADIFOCUS OPTITORQUE ANGIOGRAPHIC CATHETER: Brand: OPTITORQUE

## (undated) DEVICE — INSERT SUT HLD F/OCTBS RETRCTR --

## (undated) DEVICE — DRAIN,WOUND,ROUND,24FR,5/16",FULL-FLUTED: Brand: MEDLINE

## (undated) DEVICE — CATHETER ETER ANGIO L110CM OD5FR ID046IN L75CM 038IN 145DEG CARD

## (undated) DEVICE — TTL1LYR 16FR10ML 100%SIL TMPST TR: Brand: MEDLINE

## (undated) DEVICE — AGENT HEMSTAT W4XL4IN OXIDIZED REGENERATED CELOS ABSRB SFT

## (undated) DEVICE — 6 FOOT DISPOSABLE EXTENSION CABLE WITH SAFE CONNECT / SCREW-DOWN

## (undated) DEVICE — COVER,MAYO STAND,STERILE: Brand: MEDLINE

## (undated) DEVICE — SYR 50ML LR LCK 1ML GRAD NSAF --

## (undated) DEVICE — DISPOSABLE OR TOWEL: Brand: CARDINAL HEALTH

## (undated) DEVICE — Device

## (undated) DEVICE — BAG RED 3PLY 2MIL 30X40 IN

## (undated) DEVICE — STERILE POLYISOPRENE POWDER-FREE SURGICAL GLOVES: Brand: PROTEXIS

## (undated) DEVICE — DRESSING SIL W4XL5IN ANTIBACT GELLING FBR CYTOFORM

## (undated) DEVICE — CANNULA INJ L2.5IN BLNT TIP 3MM CLR BODY W/ 1 W VLV DLP

## (undated) DEVICE — DUAL LUMEN STOMACH TUBE MULTI-FUNCTIONAL PORT: Brand: SALEM SUMP

## (undated) DEVICE — DRSG BORDR MPLX HEEL 8.7X9.1IN --

## (undated) DEVICE — TUBING PRSS MON L6IN PVC M FEM CONN

## (undated) DEVICE — SUTURE PROL SZ 7-0 L24IN NONABSORBABLE BLU L8MM BV175-6 3/8 8735H

## (undated) DEVICE — SYR LR LCK 1ML GRAD NSAF 30ML --

## (undated) DEVICE — 3M™ TEGADERM™ TRANSPARENT FILM DRESSING FRAME STYLE, 1626W, 4 IN X 4-3/4 IN (10 CM X 12 CM), 50/CT 4CT/CASE: Brand: 3M™ TEGADERM™

## (undated) DEVICE — TUBING, SUCTION, 1/4" X 10', STRAIGHT: Brand: MEDLINE

## (undated) DEVICE — PRESSURE MONITORING SET: Brand: TRUWAVE

## (undated) DEVICE — SOL IRR STRL H2O 1000ML BTL --

## (undated) DEVICE — SYRINGE ANGIO 10 CC BRL STD PRNT POLYCARB LT BLU MEDALLION

## (undated) DEVICE — HI-TORQUE VERSACORE FLOPPY GUIDE WIRE SYSTEM 145 CM: Brand: HI-TORQUE VERSACORE

## (undated) DEVICE — GOWN,SIRUS,NONRNF,SETINSLV,XL,20/CS: Brand: MEDLINE

## (undated) DEVICE — KIT,1200CC CANISTER,3/16"X6' TUBING: Brand: MEDLINE INDUSTRIES, INC.

## (undated) DEVICE — 3M™ MEDIPORE™ H SOFT CLOTH SURGICAL TAPE 2864, 4 INCH X 10 YARD (10CM X 9,14M), 12 ROLLS/CASE: Brand: 3M™ MEDIPORE™

## (undated) DEVICE — KIT,ANTI FOG,W/SPONGE & FLUID,SOFT PACK: Brand: MEDLINE

## (undated) DEVICE — AEGIS 1" DISK 4MM HOLE, PEEL OPEN: Brand: MEDLINE

## (undated) DEVICE — SUTURE SZ 7 L18IN NONABSORBABLE SIL CCS L48MM 1/2 CIR STRNM M655G

## (undated) DEVICE — SPONGE GZ W4XL4IN COT 12 PLY TYP VII WVN C FLD DSGN

## (undated) DEVICE — SUTURE PROL SZ 6-0 L24IN NONABSORBABLE BLU L13MM C-1 3/8 8726H

## (undated) DEVICE — GLIDESHEATH SLENDER ACCESS KIT: Brand: GLIDESHEATH SLENDER

## (undated) DEVICE — KIT BLWR MISTER 5P 15L W/ TBNG SET IRRIG MIST TO IMPROVE

## (undated) DEVICE — SUTURE PROL SZ 5-0 L30IN NONABSORBABLE BLU L13MM RB-2 1/2 8710H

## (undated) DEVICE — REM POLYHESIVE ADULT PATIENT RETURN ELECTRODE: Brand: VALLEYLAB

## (undated) DEVICE — SYR 10ML LUER LOK 1/5ML GRAD --

## (undated) DEVICE — NEEDLE HYPO 18GA L1.5IN PNK S STL HUB POLYPR SHLD REG BVL

## (undated) DEVICE — SUTURE VCRL SZ 0 L18IN ABSRB VLT L40MM CT 1/2 CIR J752D

## (undated) DEVICE — SOLUTION IV 1000ML PH 7.4 INJ NRMSOL R

## (undated) DEVICE — SUT PROL 6-0 30IN C1 DA BLU --

## (undated) DEVICE — LABEL MED CARD MRMC STRL

## (undated) DEVICE — SUTURE TICRON DBL ARMED 2 0 CV 305 42IN BLU N ABSRB BRAID 8886303551

## (undated) DEVICE — SUTURE ETHBND EXCEL SZ 3-0 L36IN NONABSORBABLE GRN BB L17MM X588H

## (undated) DEVICE — TR BAND RADIAL ARTERY COMPRESSION DEVICE: Brand: TR BAND

## (undated) DEVICE — SUT PROL 3-0 30IN SH1 DA BLU --

## (undated) DEVICE — DRAPE SLUSH DISC W44XL66IN ST FOR RND BSIN HUSH SLUSH SYS

## (undated) DEVICE — BLADE OPHTH 180DEG CUT SURF BLU STR SHRP DBL BVL GRINDLESS

## (undated) DEVICE — CANNULA AORT ROOT INTRO STD TIP 5FR OVERALL LEN 55IN DLP

## (undated) DEVICE — 3M™ IOBAN™ 2 ANTIMICROBIAL INCISE DRAPE 6648EZ: Brand: IOBAN™ 2

## (undated) DEVICE — BLANKET WRM W25XL64IN NONWOVEN SFT LTWT PLIABLE HYPR

## (undated) DEVICE — INFECTION CONTROL KIT SYS

## (undated) DEVICE — SYR 3ML LL TIP 1/10ML GRAD --

## (undated) DEVICE — PREP SKN CHLRAPRP APL 26ML STR --

## (undated) DEVICE — SUTURE PERMA-HAND 0 L18IN NONABSORBABLE BLK CT-1 L36MM 1/2 C021D

## (undated) DEVICE — CANNULA PERF ART HI FLO VENT DIL TIP J TIP 3/8IN CONN 22FR

## (undated) DEVICE — KIT ANGIOGRAPHY CUST MRMC

## (undated) DEVICE — SYS VSL HARV HEMOPRO2 VASOVIEW -- HARV SYS MINIMUM ORDER 5/EA

## (undated) DEVICE — PACK PROCEDURE SURG HRT CATH

## (undated) DEVICE — TUBE KT ENDFLTN INSUFFLTN SVL --

## (undated) DEVICE — SET PERF L203CM 12IN RED AND BLU AORT ROOT MULT SLIP CONN

## (undated) DEVICE — DRAPE FLD WRM W44XL66IN C6L FOR INTRATEMP SYS THERMABASIN

## (undated) DEVICE — TRANSFER PK BLD PROD 300ML --

## (undated) DEVICE — SYR 20ML LL STRL LF --

## (undated) DEVICE — SUTURE MCRYL SZ 3-0 L27IN ABSRB UD L24MM PS-1 3/8 CIR PRIM Y936H

## (undated) DEVICE — BLADE SAW STRNL 29.9MM NS --

## (undated) DEVICE — MEDI-TRACE CADENCE ADULT, DEFIBRILLATION ELECTRODE -RTS  (10 PR/PK) - PHILIPS: Brand: MEDI-TRACE CADENCE

## (undated) DEVICE — SUTURE PROL SZ 6-0 L30IN NONABSORBABLE BLU L13MM C-1 3/8 M8706

## (undated) DEVICE — INTENDED FOR TISSUE SEPARATION, AND OTHER PROCEDURES THAT REQUIRE A SHARP SURGICAL BLADE TO PUNCTURE OR CUT.: Brand: BARD-PARKER ® CARBON RIB-BACK BLADES

## (undated) DEVICE — SOL IRR SOD CL 0.9% 1000ML BTL --

## (undated) DEVICE — STERNUM BLADE, OFFSET (31.7 X 0.64 X 6.3MM)

## (undated) DEVICE — 72" ARTERIAL PRESSURE TUBING: Brand: ICU MEDICAL

## (undated) DEVICE — CLIP INT SM WIDE RED TI TRNSVRS GRV CHEVRON SHP W PRECIS

## (undated) DEVICE — BLADE OPHTH W1.5MM 15DEG ORNG HNDL SHRP SHRP DEL FOR CATRCT

## (undated) DEVICE — CATHETER IV 14GA L2IN POLYUR STR ORNG HUB SFTY RADPQ DISP

## (undated) DEVICE — DRAPE PRB US TRNSDCR 6X96IN --

## (undated) DEVICE — COVER,TABLE,60X90,STERILE: Brand: MEDLINE

## (undated) DEVICE — GUIDEWIRE VASC L260CM 0.035IN J TIP L3MM PTFE FIX COR NAMIC

## (undated) DEVICE — SPLINT WR POS F/ARTERIAL ACC -- BX/10

## (undated) DEVICE — ADAPTER CATH WHT DISP FOR ANES

## (undated) DEVICE — SOL INJ SOD CL 0.9% 500ML BG --

## (undated) DEVICE — STRAP,POSITIONING,KNEE/BODY,FOAM,4X60": Brand: MEDLINE

## (undated) DEVICE — AORTIC PUNCHES ARE USED TO CREATE A UNIFORM OPENING IN BLOOD VESSELS DURING CARDIOVASCULAR SURGERY. THE VESSEL GRAFT IS INSERTED INTO THE CREATED OPENING AND SUTURED TO THE VESSEL WALL. AORTIC LANCETS ARE USED TO MAKE A SMALL UNIFORM CUT IN A BLOOD VESSEL TO FACILITATE INSERTION OF AN AORTIC PUNCH.  PUNCHES COME IN VARIOUS LENGTHS, DIAMETERS AND TIP CONFIGURATIONS.: Brand: CLEANCUT ROTATING AORTIC PUNCH